# Patient Record
Sex: FEMALE | Race: WHITE | NOT HISPANIC OR LATINO | Employment: OTHER | URBAN - METROPOLITAN AREA
[De-identification: names, ages, dates, MRNs, and addresses within clinical notes are randomized per-mention and may not be internally consistent; named-entity substitution may affect disease eponyms.]

---

## 2017-01-05 ENCOUNTER — TELEPHONE (OUTPATIENT)
Dept: INTERNAL MEDICINE | Facility: CLINIC | Age: 67
End: 2017-01-05

## 2017-01-05 NOTE — TELEPHONE ENCOUNTER
----- Message from Safia Sanches sent at 1/5/2017 10:29 AM CST -----  Contact: self  Pt returning phone call. Please contact the pt at [phone]. Thanks!

## 2017-01-17 ENCOUNTER — OFFICE VISIT (OUTPATIENT)
Dept: INTERNAL MEDICINE | Facility: CLINIC | Age: 67
End: 2017-01-17
Payer: MEDICARE

## 2017-01-17 VITALS
OXYGEN SATURATION: 96 % | TEMPERATURE: 98 F | HEIGHT: 59 IN | HEART RATE: 83 BPM | RESPIRATION RATE: 17 BRPM | BODY MASS INDEX: 37.36 KG/M2 | DIASTOLIC BLOOD PRESSURE: 82 MMHG | WEIGHT: 185.31 LBS | SYSTOLIC BLOOD PRESSURE: 120 MMHG

## 2017-01-17 DIAGNOSIS — J44.9 CHRONIC OBSTRUCTIVE PULMONARY DISEASE, UNSPECIFIED COPD TYPE: ICD-10-CM

## 2017-01-17 DIAGNOSIS — I10 HYPERTENSION, ESSENTIAL: ICD-10-CM

## 2017-01-17 DIAGNOSIS — N18.30 CKD (CHRONIC KIDNEY DISEASE), STAGE III: ICD-10-CM

## 2017-01-17 DIAGNOSIS — H25.9 SENILE CATARACT OF LEFT EYE, UNSPECIFIED AGE-RELATED CATARACT TYPE: Primary | ICD-10-CM

## 2017-01-17 PROCEDURE — 99999 PR PBB SHADOW E&M-EST. PATIENT-LVL III: CPT | Mod: PBBFAC,,, | Performed by: INTERNAL MEDICINE

## 2017-01-17 PROCEDURE — 99213 OFFICE O/P EST LOW 20 MIN: CPT | Mod: PBBFAC | Performed by: INTERNAL MEDICINE

## 2017-01-17 PROCEDURE — 99214 OFFICE O/P EST MOD 30 MIN: CPT | Mod: S$PBB,,, | Performed by: INTERNAL MEDICINE

## 2017-01-17 NOTE — PROGRESS NOTES
"Subjective:       Patient ID: Dayami Mina is a 66 y.o. female.    Chief Complaint: Bronchitis and Follow-up    HPI Comments: Medical clearance for cataract surgery 1/30/17    HPI: 65 y/o w/ HTN CKD III chronic lower back pain presents for clearance for elective left cataract surgery scheduled 1/30/17. Seen one month ago with persistant cough and wheezing has resolved now back to baseline no exertional dyspnea not using albuterol regularlly. No LE edema or CP    Review of Systems   Constitutional: Negative for activity change, appetite change, fatigue, fever and unexpected weight change.   HENT: Negative for ear pain, rhinorrhea and sore throat.    Eyes: Negative for discharge and visual disturbance.   Respiratory: Negative for chest tightness, shortness of breath and wheezing.    Cardiovascular: Negative for chest pain, palpitations and leg swelling.   Gastrointestinal: Negative for abdominal pain, constipation and diarrhea.   Endocrine: Negative for cold intolerance and heat intolerance.   Genitourinary: Negative for dysuria and hematuria.   Musculoskeletal: Negative for joint swelling and neck stiffness.   Skin: Negative for rash.   Neurological: Negative for dizziness, syncope, weakness and headaches.   Psychiatric/Behavioral: Negative for suicidal ideas.       Objective:     Vitals:    01/17/17 1119   BP: 120/82   BP Location: Left arm   Patient Position: Sitting   BP Method: Manual   Pulse: 83   Resp: 17   Temp: 98 °F (36.7 °C)   TempSrc: Oral   SpO2: 96%   Weight: 84.1 kg (185 lb 4.8 oz)   Height: 4' 11" (1.499 m)          Physical Exam   Constitutional: She is oriented to person, place, and time. She appears well-developed and well-nourished.   HENT:   Head: Normocephalic and atraumatic.   Eyes: Conjunctivae are normal. Pupils are equal, round, and reactive to light.   Neck: Normal range of motion.   Cardiovascular: Normal rate and regular rhythm.  Exam reveals no gallop and no friction rub.    No " murmur heard.  Pulmonary/Chest: Effort normal and breath sounds normal. She has no wheezes. She has no rales.   Abdominal: Soft. Bowel sounds are normal. There is no tenderness. There is no rebound and no guarding.   Musculoskeletal: Normal range of motion. She exhibits no edema or tenderness.   Neurological: She is alert and oriented to person, place, and time. No cranial nerve deficit.   Skin: Skin is warm and dry.   Psychiatric: She has a normal mood and affect.       Assessment:       1. Senile cataract of left eye, unspecified age-related cataract type    2. Hypertension, essential    3. CKD (chronic kidney disease), stage III    4. Chronic obstructive pulmonary disease, unspecified COPD type        Plan:    1. Medically optimized intermediate risk for low risk procedure no further CV testing indicated    2/3. BP at goal continue avoidance of NSAID's repeat labs in three months    4. Stable no wheezing today

## 2017-01-30 ENCOUNTER — OFFICE VISIT (OUTPATIENT)
Dept: FAMILY MEDICINE | Facility: CLINIC | Age: 67
End: 2017-01-30
Payer: MEDICARE

## 2017-01-30 VITALS
WEIGHT: 182.44 LBS | DIASTOLIC BLOOD PRESSURE: 78 MMHG | SYSTOLIC BLOOD PRESSURE: 159 MMHG | TEMPERATURE: 98 F | HEART RATE: 79 BPM | OXYGEN SATURATION: 96 % | HEIGHT: 59 IN | BODY MASS INDEX: 36.78 KG/M2

## 2017-01-30 DIAGNOSIS — J40 BRONCHITIS: ICD-10-CM

## 2017-01-30 DIAGNOSIS — J44.9 CHRONIC OBSTRUCTIVE PULMONARY DISEASE, UNSPECIFIED COPD TYPE: Primary | ICD-10-CM

## 2017-01-30 PROCEDURE — 99214 OFFICE O/P EST MOD 30 MIN: CPT | Mod: PBBFAC,PO | Performed by: NURSE PRACTITIONER

## 2017-01-30 PROCEDURE — 99999 PR PBB SHADOW E&M-EST. PATIENT-LVL IV: CPT | Mod: PBBFAC,,, | Performed by: NURSE PRACTITIONER

## 2017-01-30 PROCEDURE — 99214 OFFICE O/P EST MOD 30 MIN: CPT | Mod: S$PBB,,, | Performed by: NURSE PRACTITIONER

## 2017-01-30 RX ORDER — METHYLPREDNISOLONE 4 MG/1
TABLET ORAL
Qty: 1 PACKAGE | Refills: 0 | Status: SHIPPED | OUTPATIENT
Start: 2017-01-30 | End: 2017-02-20

## 2017-01-30 RX ORDER — BENZONATATE 100 MG/1
100 CAPSULE ORAL 3 TIMES DAILY PRN
Qty: 30 CAPSULE | Refills: 0 | Status: SHIPPED | OUTPATIENT
Start: 2017-01-30 | End: 2017-02-09

## 2017-01-30 RX ORDER — CODEINE PHOSPHATE AND GUAIFENESIN 10; 100 MG/5ML; MG/5ML
5 SOLUTION ORAL 3 TIMES DAILY PRN
Qty: 180 ML | Refills: 0 | Status: SHIPPED | OUTPATIENT
Start: 2017-01-30 | End: 2017-02-09

## 2017-01-30 RX ORDER — TRIAMCINOLONE ACETONIDE 40 MG/ML
40 INJECTION, SUSPENSION INTRA-ARTICULAR; INTRAMUSCULAR
Status: COMPLETED | OUTPATIENT
Start: 2017-01-30 | End: 2017-01-30

## 2017-01-30 RX ADMIN — TRIAMCINOLONE ACETONIDE 40 MG: 40 INJECTION, SUSPENSION INTRA-ARTICULAR; INTRAMUSCULAR at 02:01

## 2017-01-30 NOTE — MR AVS SNAPSHOT
[facility]  [address]  [place] [address]  Phone: [phone]  Fax: [phone]                  Dayami Mina   2017 2:00 PM   Office Visit    Description:  Female : 1950   Provider:  ROSMERY GARCIA IN   Department:  [facility]           Reason for Visit     COUGH & CONGESTION           Diagnoses this Visit        Comments    Chronic obstructive pulmonary disease, unspecified COPD type    -  Primary     Bronchitis                To Do List           Goals (5 Years of Data)     None      Follow-Up and Disposition     Return if symptoms worsen or fail to improve.       These Medications        Disp Refills Start End    guaifenesin-codeine 100-10 mg/5 ml (TUSSI-ORGANIDIN NR)  mg/5 mL syrup 180 mL 0 2017    Take 5 mLs by mouth 3 (three) times daily as needed for Cough. - Oral    Pharmacy: [address] Ph #: [phone]       methylPREDNISolone (MEDROL DOSEPACK) 4 mg tablet 1 Package 0 2017    use as directed    Pharmacy: [address] Ph #: [phone]       benzonatate (TESSALON) 100 MG capsule 30 capsule 0 2017    Take 1 capsule (100 mg total) by mouth 3 (three) times daily as needed. - Oral    Pharmacy: [address] Ph #: [phone]         [facility]     [facility] Care Line -  Assistance  Registered nurses in the [facility] provide clinical advisement, health education, appointment booking, and other advisory services.  Call for this free service at [phone].             Medications           Message regarding Medications     Verify the changes and/or additions to your medication regime listed below are the same as discussed with your clinician today.  If any of these changes or additions are  incorrect, please notify your healthcare provider.        START taking these NEW medications        Refills    guaifenesin-codeine 100-10 mg/5 ml (TUSSI-ORGANIDIN NR)  mg/5 mL syrup 0    Sig: Take 5 mLs by mouth 3 (three) times daily as needed for Cough.    Class: Normal    Route: Oral    methylPREDNISolone (MEDROL DOSEPACK) 4 mg tablet 0    Sig: use as directed    Class: Normal    benzonatate (TESSALON) 100 MG capsule 0    Sig: Take 1 capsule (100 mg total) by mouth 3 (three) times daily as needed.    Class: Normal    Route: Oral      These medications were administered today        Dose Freq    triamcinolone acetonide injection 40 mg 40 mg Clinic/HOD 1 time    Sig: Inject 1 mL (40 mg total) into the muscle one time.    Class: Normal    Route: Intramuscular      STOP taking these medications     ferrous sulfate 325 mg (65 mg iron) Tab tablet Take 1 tablet (325 mg total) by mouth 2 (two) times daily.    omega-3 acid ethyl esters (LOVAZA) 1 gram capsule Take 2 g by mouth 2 (two) times daily.           Verify that the below list of medications is an accurate representation of the medications you are currently taking.  If none reported, the list may be blank. If incorrect, please contact your healthcare provider. Carry this list with you in case of emergency.           Current Medications     albuterol 90 mcg/actuation inhaler Inhale 2 puffs into the lungs every 6 (six) hours as needed for Wheezing.    allopurinol (ZYLOPRIM) 100 MG tablet Take 100 mg by mouth once daily.     aspirin (ECOTRIN) 81 MG EC tablet Take 81 mg by mouth once daily.    escitalopram oxalate (LEXAPRO) 20 MG tablet Take 1 tablet (20 mg total) by mouth once daily.    esomeprazole (NEXIUM) 20 MG capsule Take 20 mg by mouth before breakfast.    estradiol (ESTRACE) 0.5 MG tablet Take 0.5 mg by mouth once daily.    gabapentin (NEURONTIN) 100 MG capsule     lancets Misc 1 lancet by Misc.(Non-Drug; Combo Route) route once daily.    LIVALO 2 mg Tab  "tablet TAKE 1 TABLET THREE TIMES WEEKLY    loratadine (CLARITIN) 10 mg tablet Take 10 mg by mouth once daily.    losartan (COZAAR) 50 MG tablet Take 50 mg by mouth 2 (two) times daily.    tramadol (ULTRAM) 50 mg tablet Take 50 mg by mouth every 6 (six) hours as needed for Pain.    benzonatate (TESSALON) 100 MG capsule Take 1 capsule (100 mg total) by mouth 3 (three) times daily as needed.    blood sugar diagnostic Strp Uses Freestyle meter to monitor readings once daily    epinastine 0.05 % ophthalmic solution Place 2 drops into both eyes once daily.     guaifenesin-codeine 100-10 mg/5 ml (TUSSI-ORGANIDIN NR)  mg/5 mL syrup Take 5 mLs by mouth 3 (three) times daily as needed for Cough.    methylPREDNISolone (MEDROL DOSEPACK) 4 mg tablet use as directed           Clinical Reference Information           Vital Signs - Last Recorded  Most recent update: 1/30/2017  2:13 PM by Oksana Troncoso    BP Pulse Temp Ht Wt SpO2    (!) 159/78 (BP Location: Left arm, Patient Position: Sitting, BP Method: Manual) 79 97.9 °F (36.6 °C) (Oral) 4' 11" (1.499 m) 82.7 kg (182 lb 6.9 oz) 96%    BMI                36.85 kg/m2          Blood Pressure          Most Recent Value    BP  (!)  159/78      Allergies as of 1/30/2017     Sulfa (Sulfonamide Antibiotics)      Immunizations Administered on Date of Encounter - 1/30/2017     None      Instructions      Bronchitis, Antibiotic Treatment (Adult)    Bronchitis is an infection of the air passages (bronchial tubes) in your lungs. It often occurs when you have a cold. This illness is contagious during the first few days and is spread through the air by coughing and sneezing, or by direct contact (touching the sick person and then touching your own eyes, nose, or mouth).  Symptoms of bronchitis include cough with mucus (phlegm) and low-grade fever. Bronchitis usually lasts 7 to 14 days. Mild cases can be treated with simple home remedies. More severe infection is treated with an " antibiotic.  Home care  Follow these guidelines when caring for yourself at home:  · If your symptoms are severe, rest at home for the first 2 to 3 days. When you go back to your usual activities, don't let yourself get too tired.  · Do not smoke. Also avoid being exposed to secondhand smoke.  · You may use over-the-counter medicines to control fever or pain, unless another medicine was prescribed. (Note: If you have chronic liver or kidney disease or have ever had a stomach ulcer or gastrointestinal bleeding, talk with your healthcare provider before using these medicines. Also talk to your provider if you are taking medicine to prevent blood clots.) Aspirin should never be given to anyone younger than 18 years of age who is ill with a viral infection or fever. It may cause severe liver or brain damage.  · Your appetite may be poor, so a light diet is fine. Avoid dehydration by drinking 6 to 8 glasses of fluids per day (such as water, soft drinks, sports drinks, juices, tea, or soup). Extra fluids will help loosen secretions in the nose and lungs.  · Over-the-counter cough, cold, and sore-throat medicines will not shorten the length of the illness, but they may be helpful to reduce symptoms. (Note: Do not use decongestants if you have high blood pressure.)  · Finish all antibiotic medicine. Do this even if you are feeling better after only a few days.  Follow-up care  Follow up with your healthcare provider, or as advised. If you had an X-ray or ECG (electrocardiogram), a specialist will review it. You will be notified of any new findings that may affect your care.  Note: If you are age 65 or older, or if you have a chronic lung disease or condition that affects your immune system, or you smoke, talk to your healthcare provider about having pneumococcal vaccinations and a yearly influenza vaccination (flu shot).  When to seek medical advice  Call your healthcare provider right away if any of these occur:  · Fever  of 100.4°F (38°C) or higher  · Coughing up increased amounts of colored sputum  · Weakness, drowsiness, headache, facial pain, ear pain, or a stiff neck  Call 911, or get immediate medical care  Contact emergency services right away if any of these occur.  · Coughing up blood  · Worsening weakness, drowsiness, headache, or stiff neck  · Trouble breathing, wheezing, or pain with breathing  © 5832-9282 [employer]. [address] [place] [address], [place] [address]. All rights reserved. This information is not intended as a substitute for professional medical care. Always follow your healthcare professional's instructions.

## 2017-01-30 NOTE — PATIENT INSTRUCTIONS
Bronchitis, Antibiotic Treatment (Adult)    Bronchitis is an infection of the air passages (bronchial tubes) in your lungs. It often occurs when you have a cold. This illness is contagious during the first few days and is spread through the air by coughing and sneezing, or by direct contact (touching the sick person and then touching your own eyes, nose, or mouth).  Symptoms of bronchitis include cough with mucus (phlegm) and low-grade fever. Bronchitis usually lasts 7 to 14 days. Mild cases can be treated with simple home remedies. More severe infection is treated with an antibiotic.  Home care  Follow these guidelines when caring for yourself at home:  · If your symptoms are severe, rest at home for the first 2 to 3 days. When you go back to your usual activities, don't let yourself get too tired.  · Do not smoke. Also avoid being exposed to secondhand smoke.  · You may use over-the-counter medicines to control fever or pain, unless another medicine was prescribed. (Note: If you have chronic liver or kidney disease or have ever had a stomach ulcer or gastrointestinal bleeding, talk with your healthcare provider before using these medicines. Also talk to your provider if you are taking medicine to prevent blood clots.) Aspirin should never be given to anyone younger than 18 years of age who is ill with a viral infection or fever. It may cause severe liver or brain damage.  · Your appetite may be poor, so a light diet is fine. Avoid dehydration by drinking 6 to 8 glasses of fluids per day (such as water, soft drinks, sports drinks, juices, tea, or soup). Extra fluids will help loosen secretions in the nose and lungs.  · Over-the-counter cough, cold, and sore-throat medicines will not shorten the length of the illness, but they may be helpful to reduce symptoms. (Note: Do not use decongestants if you have high blood pressure.)  · Finish all antibiotic medicine. Do this even if you are feeling better after only a  few days.  Follow-up care  Follow up with your healthcare provider, or as advised. If you had an X-ray or ECG (electrocardiogram), a specialist will review it. You will be notified of any new findings that may affect your care.  Note: If you are age 65 or older, or if you have a chronic lung disease or condition that affects your immune system, or you smoke, talk to your healthcare provider about having pneumococcal vaccinations and a yearly influenza vaccination (flu shot).  When to seek medical advice  Call your healthcare provider right away if any of these occur:  · Fever of 100.4°F (38°C) or higher  · Coughing up increased amounts of colored sputum  · Weakness, drowsiness, headache, facial pain, ear pain, or a stiff neck  Call 911, or get immediate medical care  Contact emergency services right away if any of these occur.  · Coughing up blood  · Worsening weakness, drowsiness, headache, or stiff neck  · Trouble breathing, wheezing, or pain with breathing  © 2050-8077 [employer]. [address] [place] [address], [place] [address]. All rights reserved. This information is not intended as a substitute for professional medical care. Always follow your healthcare professional's instructions.

## 2017-01-30 NOTE — PROGRESS NOTES
Subjective:       Patient ID: Dayami Mina is a 66 y.o. female.    Chief Complaint: COUGH & CONGESTION (X1D)    Cough   This is a new problem. The current episode started yesterday. The problem has been gradually worsening. The problem occurs every few minutes. The cough is productive of sputum. Associated symptoms include ear congestion, headaches, nasal congestion, postnasal drip, rhinorrhea and a sore throat. Pertinent negatives include no chest pain, chills, ear pain, fever, hemoptysis, myalgias, shortness of breath, sweats, weight loss or wheezing. Nothing aggravates the symptoms. She has tried nothing for the symptoms. The treatment provided no relief.     Review of Systems   Constitutional: Negative for chills, fever and weight loss.   HENT: Positive for postnasal drip, rhinorrhea and sore throat. Negative for ear pain.    Respiratory: Positive for cough. Negative for hemoptysis, shortness of breath and wheezing.    Cardiovascular: Negative for chest pain.   Musculoskeletal: Negative for myalgias.   Neurological: Positive for headaches.       Objective:      Physical Exam   Constitutional: She is oriented to person, place, and time. She appears well-developed and well-nourished. No distress.   HENT:   Head: Normocephalic and atraumatic.   Right Ear: Tympanic membrane, external ear and ear canal normal.   Left Ear: Tympanic membrane, external ear and ear canal normal.   Nose: Mucosal edema present.   Mouth/Throat: Oropharynx is clear and moist. Mucous membranes are not dry. No oropharyngeal exudate, posterior oropharyngeal edema or posterior oropharyngeal erythema.   Cardiovascular: Normal rate and regular rhythm.    No murmur heard.  Pulmonary/Chest: Effort normal. She has no wheezes. She has rhonchi. She has no rales.   Lymphadenopathy:     She has no cervical adenopathy.   Neurological: She is alert and oriented to person, place, and time.   Skin: Skin is warm and dry.   Psychiatric: She has a  normal mood and affect.   Nursing note and vitals reviewed.      Assessment:       1. Chronic obstructive pulmonary disease, unspecified COPD type    2. Bronchitis        Plan:       Dayami was seen today for cough & congestion.    Diagnoses and all orders for this visit:    Chronic obstructive pulmonary disease, unspecified COPD type  -     guaifenesin-codeine 100-10 mg/5 ml (TUSSI-ORGANIDIN NR)  mg/5 mL syrup; Take 5 mLs by mouth 3 (three) times daily as needed for Cough.  -     methylPREDNISolone (MEDROL DOSEPACK) 4 mg tablet; use as directed  -     triamcinolone acetonide injection 40 mg; Inject 1 mL (40 mg total) into the muscle one time.  -     benzonatate (TESSALON) 100 MG capsule; Take 1 capsule (100 mg total) by mouth 3 (three) times daily as needed.    Bronchitis  -     guaifenesin-codeine 100-10 mg/5 ml (TUSSI-ORGANIDIN NR)  mg/5 mL syrup; Take 5 mLs by mouth 3 (three) times daily as needed for Cough.  -     methylPREDNISolone (MEDROL DOSEPACK) 4 mg tablet; use as directed  -     triamcinolone acetonide injection 40 mg; Inject 1 mL (40 mg total) into the muscle one time.  -     benzonatate (TESSALON) 100 MG capsule; Take 1 capsule (100 mg total) by mouth 3 (three) times daily as needed.    Pt has been given instructions populated from [employer] database and has verbalized understanding of the after visit summary and information contained wherein.     Follow up with a primary care provider. May go to ER for acute shortness of breath, lightheadedness, fever, or any other emergent complaints or changes in condition.

## 2017-02-27 ENCOUNTER — TELEPHONE (OUTPATIENT)
Dept: FAMILY MEDICINE | Facility: CLINIC | Age: 67
End: 2017-02-27

## 2017-02-27 NOTE — TELEPHONE ENCOUNTER
----- Message from Ti Howell sent at 2/27/2017  9:37 AM CST -----  Contact: Self/[phone]  Refill:  escitalopram oxalate (LEXAPRO) 20 MG tablet    Thank you.

## 2017-03-02 ENCOUNTER — PATIENT MESSAGE (OUTPATIENT)
Dept: WOUND CARE | Facility: HOSPITAL | Age: 67
End: 2017-03-02

## 2017-03-02 DIAGNOSIS — F33.42 RECURRENT MAJOR DEPRESSIVE DISORDER, IN FULL REMISSION: Primary | ICD-10-CM

## 2017-03-03 RX ORDER — ESCITALOPRAM OXALATE 20 MG/1
20 TABLET ORAL DAILY
Qty: 90 TABLET | Refills: 3 | Status: SHIPPED | OUTPATIENT
Start: 2017-03-03 | End: 2018-03-07 | Stop reason: SDUPTHER

## 2017-03-06 ENCOUNTER — TELEPHONE (OUTPATIENT)
Dept: FAMILY MEDICINE | Facility: CLINIC | Age: 67
End: 2017-03-06

## 2017-03-06 NOTE — TELEPHONE ENCOUNTER
----- Message from Berenice Ling sent at 3/6/2017  2:44 PM CST -----  Contact: self  Pt called to schedule a pre op clearance. Please advise. [phone]

## 2017-03-07 ENCOUNTER — OFFICE VISIT (OUTPATIENT)
Dept: FAMILY MEDICINE | Facility: CLINIC | Age: 67
End: 2017-03-07
Payer: MEDICARE

## 2017-03-07 VITALS
WEIGHT: 181 LBS | HEIGHT: 59 IN | SYSTOLIC BLOOD PRESSURE: 132 MMHG | RESPIRATION RATE: 17 BRPM | DIASTOLIC BLOOD PRESSURE: 84 MMHG | OXYGEN SATURATION: 97 % | BODY MASS INDEX: 36.49 KG/M2 | TEMPERATURE: 97 F | HEART RATE: 76 BPM

## 2017-03-07 DIAGNOSIS — Z01.818 PREOP EXAMINATION: ICD-10-CM

## 2017-03-07 PROCEDURE — 99213 OFFICE O/P EST LOW 20 MIN: CPT | Mod: PBBFAC,PN | Performed by: NURSE PRACTITIONER

## 2017-03-07 PROCEDURE — 99214 OFFICE O/P EST MOD 30 MIN: CPT | Mod: S$PBB,,, | Performed by: NURSE PRACTITIONER

## 2017-03-07 PROCEDURE — 99999 PR PBB SHADOW E&M-EST. PATIENT-LVL III: CPT | Mod: PBBFAC,,, | Performed by: NURSE PRACTITIONER

## 2017-03-07 RX ORDER — PREDNISOLONE ACETATE 10 MG/ML
SUSPENSION/ DROPS OPHTHALMIC
COMMUNITY
Start: 2017-02-09 | End: 2017-04-21 | Stop reason: ALTCHOICE

## 2017-03-07 RX ORDER — MOXIFLOXACIN HCL 0.5 %
DROPS OPHTHALMIC (EYE)
COMMUNITY
Start: 2017-02-09 | End: 2017-04-21 | Stop reason: ALTCHOICE

## 2017-03-07 RX ORDER — BUTALBITAL, ACETAMINOPHEN AND CAFFEINE 50; 325; 40 MG/1; MG/1; MG/1
TABLET ORAL DAILY PRN
COMMUNITY
Start: 2017-01-18 | End: 2021-03-10 | Stop reason: SDUPTHER

## 2017-03-07 NOTE — PROGRESS NOTES
Subjective:       Patient ID: Dayami Mina is a 66 y.o. female.    Chief Complaint: Pre-op Exam    HPI Ms Mina is here for a preop exam for prolapsed rectum.  She has undergone surgery with general anesthesia without any untoward effects.  She denies any SOB or CP when walking.  She states her sugars run in the 80's.    Review of Systems   Constitutional: Negative for fever.   Respiratory: Negative.    Cardiovascular: Negative.        Objective:      Physical Exam   Constitutional: She is oriented to person, place, and time. She appears well-nourished. She does not appear ill. No distress.   Cardiovascular: Normal rate, regular rhythm and normal heart sounds.  Exam reveals no friction rub.    No murmur heard.  Pulmonary/Chest: Effort normal and breath sounds normal. No respiratory distress. She has no decreased breath sounds. She has no wheezes. She has no rhonchi. She has no rales.   Neurological: She is alert and oriented to person, place, and time.   Skin: Skin is warm and dry. No erythema.   Psychiatric: She has a normal mood and affect. Her behavior is normal.   Vitals reviewed.      Assessment:       1. Preop examination        Plan:       Preop examination    Ms Mina has minor clinical indicators, she is cleared to proceed at minimal risk.    F/u post up

## 2017-03-07 NOTE — PATIENT INSTRUCTIONS
Follow up with primary care provider after surgery  Go to ER for new worse or concerning symptoms  Using a Blood Sugar Log    You have diabetes. This means your body has trouble regulating a sugar called glucose. To help manage your diabetes, youll need to check your blood sugar level as directed by your healthcare provider. Keeping a log of your blood sugar levels will help you track your blood sugar readings. Its a simple and easy way to see how well you are controlling your diabetes.  Checking your blood sugar level  You can check your blood sugar level with a blood glucose meter. Youll first prick the side of your finger with a tiny lancet to draw a tiny drop of blood onto the test strip. Some glucose meters let you use another place on your body to test. But these other places should not be used in some cases as they may be inaccurate. Follow the instructions for your glucose meter. And talk with your healthcare provider before doing the test on other places.  The strip goes into the meter first, then a drop of blood is placed on the tip of the strip. The meter then shows a reading that tells you the level of your blood sugar. Your readings should be in your target range as often as possible. This means not too high or too low. Staying in this range helps lower your risk for complications. Your healthcare provider will help you figure out the target range that is best for you.  Tracking your readings  Every time you check your blood sugar, use your log to keep track of your readings. Your meter will also probably have a memory feature that your healthcare provider can check at your next visit. You may be advised by your healthcare provider to check your blood sugar in the morning, at bedtime, and before and after meals. Be sure to write down all of your numbers. Also use your log to record things that might have affected your blood sugar. Some examples include being sick, certain medicines, being physically  active, feeling stressed, or skipping meals.   Lessons learned from your readings  Tracking your blood sugar readings helps you see patterns. These patterns tell you how your actions affect your blood sugar. For instance, you may have higher numbers after eating certain foods or lower numbers after exercise. They just help you understand how to stay in your target range more often, so that your diabetes remains in good control.  Sharing your log with your healthcare team  Bring your blood sugar log and glucose meter with you to all of your healthcare appointments. This can help your healthcare team make changes to your treatment plan, if needed. This may involve making changes in what you eat, what medicines you take, or how much you exercise.  To learn more  The resources below can help you learn more:  · American Diabetes Association 830-352-2411 www.diabetes.org  · [employer] 171-129-0851 www.lighthouse.org  · [employer] [place] 205-404-8805 www.nei.nih.gov  · [employer] 842-365-7583 www.hormone.org  Date Last Reviewed: 5/1/2016  © 3212-8130 [employer]. [address] [place] [address], [place] [address]. All rights reserved. This information is not intended as a substitute for professional medical care. Always follow your healthcare professional's instructions.

## 2017-04-21 ENCOUNTER — OFFICE VISIT (OUTPATIENT)
Dept: FAMILY MEDICINE | Facility: CLINIC | Age: 67
End: 2017-04-21
Payer: MEDICARE

## 2017-04-21 VITALS
OXYGEN SATURATION: 97 % | RESPIRATION RATE: 16 BRPM | WEIGHT: 183.44 LBS | TEMPERATURE: 98 F | HEIGHT: 59 IN | SYSTOLIC BLOOD PRESSURE: 126 MMHG | BODY MASS INDEX: 36.98 KG/M2 | HEART RATE: 88 BPM | DIASTOLIC BLOOD PRESSURE: 78 MMHG

## 2017-04-21 DIAGNOSIS — L03.032 CELLULITIS OF TOE OF LEFT FOOT: Primary | ICD-10-CM

## 2017-04-21 PROCEDURE — 99213 OFFICE O/P EST LOW 20 MIN: CPT | Mod: PBBFAC,PN | Performed by: INTERNAL MEDICINE

## 2017-04-21 PROCEDURE — 99999 PR PBB SHADOW E&M-EST. PATIENT-LVL III: CPT | Mod: PBBFAC,,, | Performed by: INTERNAL MEDICINE

## 2017-04-21 PROCEDURE — 99214 OFFICE O/P EST MOD 30 MIN: CPT | Mod: S$PBB,,, | Performed by: INTERNAL MEDICINE

## 2017-04-21 RX ORDER — DOCUSATE SODIUM 100 MG/1
CAPSULE, LIQUID FILLED ORAL
Refills: 0 | COMMUNITY
Start: 2017-04-05 | End: 2017-04-21

## 2017-04-21 RX ORDER — IBUPROFEN 600 MG/1
TABLET ORAL
Refills: 0 | Status: ON HOLD | COMMUNITY
Start: 2017-04-05 | End: 2017-12-03 | Stop reason: HOSPADM

## 2017-04-21 RX ORDER — OXYCODONE AND ACETAMINOPHEN 5; 325 MG/1; MG/1
TABLET ORAL
Refills: 0 | COMMUNITY
Start: 2017-04-05 | End: 2017-06-27 | Stop reason: ALTCHOICE

## 2017-04-21 RX ORDER — CLINDAMYCIN HYDROCHLORIDE 300 MG/1
300 CAPSULE ORAL 3 TIMES DAILY
Qty: 21 CAPSULE | Refills: 0 | Status: SHIPPED | OUTPATIENT
Start: 2017-04-21 | End: 2017-06-27 | Stop reason: ALTCHOICE

## 2017-04-21 NOTE — PROGRESS NOTES
"Subjective:       Patient ID: Dayami Mina is a 66 y.o. female.    Chief Complaint: Toe Pain (left 5th digit with redness and drainage )    HPI Comments: Painful toe ulcer    HPI: 67 y/o w/ one week historyof a "corn" to medial fifth left toe. Used a Dr. Scholls bandage yesterday when removed today had drainage and pain. No erythema no fevers chills. She does have distant history of left foot fracture with internal hardware. No motor weakness.     Review of Systems   Constitutional: Negative for activity change, appetite change, fatigue, fever and unexpected weight change.   HENT: Negative for ear pain, rhinorrhea and sore throat.    Eyes: Negative for discharge and visual disturbance.   Respiratory: Negative for chest tightness, shortness of breath and wheezing.    Cardiovascular: Negative for chest pain, palpitations and leg swelling.   Gastrointestinal: Negative for abdominal pain, constipation and diarrhea.   Endocrine: Negative for cold intolerance and heat intolerance.   Genitourinary: Negative for dysuria and hematuria.   Musculoskeletal: Negative for joint swelling and neck stiffness.   Skin: Positive for wound. Negative for rash.   Neurological: Negative for dizziness, syncope, weakness and headaches.   Psychiatric/Behavioral: Negative for suicidal ideas.       Objective:     Vitals:    04/21/17 1450   BP: 126/78   BP Location: Left arm   Patient Position: Sitting   BP Method: Manual   Pulse: 88   Resp: 16   Temp: 97.7 °F (36.5 °C)   TempSrc: Oral   SpO2: 97%   Weight: 83.2 kg (183 lb 6.8 oz)   Height: 4' 11" (1.499 m)          Physical Exam   Constitutional: She is oriented to person, place, and time. She appears well-developed and well-nourished.   HENT:   Head: Normocephalic and atraumatic.   Eyes: Conjunctivae are normal. Pupils are equal, round, and reactive to light.   Neck: Normal range of motion.   Cardiovascular: Normal rate and regular rhythm.  Exam reveals no gallop and no friction rub. "    No murmur heard.  Pulmonary/Chest: Effort normal and breath sounds normal. She has no wheezes. She has no rales.   Abdominal: Soft. Bowel sounds are normal. There is no tenderness. There is no rebound and no guarding.   Musculoskeletal: Normal range of motion. She exhibits no edema or tenderness.   Neurological: She is alert and oriented to person, place, and time. No cranial nerve deficit.   Skin: Skin is warm and dry. There is erythema.   Medial fifth toe on left shows 1cm are of erythema with central opening to dermal layer no exposed structures no surrounding induration or expressible discharge   Psychiatric: She has a normal mood and affect.       Assessment:       1. Cellulitis of toe of left foot        Plan:        with underlying hardware would like coverage for staph, clindamycin tid x seven days warm water soaks nightly keep skin dry avoid cutting or puncturing skin.

## 2017-06-27 ENCOUNTER — OFFICE VISIT (OUTPATIENT)
Dept: FAMILY MEDICINE | Facility: CLINIC | Age: 67
End: 2017-06-27
Payer: MEDICARE

## 2017-06-27 VITALS
BODY MASS INDEX: 36.01 KG/M2 | WEIGHT: 178.63 LBS | RESPIRATION RATE: 17 BRPM | OXYGEN SATURATION: 97 % | HEIGHT: 59 IN | HEART RATE: 84 BPM | TEMPERATURE: 98 F | DIASTOLIC BLOOD PRESSURE: 84 MMHG | SYSTOLIC BLOOD PRESSURE: 142 MMHG

## 2017-06-27 DIAGNOSIS — N18.30 CKD (CHRONIC KIDNEY DISEASE), STAGE III: Primary | ICD-10-CM

## 2017-06-27 DIAGNOSIS — R68.89 OTHER GENERAL SYMPTOMS AND SIGNS: ICD-10-CM

## 2017-06-27 DIAGNOSIS — R73.02 GLUCOSE INTOLERANCE (IMPAIRED GLUCOSE TOLERANCE): ICD-10-CM

## 2017-06-27 PROCEDURE — 1126F AMNT PAIN NOTED NONE PRSNT: CPT | Mod: ,,, | Performed by: INTERNAL MEDICINE

## 2017-06-27 PROCEDURE — 99213 OFFICE O/P EST LOW 20 MIN: CPT | Mod: PBBFAC,PN | Performed by: INTERNAL MEDICINE

## 2017-06-27 PROCEDURE — 99999 PR PBB SHADOW E&M-EST. PATIENT-LVL III: CPT | Mod: PBBFAC,,, | Performed by: INTERNAL MEDICINE

## 2017-06-27 PROCEDURE — 99214 OFFICE O/P EST MOD 30 MIN: CPT | Mod: S$PBB,,, | Performed by: INTERNAL MEDICINE

## 2017-06-27 PROCEDURE — 1159F MED LIST DOCD IN RCRD: CPT | Mod: ,,, | Performed by: INTERNAL MEDICINE

## 2017-06-27 RX ORDER — OXYBUTYNIN CHLORIDE 5 MG/1
TABLET ORAL
COMMUNITY
Start: 2017-06-08 | End: 2018-01-22

## 2017-06-27 NOTE — PROGRESS NOTES
"Subjective:       Patient ID: aDyami Mina is a 67 y.o. female.    Chief Complaint: Headache; Dizziness; and Fatigue    Sweating x 2 weeks    HPI: 66 y/o reports sensation of feeling flush and sweating x two weeks symptoms can occur at different times of day no associate palpitations or light headed. No exacerbation when going from sitting to standing. No increase urinary frequency denies swelling of lower extremities. Did have NGUYEN to lumbar spine two months ago and since that time has been able to taper down on tramadol from 300mg daily to 150mg daily. No diarrhea or loose stool no syncope no dyspnea or CP    PMHx: bilateral mastectomy for fibrocystic breast with recurrent abscess      Review of Systems   Constitutional: Negative for activity change, appetite change, fatigue, fever and unexpected weight change.   HENT: Negative for ear pain, rhinorrhea and sore throat.    Eyes: Negative for discharge and visual disturbance.   Respiratory: Negative for chest tightness, shortness of breath and wheezing.    Cardiovascular: Negative for chest pain, palpitations and leg swelling.   Gastrointestinal: Negative for abdominal pain, constipation and diarrhea.   Endocrine: Negative for cold intolerance and heat intolerance.   Genitourinary: Negative for dysuria and hematuria.   Musculoskeletal: Negative for joint swelling and neck stiffness.   Skin: Negative for rash.   Neurological: Negative for dizziness, syncope, weakness and headaches.   Psychiatric/Behavioral: Negative for suicidal ideas.       Objective:     Vitals:    06/27/17 1148   BP: (!) 142/84   BP Location: Left arm   Patient Position: Sitting   BP Method: Manual   Pulse: 84   Resp: 17   Temp: 98.1 °F (36.7 °C)   TempSrc: Oral   SpO2: 97%   Weight: 81 kg (178 lb 9.6 oz)   Height: 4' 11" (1.499 m)          Physical Exam   Constitutional: She is oriented to person, place, and time. She appears well-developed and well-nourished.   HENT:   Head: " Normocephalic and atraumatic.   Eyes: Conjunctivae are normal. Pupils are equal, round, and reactive to light.   Neck: Normal range of motion. Neck supple. No JVD present. No thyromegaly present.   Cardiovascular: Normal rate and regular rhythm.  Exam reveals no gallop and no friction rub.    No murmur heard.  No LE swelling   Pulmonary/Chest: Effort normal and breath sounds normal. She has no wheezes. She has no rales.   Abdominal: Soft. Bowel sounds are normal. There is no tenderness. There is no rebound and no guarding.   Musculoskeletal: Normal range of motion. She exhibits no edema or tenderness.   Lymphadenopathy:     She has no cervical adenopathy.   Neurological: She is alert and oriented to person, place, and time. No cranial nerve deficit.   atremulous normal gait negative pronator drift   Skin: Skin is warm and dry.   Psychiatric: She has a normal mood and affect.       Assessment:       1. CKD (chronic kidney disease), stage III    2. Glucose intolerance (impaired glucose tolerance)    3. Other general symptoms and signs         Plan:    1/2/3. With history of pre diabetes POCT glucose within normal range cehck a1c thyroid for other endorcine causes. Check renal functiona dn electrolytes in light of CKD, BP at upper limit of normal but no evidence of volume overload. Short follow up in one week to monitor symtpoms

## 2017-07-07 ENCOUNTER — OFFICE VISIT (OUTPATIENT)
Dept: FAMILY MEDICINE | Facility: CLINIC | Age: 67
End: 2017-07-07
Payer: MEDICARE

## 2017-07-07 ENCOUNTER — PATIENT MESSAGE (OUTPATIENT)
Dept: FAMILY MEDICINE | Facility: CLINIC | Age: 67
End: 2017-07-07

## 2017-07-07 VITALS
BODY MASS INDEX: 36.53 KG/M2 | HEART RATE: 93 BPM | OXYGEN SATURATION: 96 % | SYSTOLIC BLOOD PRESSURE: 142 MMHG | WEIGHT: 181.19 LBS | HEIGHT: 59 IN | RESPIRATION RATE: 18 BRPM | TEMPERATURE: 98 F | DIASTOLIC BLOOD PRESSURE: 84 MMHG

## 2017-07-07 DIAGNOSIS — R00.2 INTERMITTENT PALPITATIONS: ICD-10-CM

## 2017-07-07 DIAGNOSIS — I10 HYPERTENSION, ESSENTIAL: Primary | ICD-10-CM

## 2017-07-07 DIAGNOSIS — R00.2 HEART PALPITATIONS: Primary | ICD-10-CM

## 2017-07-07 PROCEDURE — 1159F MED LIST DOCD IN RCRD: CPT | Mod: ,,, | Performed by: INTERNAL MEDICINE

## 2017-07-07 PROCEDURE — 99214 OFFICE O/P EST MOD 30 MIN: CPT | Mod: S$PBB,,, | Performed by: INTERNAL MEDICINE

## 2017-07-07 PROCEDURE — 1125F AMNT PAIN NOTED PAIN PRSNT: CPT | Mod: ,,, | Performed by: INTERNAL MEDICINE

## 2017-07-07 PROCEDURE — 99213 OFFICE O/P EST LOW 20 MIN: CPT | Mod: PBBFAC,PN | Performed by: INTERNAL MEDICINE

## 2017-07-07 PROCEDURE — 99999 PR PBB SHADOW E&M-EST. PATIENT-LVL III: CPT | Mod: PBBFAC,,, | Performed by: INTERNAL MEDICINE

## 2017-07-07 RX ORDER — METOPROLOL SUCCINATE 50 MG/1
50 TABLET, EXTENDED RELEASE ORAL DAILY
Qty: 30 TABLET | Refills: 2 | Status: SHIPPED | OUTPATIENT
Start: 2017-07-07 | End: 2017-09-21 | Stop reason: SDUPTHER

## 2017-07-10 ENCOUNTER — TELEPHONE (OUTPATIENT)
Dept: FAMILY MEDICINE | Facility: CLINIC | Age: 67
End: 2017-07-10

## 2017-07-10 NOTE — TELEPHONE ENCOUNTER
----- Message from Berenice Ling sent at 7/7/2017 12:35 PM CDT -----  Contact: self  Pt called concerning her cardiology appt. Please advise. [phone] or [phone]

## 2017-07-21 ENCOUNTER — OFFICE VISIT (OUTPATIENT)
Dept: CARDIOLOGY | Facility: CLINIC | Age: 67
End: 2017-07-21
Payer: MEDICARE

## 2017-07-21 VITALS
SYSTOLIC BLOOD PRESSURE: 143 MMHG | BODY MASS INDEX: 36.17 KG/M2 | HEART RATE: 66 BPM | HEIGHT: 59 IN | WEIGHT: 179.44 LBS | DIASTOLIC BLOOD PRESSURE: 73 MMHG | OXYGEN SATURATION: 97 %

## 2017-07-21 DIAGNOSIS — R06.09 DOE (DYSPNEA ON EXERTION): ICD-10-CM

## 2017-07-21 DIAGNOSIS — R07.89 CHEST PAIN, ATYPICAL: ICD-10-CM

## 2017-07-21 DIAGNOSIS — J44.9 CHRONIC OBSTRUCTIVE PULMONARY DISEASE, UNSPECIFIED COPD TYPE: Primary | ICD-10-CM

## 2017-07-21 DIAGNOSIS — R00.2 PALPITATIONS: ICD-10-CM

## 2017-07-21 DIAGNOSIS — I10 HYPERTENSION, ESSENTIAL: ICD-10-CM

## 2017-07-21 DIAGNOSIS — I10 HYPERTENSION: ICD-10-CM

## 2017-07-21 PROBLEM — Z01.818 PREOP EXAMINATION: Status: RESOLVED | Noted: 2017-03-07 | Resolved: 2017-07-21

## 2017-07-21 PROCEDURE — 93005 ELECTROCARDIOGRAM TRACING: CPT | Mod: PBBFAC | Performed by: INTERNAL MEDICINE

## 2017-07-21 PROCEDURE — 99999 PR PBB SHADOW E&M-EST. PATIENT-LVL IV: CPT | Mod: PBBFAC,,, | Performed by: INTERNAL MEDICINE

## 2017-07-21 PROCEDURE — 1126F AMNT PAIN NOTED NONE PRSNT: CPT | Mod: ,,, | Performed by: INTERNAL MEDICINE

## 2017-07-21 PROCEDURE — 93010 ELECTROCARDIOGRAM REPORT: CPT | Mod: S$PBB,,, | Performed by: INTERNAL MEDICINE

## 2017-07-21 PROCEDURE — 99214 OFFICE O/P EST MOD 30 MIN: CPT | Mod: PBBFAC | Performed by: INTERNAL MEDICINE

## 2017-07-21 PROCEDURE — 1159F MED LIST DOCD IN RCRD: CPT | Mod: ,,, | Performed by: INTERNAL MEDICINE

## 2017-07-21 PROCEDURE — 99204 OFFICE O/P NEW MOD 45 MIN: CPT | Mod: S$PBB,,, | Performed by: INTERNAL MEDICINE

## 2017-07-21 NOTE — LETTER
July 21, 2017      Ramana French MD  [address]  [place] [address]           [facility]  [address] [facility] [address]  Phone: [phone]          Patient: Dayami Mina   MR Number: [id number]   YOB: 1950   Date of Visit: 7/21/2017       Dear Dr. Ramana French:    Thank you for referring Dayami Mina to me for evaluation. Attached you will find relevant portions of my assessment and plan of care.    If you have questions, please do not hesitate to call me. I look forward to following Dayami Mina along with you.    Sincerely,    Todd Chapa MD    Enclosure  CC:  No Recipients    If you would like to receive this communication electronically, please contact [email or web address] or [phone] to request more information on [employer] access.    For providers and/or their staff who would like to refer a patient to [facility], please contact us through our one-stop-shop provider referral line, [facility], at [phone].    If you feel you have received this communication in error or would no longer like to receive these types of communications, please e-mail [email or web address]

## 2017-07-21 NOTE — PROGRESS NOTES
Subjective:    Patient ID:  Dayami Mina is a 67 y.o. female who presents for evaluation of Hypertension      HPI     Referred by Dr French  HPI: 68 y/o w/ HTN CKD III presents to follow up sweating and palpitations. Episodes daily lasting few seconds no syncope or dizziness. Labs from [facility] show improved renal function normal blood count LFT's and blood glucose.     [facility] - has not had stress test or echo in years  TSH 3.2 (10/2016)    Palpitations occur daily - he gets a flushed sensation as well  Worsening ARANGO for several months - vague chest discomfort  EKG NSR - ok  Cannot walk on the treadmill due to chronic back issues         Review of Systems   Constitution: Negative for decreased appetite.   HENT: Negative for ear discharge.    Eyes: Negative for blurred vision.   Respiratory: Negative for hemoptysis.    Endocrine: Negative for polyphagia.   Hematologic/Lymphatic: Negative for adenopathy.   Skin: Negative for color change.   Musculoskeletal: Negative for joint swelling.   Neurological: Negative for brief paralysis.   Psychiatric/Behavioral: Negative for hallucinations.        Objective:    Physical Exam   Constitutional: She is oriented to person, place, and time. She appears well-developed and well-nourished.   HENT:   Head: Normocephalic and atraumatic.   Eyes: Conjunctivae are normal. Pupils are equal, round, and reactive to light.   Neck: Normal range of motion. Neck supple.   Cardiovascular: Normal rate, normal heart sounds and intact distal pulses.    Pulmonary/Chest: Effort normal and breath sounds normal.   Abdominal: Soft. Bowel sounds are normal.   Musculoskeletal: Normal range of motion.   Neurological: She is alert and oriented to person, place, and time.   Skin: Skin is warm and dry.         Assessment:       1. Chronic obstructive pulmonary disease, unspecified COPD type    2. Hypertension, essential    3. Palpitations    4. ARANGO (dyspnea on exertion)     5. Chest pain, atypical         Plan:       Echo, holter and lexiscan myoview for worsening palpitations and ARANGO  Obtain records from [facility]

## 2017-07-26 ENCOUNTER — OFFICE VISIT (OUTPATIENT)
Dept: FAMILY MEDICINE | Facility: CLINIC | Age: 67
End: 2017-07-26
Payer: MEDICARE

## 2017-07-26 VITALS
BODY MASS INDEX: 31.36 KG/M2 | HEART RATE: 68 BPM | OXYGEN SATURATION: 96 % | RESPIRATION RATE: 17 BRPM | SYSTOLIC BLOOD PRESSURE: 142 MMHG | TEMPERATURE: 98 F | DIASTOLIC BLOOD PRESSURE: 86 MMHG | WEIGHT: 177 LBS | HEIGHT: 63 IN

## 2017-07-26 DIAGNOSIS — R00.2 PALPITATIONS: Primary | ICD-10-CM

## 2017-07-26 DIAGNOSIS — H25.10 NUCLEAR SCLEROSIS, UNSPECIFIED LATERALITY: ICD-10-CM

## 2017-07-26 DIAGNOSIS — I10 HYPERTENSION, ESSENTIAL: ICD-10-CM

## 2017-07-26 PROCEDURE — 99999 PR PBB SHADOW E&M-EST. PATIENT-LVL III: CPT | Mod: PBBFAC,,, | Performed by: INTERNAL MEDICINE

## 2017-07-26 PROCEDURE — 99213 OFFICE O/P EST LOW 20 MIN: CPT | Mod: PBBFAC,PN | Performed by: INTERNAL MEDICINE

## 2017-07-26 PROCEDURE — 99214 OFFICE O/P EST MOD 30 MIN: CPT | Mod: S$PBB,,, | Performed by: INTERNAL MEDICINE

## 2017-07-26 PROCEDURE — 1159F MED LIST DOCD IN RCRD: CPT | Mod: ,,, | Performed by: INTERNAL MEDICINE

## 2017-07-26 PROCEDURE — 1125F AMNT PAIN NOTED PAIN PRSNT: CPT | Mod: ,,, | Performed by: INTERNAL MEDICINE

## 2017-07-26 RX ORDER — ALLOPURINOL 100 MG/1
100 TABLET ORAL DAILY
Qty: 90 TABLET | Refills: 2 | Status: SHIPPED | OUTPATIENT
Start: 2017-07-26 | End: 2018-04-03 | Stop reason: SDUPTHER

## 2017-07-26 NOTE — PROGRESS NOTES
"Subjective:       Patient ID: Dayami Mina is a 67 y.o. female.    Chief Complaint: Follow-up (medication )    F/u palpitations    HPI: 66 y/o w/ COPD HTN presents for follow up. Recently seen by cards due to intermittent palpitations scheduled for holter and NST next week. No changes in symptoms with increase metoprolol. No chest pain currently. She is to have cataract surgery in mid august      Review of Systems   Constitutional: Negative for activity change, appetite change, fatigue, fever and unexpected weight change.   HENT: Negative for ear pain, rhinorrhea and sore throat.    Eyes: Negative for discharge and visual disturbance.   Respiratory: Negative for chest tightness, shortness of breath and wheezing.    Cardiovascular: Negative for chest pain, palpitations and leg swelling.   Gastrointestinal: Negative for abdominal pain, constipation and diarrhea.   Endocrine: Negative for cold intolerance and heat intolerance.   Genitourinary: Negative for dysuria and hematuria.   Musculoskeletal: Negative for joint swelling and neck stiffness.   Skin: Negative for rash.   Neurological: Negative for dizziness, syncope, weakness and headaches.   Psychiatric/Behavioral: Negative for suicidal ideas.       Objective:     Vitals:    07/26/17 1147   BP: (!) 142/86   BP Location: Right arm   Patient Position: Sitting   BP Method: Manual   Pulse: 68   Resp: 17   Temp: 97.8 °F (36.6 °C)   TempSrc: Oral   SpO2: 96%   Weight: 80.3 kg (177 lb 0.5 oz)   Height: 5' 3" (1.6 m)          Physical Exam   Constitutional: She is oriented to person, place, and time. She appears well-developed and well-nourished.   HENT:   Head: Normocephalic and atraumatic.   Eyes: Conjunctivae are normal. Pupils are equal, round, and reactive to light.   Neck: Normal range of motion.   Cardiovascular: Normal rate and regular rhythm.  Exam reveals no gallop and no friction rub.    No murmur heard.  Pulmonary/Chest: Effort normal and breath sounds " normal. She has no wheezes. She has no rales.   Abdominal: Soft. Bowel sounds are normal. There is no tenderness. There is no rebound and no guarding.   Musculoskeletal: Normal range of motion. She exhibits no edema or tenderness.   Neurological: She is alert and oriented to person, place, and time. No cranial nerve deficit.   Skin: Skin is warm and dry.   Psychiatric: She has a normal mood and affect.       Assessment:       1. Palpitations    2. Hypertension, essential    3. Nuclear sclerosis, unspecified laterality        Plan:    1. Cardiac testing and cards follow up as scheduled    2. Continue current medicaitons    3. Medical clearnace pending above cardiac testing.

## 2017-08-01 ENCOUNTER — HOSPITAL ENCOUNTER (OUTPATIENT)
Dept: CARDIOLOGY | Facility: HOSPITAL | Age: 67
Discharge: HOME | End: 2017-08-01
Attending: INTERNAL MEDICINE
Payer: MEDICARE

## 2017-08-01 ENCOUNTER — HOSPITAL ENCOUNTER (OUTPATIENT)
Dept: RADIOLOGY | Facility: HOSPITAL | Age: 67
Discharge: HOME | End: 2017-08-01
Attending: INTERNAL MEDICINE
Payer: MEDICARE

## 2017-08-01 DIAGNOSIS — R06.09 DOE (DYSPNEA ON EXERTION): ICD-10-CM

## 2017-08-01 DIAGNOSIS — R07.89 CHEST PAIN, ATYPICAL: ICD-10-CM

## 2017-08-01 DIAGNOSIS — J44.9 CHRONIC OBSTRUCTIVE PULMONARY DISEASE, UNSPECIFIED COPD TYPE: ICD-10-CM

## 2017-08-01 DIAGNOSIS — I10 HYPERTENSION, ESSENTIAL: ICD-10-CM

## 2017-08-01 DIAGNOSIS — R00.2 PALPITATIONS: ICD-10-CM

## 2017-08-01 LAB
DIASTOLIC DYSFUNCTION: NO
DIASTOLIC DYSFUNCTION: NO
ESTIMATED PA SYSTOLIC PRESSURE: 17.59
GLOBAL PERICARDIAL EFFUSION: NORMAL
MITRAL VALVE REGURGITATION: NORMAL
RETIRED EF AND QEF - SEE NOTES: 50 (ref 55–65)
TRICUSPID VALVE REGURGITATION: NORMAL

## 2017-08-01 PROCEDURE — 93016 CV STRESS TEST SUPVJ ONLY: CPT | Mod: ,,, | Performed by: INTERNAL MEDICINE

## 2017-08-01 PROCEDURE — 93306 TTE W/DOPPLER COMPLETE: CPT

## 2017-08-01 PROCEDURE — 93226 XTRNL ECG REC<48 HR SCAN A/R: CPT

## 2017-08-01 PROCEDURE — 93227 XTRNL ECG REC<48 HR R&I: CPT | Mod: ,,, | Performed by: INTERNAL MEDICINE

## 2017-08-01 PROCEDURE — 93306 TTE W/DOPPLER COMPLETE: CPT | Mod: 26,,, | Performed by: INTERNAL MEDICINE

## 2017-08-01 PROCEDURE — 78452 HT MUSCLE IMAGE SPECT MULT: CPT | Mod: 26,,, | Performed by: INTERNAL MEDICINE

## 2017-08-01 PROCEDURE — 78452 HT MUSCLE IMAGE SPECT MULT: CPT | Mod: TC

## 2017-08-01 PROCEDURE — 93017 CV STRESS TEST TRACING ONLY: CPT

## 2017-08-01 PROCEDURE — 93018 CV STRESS TEST I&R ONLY: CPT | Mod: ,,, | Performed by: INTERNAL MEDICINE

## 2017-08-01 PROCEDURE — 63600175 PHARM REV CODE 636 W HCPCS

## 2017-08-01 RX ORDER — REGADENOSON 0.08 MG/ML
INJECTION, SOLUTION INTRAVENOUS
Status: DISPENSED
Start: 2017-08-01 | End: 2017-08-01

## 2017-08-03 ENCOUNTER — OFFICE VISIT (OUTPATIENT)
Dept: CARDIOLOGY | Facility: CLINIC | Age: 67
End: 2017-08-03
Payer: MEDICARE

## 2017-08-03 VITALS
BODY MASS INDEX: 36.63 KG/M2 | SYSTOLIC BLOOD PRESSURE: 114 MMHG | HEIGHT: 59 IN | OXYGEN SATURATION: 95 % | WEIGHT: 181.69 LBS | HEART RATE: 72 BPM | DIASTOLIC BLOOD PRESSURE: 58 MMHG

## 2017-08-03 DIAGNOSIS — R00.2 PALPITATIONS: Primary | ICD-10-CM

## 2017-08-03 DIAGNOSIS — J44.9 CHRONIC OBSTRUCTIVE PULMONARY DISEASE, UNSPECIFIED COPD TYPE: ICD-10-CM

## 2017-08-03 DIAGNOSIS — R07.89 CHEST PAIN, ATYPICAL: ICD-10-CM

## 2017-08-03 DIAGNOSIS — I10 HYPERTENSION, ESSENTIAL: ICD-10-CM

## 2017-08-03 PROCEDURE — 99213 OFFICE O/P EST LOW 20 MIN: CPT | Mod: S$PBB,,, | Performed by: INTERNAL MEDICINE

## 2017-08-03 PROCEDURE — 99999 PR PBB SHADOW E&M-EST. PATIENT-LVL IV: CPT | Mod: PBBFAC,,, | Performed by: INTERNAL MEDICINE

## 2017-08-03 PROCEDURE — 1159F MED LIST DOCD IN RCRD: CPT | Mod: ,,, | Performed by: INTERNAL MEDICINE

## 2017-08-03 PROCEDURE — 99214 OFFICE O/P EST MOD 30 MIN: CPT | Mod: PBBFAC | Performed by: INTERNAL MEDICINE

## 2017-08-03 PROCEDURE — 3008F BODY MASS INDEX DOCD: CPT | Mod: ,,, | Performed by: INTERNAL MEDICINE

## 2017-08-03 PROCEDURE — 1126F AMNT PAIN NOTED NONE PRSNT: CPT | Mod: ,,, | Performed by: INTERNAL MEDICINE

## 2017-08-03 NOTE — PROGRESS NOTES
Subjective:    Patient ID:  Dayami Mina is a 67 y.o. female who presents for follow-up of Results      HPI     Referred by Dr French  HPI: 68 y/o w/ HTN CKD III presents to follow up sweating and palpitations. Episodes daily lasting few seconds no syncope or dizziness. Labs from [facility] show improved renal function normal blood count LFT's and blood glucose.      [facility] - has not had stress test or echo in years  TSH 3.2 (10/2016)     Palpitations occur daily - he gets a flushed sensation as well  Worsening ARANGO for several months - vague chest discomfort  EKG NSR - ok  Cannot walk on the treadmill due to chronic back issues    Stress test 8/1/17  LVEF: 66 %  Impression: NORMAL MYOCARDIAL PERFUSION  1. The perfusion scan is free of evidence for myocardial ischemia or injury.   2. There is a mild intensity fixed defect in the anterior wall of the left ventricle, secondary to breast attenuation.   3. Resting wall motion is physiologic.   4. Resting LV function is normal.     Echo 8/1/17    1 - Low normal to mildly depressed left ventricular systolic function (EF 50-55%).     Holter 8/1/17  1. Sinus rhythm with heart rates varying between 56 and 105 bpm with an average of 72 bpm.     VENTRICULAR ARRHYTHMIAS  1. There were very frequent PVCs totalling 4114 and averaging 171 per hour.     2. There were no episodes of ventricular tachycardia.    SUPRA VENTRICULAR ARRHYTHMIAS  1. There were very rare PACs recorded totalling 1 and averaging less than 1 per hour.     2. There were no episodes of sustained supraventricular tachycardia.    SINUS NODE FUNCTION  1. There was no evidence of high grade SA manuel block.     AV CONDUCTION  1. There was no evidence of high grade AV block.     Was placed on metoprolol last month and palpitations have improved      Review of Systems   Constitution: Negative for decreased appetite.   HENT: Negative for ear discharge.    Eyes: Negative for blurred  vision.   Respiratory: Negative for hemoptysis.    Endocrine: Negative for polyphagia.   Hematologic/Lymphatic: Negative for adenopathy.   Skin: Negative for color change.   Musculoskeletal: Negative for joint swelling.   Neurological: Negative for brief paralysis.   Psychiatric/Behavioral: Negative for hallucinations.        Objective:    Physical Exam   Constitutional: She is oriented to person, place, and time. She appears well-developed and well-nourished.   HENT:   Head: Normocephalic and atraumatic.   Eyes: Conjunctivae are normal. Pupils are equal, round, and reactive to light.   Neck: Normal range of motion. Neck supple.   Cardiovascular: Normal rate, normal heart sounds and intact distal pulses.    Pulmonary/Chest: Effort normal and breath sounds normal.   Abdominal: Soft. Bowel sounds are normal.   Musculoskeletal: Normal range of motion.   Neurological: She is alert and oriented to person, place, and time.   Skin: Skin is warm and dry.         Assessment:       1. Palpitations    2. Chest pain, atypical    3. Chronic obstructive pulmonary disease, unspecified COPD type    4. Hypertension, essential         Plan:       Cardiac stable  OV 6 months

## 2017-08-22 ENCOUNTER — OFFICE VISIT (OUTPATIENT)
Dept: FAMILY MEDICINE | Facility: CLINIC | Age: 67
End: 2017-08-22
Payer: MEDICARE

## 2017-08-22 VITALS
BODY MASS INDEX: 36.44 KG/M2 | RESPIRATION RATE: 20 BRPM | DIASTOLIC BLOOD PRESSURE: 66 MMHG | HEIGHT: 59 IN | OXYGEN SATURATION: 97 % | HEART RATE: 69 BPM | WEIGHT: 180.75 LBS | SYSTOLIC BLOOD PRESSURE: 128 MMHG | TEMPERATURE: 98 F

## 2017-08-22 DIAGNOSIS — Z23 NEED FOR PNEUMOCOCCAL VACCINE: ICD-10-CM

## 2017-08-22 DIAGNOSIS — H25.11 NUCLEAR SCLEROSIS, RIGHT: Primary | ICD-10-CM

## 2017-08-22 DIAGNOSIS — Z23 NEED FOR PNEUMOCOCCAL VACCINATION: ICD-10-CM

## 2017-08-22 DIAGNOSIS — J44.9 CHRONIC OBSTRUCTIVE PULMONARY DISEASE, UNSPECIFIED COPD TYPE: ICD-10-CM

## 2017-08-22 DIAGNOSIS — I10 HYPERTENSION, ESSENTIAL: ICD-10-CM

## 2017-08-22 PROCEDURE — 3074F SYST BP LT 130 MM HG: CPT | Mod: ,,, | Performed by: INTERNAL MEDICINE

## 2017-08-22 PROCEDURE — 1125F AMNT PAIN NOTED PAIN PRSNT: CPT | Mod: ,,, | Performed by: INTERNAL MEDICINE

## 2017-08-22 PROCEDURE — G0009 ADMIN PNEUMOCOCCAL VACCINE: HCPCS | Mod: PBBFAC,PN

## 2017-08-22 PROCEDURE — 1159F MED LIST DOCD IN RCRD: CPT | Mod: ,,, | Performed by: INTERNAL MEDICINE

## 2017-08-22 PROCEDURE — 99213 OFFICE O/P EST LOW 20 MIN: CPT | Mod: PBBFAC,PN | Performed by: INTERNAL MEDICINE

## 2017-08-22 PROCEDURE — 99999 PR PBB SHADOW E&M-EST. PATIENT-LVL III: CPT | Mod: PBBFAC,,, | Performed by: INTERNAL MEDICINE

## 2017-08-22 PROCEDURE — 90670 PCV13 VACCINE IM: CPT | Mod: PBBFAC,PN

## 2017-08-22 PROCEDURE — 3078F DIAST BP <80 MM HG: CPT | Mod: ,,, | Performed by: INTERNAL MEDICINE

## 2017-08-22 PROCEDURE — 99214 OFFICE O/P EST MOD 30 MIN: CPT | Mod: S$PBB,,, | Performed by: INTERNAL MEDICINE

## 2017-08-22 NOTE — LETTER
2017    Dayami Mina  [address]  [facility] [address]             [facility]  [address] [facility] [address]  Phone: [phone] To Whom It May Concern:     Ms. Dayami Mina (: 1950) is medically optimized for cataract surgery without further cardiovascular testing. She under went pharmacological nuclear stress testing 2017 without evidence of reversible ischemia. Her blood pressure and other cardiovascular risk are well controlled.     Please do not hesitate to contact me with any questions or concerns.      Very Respectfully        Bryce French M.D.

## 2017-08-22 NOTE — PROGRESS NOTES
Prevnar 13  Given to the pt as order by the MD. The patient tolerated well, I advised the patient to wait 15 minuets to observe for any vaccine reactions. The pt. Expressed an understanding.

## 2017-08-22 NOTE — PROGRESS NOTES
"Subjective:       Patient ID: Dayami Mina is a 67 y.o. female.    Chief Complaint: Pre-op Exam    F/u recent cardiac testing    HPI: 66 y/o w/ HTN CKD presents for scheduled follow up. Due to symptomatic palpitations and flushing she underwent ischemic cardiac testing and arrythmia work up with negative SPECT and holter monitor (did have frequent PVC's but no tachyarrhytmias). She feels palpitations have improved with metoprolol. No pain overall feels well.s cheduled for cataract surgery OD next week      Review of Systems   Constitutional: Negative for activity change, appetite change, fatigue, fever and unexpected weight change.   HENT: Negative for ear pain, rhinorrhea and sore throat.    Eyes: Negative for discharge and visual disturbance.   Respiratory: Negative for chest tightness, shortness of breath and wheezing.    Cardiovascular: Negative for chest pain, palpitations and leg swelling.   Gastrointestinal: Negative for abdominal pain, constipation and diarrhea.   Endocrine: Negative for cold intolerance and heat intolerance.   Genitourinary: Negative for dysuria and hematuria.   Musculoskeletal: Negative for joint swelling and neck stiffness.   Skin: Negative for rash.   Neurological: Negative for dizziness, syncope, weakness and headaches.   Psychiatric/Behavioral: Negative for suicidal ideas.       Objective:     Vitals:    08/22/17 1344   BP: 128/66   BP Location: Right arm   Patient Position: Sitting   BP Method: Large (Manual)   Pulse: 69   Resp: 20   Temp: 97.8 °F (36.6 °C)   TempSrc: Oral   SpO2: 97%   Weight: 82 kg (180 lb 12.4 oz)   Height: 4' 11" (1.499 m)          Physical Exam   Constitutional: She is oriented to person, place, and time. She appears well-developed and well-nourished.   HENT:   Head: Normocephalic and atraumatic.   Eyes: Conjunctivae are normal. Pupils are equal, round, and reactive to light.   Neck: Normal range of motion.   Cardiovascular: Normal rate and regular " rhythm.  Exam reveals no gallop and no friction rub.    No murmur heard.  Pulmonary/Chest: Effort normal and breath sounds normal. She has no wheezes. She has no rales.   Abdominal: Soft. Bowel sounds are normal. There is no tenderness. There is no rebound and no guarding.   Musculoskeletal: Normal range of motion. She exhibits no edema or tenderness.   Neurological: She is alert and oriented to person, place, and time. No cranial nerve deficit.   Skin: Skin is warm and dry.   Psychiatric: She has a normal mood and affect.       Assessment:       1. Nuclear sclerosis, right    2. Chronic obstructive pulmonary disease, unspecified COPD type    3. Hypertension, essential    4. Need for pneumococcal vaccine    5. Need for pneumococcal vaccination        Plan:    1. Medically optimized for cataract surgery note stating same given to patient    2. Stable continue prn albuterol    3. At goal continue current meidcatiosn    4/5. pcv13 today    F/u three months sooner PRN

## 2017-09-07 ENCOUNTER — PATIENT MESSAGE (OUTPATIENT)
Dept: FAMILY MEDICINE | Facility: CLINIC | Age: 67
End: 2017-09-07

## 2017-09-07 RX ORDER — PITAVASTATIN CALCIUM 2.09 MG/1
2 TABLET, FILM COATED ORAL DAILY
Qty: 45 TABLET | Refills: 1 | Status: SHIPPED | OUTPATIENT
Start: 2017-09-07 | End: 2017-09-18 | Stop reason: SDUPTHER

## 2017-09-18 RX ORDER — PITAVASTATIN CALCIUM 2.09 MG/1
2 TABLET, FILM COATED ORAL DAILY
Qty: 90 TABLET | Refills: 1 | Status: SHIPPED | OUTPATIENT
Start: 2017-09-18 | End: 2018-04-03 | Stop reason: SDUPTHER

## 2017-09-18 NOTE — TELEPHONE ENCOUNTER
----- Message from Ti Howell sent at 9/18/2017 11:37 AM CDT -----  Contact: [facility]/[phone]  [facility] is requesting clarification on the directions regarding patient's prescription:  pitavastatin (LIVALO) 2 mg Tab tablet. Thank you.

## 2017-09-18 NOTE — TELEPHONE ENCOUNTER
[facility] requesting clarification on medication directions.  Please advise.    pitavastatin (LIVALO) 2 mg Tab tablet     Sig - Route: Take 1 tablet (2 mg total) by mouth once daily. One tab three times per week.

## 2017-09-21 DIAGNOSIS — R00.2 INTERMITTENT PALPITATIONS: ICD-10-CM

## 2017-09-21 RX ORDER — METOPROLOL SUCCINATE 50 MG/1
TABLET, EXTENDED RELEASE ORAL
Qty: 30 TABLET | Refills: 5 | Status: SHIPPED | OUTPATIENT
Start: 2017-09-21 | End: 2018-03-02 | Stop reason: SDUPTHER

## 2017-09-22 DIAGNOSIS — Z12.11 COLON CANCER SCREENING: ICD-10-CM

## 2017-10-11 ENCOUNTER — OFFICE VISIT (OUTPATIENT)
Dept: FAMILY MEDICINE | Facility: CLINIC | Age: 67
End: 2017-10-11
Payer: MEDICARE

## 2017-10-11 VITALS
WEIGHT: 181.69 LBS | RESPIRATION RATE: 17 BRPM | SYSTOLIC BLOOD PRESSURE: 128 MMHG | OXYGEN SATURATION: 96 % | BODY MASS INDEX: 36.63 KG/M2 | HEIGHT: 59 IN | TEMPERATURE: 98 F | HEART RATE: 66 BPM | DIASTOLIC BLOOD PRESSURE: 78 MMHG

## 2017-10-11 DIAGNOSIS — N18.30 CKD (CHRONIC KIDNEY DISEASE), STAGE III: ICD-10-CM

## 2017-10-11 DIAGNOSIS — I10 HYPERTENSION, ESSENTIAL: ICD-10-CM

## 2017-10-11 DIAGNOSIS — S83.242D TEAR OF MEDIAL MENISCUS OF LEFT KNEE, CURRENT, UNSPECIFIED TEAR TYPE, SUBSEQUENT ENCOUNTER: Primary | ICD-10-CM

## 2017-10-11 PROCEDURE — 99999 PR PBB SHADOW E&M-EST. PATIENT-LVL III: CPT | Mod: PBBFAC,,, | Performed by: INTERNAL MEDICINE

## 2017-10-11 PROCEDURE — 99214 OFFICE O/P EST MOD 30 MIN: CPT | Mod: S$PBB,,, | Performed by: INTERNAL MEDICINE

## 2017-10-11 PROCEDURE — 99213 OFFICE O/P EST LOW 20 MIN: CPT | Mod: PBBFAC,PN | Performed by: INTERNAL MEDICINE

## 2017-10-11 RX ORDER — PREDNISOLONE ACETATE 10 MG/ML
SUSPENSION/ DROPS OPHTHALMIC
COMMUNITY
Start: 2017-09-21 | End: 2018-01-22

## 2017-10-11 RX ORDER — MOXIFLOXACIN 5 MG/ML
SOLUTION/ DROPS OPHTHALMIC
COMMUNITY
Start: 2017-08-25 | End: 2018-01-22

## 2017-10-11 RX ORDER — METRONIDAZOLE 7.5 MG/G
CREAM TOPICAL
COMMUNITY
Start: 2017-09-06 | End: 2018-01-22

## 2017-10-11 NOTE — Clinical Note
Please fax copy of my note from today (10/11), cardiac tests from 8/1/17 to Dr. Hernández at [facility] fax number [phone]

## 2017-10-11 NOTE — PROGRESS NOTES
"Subjective:       Patient ID: Dayami Mina is a 67 y.o. female.    Chief Complaint: Pre-op Exam (knee surgery)    Surgical clearance    HPI: 66 y/o presents requesting medical clearance for left knee arthroscopy for meniscal repair. She denies any current chest pain orthopnea PND or LE swelling. She underwent non invasive cardiac testing in Aug 2017 with nuclear SPECT without evidence of reversible ischemia.       Review of Systems   Constitutional: Negative for activity change, appetite change, fatigue, fever and unexpected weight change.   HENT: Negative for ear pain, rhinorrhea and sore throat.    Eyes: Negative for discharge and visual disturbance.   Respiratory: Negative for chest tightness, shortness of breath and wheezing.    Cardiovascular: Negative for chest pain, palpitations and leg swelling.   Gastrointestinal: Negative for abdominal pain, constipation and diarrhea.   Endocrine: Negative for cold intolerance and heat intolerance.   Genitourinary: Negative for dysuria and hematuria.   Musculoskeletal: Negative for joint swelling and neck stiffness.   Skin: Negative for rash.   Neurological: Negative for dizziness, syncope, weakness and headaches.   Psychiatric/Behavioral: Negative for suicidal ideas.       Objective:     Vitals:    10/11/17 1353   BP: 128/78   BP Location: Left arm   Patient Position: Sitting   BP Method: Medium (Manual)   Pulse: 66   Resp: 17   Temp: 98 °F (36.7 °C)   TempSrc: Oral   SpO2: 96%   Weight: 82.4 kg (181 lb 10.5 oz)   Height: 4' 11" (1.499 m)          Physical Exam   Constitutional: She is oriented to person, place, and time. She appears well-developed and well-nourished.   HENT:   Head: Normocephalic and atraumatic.   Eyes: Conjunctivae are normal. Pupils are equal, round, and reactive to light.   Neck: Normal range of motion.   Cardiovascular: Normal rate and regular rhythm.  Exam reveals no gallop and no friction rub.    No murmur heard.  Pulmonary/Chest: Effort " normal and breath sounds normal. She has no wheezes. She has no rales.   Abdominal: Soft. Bowel sounds are normal. There is no tenderness. There is no rebound and no guarding.   Musculoskeletal: Normal range of motion. She exhibits no edema or tenderness.   Neurological: She is alert and oriented to person, place, and time. No cranial nerve deficit.   Skin: Skin is warm and dry.   Psychiatric: She has a normal mood and affect.       Assessment:       1. Tear of medial meniscus of left knee, current, unspecified tear type, subsequent encounter    2. Hypertension, essential    3. CKD (chronic kidney disease), stage III        Plan:    1. Intermediate risk procedure she is intermediate risk but has had recent CV testing without evidence of ischemia and is currently without symptoms       2/3. At goal continue current medications

## 2017-10-13 ENCOUNTER — TELEPHONE (OUTPATIENT)
Dept: FAMILY MEDICINE | Facility: CLINIC | Age: 67
End: 2017-10-13

## 2017-10-13 NOTE — TELEPHONE ENCOUNTER
----- Message from Ramana French MD sent at 10/11/2017  5:40 PM CDT -----  Please fax copy of my note from today (10/11), cardiac tests from 8/1/17 to Dr. Hernández at [facility] fax number [phone]

## 2017-10-16 ENCOUNTER — PATIENT MESSAGE (OUTPATIENT)
Dept: FAMILY MEDICINE | Facility: CLINIC | Age: 67
End: 2017-10-16

## 2017-10-16 RX ORDER — LOSARTAN POTASSIUM 50 MG/1
50 TABLET ORAL 2 TIMES DAILY
Qty: 180 TABLET | Refills: 2 | Status: SHIPPED | OUTPATIENT
Start: 2017-10-16 | End: 2018-04-03 | Stop reason: SDUPTHER

## 2017-11-15 ENCOUNTER — OFFICE VISIT (OUTPATIENT)
Dept: FAMILY MEDICINE | Facility: CLINIC | Age: 67
End: 2017-11-15
Payer: MEDICARE

## 2017-11-15 VITALS
WEIGHT: 183.63 LBS | OXYGEN SATURATION: 94 % | DIASTOLIC BLOOD PRESSURE: 72 MMHG | RESPIRATION RATE: 16 BRPM | TEMPERATURE: 98 F | SYSTOLIC BLOOD PRESSURE: 122 MMHG | HEART RATE: 69 BPM | BODY MASS INDEX: 37.02 KG/M2 | HEIGHT: 59 IN

## 2017-11-15 DIAGNOSIS — N18.30 CKD (CHRONIC KIDNEY DISEASE), STAGE III: Primary | ICD-10-CM

## 2017-11-15 DIAGNOSIS — I10 HYPERTENSION, ESSENTIAL: ICD-10-CM

## 2017-11-15 PROCEDURE — 99213 OFFICE O/P EST LOW 20 MIN: CPT | Mod: PBBFAC,PN | Performed by: INTERNAL MEDICINE

## 2017-11-15 PROCEDURE — 99214 OFFICE O/P EST MOD 30 MIN: CPT | Mod: S$PBB,,, | Performed by: INTERNAL MEDICINE

## 2017-11-15 PROCEDURE — 99999 PR PBB SHADOW E&M-EST. PATIENT-LVL III: CPT | Mod: PBBFAC,,, | Performed by: INTERNAL MEDICINE

## 2017-11-15 RX ORDER — HYDROCODONE BITARTRATE AND ACETAMINOPHEN 7.5; 325 MG/1; MG/1
TABLET ORAL
COMMUNITY
Start: 2017-10-17 | End: 2018-01-22

## 2017-11-15 NOTE — PROGRESS NOTES
"Subjective:       Patient ID: Dayami Mina is a 67 y.o. female.    Chief Complaint: Follow-up    F/u for chronic conditions    HPI: 68 y/o w/ HTN had recent knee arthoscopy for tendon cyst. This was excised and ROM and pain significantly improved. No LE swelling she did have borderline CKD III, has improved with better BP control overall she feels well denies LE swelling ARANGO or orthopnea      Review of Systems   Constitutional: Negative for activity change, appetite change, fatigue, fever and unexpected weight change.   HENT: Negative for ear pain, rhinorrhea and sore throat.    Eyes: Negative for discharge and visual disturbance.   Respiratory: Negative for chest tightness, shortness of breath and wheezing.    Cardiovascular: Negative for chest pain, palpitations and leg swelling.   Gastrointestinal: Negative for abdominal pain, constipation and diarrhea.   Endocrine: Negative for cold intolerance and heat intolerance.   Genitourinary: Negative for dysuria and hematuria.   Musculoskeletal: Negative for joint swelling and neck stiffness.   Skin: Negative for rash.   Neurological: Negative for dizziness, syncope, weakness and headaches.   Psychiatric/Behavioral: Negative for suicidal ideas.       Objective:     Vitals:    11/15/17 1355   BP: 122/72   Pulse: 69   Resp: 16   Temp: 97.5 °F (36.4 °C)   TempSrc: Oral   SpO2: (!) 94%   Weight: 83.3 kg (183 lb 10.3 oz)   Height: 4' 11" (1.499 m)          Physical Exam   Constitutional: She is oriented to person, place, and time. She appears well-developed and well-nourished.   HENT:   Head: Normocephalic and atraumatic.   Eyes: Conjunctivae are normal. Pupils are equal, round, and reactive to light.   Neck: Normal range of motion.   Cardiovascular: Normal rate and regular rhythm.  Exam reveals no gallop and no friction rub.    No murmur heard.  No LE edema   Pulmonary/Chest: Effort normal and breath sounds normal. She has no wheezes. She has no rales.   Abdominal: " Soft. Bowel sounds are normal. There is no tenderness. There is no rebound and no guarding.   Musculoskeletal: Normal range of motion. She exhibits no edema or tenderness.   Neurological: She is alert and oriented to person, place, and time. No cranial nerve deficit.   Skin: Skin is warm and dry.   Psychiatric: She has a normal mood and affect.       Assessment:       1. CKD (chronic kidney disease), stage III    2. Hypertension, essential        Plan:    1/2. BP at goal creatinine improved will plan to repeat again in three months. With follow up there after

## 2017-12-02 ENCOUNTER — HOSPITAL ENCOUNTER (INPATIENT)
Facility: HOSPITAL | Age: 67
LOS: 1 days | Discharge: HOME | DRG: 206 | End: 2017-12-03
Attending: EMERGENCY MEDICINE | Admitting: EMERGENCY MEDICINE
Payer: MEDICARE

## 2017-12-02 DIAGNOSIS — R06.02 SOB (SHORTNESS OF BREATH): ICD-10-CM

## 2017-12-02 DIAGNOSIS — R09.02 HYPOXIA: Primary | ICD-10-CM

## 2017-12-02 DIAGNOSIS — J96.91 RESPIRATORY FAILURE WITH HYPOXIA, UNSPECIFIED CHRONICITY: ICD-10-CM

## 2017-12-02 LAB
ALBUMIN SERPL BCP-MCNC: 3.6 G/DL
ALP SERPL-CCNC: 54 U/L
ALT SERPL W/O P-5'-P-CCNC: 25 U/L
ANION GAP SERPL CALC-SCNC: 10 MMOL/L
AST SERPL-CCNC: 18 U/L
BASOPHILS # BLD AUTO: 0.01 K/UL
BASOPHILS NFR BLD: 0.1 %
BILIRUB SERPL-MCNC: 0.3 MG/DL
BNP SERPL-MCNC: 58 PG/ML
BUN SERPL-MCNC: 19 MG/DL
CALCIUM SERPL-MCNC: 9.4 MG/DL
CHLORIDE SERPL-SCNC: 101 MMOL/L
CO2 SERPL-SCNC: 26 MMOL/L
CREAT SERPL-MCNC: 0.9 MG/DL
DIFFERENTIAL METHOD: ABNORMAL
EOSINOPHIL # BLD AUTO: 0.1 K/UL
EOSINOPHIL NFR BLD: 0.8 %
ERYTHROCYTE [DISTWIDTH] IN BLOOD BY AUTOMATED COUNT: 13.9 %
EST. GFR  (AFRICAN AMERICAN): >60 ML/MIN/1.73 M^2
EST. GFR  (NON AFRICAN AMERICAN): >60 ML/MIN/1.73 M^2
FLUAV AG SPEC QL IA: NEGATIVE
FLUBV AG SPEC QL IA: NEGATIVE
GLUCOSE SERPL-MCNC: 112 MG/DL
HCT VFR BLD AUTO: 39.6 %
HGB BLD-MCNC: 12.6 G/DL
LYMPHOCYTES # BLD AUTO: 1.4 K/UL
LYMPHOCYTES NFR BLD: 13.1 %
MAGNESIUM SERPL-MCNC: 1.5 MG/DL
MCH RBC QN AUTO: 27 PG
MCHC RBC AUTO-ENTMCNC: 31.8 G/DL
MCV RBC AUTO: 85 FL
MONOCYTES # BLD AUTO: 0.4 K/UL
MONOCYTES NFR BLD: 3.3 %
NEUTROPHILS # BLD AUTO: 8.7 K/UL
NEUTROPHILS NFR BLD: 82.7 %
PLATELET # BLD AUTO: 221 K/UL
PMV BLD AUTO: 9.8 FL
POTASSIUM SERPL-SCNC: 4.1 MMOL/L
PROT SERPL-MCNC: 7 G/DL
RBC # BLD AUTO: 4.67 M/UL
SODIUM SERPL-SCNC: 137 MMOL/L
SPECIMEN SOURCE: NORMAL
TROPONIN I SERPL DL<=0.01 NG/ML-MCNC: <0.006 NG/ML
WBC # BLD AUTO: 10.56 K/UL

## 2017-12-02 PROCEDURE — 96372 THER/PROPH/DIAG INJ SC/IM: CPT | Mod: 59

## 2017-12-02 PROCEDURE — 80053 COMPREHEN METABOLIC PANEL: CPT

## 2017-12-02 PROCEDURE — 25000242 PHARM REV CODE 250 ALT 637 W/ HCPCS: Performed by: EMERGENCY MEDICINE

## 2017-12-02 PROCEDURE — 94761 N-INVAS EAR/PLS OXIMETRY MLT: CPT

## 2017-12-02 PROCEDURE — 12000002 HC ACUTE/MED SURGE SEMI-PRIVATE ROOM

## 2017-12-02 PROCEDURE — 93010 ELECTROCARDIOGRAM REPORT: CPT | Mod: ,,, | Performed by: INTERNAL MEDICINE

## 2017-12-02 PROCEDURE — 85025 COMPLETE CBC W/AUTO DIFF WBC: CPT

## 2017-12-02 PROCEDURE — 83735 ASSAY OF MAGNESIUM: CPT

## 2017-12-02 PROCEDURE — 27000221 HC OXYGEN, UP TO 24 HOURS

## 2017-12-02 PROCEDURE — 84484 ASSAY OF TROPONIN QUANT: CPT

## 2017-12-02 PROCEDURE — 93005 ELECTROCARDIOGRAM TRACING: CPT

## 2017-12-02 PROCEDURE — 63600175 PHARM REV CODE 636 W HCPCS: Performed by: EMERGENCY MEDICINE

## 2017-12-02 PROCEDURE — 83880 ASSAY OF NATRIURETIC PEPTIDE: CPT

## 2017-12-02 PROCEDURE — 96375 TX/PRO/DX INJ NEW DRUG ADDON: CPT

## 2017-12-02 PROCEDURE — 96365 THER/PROPH/DIAG IV INF INIT: CPT

## 2017-12-02 PROCEDURE — 99285 EMERGENCY DEPT VISIT HI MDM: CPT | Mod: 25

## 2017-12-02 PROCEDURE — 94640 AIRWAY INHALATION TREATMENT: CPT

## 2017-12-02 PROCEDURE — 87400 INFLUENZA A/B EACH AG IA: CPT | Mod: 59

## 2017-12-02 RX ORDER — MAGNESIUM SULFATE 1 G/100ML
1 INJECTION INTRAVENOUS
Status: COMPLETED | OUTPATIENT
Start: 2017-12-02 | End: 2017-12-02

## 2017-12-02 RX ORDER — IPRATROPIUM BROMIDE AND ALBUTEROL SULFATE 2.5; .5 MG/3ML; MG/3ML
3 SOLUTION RESPIRATORY (INHALATION)
Status: COMPLETED | OUTPATIENT
Start: 2017-12-02 | End: 2017-12-02

## 2017-12-02 RX ORDER — PREDNISONE 20 MG/1
60 TABLET ORAL
Status: COMPLETED | OUTPATIENT
Start: 2017-12-02 | End: 2017-12-02

## 2017-12-02 RX ORDER — PROCHLORPERAZINE EDISYLATE 5 MG/ML
10 INJECTION INTRAMUSCULAR; INTRAVENOUS ONCE
Status: COMPLETED | OUTPATIENT
Start: 2017-12-02 | End: 2017-12-02

## 2017-12-02 RX ADMIN — MAGNESIUM SULFATE 1 G: 1 INJECTION INTRAVENOUS at 09:12

## 2017-12-02 RX ADMIN — PROCHLORPERAZINE EDISYLATE 10 MG: 5 INJECTION INTRAMUSCULAR; INTRAVENOUS at 07:12

## 2017-12-02 RX ADMIN — PREDNISONE 60 MG: 20 TABLET ORAL at 06:12

## 2017-12-02 RX ADMIN — IOHEXOL 70 ML: 350 INJECTION, SOLUTION INTRAVENOUS at 11:12

## 2017-12-02 RX ADMIN — IPRATROPIUM BROMIDE AND ALBUTEROL SULFATE 3 ML: .5; 3 SOLUTION RESPIRATORY (INHALATION) at 06:12

## 2017-12-02 NOTE — ED PROVIDER NOTES
Encounter Date: 12/2/2017    SCRIBE #1 NOTE: I, Dennys Avis, am scribing for, and in the presence of, Chuy De Jesus MD. Other sections scribed: HPI, ROS.       History     Chief Complaint   Patient presents with    Shortness of Breath     with cough that started suddenly a few hours ago. tried inhalers without relief     CC: Shortness of Breath  HPI: This 67 y.o. female with Hx of HTN, HLD, reactive airway disease, tobacco use, sinus surgery presents to the ED c/o shortness of breath a chest tightness that began at 1400 this afternoon after smelling treated lumber. She gave herself a Proair treatment with little relief, and then laid for a nap after taking 2 Ibuprofen for chills, severe HA, and body aches that developed earlier today; pt states that she woke a few hours later still feeling SOB. Pt states that she had rhinorrhea, cough sore throat last week that have improved a few days ago; she states that she had rhinorrhea again today that improved after she took Claritin. Pt is not on home O2. Daughter states pt is chronically dyspneic on exertion. Pt also c/o that her belly feels bloated today. Pt denies any recent oral steroid use for he respiratory problems. Pt denies chest pain, abdominal pain, N/V. She denies any recent sick contacts.        The history is provided by the patient and a relative.     Review of patient's allergies indicates:   Allergen Reactions    Refresh redness relief [phenylephrin-polyvinyl alcohol] Blisters    Sulfa (sulfonamide antibiotics)      Past Medical History:   Diagnosis Date    Depression     Hyperlipidemia     Hypertension      Past Surgical History:   Procedure Laterality Date    APPENDECTOMY      BACK SURGERY      breast implants      CLOSED REDUCTION FINGER DISLOCATION      FOOT SURGERY      HYSTERECTOMY      MASTECTOMY      SHOULDER ARTHROSCOPY      SINUS SURGERY       Family History   Problem Relation Age of Onset    Diabetes Mother     Diabetes  Father     Stroke Paternal Grandfather     Diabetes Sister      Social History   Substance Use Topics    Smoking status: Former Smoker     Quit date: 1/1/1988    Smokeless tobacco: Never Used    Alcohol use No     Review of Systems   Constitutional: Positive for chills and fever (subjective).   HENT: Positive for rhinorrhea. Negative for ear pain and sore throat.    Eyes: Negative for pain and visual disturbance.   Respiratory: Positive for cough, chest tightness and shortness of breath.    Cardiovascular: Negative for chest pain.   Gastrointestinal: Negative for abdominal pain, diarrhea, nausea and vomiting.   Genitourinary: Negative for difficulty urinating, dysuria and flank pain.   Musculoskeletal: Positive for myalgias (generalized).   Skin: Negative for rash and wound.   Neurological: Positive for headaches. Negative for syncope.       Physical Exam     Initial Vitals [12/02/17 1714]   BP Pulse Resp Temp SpO2   (!) 159/72 103 (!) 22 98.2 °F (36.8 °C) (!) 87 %      MAP       101         Physical Exam    Constitutional: She appears well-developed and well-nourished.   HENT:   Head: Normocephalic and atraumatic.   Eyes: EOM are normal. Pupils are equal, round, and reactive to light.   Neck: Normal range of motion.   Cardiovascular: Normal rate, regular rhythm, normal heart sounds and intact distal pulses. Exam reveals no gallop and no friction rub.    No murmur heard.  Pulmonary/Chest: No respiratory distress. She has wheezes (diffuse).   Mild tachypnea   Abdominal: Soft. Bowel sounds are normal. She exhibits no distension. There is no tenderness. There is no rebound and no guarding.   Musculoskeletal: Normal range of motion. She exhibits no edema.   Neurological: She is alert and oriented to person, place, and time.   Skin: Skin is warm and dry.   Psychiatric: Thought content normal.         ED Course   Procedures  Labs Reviewed   MAGNESIUM - Abnormal; Notable for the following:        Result Value     Magnesium 1.5 (*)     All other components within normal limits   COMPREHENSIVE METABOLIC PANEL - Abnormal; Notable for the following:     Glucose 112 (*)     Alkaline Phosphatase 54 (*)     All other components within normal limits   CBC W/ AUTO DIFFERENTIAL - Abnormal; Notable for the following:     MCHC 31.8 (*)     Gran # 8.7 (*)     Gran% 82.7 (*)     Lymph% 13.1 (*)     Mono% 3.3 (*)     All other components within normal limits   TROPONIN I   B-TYPE NATRIURETIC PEPTIDE   INFLUENZA A AND B ANTIGEN     EKG Readings: (Independently Interpreted)   Initial Reading: No STEMI. Rhythm: Sinus Tachycardia. Heart Rate: 109.          Medical Decision Making:   Initial Assessment:   66 yo female w/ Hx of reactive airway disease p/w SOB, dyspnea, and wheezing since earlier today c/w prior exacerbation. Denies CP other than  Tightness similar to previous. Also notes mild Ha, not as severe as prior migraines. Tried albuterol inhaler w/o relief.   Differential Diagnosis:   COPD exacerbation, Pe, ACS, PNA  ED Management:  Patient given round 1 of nebs. EKG/trop negative. CXR . Relatively low concern for PE given improvement and no other symptoms aside from dyspnea. Low Wells score. CTA negative for PE/PNA. Sx atypical for ACS, negative stress in August, and trop/EKG reassuring. Patient continued to wheeze after initial round of nebs. Will admit for further treatment and workup of COPD exacerbation as well as hypoxia, 87% on RA on presentation.             Scribe Attestation:   Scribe #1: I performed the above scribed service and the documentation accurately describes the services I performed. I attest to the accuracy of the note.    Attending Attestation:           Physician Attestation for Scribe:  Physician Attestation Statement for Scribe #1: I, Chuy De Jesus MD, reviewed documentation, as scribed by Dennys Albarran in my presence, and it is both accurate and complete.                 ED Course      Clinical Impression:    The primary encounter diagnosis was Hypoxia. Diagnoses of Respiratory failure with hypoxia, unspecified chronicity and SOB (shortness of breath) were also pertinent to this visit.    Disposition:   Disposition: Discharged  Condition: Stable                        Chuy De Jesus MD  12/07/17 9556

## 2017-12-03 VITALS
WEIGHT: 176.56 LBS | OXYGEN SATURATION: 98 % | BODY MASS INDEX: 35.6 KG/M2 | DIASTOLIC BLOOD PRESSURE: 68 MMHG | RESPIRATION RATE: 18 BRPM | HEIGHT: 59 IN | TEMPERATURE: 99 F | SYSTOLIC BLOOD PRESSURE: 151 MMHG | HEART RATE: 76 BPM

## 2017-12-03 PROBLEM — R09.02 HYPOXIA: Status: ACTIVE | Noted: 2017-12-02

## 2017-12-03 PROBLEM — K21.9 GERD (GASTROESOPHAGEAL REFLUX DISEASE): Chronic | Status: ACTIVE | Noted: 2017-12-03

## 2017-12-03 PROBLEM — F32.A DEPRESSION: Chronic | Status: ACTIVE | Noted: 2017-12-03

## 2017-12-03 LAB
ALBUMIN SERPL BCP-MCNC: 3.4 G/DL
ALP SERPL-CCNC: 52 U/L
ALT SERPL W/O P-5'-P-CCNC: 23 U/L
ANION GAP SERPL CALC-SCNC: 9 MMOL/L
AST SERPL-CCNC: 18 U/L
BASOPHILS # BLD AUTO: 0 K/UL
BASOPHILS NFR BLD: 0 %
BILIRUB SERPL-MCNC: 0.3 MG/DL
BUN SERPL-MCNC: 15 MG/DL
CALCIUM SERPL-MCNC: 9.5 MG/DL
CHLORIDE SERPL-SCNC: 99 MMOL/L
CO2 SERPL-SCNC: 28 MMOL/L
CREAT SERPL-MCNC: 0.9 MG/DL
DIFFERENTIAL METHOD: ABNORMAL
EOSINOPHIL # BLD AUTO: 0 K/UL
EOSINOPHIL NFR BLD: 0 %
ERYTHROCYTE [DISTWIDTH] IN BLOOD BY AUTOMATED COUNT: 14.2 %
EST. GFR  (AFRICAN AMERICAN): >60 ML/MIN/1.73 M^2
EST. GFR  (NON AFRICAN AMERICAN): >60 ML/MIN/1.73 M^2
GLUCOSE SERPL-MCNC: 181 MG/DL
HCT VFR BLD AUTO: 38.6 %
HGB BLD-MCNC: 12.4 G/DL
LYMPHOCYTES # BLD AUTO: 0.4 K/UL
LYMPHOCYTES NFR BLD: 3.2 %
MAGNESIUM SERPL-MCNC: 2 MG/DL
MCH RBC QN AUTO: 26.8 PG
MCHC RBC AUTO-ENTMCNC: 32.1 G/DL
MCV RBC AUTO: 83 FL
MONOCYTES # BLD AUTO: 0.2 K/UL
MONOCYTES NFR BLD: 1.4 %
NEUTROPHILS # BLD AUTO: 11.9 K/UL
NEUTROPHILS NFR BLD: 95.4 %
PHOSPHATE SERPL-MCNC: 3.1 MG/DL
PLATELET # BLD AUTO: 298 K/UL
PMV BLD AUTO: 10.3 FL
POTASSIUM SERPL-SCNC: 4.2 MMOL/L
PROT SERPL-MCNC: 7.1 G/DL
RBC # BLD AUTO: 4.63 M/UL
SODIUM SERPL-SCNC: 136 MMOL/L
WBC # BLD AUTO: 12.45 K/UL

## 2017-12-03 PROCEDURE — 94640 AIRWAY INHALATION TREATMENT: CPT

## 2017-12-03 PROCEDURE — 25000242 PHARM REV CODE 250 ALT 637 W/ HCPCS: Performed by: EMERGENCY MEDICINE

## 2017-12-03 PROCEDURE — 85025 COMPLETE CBC W/AUTO DIFF WBC: CPT

## 2017-12-03 PROCEDURE — 27000221 HC OXYGEN, UP TO 24 HOURS

## 2017-12-03 PROCEDURE — 83735 ASSAY OF MAGNESIUM: CPT

## 2017-12-03 PROCEDURE — 84100 ASSAY OF PHOSPHORUS: CPT

## 2017-12-03 PROCEDURE — 94761 N-INVAS EAR/PLS OXIMETRY MLT: CPT

## 2017-12-03 PROCEDURE — 25500020 PHARM REV CODE 255: Performed by: EMERGENCY MEDICINE

## 2017-12-03 PROCEDURE — 80053 COMPREHEN METABOLIC PANEL: CPT

## 2017-12-03 PROCEDURE — 36415 COLL VENOUS BLD VENIPUNCTURE: CPT

## 2017-12-03 PROCEDURE — 25000003 PHARM REV CODE 250: Performed by: INTERNAL MEDICINE

## 2017-12-03 RX ORDER — POTASSIUM CHLORIDE 20 MEQ/15ML
60 SOLUTION ORAL
Status: DISCONTINUED | OUTPATIENT
Start: 2017-12-03 | End: 2017-12-03 | Stop reason: HOSPADM

## 2017-12-03 RX ORDER — CLONIDINE HYDROCHLORIDE 0.1 MG/1
0.1 TABLET ORAL 3 TIMES DAILY PRN
Status: DISCONTINUED | OUTPATIENT
Start: 2017-12-03 | End: 2017-12-03 | Stop reason: HOSPADM

## 2017-12-03 RX ORDER — ENOXAPARIN SODIUM 100 MG/ML
40 INJECTION SUBCUTANEOUS EVERY 24 HOURS
Status: DISCONTINUED | OUTPATIENT
Start: 2017-12-03 | End: 2017-12-03 | Stop reason: HOSPADM

## 2017-12-03 RX ORDER — IPRATROPIUM BROMIDE AND ALBUTEROL SULFATE 2.5; .5 MG/3ML; MG/3ML
3 SOLUTION RESPIRATORY (INHALATION) EVERY 4 HOURS
Status: DISCONTINUED | OUTPATIENT
Start: 2017-12-03 | End: 2017-12-03 | Stop reason: HOSPADM

## 2017-12-03 RX ORDER — PITAVASTATIN CALCIUM 2.09 MG/1
2 TABLET, FILM COATED ORAL DAILY
Status: DISCONTINUED | OUTPATIENT
Start: 2017-12-03 | End: 2017-12-03 | Stop reason: HOSPADM

## 2017-12-03 RX ORDER — METOPROLOL SUCCINATE 50 MG/1
50 TABLET, EXTENDED RELEASE ORAL DAILY
Status: DISCONTINUED | OUTPATIENT
Start: 2017-12-03 | End: 2017-12-03 | Stop reason: HOSPADM

## 2017-12-03 RX ORDER — ESCITALOPRAM OXALATE 10 MG/1
20 TABLET ORAL DAILY
Status: DISCONTINUED | OUTPATIENT
Start: 2017-12-03 | End: 2017-12-03 | Stop reason: HOSPADM

## 2017-12-03 RX ORDER — ENOXAPARIN SODIUM 100 MG/ML
30 INJECTION SUBCUTANEOUS EVERY 24 HOURS
Status: DISCONTINUED | OUTPATIENT
Start: 2017-12-03 | End: 2017-12-03

## 2017-12-03 RX ORDER — POTASSIUM CHLORIDE 20 MEQ/15ML
40 SOLUTION ORAL
Status: DISCONTINUED | OUTPATIENT
Start: 2017-12-03 | End: 2017-12-03 | Stop reason: HOSPADM

## 2017-12-03 RX ORDER — ASPIRIN 81 MG/1
81 TABLET ORAL DAILY
Status: DISCONTINUED | OUTPATIENT
Start: 2017-12-03 | End: 2017-12-03 | Stop reason: HOSPADM

## 2017-12-03 RX ORDER — RAMELTEON 8 MG/1
8 TABLET ORAL NIGHTLY PRN
Status: DISCONTINUED | OUTPATIENT
Start: 2017-12-03 | End: 2017-12-03 | Stop reason: HOSPADM

## 2017-12-03 RX ORDER — ACETAMINOPHEN 325 MG/1
650 TABLET ORAL EVERY 8 HOURS PRN
Status: DISCONTINUED | OUTPATIENT
Start: 2017-12-03 | End: 2017-12-03

## 2017-12-03 RX ORDER — ONDANSETRON 2 MG/ML
4 INJECTION INTRAMUSCULAR; INTRAVENOUS EVERY 8 HOURS PRN
Status: DISCONTINUED | OUTPATIENT
Start: 2017-12-03 | End: 2017-12-03

## 2017-12-03 RX ORDER — PROCHLORPERAZINE EDISYLATE 5 MG/ML
5 INJECTION INTRAMUSCULAR; INTRAVENOUS EVERY 6 HOURS PRN
Status: DISCONTINUED | OUTPATIENT
Start: 2017-12-03 | End: 2017-12-03 | Stop reason: HOSPADM

## 2017-12-03 RX ORDER — ACETAMINOPHEN 500 MG
500 TABLET ORAL EVERY 6 HOURS PRN
Status: DISCONTINUED | OUTPATIENT
Start: 2017-12-03 | End: 2017-12-03 | Stop reason: HOSPADM

## 2017-12-03 RX ORDER — LOSARTAN POTASSIUM 25 MG/1
50 TABLET ORAL 2 TIMES DAILY
Status: DISCONTINUED | OUTPATIENT
Start: 2017-12-03 | End: 2017-12-03 | Stop reason: HOSPADM

## 2017-12-03 RX ORDER — AMOXICILLIN 250 MG
1 CAPSULE ORAL 2 TIMES DAILY
Status: DISCONTINUED | OUTPATIENT
Start: 2017-12-03 | End: 2017-12-03

## 2017-12-03 RX ORDER — PANTOPRAZOLE SODIUM 40 MG/1
40 TABLET, DELAYED RELEASE ORAL DAILY
Status: DISCONTINUED | OUTPATIENT
Start: 2017-12-03 | End: 2017-12-03 | Stop reason: HOSPADM

## 2017-12-03 RX ORDER — FAMOTIDINE 20 MG/1
20 TABLET, FILM COATED ORAL 2 TIMES DAILY
Status: DISCONTINUED | OUTPATIENT
Start: 2017-12-03 | End: 2017-12-03

## 2017-12-03 RX ORDER — SODIUM CHLORIDE 0.9 % (FLUSH) 0.9 %
3 SYRINGE (ML) INJECTION EVERY 8 HOURS
Status: DISCONTINUED | OUTPATIENT
Start: 2017-12-03 | End: 2017-12-03

## 2017-12-03 RX ADMIN — ESCITALOPRAM OXALATE 20 MG: 10 TABLET ORAL at 09:12

## 2017-12-03 RX ADMIN — METOPROLOL SUCCINATE 50 MG: 50 TABLET, EXTENDED RELEASE ORAL at 09:12

## 2017-12-03 RX ADMIN — ASPIRIN 81 MG: 81 TABLET, COATED ORAL at 09:12

## 2017-12-03 RX ADMIN — IPRATROPIUM BROMIDE AND ALBUTEROL SULFATE 3 ML: .5; 3 SOLUTION RESPIRATORY (INHALATION) at 03:12

## 2017-12-03 RX ADMIN — IPRATROPIUM BROMIDE AND ALBUTEROL SULFATE 3 ML: .5; 3 SOLUTION RESPIRATORY (INHALATION) at 08:12

## 2017-12-03 RX ADMIN — LOSARTAN POTASSIUM 50 MG: 25 TABLET, FILM COATED ORAL at 09:12

## 2017-12-03 RX ADMIN — PANTOPRAZOLE SODIUM 40 MG: 40 TABLET, DELAYED RELEASE ORAL at 09:12

## 2017-12-03 NOTE — SUBJECTIVE & OBJECTIVE
Past Medical History:   Diagnosis Date    Depression     Hyperlipidemia     Hypertension        Past Surgical History:   Procedure Laterality Date    APPENDECTOMY      BACK SURGERY      breast implants      CLOSED REDUCTION FINGER DISLOCATION      FOOT SURGERY      HYSTERECTOMY      MASTECTOMY      SHOULDER ARTHROSCOPY      SINUS SURGERY         Review of patient's allergies indicates:   Allergen Reactions    Refresh redness relief [phenylephrin-polyvinyl alcohol] Blisters    Sulfa (sulfonamide antibiotics)        No current facility-administered medications on file prior to encounter.      Current Outpatient Prescriptions on File Prior to Encounter   Medication Sig    albuterol 90 mcg/actuation inhaler Inhale 2 puffs into the lungs every 6 (six) hours as needed for Wheezing.    aspirin (ECOTRIN) 81 MG EC tablet Take 81 mg by mouth once daily.    blood sugar diagnostic Strp Uses Freestyle meter to monitor readings once daily    butalbital-acetaminophen-caffeine -40 mg (FIORICET, ESGIC) -40 mg per tablet     escitalopram oxalate (LEXAPRO) 20 MG tablet Take 1 tablet (20 mg total) by mouth once daily.    esomeprazole (NEXIUM) 20 MG capsule Take 20 mg by mouth before breakfast.    FOLIC ACID/MULTIVIT-MIN/LUTEIN (CENTRUM SILVER ORAL) Take by mouth.    gabapentin (NEURONTIN) 100 MG capsule     ibuprofen (ADVIL,MOTRIN) 600 MG tablet TK 1 T PO  Q 6 H    lancets Misc 1 lancet by Misc.(Non-Drug; Combo Route) route once daily.    loratadine (CLARITIN) 10 mg tablet Take 10 mg by mouth once daily.    losartan (COZAAR) 50 MG tablet Take 1 tablet (50 mg total) by mouth 2 (two) times daily.    metoprolol succinate (TOPROL-XL) 50 MG 24 hr tablet TAKE ONE TABLET BY MOUTH once DAILY    metronidazole 0.75% (METROCREAM) 0.75 % Crea     pitavastatin (LIVALO) 2 mg Tab tablet Take 1 tablet (2 mg total) by mouth once daily.    tramadol (ULTRAM) 50 mg tablet Take 50 mg by mouth every 6 (six) hours as  needed for Pain.    allopurinol (ZYLOPRIM) 100 MG tablet Take 1 tablet (100 mg total) by mouth once daily.    estradiol (ESTRACE) 0.5 MG tablet Take 0.5 mg by mouth once daily.    hydrocodone-acetaminophen 7.5-325mg (NORCO) 7.5-325 mg per tablet     moxifloxacin (VIGAMOX) 0.5 % ophthalmic solution     oxybutynin (DITROPAN) 5 MG Tab     prednisoLONE acetate (PRED FORTE) 1 % DrpS     UNABLE TO FIND Take by mouth. Herbal Laxative Tabs     Family History     Problem Relation (Age of Onset)    Diabetes Mother, Father, Sister    Stroke Paternal Grandfather        Social History Main Topics    Smoking status: Former Smoker     Quit date: 1/1/1988    Smokeless tobacco: Never Used    Alcohol use No    Drug use: No    Sexual activity: No     Review of Systems   Constitutional: Negative for activity change, appetite change, chills, diaphoresis, fatigue, fever and unexpected weight change.   HENT: Negative.    Eyes: Negative.    Respiratory: Positive for cough, chest tightness, shortness of breath and wheezing.    Cardiovascular: Negative for chest pain, palpitations and leg swelling.   Gastrointestinal: Negative for abdominal distention, abdominal pain, blood in stool, constipation, diarrhea, nausea and vomiting.   Genitourinary: Negative for dyspareunia and hematuria.   Musculoskeletal: Negative.    Neurological: Negative for dizziness, seizures, syncope, weakness and light-headedness.   Psychiatric/Behavioral: Negative.      Objective:     Vital Signs (Most Recent):  Temp: 100 °F (37.8 °C) (12/03/17 0027)  Pulse: 83 (12/03/17 0348)  Resp: 18 (12/03/17 0348)  BP: (!) 167/73 (12/03/17 0027)  SpO2: 96 % (12/03/17 0348) Vital Signs (24h Range):  Temp:  [98.2 °F (36.8 °C)-100 °F (37.8 °C)] 100 °F (37.8 °C)  Pulse:  [] 83  Resp:  [14-22] 18  SpO2:  [78 %-96 %] 96 %  BP: (137-174)/(62-77) 167/73     Weight: 80.1 kg (176 lb 9.4 oz)  Body mass index is 35.67 kg/m².    Physical Exam   Constitutional: She is oriented  to person, place, and time. She appears well-developed and well-nourished. No distress.   HENT:   Head: Normocephalic and atraumatic.   Right Ear: External ear normal.   Left Ear: External ear normal.   Nose: Nose normal.   Eyes: Right eye exhibits no discharge. Left eye exhibits no discharge.   Neck: Normal range of motion.   Cardiovascular:   Regular rate & rhythm with a Grade II/VI holosystolic murmur, no gallops   Pulmonary/Chest:   Normal effort with no wheezing but with mild bibasilar crackles   Abdominal: Soft. Bowel sounds are normal. She exhibits no distension. There is no tenderness. There is no rebound and no guarding.   Musculoskeletal: Normal range of motion. She exhibits no edema.   Neurological: She is alert and oriented to person, place, and time.   Skin: Skin is warm and dry. She is not diaphoretic. No erythema.   Psychiatric: She has a normal mood and affect. Her behavior is normal. Judgment and thought content normal.   Nursing note and vitals reviewed.          Significant Labs: All pertinent labs within the past 24 hours have been reviewed.    Significant Imaging: I have reviewed and interpreted all pertinent imaging results/findings within the past 24 hours.

## 2017-12-03 NOTE — ASSESSMENT & PLAN NOTE
Patient's blood pressure is poorly-controlled; will continue home regimen of losartan and metoprolol, and provide as-needed clonidine.

## 2017-12-03 NOTE — H&P
[facility] Medicine  History & Physical    Patient Name: Dayami Mina  MRN: [id number]  Admission Date: 12/2/2017  Attending Physician: Facundo Scales MD   Primary Care Provider: Ramana French MD         Patient information was obtained from patient.     Subjective:     Principal Problem:Hypoxia    Chief Complaint: Shortness of breath today.    HPI: Mrs. Dayami Mina is a 67 y.o. female with essential hypertension, hyperlipidemia (.2 Apr 2015), GERD, and depression who presents to [facility] ED with complaints of dyspnea today.  She shortness of breath started gradually throughout the day and she thinks it was triggered by some renovations bring done in her home.  Some plywood was being placed in her home and the installers says it was treated plywood.  She started to have some wheezing and coughing that was productive of greenish sputum.  She went to bed to take a nap but woke up still having these symptoms.  She tried her albuterol inhalers without relief and decided to seek ED evaluation.  She denies any fevers, chills, chest pain, pleurisy, palpitations, diaphoresis, nausea, vomiting, hemoptysis, nor any lower extremity pain or swelling.  She did not have any sick contacts or recent travel, and denies any similar episodes in the past.  Of note, she has been following with Dr. Vishal Mcmahan who initially felt that she had COPD and treated her for that for three years before changing his opinion.  She did smoke but quit in 1988.      Chart Review:  Patient has not had any recent hospitalizations within the system.    Outpatient Follow-Up  Date of Visit Physician Service   Nov 2017 Ramana French MD Primary Care   Aug 2017 Todd Chapa MD Cardiology    Jul 2015 Ricky Robins NP Endocrinology      Past Medical History:   Diagnosis Date    Depression     Hyperlipidemia     Hypertension        Past Surgical History:   Procedure Laterality Date     APPENDECTOMY      BACK SURGERY      breast implants      CLOSED REDUCTION FINGER DISLOCATION      FOOT SURGERY      HYSTERECTOMY      MASTECTOMY      SHOULDER ARTHROSCOPY      SINUS SURGERY         Review of patient's allergies indicates:   Allergen Reactions    Refresh redness relief [phenylephrin-polyvinyl alcohol] Blisters    Sulfa (sulfonamide antibiotics)        No current facility-administered medications on file prior to encounter.      Current Outpatient Prescriptions on File Prior to Encounter   Medication Sig    albuterol 90 mcg/actuation inhaler Inhale 2 puffs into the lungs every 6 (six) hours as needed for Wheezing.    aspirin (ECOTRIN) 81 MG EC tablet Take 81 mg by mouth once daily.    blood sugar diagnostic Strp Uses Freestyle meter to monitor readings once daily    butalbital-acetaminophen-caffeine -40 mg (FIORICET, ESGIC) -40 mg per tablet     escitalopram oxalate (LEXAPRO) 20 MG tablet Take 1 tablet (20 mg total) by mouth once daily.    esomeprazole (NEXIUM) 20 MG capsule Take 20 mg by mouth before breakfast.    FOLIC ACID/MULTIVIT-MIN/LUTEIN (CENTRUM SILVER ORAL) Take by mouth.    gabapentin (NEURONTIN) 100 MG capsule     ibuprofen (ADVIL,MOTRIN) 600 MG tablet TK 1 T PO  Q 6 H    lancets Misc 1 lancet by Misc.(Non-Drug; Combo Route) route once daily.    loratadine (CLARITIN) 10 mg tablet Take 10 mg by mouth once daily.    losartan (COZAAR) 50 MG tablet Take 1 tablet (50 mg total) by mouth 2 (two) times daily.    metoprolol succinate (TOPROL-XL) 50 MG 24 hr tablet TAKE ONE TABLET BY MOUTH once DAILY    metronidazole 0.75% (METROCREAM) 0.75 % Crea     pitavastatin (LIVALO) 2 mg Tab tablet Take 1 tablet (2 mg total) by mouth once daily.    tramadol (ULTRAM) 50 mg tablet Take 50 mg by mouth every 6 (six) hours as needed for Pain.    allopurinol (ZYLOPRIM) 100 MG tablet Take 1 tablet (100 mg total) by mouth once daily.    estradiol (ESTRACE) 0.5 MG tablet Take  0.5 mg by mouth once daily.    hydrocodone-acetaminophen 7.5-325mg (NORCO) 7.5-325 mg per tablet     moxifloxacin (VIGAMOX) 0.5 % ophthalmic solution     oxybutynin (DITROPAN) 5 MG Tab     prednisoLONE acetate (PRED FORTE) 1 % DrpS     UNABLE TO FIND Take by mouth. Herbal Laxative Tabs     Family History     Problem Relation (Age of Onset)    Diabetes Mother, Father, Sister    Stroke Paternal Grandfather        Social History Main Topics    Smoking status: Former Smoker     Quit date: 1/1/1988    Smokeless tobacco: Never Used    Alcohol use No    Drug use: No    Sexual activity: No     Review of Systems   Constitutional: Negative for activity change, appetite change, chills, diaphoresis, fatigue, fever and unexpected weight change.   HENT: Negative.    Eyes: Negative.    Respiratory: Positive for cough, chest tightness, shortness of breath and wheezing.    Cardiovascular: Negative for chest pain, palpitations and leg swelling.   Gastrointestinal: Negative for abdominal distention, abdominal pain, blood in stool, constipation, diarrhea, nausea and vomiting.   Genitourinary: Negative for dyspareunia and hematuria.   Musculoskeletal: Negative.    Neurological: Negative for dizziness, seizures, syncope, weakness and light-headedness.   Psychiatric/Behavioral: Negative.      Objective:     Vital Signs (Most Recent):  Temp: 100 °F (37.8 °C) (12/03/17 0027)  Pulse: 83 (12/03/17 0348)  Resp: 18 (12/03/17 0348)  BP: (!) 167/73 (12/03/17 0027)  SpO2: 96 % (12/03/17 0348) Vital Signs (24h Range):  Temp:  [98.2 °F (36.8 °C)-100 °F (37.8 °C)] 100 °F (37.8 °C)  Pulse:  [] 83  Resp:  [14-22] 18  SpO2:  [78 %-96 %] 96 %  BP: (137-174)/(62-77) 167/73     Weight: 80.1 kg (176 lb 9.4 oz)  Body mass index is 35.67 kg/m².    Physical Exam   Constitutional: She is oriented to person, place, and time. She appears well-developed and well-nourished. No distress.   HENT:   Head: Normocephalic and atraumatic.   Right Ear:  External ear normal.   Left Ear: External ear normal.   Nose: Nose normal.   Eyes: Right eye exhibits no discharge. Left eye exhibits no discharge.   Neck: Normal range of motion.   Cardiovascular:   Regular rate & rhythm with a Grade II/VI holosystolic murmur, no gallops   Pulmonary/Chest:   Normal effort with no wheezing but with mild bibasilar crackles   Abdominal: Soft. Bowel sounds are normal. She exhibits no distension. There is no tenderness. There is no rebound and no guarding.   Musculoskeletal: Normal range of motion. She exhibits no edema.   Neurological: She is alert and oriented to person, place, and time.   Skin: Skin is warm and dry. She is not diaphoretic. No erythema.   Psychiatric: She has a normal mood and affect. Her behavior is normal. Judgment and thought content normal.   Nursing note and vitals reviewed.          Significant Labs: All pertinent labs within the past 24 hours have been reviewed.    Significant Imaging: I have reviewed and interpreted all pertinent imaging results/findings within the past 24 hours.    Assessment/Plan:     * Hypoxia    She has some vague history of a pulmonary disorder and does have a history of tobacco abuse for which she has long stopped smoking.  I have reviewed the chest X-ray and it reveals no focal infiltrates or pleural effusions.  Her presenting oxygen saturation on ambient air was 87% but dropped as low as 78% on ambulation.  CT-A chest is negative for pulmonary embolism.  Will provide supplemental oxygen therapy; she may need home oxygen therapy.        Essential hypertension    Patient's blood pressure is poorly-controlled; will continue home regimen of losartan and metoprolol, and provide as-needed clonidine.        Hyperlipidemia    Well-controlled; will continue patient's home regimen of pitavastatin.        GERD (gastroesophageal reflux disease)    Will substitute her home regimen of esomeprazole for pantoprazole while she is inpatient.         Depression    Stable; will continue her home regimen of citalopram.          VTE Risk Mitigation         Ordered     enoxaparin injection 30 mg  Daily     Route:  Subcutaneous        12/03/17 0244     Medium Risk of VTE  Once      12/03/17 0244             Total time spent on case: 45 minutes.        Eddie Clark M.D.  Staff Nocturnist  Department of [facility] Medicine  [facility]  Pager: [phone]

## 2017-12-03 NOTE — PLAN OF CARE
Problem: Fall Risk (Adult)  Intervention: Reduce Risk/Promote Restraint Free Environment   17   Prevent [place] Drop/Fall   Safety/Security Measures family to remain at bedside;bed alarm set     Intervention: Review Medications/Identify Contributors to Fall Risk   17   Safety Interventions   Medication Review/Management medications reviewed     Intervention: Patient Rounds   17 0500   Safety Interventions   Patient Rounds bed wheels locked;bed in low position;call light in reach;clutter free environment maintained;ID band on;placement of personal items at bedside;toileting offered;visualized patient       Goal: Identify Related Risk Factors and Signs and Symptoms  Related risk factors and signs and symptoms are identified upon initiation of Human Response Clinical Practice Guideline (CPG)   Outcome: Ongoing (interventions implemented as appropriate)   17   Fall Risk   Related Risk Factors (Fall Risk) environment unfamiliar   Signs and Symptoms (Fall Risk) presence of risk factors     Goal: Absence of Falls  Patient will demonstrate the desired outcomes by discharge/transition of care.   Outcome: Ongoing (interventions implemented as appropriate)   17   Fall Risk (Adult)   Absence of Falls making progress toward outcome       Problem: Patient Care Overview  Goal: Plan of Care Review  Outcome: Ongoing (interventions implemented as appropriate)   17   Coping/Psychosocial   Plan Of Care Reviewed With patient;daughter     Rested quietly in bed.  No complaints of pain or shortness of breath noted this shift.  Remained free from falls and trauma this shift.  Call bell within reach.  Will continue to monitor.

## 2017-12-03 NOTE — HPI
Mrs. Dayami Mina is a 67 y.o. female with essential hypertension, hyperlipidemia (.2 Apr 2015), GERD, and depression who presents to [facility] ED with complaints of dyspnea today.  She shortness of breath started gradually throughout the day and she thinks it was triggered by some renovations bring done in her home.  Some plywood was being placed in her home and the installers says it was treated plywood.  She started to have some wheezing and coughing that was productive of greenish sputum.  She went to bed to take a nap but woke up still having these symptoms.  She tried her albuterol inhalers without relief and decided to seek ED evaluation.  She denies any fevers, chills, chest pain, pleurisy, palpitations, diaphoresis, nausea, vomiting, hemoptysis, nor any lower extremity pain or swelling.  She did not have any sick contacts or recent travel, and denies any similar episodes in the past.  Of note, she has been following with Dr. Vishal Mcmahan who initially felt that she had COPD and treated her for that for three years before changing his opinion.  She did smoke but quit in 1988.

## 2017-12-03 NOTE — DISCHARGE SUMMARY
[facility] Hospital Medicine  Discharge Summary      Patient Name: Dayami Mina  MRN: [id number]  Admission Date: 12/2/2017  Hospital Length of Stay: 1 days  Discharge Date and Time:  12/03/2017 9:52 AM  Attending Physician: Lila Askew MD   Discharging Provider: Lila Askew MD  Primary Care Provider: Ramana French MD      HPI:   Mrs. Dayami Mina is a 67 y.o. female with essential hypertension, hyperlipidemia (.2 Apr 2015), GERD, and depression who presents to [facility] ED with complaints of dyspnea today.  She shortness of breath started gradually throughout the day and she thinks it was triggered by some renovations bring done in her home.  Some plywood was being placed in her home and the installers says it was treated plywood.  She started to have some wheezing and coughing that was productive of greenish sputum.  She went to bed to take a nap but woke up still having these symptoms.  She tried her albuterol inhalers without relief and decided to seek ED evaluation.  She denies any fevers, chills, chest pain, pleurisy, palpitations, diaphoresis, nausea, vomiting, hemoptysis, nor any lower extremity pain or swelling.  She did not have any sick contacts or recent travel, and denies any similar episodes in the past.  Of note, she has been following with Dr. Vishal Mcmahan who initially felt that she had COPD and treated her for that for three years before changing his opinion.  She did smoke but quit in 1988.      * No surgery found *      Hospital Course:   Mrs. Dayami Mina is a 67 y.o. female with essential hypertension, hyperlipidemia (.2 Apr 2015), GERD, and depression who presents to [facility] ED with complaints of dyspnea .She shortness of breath started gradually throughout the day and she thinks it was triggered by some renovations bring done in her home.  Some plywood was being placed in her home and the installers says it was  treated kim.  She started to have some wheezing and coughing that was productive of greenish sputum.  She went to bed to take a nap but woke up still having these symptoms.  She tried her albuterol inhalers without relief and decided to seek ED evaluation.  She denies any fevers, chills, chest pain, pleurisy, palpitations, diaphoresis, nausea, vomiting, hemoptysis, nor any lower extremity pain or swelling.  She did not have any sick contacts or recent travel, and denies any similar episodes in the past.  Of note, she has been following with Dr. Vishal Mcmahan who initially felt that she had COPD and treated her for that for three years before changing his opinion,after couple PFT study show no sign of COPD,  She did smoke but quit in 1988. She was started on supplemental oxygen for hypoxia with nebulizer treatment,CTA chest show no PE,no consolidation,or fluid overload,SOB and hypoxic episode may is duo to expose to chemical agent.her SOB,and  Hypoxia resolved,she was stable on RA,she improved better than expected,she has been discharged home with follow up with her pulmonologist ,she has already appointment.     Consults:   Consults         Status Ordering Provider     Inpatient consult to Pulmonology  Once     Provider:  (Not yet assigned)    Acknowledged CORNELIO LION          No new Assessment & Plan notes have been filed under this hospital service since the last note was generated.  Service: Hospital Medicine    Final Active Diagnoses:    Diagnosis Date Noted POA    PRINCIPAL PROBLEM:  Hypoxia [R09.02] 12/02/2017 Yes    GERD (gastroesophageal reflux disease) [K21.9] 12/03/2017 Yes     Chronic    Depression [F32.9] 12/03/2017 Yes     Chronic    Hyperlipidemia [E78.5] 07/07/2015 Yes     Chronic    Essential hypertension [I10] 04/20/2015 Yes     Chronic      Problems Resolved During this Admission:    Diagnosis Date Noted Date Resolved POA       Discharged Condition: stable    Disposition:  Home or Self Care    Follow Up:  Follow-up Information     Ramana French MD In 1 week.    Specialty:  Internal Medicine  Contact information:  [place] [id number] [phone]                 Patient Instructions:     Diet general     Activity as tolerated         Significant Diagnostic Studies: Labs:   BMP:   Recent Labs  Lab 12/02/17  1825 12/03/17  0533   * 181*    136   K 4.1 4.2    99   CO2 26 28   BUN 19 15   CREATININE 0.9 0.9   CALCIUM 9.4 9.5   MG 1.5* 2.0    and CBC   Recent Labs  Lab 12/02/17  1825 12/03/17  0533   WBC 10.56 12.45   HGB 12.6 12.4   HCT 39.6 38.6    298       CT scan: CTA chest     Pending Diagnostic Studies:     None         Medications:  Reconciled Home Medications:   Current Discharge Medication List      CONTINUE these medications which have NOT CHANGED    Details   albuterol 90 mcg/actuation inhaler Inhale 2 puffs into the lungs every 6 (six) hours as needed for Wheezing.  Qty: 8 g, Refills: 3    Associated Diagnoses: COPD exacerbation      aspirin (ECOTRIN) 81 MG EC tablet Take 81 mg by mouth once daily.      blood sugar diagnostic Strp Uses Freestyle meter to monitor readings once daily  Qty: 50 strip, Refills: 6    Associated Diagnoses: Glucose intolerance (impaired glucose tolerance)      butalbital-acetaminophen-caffeine -40 mg (FIORICET, ESGIC) -40 mg per tablet       escitalopram oxalate (LEXAPRO) 20 MG tablet Take 1 tablet (20 mg total) by mouth once daily.  Qty: 90 tablet, Refills: 3    Associated Diagnoses: Recurrent major depressive disorder, in full remission      esomeprazole (NEXIUM) 20 MG capsule Take 20 mg by mouth before breakfast.      FOLIC ACID/MULTIVIT-MIN/LUTEIN (CENTRUM SILVER ORAL) Take by mouth.      gabapentin (NEURONTIN) 100 MG capsule       lancets Misc 1 lancet by Misc.(Non-Drug; Combo Route) route once daily.  Qty: 50 each, Refills: 6    Associated Diagnoses: Glucose intolerance (impaired glucose  tolerance)      loratadine (CLARITIN) 10 mg tablet Take 10 mg by mouth once daily.      losartan (COZAAR) 50 MG tablet Take 1 tablet (50 mg total) by mouth 2 (two) times daily.  Qty: 180 tablet, Refills: 2      metoprolol succinate (TOPROL-XL) 50 MG 24 hr tablet TAKE ONE TABLET BY MOUTH once DAILY  Qty: 30 tablet, Refills: 5    Associated Diagnoses: Intermittent palpitations      metronidazole 0.75% (METROCREAM) 0.75 % Crea       pitavastatin (LIVALO) 2 mg Tab tablet Take 1 tablet (2 mg total) by mouth once daily.  Qty: 90 tablet, Refills: 1      tramadol (ULTRAM) 50 mg tablet Take 50 mg by mouth every 6 (six) hours as needed for Pain.      allopurinol (ZYLOPRIM) 100 MG tablet Take 1 tablet (100 mg total) by mouth once daily.  Qty: 90 tablet, Refills: 2      estradiol (ESTRACE) 0.5 MG tablet Take 0.5 mg by mouth once daily.      hydrocodone-acetaminophen 7.5-325mg (NORCO) 7.5-325 mg per tablet       moxifloxacin (VIGAMOX) 0.5 % ophthalmic solution       oxybutynin (DITROPAN) 5 MG Tab       prednisoLONE acetate (PRED FORTE) 1 % DrpS       UNABLE TO FIND Take by mouth. Herbal Laxative Tabs         STOP taking these medications       ibuprofen (ADVIL,MOTRIN) 600 MG tablet Comments:   Reason for Stopping:               Indwelling Lines/Drains at time of discharge:   Lines/Drains/Airways          No matching active lines, drains, or airways          Time spent on the discharge of patient: 30  minutes  Patient was seen and examined on the date of discharge and determined to be suitable for discharge.         Lila Askew MD  Department of Hospital Medicine  [facility]

## 2017-12-03 NOTE — NURSING
Saline locks removed cath tips intact no redness or swelling to sites X2. Verbalized good understanding of discharge instructions. Left unit ambulatory as requested. Family at side.

## 2017-12-03 NOTE — NURSING
Arrived to the floor via wheelchair per transport. Oriented to room.  Introductions made.  Able to make needs known. Denies pain at this time.  Encouraged to call with PRN assist.  Daughter noted to be at bedside.  Call bell within reach.  Will continue to monitor.

## 2017-12-03 NOTE — ASSESSMENT & PLAN NOTE
She has some vague history of a pulmonary disorder and does have a history of tobacco abuse for which she has long stopped smoking.  I have reviewed the chest X-ray and it reveals no focal infiltrates or pleural effusions.  Her presenting oxygen saturation on ambient air was 87% but dropped as low as 78% on ambulation.  CT-A chest is negative for pulmonary embolism.  Will provide supplemental oxygen therapy; she may need home oxygen therapy.

## 2017-12-03 NOTE — HOSPITAL COURSE
Mrs. Dayami Mina is a 67 y.o. female with essential hypertension, hyperlipidemia (.2 Apr 2015), GERD, and depression who presents to [facility] ED with complaints of dyspnea .She shortness of breath started gradually throughout the day and she thinks it was triggered by some renovations bring done in her home.  Some plywood was being placed in her home and the installers says it was treated plywood.  She started to have some wheezing and coughing that was productive of greenish sputum.  She went to bed to take a nap but woke up still having these symptoms.  She tried her albuterol inhalers without relief and decided to seek ED evaluation.  She denies any fevers, chills, chest pain, pleurisy, palpitations, diaphoresis, nausea, vomiting, hemoptysis, nor any lower extremity pain or swelling.  She did not have any sick contacts or recent travel, and denies any similar episodes in the past.  Of note, she has been following with Dr. Vishal Mcmahan who initially felt that she had COPD and treated her for that for three years before changing his opinion,after couple PFT study show no sign of COPD,  She did smoke but quit in 1988. She was started on supplemental oxygen for hypoxia with nebulizer treatment,CTA chest show no PE,no consolidation,or fluid overload,SOB and hypoxic episode may is duo to expose to chemical agent.her SOB,and  Hypoxia resolved,she was stable on RA,she improved better than expected,she has been discharged home with follow up with her pulmonologist ,she has already appointment.

## 2017-12-03 NOTE — PLAN OF CARE
12/03/17 1005   Discharge Assessment   Assessment Type Discharge Planning Assessment   Confirmed/corrected address and phone number on facesheet? Yes   Assessment information obtained from? Medical Record   Prior to hospitilization cognitive status: Alert/Oriented   Prior to hospitalization functional status: Independent   Current cognitive status: Alert/Oriented   Current Functional Status: Independent   Facility Arrived From: Home   Able to Return to Prior Arrangements yes   Is patient able to care for self after discharge? Yes   Who are your caregiver(s) and their phone number(s)? Frieda-daughter: [phone]   Patient's perception of discharge disposition home or selfcare   Patient currently being followed by outpatient case management? No   Patient currently receives any other outside agency services? No   Do you have any problems affording any of your prescribed medications? No   Is the patient taking medications as prescribed? yes   Does the patient have transportation home? Yes   Transportation Available family or friend will provide   Does the patient receive services at the [facility]? No   Discharge Plan A Home with family   Discharge Plan B Other  (TBD)   Patient/Family In Agreement With Plan yes

## 2017-12-04 ENCOUNTER — OFFICE VISIT (OUTPATIENT)
Dept: FAMILY MEDICINE | Facility: CLINIC | Age: 67
End: 2017-12-04
Payer: MEDICARE

## 2017-12-04 ENCOUNTER — TELEPHONE (OUTPATIENT)
Dept: FAMILY MEDICINE | Facility: CLINIC | Age: 67
End: 2017-12-04

## 2017-12-04 VITALS
TEMPERATURE: 97 F | HEIGHT: 59 IN | OXYGEN SATURATION: 91 % | RESPIRATION RATE: 14 BRPM | BODY MASS INDEX: 36.08 KG/M2 | HEART RATE: 74 BPM | WEIGHT: 179 LBS | SYSTOLIC BLOOD PRESSURE: 132 MMHG | DIASTOLIC BLOOD PRESSURE: 80 MMHG

## 2017-12-04 DIAGNOSIS — R05.9 COUGH: ICD-10-CM

## 2017-12-04 DIAGNOSIS — J44.1 COPD WITH ACUTE EXACERBATION: Primary | ICD-10-CM

## 2017-12-04 PROCEDURE — 99214 OFFICE O/P EST MOD 30 MIN: CPT | Mod: S$PBB,,, | Performed by: INTERNAL MEDICINE

## 2017-12-04 PROCEDURE — 99213 OFFICE O/P EST LOW 20 MIN: CPT | Mod: PBBFAC,PN | Performed by: INTERNAL MEDICINE

## 2017-12-04 PROCEDURE — 99999 PR PBB SHADOW E&M-EST. PATIENT-LVL III: CPT | Mod: PBBFAC,,, | Performed by: INTERNAL MEDICINE

## 2017-12-04 RX ORDER — PREDNISONE 20 MG/1
40 TABLET ORAL DAILY
Qty: 14 TABLET | Refills: 0 | Status: SHIPPED | OUTPATIENT
Start: 2017-12-04 | End: 2017-12-11

## 2017-12-04 RX ORDER — PROMETHAZINE HYDROCHLORIDE AND CODEINE PHOSPHATE 6.25; 1 MG/5ML; MG/5ML
5 SOLUTION ORAL EVERY 12 HOURS PRN
Qty: 120 ML | Refills: 0 | Status: SHIPPED | OUTPATIENT
Start: 2017-12-04 | End: 2017-12-14

## 2017-12-04 NOTE — TELEPHONE ENCOUNTER
"Incoming call from the Phone Room from patient.  Patient with complaint of continued shortness of breath.  Reports productive cough of green sputum and bilateral rib pain "from coughing too much."  Denies fever.  Reports chills.  Denies chest pain.  Discharged from hospital yesterday after one day admit for respiratory failure with hypoxia.  Instructed patient to go to the nearest ED for emergent evaluation.  Patient unable to get a ride to the ED.  Did not want to travel by EMS.  Suggested to the patient that she uses her rescue inhaler.  Patient took two puffs of her ProAir and had symptom relief after several minutes.  Speaking in clear sentences with normal respiratory effort.  Did not seem to be in respiratory distress.  Scheduled Urgent appointment today with PCP.  Former smoker.  Not on home oxygen.  Residence undergoing construction with dust and debris.  Suggested to patient to live elsewhere during construction.  Requesting home oxygen and prescription refill request for inhaler and new prescription for home nebulizer.    "

## 2017-12-04 NOTE — TELEPHONE ENCOUNTER
Please advise. This pt. Is requesting to be seen today. Would you like her added on or placed on the schedule for another day?

## 2017-12-04 NOTE — TELEPHONE ENCOUNTER
Spoke with this pt. And advised her that Dr. French could see her later this week or we could schedule her earlier with another provider.       The patient states that she spoke with someone earlier  who scheduled her in the 4:20 slot. I advised the pt. That we should move her later in the week per the doctors request. The pt. Became angry and states that she will be attending the 4:20 appointment that was given to her.

## 2017-12-04 NOTE — TELEPHONE ENCOUNTER
----- Message from Monica Emerson sent at 12/4/2017 10:41 AM CST -----  Contact: Self  Pt calling regarding getting a follow up with  today Doctor is booked. Pt went to ER 12/2 for shortness of breath. Please call [phone] or [phone].

## 2017-12-04 NOTE — PROGRESS NOTES
"Subjective:       Patient ID: Dayami Mina is a 67 y.o. female.    Chief Complaint: Shortness of Breath and Lymphadenopathy    Cough    HPI: 66 y/o w/ HTN CKD III COPD presents after hospital admission for acute onset of shortness of breath three days ago after treated wood was stored in her home. Developed non productive cough wheezing and ithcing skin. Treated with nebulizer and one time dose of predinsone. When she returned home yesterday symptoms recurred. Wood is being moved out of her home today still with "burnign" skin. And sensation of feeling short of breath. No change with position or movement no LE swelling albuteral MDI helps temporarily but symptosm return after one hour      Review of Systems   Constitutional: Negative for activity change, appetite change, fatigue, fever and unexpected weight change.   HENT: Negative for ear pain, rhinorrhea and sore throat.    Eyes: Negative for discharge and visual disturbance.   Respiratory: Positive for shortness of breath. Negative for chest tightness and wheezing.    Cardiovascular: Negative for chest pain, palpitations and leg swelling.   Gastrointestinal: Negative for abdominal pain, constipation and diarrhea.   Endocrine: Negative for cold intolerance and heat intolerance.   Genitourinary: Negative for dysuria and hematuria.   Musculoskeletal: Negative for joint swelling and neck stiffness.   Skin: Negative for rash.   Neurological: Negative for dizziness, syncope, weakness and headaches.   Psychiatric/Behavioral: Negative for suicidal ideas.       Objective:     Vitals:    12/04/17 1615   BP: 132/80   Pulse: 74   Resp: 14   Temp: 97.4 °F (36.3 °C)   TempSrc: Oral   SpO2: (!) 91%   Weight: 81.2 kg (179 lb 0.2 oz)   Height: 4' 11" (1.499 m)          Physical Exam   Constitutional: She is oriented to person, place, and time. She appears well-developed and well-nourished.   Speaking full sentences no acessory muscle use   HENT:   Head: Normocephalic " and atraumatic.   Eyes: Conjunctivae are normal. Pupils are equal, round, and reactive to light.   Neck: Normal range of motion.   Cardiovascular: Normal rate and regular rhythm.  Exam reveals no gallop and no friction rub.    No murmur heard.  Pulmonary/Chest: Effort normal and breath sounds normal. She has no wheezes. She has no rales.   Good air movement to bases   Abdominal: Soft. Bowel sounds are normal. There is no tenderness. There is no rebound and no guarding.   Musculoskeletal: Normal range of motion. She exhibits no edema or tenderness.   Neurological: She is alert and oriented to person, place, and time. No cranial nerve deficit.   Skin: Skin is warm and dry.   Psychiatric: She has a normal mood and affect.       Assessment:       1. COPD with acute exacerbation    2. Cough        Plan:    1/2. Suspect airborne irritant, stressed importance of removing treated wood from inside home. Short steroid course prn cough suppressant.

## 2018-01-22 ENCOUNTER — OFFICE VISIT (OUTPATIENT)
Dept: CARDIOLOGY | Facility: CLINIC | Age: 68
End: 2018-01-22
Payer: MEDICARE

## 2018-01-22 VITALS
OXYGEN SATURATION: 96 % | WEIGHT: 175.25 LBS | HEIGHT: 59 IN | HEART RATE: 72 BPM | DIASTOLIC BLOOD PRESSURE: 68 MMHG | SYSTOLIC BLOOD PRESSURE: 154 MMHG | BODY MASS INDEX: 35.33 KG/M2

## 2018-01-22 DIAGNOSIS — R00.2 PALPITATIONS: ICD-10-CM

## 2018-01-22 DIAGNOSIS — I10 HTN (HYPERTENSION): ICD-10-CM

## 2018-01-22 DIAGNOSIS — R07.89 CHEST PAIN, ATYPICAL: ICD-10-CM

## 2018-01-22 DIAGNOSIS — J44.9 CHRONIC OBSTRUCTIVE PULMONARY DISEASE, UNSPECIFIED COPD TYPE: Primary | ICD-10-CM

## 2018-01-22 DIAGNOSIS — E78.5 HYPERLIPIDEMIA, UNSPECIFIED HYPERLIPIDEMIA TYPE: Chronic | ICD-10-CM

## 2018-01-22 DIAGNOSIS — R06.09 DOE (DYSPNEA ON EXERTION): ICD-10-CM

## 2018-01-22 DIAGNOSIS — I10 ESSENTIAL HYPERTENSION: Chronic | ICD-10-CM

## 2018-01-22 PROCEDURE — 93010 ELECTROCARDIOGRAM REPORT: CPT | Mod: ,,, | Performed by: INTERNAL MEDICINE

## 2018-01-22 PROCEDURE — 99213 OFFICE O/P EST LOW 20 MIN: CPT | Mod: S$PBB,,, | Performed by: INTERNAL MEDICINE

## 2018-01-22 PROCEDURE — 99999 PR PBB SHADOW E&M-EST. PATIENT-LVL III: CPT | Mod: PBBFAC,,, | Performed by: INTERNAL MEDICINE

## 2018-01-22 PROCEDURE — 99213 OFFICE O/P EST LOW 20 MIN: CPT | Mod: PBBFAC | Performed by: INTERNAL MEDICINE

## 2018-01-22 NOTE — PROGRESS NOTES
Patient, Dayami Mina (MRN #[id number]), presented with a recorded BMI of 35.4 kg/m^2 and a documented comorbidity(s):  - Hypertension  - Hyperlipidemia  to which the severe obesity is a contributing factor. This is consistent with the definition of severe obesity (BMI 35.0-35.9) with comorbidity (ICD-10 E66.01, Z68.35). The patient's severe obesity was monitored, evaluated, addressed and/or treated. This addendum to the medical record is made on 01/22/2018.

## 2018-01-22 NOTE — PROGRESS NOTES
Subjective:    Patient ID:  Dayami Mina is a 67 y.o. female who presents for follow-up of Palpitations      HPI     HTN, COPD, CRI, palpitations    Stress test 8/1/17  LVEF: 66 %  Impression: NORMAL MYOCARDIAL PERFUSION  1. The perfusion scan is free of evidence for myocardial ischemia or injury.   2. There is a mild intensity fixed defect in the anterior wall of the left ventricle, secondary to breast attenuation.   3. Resting wall motion is physiologic.   4. Resting LV function is normal.      Echo 8/1/17    1 - Low normal to mildly depressed left ventricular systolic function (EF 50-55%).      Holter 8/1/17  1. Sinus rhythm with heart rates varying between 56 and 105 bpm with an average of 72 bpm.     VENTRICULAR ARRHYTHMIAS  1. There were very frequent PVCs totalling 4114 and averaging 171 per hour.     2. There were no episodes of ventricular tachycardia.    SUPRA VENTRICULAR ARRHYTHMIAS  1. There were very rare PACs recorded totalling 1 and averaging less than 1 per hour.     2. There were no episodes of sustained supraventricular tachycardia.    SINUS NODE FUNCTION  1. There was no evidence of high grade SA manuel block.     AV CONDUCTION  1. There was no evidence of high grade AV block.     Admitted 12/2/17  Mrs. Dayami Mina is a 67 y.o. female with essential hypertension, hyperlipidemia (.2 Apr 2015), GERD, and depression who presents to [facility] ED with complaints of dyspnea today.  She shortness of breath started gradually throughout the day and she thinks it was triggered by some renovations bring done in her home.  Some plywood was being placed in her home and the installers says it was treated plywood.  She started to have some wheezing and coughing that was productive of greenish sputum.  She went to bed to take a nap but woke up still having these symptoms.  She tried her albuterol inhalers without relief and decided to seek ED evaluation.  She denies any fevers, chills,  chest pain, pleurisy, palpitations, diaphoresis, nausea, vomiting, hemoptysis, nor any lower extremity pain or swelling.  She did not have any sick contacts or recent travel, and denies any similar episodes in the past.  Of note, she has been following with Dr. Vishal Mcmahan who initially felt that she had COPD and treated her for that for three years before changing his opinion.  She did smoke but quit in 1988.      Mrs. Dayami Mina is a 67 y.o. female with essential hypertension, hyperlipidemia (.2 Apr 2015), GERD, and depression who presents to [facility] ED with complaints of dyspnea .She shortness of breath started gradually throughout the day and she thinks it was triggered by some renovations bring done in her home.  Some plywood was being placed in her home and the installers says it was treated plywood.  She started to have some wheezing and coughing that was productive of greenish sputum.  She went to bed to take a nap but woke up still having these symptoms.  She tried her albuterol inhalers without relief and decided to seek ED evaluation.  She denies any fevers, chills, chest pain, pleurisy, palpitations, diaphoresis, nausea, vomiting, hemoptysis, nor any lower extremity pain or swelling.  She did not have any sick contacts or recent travel, and denies any similar episodes in the past.  Of note, she has been following with Dr. Vishal Mcmahan who initially felt that she had COPD and treated her for that for three years before changing his opinion,after couple PFT study show no sign of COPD,  She did smoke but quit in 1988. She was started on supplemental oxygen for hypoxia with nebulizer treatment,CTA chest show no PE,no consolidation,or fluid overload,SOB and hypoxic episode may is duo to expose to chemical agent.her SOB,and  Hypoxia resolved,she was stable on RA,she improved better than expected,she has been discharged home with follow up with her pulmonologist ,she has already  appointment.     Denies CP  SOB stable  EKG NSR - ok  Needs clearance for toe surgery       Review of Systems   Constitution: Negative for decreased appetite.   HENT: Negative for ear discharge.    Eyes: Negative for blurred vision.   Respiratory: Negative for hemoptysis.    Endocrine: Negative for polyphagia.   Hematologic/Lymphatic: Negative for adenopathy.   Skin: Negative for color change.   Musculoskeletal: Negative for joint swelling.   Neurological: Negative for brief paralysis.   Psychiatric/Behavioral: Negative for hallucinations.        Objective:    Physical Exam   Constitutional: She is oriented to person, place, and time. She appears well-developed and well-nourished.   HENT:   Head: Normocephalic and atraumatic.   Eyes: Conjunctivae are normal. Pupils are equal, round, and reactive to light.   Neck: Normal range of motion. Neck supple.   Cardiovascular: Normal rate, normal heart sounds and intact distal pulses.    Pulmonary/Chest: Effort normal and breath sounds normal.   Abdominal: Soft. Bowel sounds are normal.   Musculoskeletal: Normal range of motion.   Neurological: She is alert and oriented to person, place, and time.   Skin: Skin is warm and dry.         Assessment:       1. Chronic obstructive pulmonary disease, unspecified COPD type    2. Essential hypertension    3. Hyperlipidemia, unspecified hyperlipidemia type    4. Palpitations    5. Chest pain, atypical    6. ARANGO (dyspnea on exertion)         Plan:       Cleared for toe surgery at low cardiac risk  OV 6 months

## 2018-03-02 DIAGNOSIS — R00.2 INTERMITTENT PALPITATIONS: ICD-10-CM

## 2018-03-02 RX ORDER — METOPROLOL SUCCINATE 50 MG/1
TABLET, EXTENDED RELEASE ORAL
Qty: 30 TABLET | Refills: 5 | Status: SHIPPED | OUTPATIENT
Start: 2018-03-02 | End: 2018-04-03 | Stop reason: SDUPTHER

## 2018-03-07 ENCOUNTER — PATIENT MESSAGE (OUTPATIENT)
Dept: FAMILY MEDICINE | Facility: CLINIC | Age: 68
End: 2018-03-07

## 2018-03-07 DIAGNOSIS — F33.42 RECURRENT MAJOR DEPRESSIVE DISORDER, IN FULL REMISSION: ICD-10-CM

## 2018-03-07 RX ORDER — DOXYCYCLINE 100 MG/1
CAPSULE ORAL
COMMUNITY
Start: 2018-02-04 | End: 2018-04-03 | Stop reason: ALTCHOICE

## 2018-03-07 RX ORDER — MUPIROCIN 20 MG/G
OINTMENT TOPICAL
COMMUNITY
Start: 2018-02-20 | End: 2019-04-15 | Stop reason: ALTCHOICE

## 2018-03-07 RX ORDER — CEPHALEXIN 500 MG/1
CAPSULE ORAL
COMMUNITY
Start: 2018-01-03 | End: 2018-04-03 | Stop reason: ALTCHOICE

## 2018-03-07 RX ORDER — HYDROCODONE BITARTRATE AND ACETAMINOPHEN 5; 325 MG/1; MG/1
TABLET ORAL
COMMUNITY
Start: 2018-02-16 | End: 2018-07-27 | Stop reason: SDUPTHER

## 2018-03-07 RX ORDER — ESCITALOPRAM OXALATE 20 MG/1
20 TABLET ORAL DAILY
Qty: 90 TABLET | Refills: 3 | Status: SHIPPED | OUTPATIENT
Start: 2018-03-07 | End: 2019-04-15 | Stop reason: SDUPTHER

## 2018-03-07 RX ORDER — BENZONATATE 100 MG/1
CAPSULE ORAL
COMMUNITY
Start: 2018-02-04 | End: 2018-03-19

## 2018-03-07 RX ORDER — OSELTAMIVIR PHOSPHATE 75 MG/1
CAPSULE ORAL
COMMUNITY
Start: 2018-02-04 | End: 2018-04-03

## 2018-03-07 RX ORDER — MONTELUKAST SODIUM 10 MG/1
TABLET ORAL
COMMUNITY
Start: 2018-02-04 | End: 2018-04-03

## 2018-03-07 RX ORDER — CETIRIZINE HYDROCHLORIDE 10 MG/1
TABLET ORAL
COMMUNITY
Start: 2018-02-04 | End: 2019-08-14

## 2018-03-07 RX ORDER — FLUTICASONE PROPIONATE 50 MCG
SPRAY, SUSPENSION (ML) NASAL
COMMUNITY
Start: 2018-02-04 | End: 2019-04-15 | Stop reason: SDUPTHER

## 2018-03-07 RX ORDER — AMOXICILLIN AND CLAVULANATE POTASSIUM 875; 125 MG/1; MG/1
TABLET, FILM COATED ORAL
COMMUNITY
Start: 2018-02-20 | End: 2018-04-03

## 2018-03-19 ENCOUNTER — TELEPHONE (OUTPATIENT)
Dept: FAMILY MEDICINE | Facility: CLINIC | Age: 68
End: 2018-03-19

## 2018-03-19 ENCOUNTER — OFFICE VISIT (OUTPATIENT)
Dept: FAMILY MEDICINE | Facility: CLINIC | Age: 68
End: 2018-03-19
Payer: MEDICARE

## 2018-03-19 VITALS
HEART RATE: 71 BPM | OXYGEN SATURATION: 94 % | DIASTOLIC BLOOD PRESSURE: 70 MMHG | TEMPERATURE: 98 F | SYSTOLIC BLOOD PRESSURE: 110 MMHG | BODY MASS INDEX: 33.96 KG/M2 | WEIGHT: 168.44 LBS | HEIGHT: 59 IN

## 2018-03-19 DIAGNOSIS — J44.89 COPD WITH ASTHMA: ICD-10-CM

## 2018-03-19 DIAGNOSIS — R05.9 COUGH: Primary | ICD-10-CM

## 2018-03-19 DIAGNOSIS — J44.89 COPD WITH ASTHMA: Primary | ICD-10-CM

## 2018-03-19 PROCEDURE — 99999 PR PBB SHADOW E&M-EST. PATIENT-LVL III: CPT | Mod: PBBFAC,,, | Performed by: FAMILY MEDICINE

## 2018-03-19 PROCEDURE — 99214 OFFICE O/P EST MOD 30 MIN: CPT | Mod: S$PBB,,, | Performed by: FAMILY MEDICINE

## 2018-03-19 PROCEDURE — 99213 OFFICE O/P EST LOW 20 MIN: CPT | Mod: PBBFAC,PN | Performed by: FAMILY MEDICINE

## 2018-03-19 RX ORDER — ALBUTEROL SULFATE 90 UG/1
2 AEROSOL, METERED RESPIRATORY (INHALATION) EVERY 4 HOURS PRN
Qty: 1 INHALER | Refills: 2 | Status: SHIPPED | OUTPATIENT
Start: 2018-03-19 | End: 2018-07-27 | Stop reason: SDUPTHER

## 2018-03-19 RX ORDER — BENZONATATE 200 MG/1
200 CAPSULE ORAL 3 TIMES DAILY PRN
Qty: 30 CAPSULE | Refills: 0 | Status: SHIPPED | OUTPATIENT
Start: 2018-03-19 | End: 2018-03-29

## 2018-03-19 RX ORDER — FLUTICASONE PROPIONATE AND SALMETEROL 113; 14 UG/1; UG/1
1 POWDER, METERED RESPIRATORY (INHALATION) 2 TIMES DAILY
Qty: 1 EACH | Refills: 0 | Status: SHIPPED | OUTPATIENT
Start: 2018-03-19 | End: 2018-05-29 | Stop reason: DRUGHIGH

## 2018-03-19 NOTE — TELEPHONE ENCOUNTER
I did not send an antibiotic to the pharmacy. I told her I was sending a new inhaler. Ask pharmacy if they received it and if covered. If so left patient know. Antibiotic not needed.

## 2018-03-19 NOTE — TELEPHONE ENCOUNTER
Spoke with pharmacist- states that AirDuo wasn't covered- states that preferred is not listed.  I sent in Anoro Ellipta. Call pharmacy and ask if that was covered

## 2018-03-19 NOTE — PROGRESS NOTES
Spoke with patient on phone. Requesting antibiotic. Advised her that I do not recommend antibiotic right now. Needs to start new inhaler- Anoro, which I confirmed with pharmacy is covered.  In the meantime, will start Tessalon as well

## 2018-03-19 NOTE — PROGRESS NOTES
"Subjective:       Patient ID: Dayami Mina is a 67 y.o. female.    Chief Complaint: Sore Throat and Cough    HPI   67 year old female comes in for 3-4 days of worsening cough and shortness of breath. She reports that it is worse when she lays down for bed. No Chest pain, fever, chills, or fluid in feet. Has been using her Ventolin Inhaler but states that it is not providing adequate relief. Daughter who is present reports having hear wheezing. Patient's chart shows that she was previously told she had COPD, with an exacerbation in February. She was a smoker, a quit smoking in 1988.    Review of Systems   Constitutional: Positive for fatigue. Negative for chills, diaphoresis and fever.   HENT: Positive for sore throat. Negative for congestion and sneezing.    Eyes: Negative for visual disturbance.   Respiratory: Positive for cough, shortness of breath and wheezing.    Cardiovascular: Negative for chest pain, palpitations and leg swelling.   Gastrointestinal: Negative for abdominal pain.   Genitourinary: Negative for dysuria.   Hematological: Negative for adenopathy.       Objective:     /70   Pulse 71   Temp 97.7 °F (36.5 °C) (Oral)   Ht 4' 11" (1.499 m)   Wt 76.4 kg (168 lb 6.9 oz)   SpO2 (!) 94%   BMI 34.02 kg/m²     Physical Exam   Constitutional: She appears well-developed and well-nourished. No distress.   HENT:   Head: Normocephalic and atraumatic.   Right Ear: External ear normal.   Left Ear: External ear normal.   Nose: Nose normal.   Mouth/Throat: Posterior oropharyngeal erythema present. No posterior oropharyngeal edema.   Tonsils absent   Eyes: Conjunctivae and EOM are normal. Pupils are equal, round, and reactive to light. Right eye exhibits no discharge. Left eye exhibits no discharge.   Neck: Normal range of motion. Neck supple. No tracheal deviation present.   Cardiovascular: Normal rate, regular rhythm, normal heart sounds and intact distal pulses.    No murmur " heard.  Pulmonary/Chest: Effort normal. No tachypnea and no bradypnea. No respiratory distress. She has no decreased breath sounds. She has wheezes. She has no rhonchi. She has no rales.   Abdominal: Soft. Bowel sounds are normal. She exhibits no mass. There is no tenderness.   Lymphadenopathy:     She has no cervical adenopathy.   Skin: She is not diaphoretic.   Vitals reviewed.      Assessment:       1. COPD with asthma        Plan:     Dayami was seen today for sore throat and cough.    Diagnoses and all orders for this visit:    COPD with asthma  -     fluticasone-salmeterol (AIRDUO RESPICLICK) 113-14 mcg/actuation AePB; Inhale 1 puff into the lungs 2 (two) times daily.  -     albuterol 90 mcg/actuation inhaler; Inhale 2 puffs into the lungs every 4 (four) hours as needed for Wheezing or Shortness of Breath. Rescue  Patient shown how to use rescue inhaler correctly.  Patient educated on correct use of inhaler via video: https://www.Comeet.com/watch?v=Sie1k3VUrK0.  Patient was able to explain back how to use inhaler.  Also given the symptoms are increasing will add a controller, and reevaluate in 2 weeks.   I did not feel it was necessary to add an antibiotic with this exacerbation as symptoms seemed mild. However if patient doesn't improve in next few days, she is to call back and will need to be reevaluated.

## 2018-03-19 NOTE — TELEPHONE ENCOUNTER
----- Message from Ruth Ann erlindamonet sent at 3/19/2018 12:56 PM CDT -----  Contact: [facility]  Pt is at the pharmacy now and state she was supposed to have an antibiotic sent over. Please call back at [phone].

## 2018-03-19 NOTE — TELEPHONE ENCOUNTER
----- Message from Mayra Ruiz sent at 3/19/2018  3:35 PM CDT -----  Contact: self  Patient is waiting on the antibiotic to be sent into the pharmacy. Patient can be reached at [phone].      Thanks,

## 2018-03-20 NOTE — TELEPHONE ENCOUNTER
Dr. Wilcox did you order an antibiotic for this pt. Yesterday? If so rachel has not received the rx as of yet.

## 2018-03-20 NOTE — TELEPHONE ENCOUNTER
No. I spoke with patient about this yesterday. I sent in the inhaler and benzonatate, which should help with the coughing

## 2018-04-03 ENCOUNTER — OFFICE VISIT (OUTPATIENT)
Dept: FAMILY MEDICINE | Facility: CLINIC | Age: 68
End: 2018-04-03
Payer: MEDICARE

## 2018-04-03 ENCOUNTER — LAB VISIT (OUTPATIENT)
Dept: LAB | Facility: HOSPITAL | Age: 68
End: 2018-04-03
Attending: INTERNAL MEDICINE
Payer: MEDICARE

## 2018-04-03 VITALS
BODY MASS INDEX: 34.57 KG/M2 | OXYGEN SATURATION: 94 % | SYSTOLIC BLOOD PRESSURE: 126 MMHG | HEART RATE: 70 BPM | TEMPERATURE: 98 F | DIASTOLIC BLOOD PRESSURE: 80 MMHG | WEIGHT: 171.5 LBS | HEIGHT: 59 IN

## 2018-04-03 DIAGNOSIS — E78.5 HYPERLIPIDEMIA, UNSPECIFIED HYPERLIPIDEMIA TYPE: Chronic | ICD-10-CM

## 2018-04-03 DIAGNOSIS — M1A.9XX0 CHRONIC GOUT INVOLVING TOE WITHOUT TOPHUS, UNSPECIFIED CAUSE, UNSPECIFIED LATERALITY: ICD-10-CM

## 2018-04-03 DIAGNOSIS — R00.2 INTERMITTENT PALPITATIONS: ICD-10-CM

## 2018-04-03 DIAGNOSIS — I10 ESSENTIAL HYPERTENSION: Chronic | ICD-10-CM

## 2018-04-03 DIAGNOSIS — N18.30 CKD (CHRONIC KIDNEY DISEASE), STAGE III: ICD-10-CM

## 2018-04-03 DIAGNOSIS — J44.9 CHRONIC OBSTRUCTIVE PULMONARY DISEASE, UNSPECIFIED COPD TYPE: Primary | ICD-10-CM

## 2018-04-03 LAB
ALBUMIN SERPL BCP-MCNC: 3.8 G/DL
ALBUMIN SERPL BCP-MCNC: 3.8 G/DL
ALP SERPL-CCNC: 49 U/L
ALT SERPL W/O P-5'-P-CCNC: 26 U/L
ANION GAP SERPL CALC-SCNC: 8 MMOL/L
ANION GAP SERPL CALC-SCNC: 8 MMOL/L
AST SERPL-CCNC: 20 U/L
BASOPHILS # BLD AUTO: 0.03 K/UL
BASOPHILS NFR BLD: 0.5 %
BILIRUB SERPL-MCNC: 0.3 MG/DL
BUN SERPL-MCNC: 21 MG/DL
BUN SERPL-MCNC: 21 MG/DL
CALCIUM SERPL-MCNC: 9.5 MG/DL
CALCIUM SERPL-MCNC: 9.5 MG/DL
CHLORIDE SERPL-SCNC: 101 MMOL/L
CHLORIDE SERPL-SCNC: 101 MMOL/L
CO2 SERPL-SCNC: 28 MMOL/L
CO2 SERPL-SCNC: 28 MMOL/L
CREAT SERPL-MCNC: 1 MG/DL
CREAT SERPL-MCNC: 1 MG/DL
DIFFERENTIAL METHOD: ABNORMAL
EOSINOPHIL # BLD AUTO: 0.2 K/UL
EOSINOPHIL NFR BLD: 2.9 %
ERYTHROCYTE [DISTWIDTH] IN BLOOD BY AUTOMATED COUNT: 14 %
EST. GFR  (AFRICAN AMERICAN): >60 ML/MIN/1.73 M^2
EST. GFR  (AFRICAN AMERICAN): >60 ML/MIN/1.73 M^2
EST. GFR  (NON AFRICAN AMERICAN): 58 ML/MIN/1.73 M^2
EST. GFR  (NON AFRICAN AMERICAN): 58 ML/MIN/1.73 M^2
GLUCOSE SERPL-MCNC: 90 MG/DL
GLUCOSE SERPL-MCNC: 90 MG/DL
HCT VFR BLD AUTO: 40.3 %
HGB BLD-MCNC: 12.9 G/DL
LYMPHOCYTES # BLD AUTO: 2.2 K/UL
LYMPHOCYTES NFR BLD: 34.5 %
MCH RBC QN AUTO: 26.7 PG
MCHC RBC AUTO-ENTMCNC: 32 G/DL
MCV RBC AUTO: 83 FL
MONOCYTES # BLD AUTO: 0.6 K/UL
MONOCYTES NFR BLD: 9.4 %
NEUTROPHILS # BLD AUTO: 3.4 K/UL
NEUTROPHILS NFR BLD: 52.7 %
PHOSPHATE SERPL-MCNC: 3.8 MG/DL
PLATELET # BLD AUTO: 297 K/UL
PMV BLD AUTO: 10.3 FL
POTASSIUM SERPL-SCNC: 4.3 MMOL/L
POTASSIUM SERPL-SCNC: 4.3 MMOL/L
PROT SERPL-MCNC: 7.1 G/DL
RBC # BLD AUTO: 4.84 M/UL
SODIUM SERPL-SCNC: 137 MMOL/L
SODIUM SERPL-SCNC: 137 MMOL/L
URATE SERPL-MCNC: 6.1 MG/DL
WBC # BLD AUTO: 6.47 K/UL

## 2018-04-03 PROCEDURE — 80053 COMPREHEN METABOLIC PANEL: CPT

## 2018-04-03 PROCEDURE — 36415 COLL VENOUS BLD VENIPUNCTURE: CPT | Mod: PN

## 2018-04-03 PROCEDURE — 99214 OFFICE O/P EST MOD 30 MIN: CPT | Mod: S$PBB,,, | Performed by: INTERNAL MEDICINE

## 2018-04-03 PROCEDURE — 99213 OFFICE O/P EST LOW 20 MIN: CPT | Mod: PBBFAC,PN | Performed by: INTERNAL MEDICINE

## 2018-04-03 PROCEDURE — 84550 ASSAY OF BLOOD/URIC ACID: CPT

## 2018-04-03 PROCEDURE — 99999 PR PBB SHADOW E&M-EST. PATIENT-LVL III: CPT | Mod: PBBFAC,,, | Performed by: INTERNAL MEDICINE

## 2018-04-03 PROCEDURE — 85025 COMPLETE CBC W/AUTO DIFF WBC: CPT

## 2018-04-03 PROCEDURE — 84100 ASSAY OF PHOSPHORUS: CPT

## 2018-04-03 RX ORDER — PITAVASTATIN CALCIUM 2.09 MG/1
2 TABLET, FILM COATED ORAL DAILY
Qty: 90 TABLET | Refills: 1 | Status: SHIPPED | OUTPATIENT
Start: 2018-04-03 | End: 2018-07-27

## 2018-04-03 RX ORDER — ALLOPURINOL 100 MG/1
100 TABLET ORAL DAILY
Qty: 90 TABLET | Refills: 2 | Status: SHIPPED | OUTPATIENT
Start: 2018-04-03 | End: 2019-04-15 | Stop reason: SDUPTHER

## 2018-04-03 RX ORDER — LOSARTAN POTASSIUM 50 MG/1
50 TABLET ORAL 2 TIMES DAILY
Qty: 180 TABLET | Refills: 2 | Status: SHIPPED | OUTPATIENT
Start: 2018-04-03 | End: 2019-03-18 | Stop reason: SDUPTHER

## 2018-04-03 RX ORDER — METOPROLOL SUCCINATE 50 MG/1
50 TABLET, EXTENDED RELEASE ORAL DAILY
Qty: 90 TABLET | Refills: 3 | Status: SHIPPED | OUTPATIENT
Start: 2018-04-03 | End: 2018-07-27 | Stop reason: SDUPTHER

## 2018-04-03 NOTE — PROGRESS NOTES
"Subjective:       Patient ID: Dayami Mina is a 67 y.o. female.    Chief Complaint: Follow-up (2 week f/u)    F/u cough    HPI: 66 y/o w/ COPD seen by my colleague two weeks ago for chronic recurrent cough on albuterol mdi switched to laba/ics with complete resolution rinsing after use no fevers/chills no LE swelling. Compliant with BP medications no orhtopnea or PND       Review of Systems   Constitutional: Negative for activity change, appetite change, fatigue, fever and unexpected weight change.   HENT: Negative for ear pain, rhinorrhea and sore throat.    Eyes: Negative for discharge and visual disturbance.   Respiratory: Negative for chest tightness, shortness of breath and wheezing.    Cardiovascular: Negative for chest pain, palpitations and leg swelling.   Gastrointestinal: Negative for abdominal pain, constipation and diarrhea.   Endocrine: Negative for cold intolerance and heat intolerance.   Genitourinary: Negative for dysuria and hematuria.   Musculoskeletal: Negative for joint swelling and neck stiffness.   Skin: Negative for rash.   Neurological: Negative for dizziness, syncope, weakness and headaches.   Psychiatric/Behavioral: Negative for suicidal ideas.       Objective:     Vitals:    04/03/18 1312   BP: 126/80   Pulse: 70   Temp: 97.6 °F (36.4 °C)   TempSrc: Oral   SpO2: (!) 94%   Weight: 77.8 kg (171 lb 8.3 oz)   Height: 4' 11" (1.499 m)          Physical Exam   Constitutional: She is oriented to person, place, and time. She appears well-developed and well-nourished.   HENT:   Head: Normocephalic and atraumatic.   Eyes: Conjunctivae are normal. No scleral icterus.   Neck: Normal range of motion.   Cardiovascular: Normal rate and regular rhythm.  Exam reveals no gallop and no friction rub.    No murmur heard.  Pulmonary/Chest: Effort normal and breath sounds normal. She has no wheezes. She has no rales.   Abdominal: Soft. Bowel sounds are normal. There is no tenderness. There is no " rebound and no guarding.   Musculoskeletal: Normal range of motion. She exhibits no edema or tenderness.   Neurological: She is alert and oriented to person, place, and time. No cranial nerve deficit.   Skin: Skin is warm and dry.   Psychiatric: She has a normal mood and affect.       Assessment:       1. Chronic obstructive pulmonary disease, unspecified COPD type    2. Intermittent palpitations    3. Essential hypertension    4. Hyperlipidemia, unspecified hyperlipidemia type    5. Chronic gout involving toe without tophus, unspecified cause, unspecified laterality        Plan:    1. Improved with laba/ics continue    2. Metoprolol daily     3. At goal repeat renal function and eelctrolytes    4. On statin continue    5. On allopurinol no recent flares continue same

## 2018-05-25 ENCOUNTER — TELEPHONE (OUTPATIENT)
Dept: FAMILY MEDICINE | Facility: CLINIC | Age: 68
End: 2018-05-25

## 2018-05-25 DIAGNOSIS — J44.89 COPD WITH ASTHMA: ICD-10-CM

## 2018-05-25 NOTE — TELEPHONE ENCOUNTER
----- Message from Nehemiah Mathews sent at 5/25/2018  2:26 PM CDT -----  Contact: [phone]/pt  Calling TO speak with nurse regarding Anoro script ,and health concerns.

## 2018-07-10 DIAGNOSIS — Z12.31 ENCOUNTER FOR SCREENING MAMMOGRAM FOR MALIGNANT NEOPLASM OF BREAST: Primary | ICD-10-CM

## 2018-07-23 ENCOUNTER — TELEPHONE (OUTPATIENT)
Dept: FAMILY MEDICINE | Facility: CLINIC | Age: 68
End: 2018-07-23

## 2018-07-23 DIAGNOSIS — I10 ESSENTIAL HYPERTENSION: Primary | ICD-10-CM

## 2018-07-23 DIAGNOSIS — E78.5 HYPERLIPIDEMIA, UNSPECIFIED HYPERLIPIDEMIA TYPE: ICD-10-CM

## 2018-07-23 DIAGNOSIS — R73.03 PREDIABETES: ICD-10-CM

## 2018-07-24 ENCOUNTER — LAB VISIT (OUTPATIENT)
Dept: LAB | Facility: HOSPITAL | Age: 68
End: 2018-07-24
Attending: FAMILY MEDICINE
Payer: MEDICARE

## 2018-07-24 DIAGNOSIS — E78.5 HYPERLIPIDEMIA, UNSPECIFIED HYPERLIPIDEMIA TYPE: ICD-10-CM

## 2018-07-24 DIAGNOSIS — R73.03 PREDIABETES: ICD-10-CM

## 2018-07-24 DIAGNOSIS — I10 ESSENTIAL HYPERTENSION: ICD-10-CM

## 2018-07-24 LAB
ALBUMIN SERPL BCP-MCNC: 3.9 G/DL
ALP SERPL-CCNC: 47 U/L
ALT SERPL W/O P-5'-P-CCNC: 25 U/L
ANION GAP SERPL CALC-SCNC: 9 MMOL/L
AST SERPL-CCNC: 18 U/L
BILIRUB SERPL-MCNC: 0.4 MG/DL
BUN SERPL-MCNC: 23 MG/DL
CALCIUM SERPL-MCNC: 10.2 MG/DL
CHLORIDE SERPL-SCNC: 102 MMOL/L
CHOLEST SERPL-MCNC: 256 MG/DL
CHOLEST/HDLC SERPL: 5.6 {RATIO}
CO2 SERPL-SCNC: 27 MMOL/L
CREAT SERPL-MCNC: 1 MG/DL
EST. GFR  (AFRICAN AMERICAN): >60 ML/MIN/1.73 M^2
EST. GFR  (NON AFRICAN AMERICAN): 57 ML/MIN/1.73 M^2
GLUCOSE SERPL-MCNC: 98 MG/DL
HDLC SERPL-MCNC: 46 MG/DL
HDLC SERPL: 18 %
LDLC SERPL CALC-MCNC: 156.6 MG/DL
NONHDLC SERPL-MCNC: 210 MG/DL
POTASSIUM SERPL-SCNC: 4.2 MMOL/L
PROT SERPL-MCNC: 7.1 G/DL
SODIUM SERPL-SCNC: 138 MMOL/L
TRIGL SERPL-MCNC: 267 MG/DL

## 2018-07-24 PROCEDURE — 36415 COLL VENOUS BLD VENIPUNCTURE: CPT | Mod: PN

## 2018-07-24 PROCEDURE — 80053 COMPREHEN METABOLIC PANEL: CPT

## 2018-07-24 PROCEDURE — 80061 LIPID PANEL: CPT

## 2018-07-24 PROCEDURE — 83036 HEMOGLOBIN GLYCOSYLATED A1C: CPT

## 2018-07-24 NOTE — TELEPHONE ENCOUNTER
----- Message from Trisha Foy MA sent at 7/23/2018  1:38 PM CDT -----  Contact: self    ----- Message -----  From: Meena Aguilar  Sent: 7/23/2018  10:39 AM  To: [facility] Staff    Please add pt's lab orders to chart. Schedule annual appointment 7.26.18. Once orders are placed contact pt at [phone].    Thanks-

## 2018-07-25 LAB
ESTIMATED AVG GLUCOSE: 123 MG/DL
HBA1C MFR BLD HPLC: 5.9 %

## 2018-07-27 ENCOUNTER — OFFICE VISIT (OUTPATIENT)
Dept: FAMILY MEDICINE | Facility: CLINIC | Age: 68
End: 2018-07-27
Payer: MEDICARE

## 2018-07-27 ENCOUNTER — TELEPHONE (OUTPATIENT)
Dept: FAMILY MEDICINE | Facility: CLINIC | Age: 68
End: 2018-07-27

## 2018-07-27 VITALS
DIASTOLIC BLOOD PRESSURE: 90 MMHG | BODY MASS INDEX: 35.68 KG/M2 | TEMPERATURE: 99 F | HEIGHT: 59 IN | OXYGEN SATURATION: 95 % | HEART RATE: 75 BPM | RESPIRATION RATE: 17 BRPM | WEIGHT: 177 LBS | SYSTOLIC BLOOD PRESSURE: 142 MMHG

## 2018-07-27 DIAGNOSIS — L85.3 XEROSIS CUTIS: ICD-10-CM

## 2018-07-27 DIAGNOSIS — I10 ESSENTIAL HYPERTENSION: ICD-10-CM

## 2018-07-27 DIAGNOSIS — J44.89 COPD WITH ASTHMA: Primary | ICD-10-CM

## 2018-07-27 DIAGNOSIS — R73.03 PREDIABETES: ICD-10-CM

## 2018-07-27 DIAGNOSIS — E78.5 HYPERLIPIDEMIA, UNSPECIFIED HYPERLIPIDEMIA TYPE: ICD-10-CM

## 2018-07-27 DIAGNOSIS — B37.2 CANDIDAL INTERTRIGO: ICD-10-CM

## 2018-07-27 DIAGNOSIS — R60.0 LOWER EXTREMITY EDEMA: ICD-10-CM

## 2018-07-27 PROCEDURE — 99214 OFFICE O/P EST MOD 30 MIN: CPT | Mod: S$PBB,,, | Performed by: FAMILY MEDICINE

## 2018-07-27 PROCEDURE — 99999 PR PBB SHADOW E&M-EST. PATIENT-LVL III: CPT | Mod: PBBFAC,,, | Performed by: FAMILY MEDICINE

## 2018-07-27 PROCEDURE — 99213 OFFICE O/P EST LOW 20 MIN: CPT | Mod: PBBFAC,PN | Performed by: FAMILY MEDICINE

## 2018-07-27 RX ORDER — METOPROLOL SUCCINATE 50 MG/1
50 TABLET, EXTENDED RELEASE ORAL
COMMUNITY
End: 2019-03-18 | Stop reason: SDUPTHER

## 2018-07-27 RX ORDER — LOSARTAN POTASSIUM 50 MG/1
50 TABLET ORAL
COMMUNITY
End: 2018-07-27 | Stop reason: SDUPTHER

## 2018-07-27 RX ORDER — POTASSIUM CHLORIDE 20 MEQ/1
20 TABLET, EXTENDED RELEASE ORAL DAILY
Qty: 10 TABLET | Refills: 2 | Status: SHIPPED | OUTPATIENT
Start: 2018-07-27 | End: 2019-12-09 | Stop reason: SDUPTHER

## 2018-07-27 RX ORDER — ESCITALOPRAM OXALATE 20 MG/1
20 TABLET ORAL
COMMUNITY
End: 2019-03-18 | Stop reason: SDUPTHER

## 2018-07-27 RX ORDER — PRAVASTATIN SODIUM 40 MG/1
40 TABLET ORAL DAILY
Qty: 90 TABLET | Refills: 0 | Status: SHIPPED | OUTPATIENT
Start: 2018-07-27 | End: 2018-10-16 | Stop reason: SDUPTHER

## 2018-07-27 RX ORDER — ASPIRIN 81 MG/1
81 TABLET ORAL
COMMUNITY
End: 2018-07-27 | Stop reason: SDUPTHER

## 2018-07-27 RX ORDER — ALLOPURINOL 100 MG/1
100 TABLET ORAL
COMMUNITY
End: 2018-07-27 | Stop reason: SDUPTHER

## 2018-07-27 RX ORDER — GABAPENTIN 100 MG/1
100 CAPSULE ORAL
COMMUNITY
End: 2018-07-27 | Stop reason: SDUPTHER

## 2018-07-27 RX ORDER — HYDROCODONE BITARTRATE AND ACETAMINOPHEN 7.5; 325 MG/1; MG/1
TABLET ORAL
COMMUNITY
Start: 2018-05-14 | End: 2019-04-15 | Stop reason: SDUPTHER

## 2018-07-27 RX ORDER — MELOXICAM 15 MG/1
TABLET ORAL
COMMUNITY
Start: 2018-07-26 | End: 2019-04-15

## 2018-07-27 RX ORDER — HYDROGEN PEROXIDE 3 %
20 SOLUTION, NON-ORAL MISCELLANEOUS
COMMUNITY
End: 2018-07-27 | Stop reason: SDUPTHER

## 2018-07-27 NOTE — TELEPHONE ENCOUNTER
----- Message from Lionel Melara sent at 7/27/2018  3:43 PM CDT -----  Contact: Self/[phone]  Patient would like to speak with staff in regards to cream the provider was suppose to prescribe.     Thank you

## 2018-07-27 NOTE — TELEPHONE ENCOUNTER
Patient states she was suppose to have a cream sent to her pharmary for her to apply to her legs and under her stomach.

## 2018-07-30 RX ORDER — AMMONIUM LACTATE 12 G/100G
LOTION TOPICAL 2 TIMES DAILY PRN
Qty: 500 G | Refills: 5 | Status: SHIPPED | OUTPATIENT
Start: 2018-07-30 | End: 2023-05-08 | Stop reason: CLARIF

## 2018-07-30 RX ORDER — NYSTATIN 100000 [USP'U]/G
POWDER TOPICAL 3 TIMES DAILY
Qty: 60 G | Refills: 2 | Status: SHIPPED | OUTPATIENT
Start: 2018-07-30 | End: 2020-11-03 | Stop reason: ALTCHOICE

## 2018-07-31 NOTE — PROGRESS NOTES
Routine Office Visit    Patient Name: Dayami Mina    : 1950  MRN: [id number]    Subjective:  Dayami is a 68 y.o. female who presents today for:   Chief Complaint   Patient presents with    Chronic Problems       68 year old female with COPD, HTN, dyslipidemia, GERD, and CKD, comes in for routine care. She reports needing a refill on her Anoro. She also reports the she occasionally gets lower extremity edema and was given Lasix for this before but gets cramps when she takes it. She states she was never given potassium to take with the Lasix. She only takes the Lasix for a day or 2.   The patient also reports an itchy, moist, yeasty rash under her abdomen. Has been there for a few weeks.Worsened when she sweats.  Lastly she states she has been off her Livalo because she cannot afford it.    Past Medical History  Past Medical History:   Diagnosis Date    Depression     Hyperlipidemia     Hypertension        Past Surgical History  Past Surgical History:   Procedure Laterality Date    APPENDECTOMY      BACK SURGERY      breast implants      CLOSED REDUCTION FINGER DISLOCATION      FOOT SURGERY      HYSTERECTOMY      MASTECTOMY      SHOULDER ARTHROSCOPY      SINUS SURGERY          Family History  Family History   Problem Relation Age of Onset    Diabetes Mother     Diabetes Father     Stroke Paternal Grandfather     Diabetes Sister        Social History  Social History     Social History    Marital status:      Spouse name: N/A    Number of children: N/A    Years of education: N/A     Occupational History    Not on file.     Social History Main Topics    Smoking status: Former Smoker     Quit date: 1988    Smokeless tobacco: Never Used    Alcohol use No    Drug use: No    Sexual activity: No     Other Topics Concern    Not on file     Social History Narrative    No narrative on file       Current Medications  Current Outpatient Prescriptions on File Prior to Visit    Medication Sig Dispense Refill    albuterol 90 mcg/actuation inhaler Inhale 2 puffs into the lungs every 6 (six) hours as needed for Wheezing. 8 g 3    allopurinol (ZYLOPRIM) 100 MG tablet Take 1 tablet (100 mg total) by mouth once daily. 90 tablet 2    aspirin (ECOTRIN) 81 MG EC tablet Take 81 mg by mouth once daily.      blood sugar diagnostic Strp Uses Freestyle meter to monitor readings once daily 50 strip 6    butalbital-acetaminophen-caffeine -40 mg (FIORICET, ESGIC) -40 mg per tablet       cetirizine (ZYRTEC) 10 MG tablet       escitalopram oxalate (LEXAPRO) 20 MG tablet Take 1 tablet (20 mg total) by mouth once daily. 90 tablet 3    esomeprazole (NEXIUM) 20 MG capsule Take 20 mg by mouth before breakfast.      fluticasone (FLONASE) 50 mcg/actuation nasal spray       FOLIC ACID/MULTIVIT-MIN/LUTEIN (CENTRUM SILVER ORAL) Take by mouth.      gabapentin (NEURONTIN) 100 MG capsule       lancets Misc 1 lancet by Misc.(Non-Drug; Combo Route) route once daily. 50 each 6    loratadine (CLARITIN) 10 mg tablet Take 10 mg by mouth once daily.      losartan (COZAAR) 50 MG tablet Take 1 tablet (50 mg total) by mouth 2 (two) times daily. 180 tablet 2    multivitamin capsule Take 1 capsule by mouth once daily.      mupirocin (BACTROBAN) 2 % ointment       tramadol (ULTRAM) 50 mg tablet Take 50 mg by mouth every 6 (six) hours as needed for Pain.       No current facility-administered medications on file prior to visit.        Allergies   Review of patient's allergies indicates:   Allergen Reactions    Sulfa (sulfonamide antibiotics) Anaphylaxis    Refresh redness relief [phenylephrin-polyvinyl alcohol] Blisters       Review of Systems   Constitutional: Negative for unexpected weight change.   HENT: Negative for ear pain and sore throat.    Eyes: Negative for visual disturbance.   Respiratory: Negative for shortness of breath.    Cardiovascular: Positive for leg swelling (recurrent). Negative  "for chest pain.   Gastrointestinal: Negative for abdominal pain and blood in stool.   Endocrine: Negative for cold intolerance and heat intolerance.   Genitourinary: Negative for dysuria and frequency.   Skin: Negative for rash.   Neurological: Negative for weakness, numbness and headaches.   Hematological: Negative for adenopathy.   Psychiatric/Behavioral: Negative for suicidal ideas.       BP (!) 142/90 (BP Location: Right arm, Patient Position: Sitting, BP Method: Medium (Manual))   Pulse 75   Temp 98.6 °F (37 °C) (Oral)   Resp 17   Ht 4' 11" (1.499 m)   Wt 80.3 kg (177 lb 0.5 oz)   SpO2 95%   BMI 35.76 kg/m²     Physical Exam   Constitutional: She appears well-developed and well-nourished. No distress.   HENT:   Head: Normocephalic and atraumatic.   Right Ear: External ear normal.   Left Ear: External ear normal.   Nose: Nose normal.   Mouth/Throat: No posterior oropharyngeal edema or posterior oropharyngeal erythema.   Tonsils absent   Eyes: Conjunctivae and EOM are normal. Pupils are equal, round, and reactive to light. Right eye exhibits no discharge. Left eye exhibits no discharge.   Neck: Normal range of motion. Neck supple. No tracheal deviation present.   Cardiovascular: Normal rate, regular rhythm, normal heart sounds and intact distal pulses.    No murmur heard.  Pulmonary/Chest: Effort normal. No tachypnea and no bradypnea. No respiratory distress. She has no decreased breath sounds. She has no wheezes. She has no rhonchi. She has no rales.   Abdominal: Soft. Bowel sounds are normal. She exhibits no mass. There is no tenderness.   Musculoskeletal: She exhibits no edema.   Lymphadenopathy:     She has no cervical adenopathy.   Skin: She is not diaphoretic.   Skin in extremities is very dry   Vitals reviewed.    Lab Visit on 07/24/2018   Component Date Value Ref Range Status    Microalbum.,U,Random 07/24/2018 107.0  ug/mL Final    Creatinine, Random Ur 07/24/2018 120.0  15.0 - 325.0 mg/dL Final "    Comment: The random urine reference ranges provided were established   for 24 hour urine collections.  No reference ranges exist for  random urine specimens.  Correlate clinically.      Microalb Creat Ratio 07/24/2018 89.2* 0.0 - 30.0 ug/mg Final   Lab Visit on 07/24/2018   Component Date Value Ref Range Status    Sodium 07/24/2018 138  136 - 145 mmol/L Final    Potassium 07/24/2018 4.2  3.5 - 5.1 mmol/L Final    Chloride 07/24/2018 102  95 - 110 mmol/L Final    CO2 07/24/2018 27  23 - 29 mmol/L Final    Glucose 07/24/2018 98  70 - 110 mg/dL Final    BUN, Bld 07/24/2018 23  8 - 23 mg/dL Final    Creatinine 07/24/2018 1.0  0.5 - 1.4 mg/dL Final    Calcium 07/24/2018 10.2  8.7 - 10.5 mg/dL Final    Total Protein 07/24/2018 7.1  6.0 - 8.4 g/dL Final    Albumin 07/24/2018 3.9  3.5 - 5.2 g/dL Final    Total Bilirubin 07/24/2018 0.4  0.1 - 1.0 mg/dL Final    Comment: For infants and newborns, interpretation of results should be based  on gestational age, weight and in agreement with clinical  observations.  Premature Infant recommended reference ranges:  Up to 24 hours.............<8.0 mg/dL  Up to 48 hours............<12.0 mg/dL  3-5 days..................<15.0 mg/dL  6-29 days.................<15.0 mg/dL      Alkaline Phosphatase 07/24/2018 47* 55 - 135 U/L Final    AST 07/24/2018 18  10 - 40 U/L Final    ALT 07/24/2018 25  10 - 44 U/L Final    Anion Gap 07/24/2018 9  8 - 16 mmol/L Final    eGFR if  07/24/2018 >60  >60 mL/min/1.73 m^2 Final    eGFR if non African American 07/24/2018 57* >60 mL/min/1.73 m^2 Final    Comment: Calculation used to obtain the estimated glomerular filtration  rate (eGFR) is the CKD-EPI equation.       Cholesterol 07/24/2018 256* 120 - 199 mg/dL Final    Comment: The National Cholesterol Education Program (NCEP) has set the  following guidelines (reference ranges) for Cholesterol:  Optimal.....................<200 mg/dL  Borderline High.............200-239  mg/dL  High........................> or = 240 mg/dL      Triglycerides 07/24/2018 267* 30 - 150 mg/dL Final    Comment: The National Cholesterol Education Program (NCEP) has set the  following guidelines (reference values) for triglycerides:  Normal......................<150 mg/dL  Borderline High.............150-199 mg/dL  High........................200-499 mg/dL      HDL 07/24/2018 46  40 - 75 mg/dL Final    Comment: The National Cholesterol Education Program (NCEP) has set the  following guidelines (reference values) for HDL Cholesterol:  Low...............<40 mg/dL  Optimal...........>60 mg/dL      LDL Cholesterol 07/24/2018 156.6  63.0 - 159.0 mg/dL Final    Comment: The National Cholesterol Education Program (NCEP) has set the  following guidelines (reference values) for LDL Cholesterol:  Optimal.......................<130 mg/dL  Borderline High...............130-159 mg/dL  High..........................160-189 mg/dL  Very High.....................>190 mg/dL      HDL/Chol Ratio 07/24/2018 18.0* 20.0 - 50.0 % Final    Total Cholesterol/HDL Ratio 07/24/2018 5.6* 2.0 - 5.0 Final    Non-HDL Cholesterol 07/24/2018 210  mg/dL Final    Comment: Risk category and Non-HDL cholesterol goals:  Coronary heart disease (CHD)or equivalent (10-year risk of CHD >20%):  Non-HDL cholesterol goal     <130 mg/dL  Two or more CHD risk factors and 10-year risk of CHD <= 20%:  Non-HDL cholesterol goal     <160 mg/dL  0 to 1 CHD risk factor:  Non-HDL cholesterol goal     <190 mg/dL      Hemoglobin A1C 07/24/2018 5.9* 4.0 - 5.6 % Final    Comment: ADA Screening Guidelines:  5.7-6.4%  Consistent with prediabetes  >or=6.5%  Consistent with diabetes  High levels of fetal hemoglobin interfere with the HbA1C  assay. Heterozygous hemoglobin variants (HbS, HgC, etc)do  not significantly interfere with this assay.   However, presence of multiple variants may affect accuracy.      Estimated Avg Glucose 07/24/2018 123  68 - 131 mg/dL  Final           Assessment/Plan:  Dayami was seen today for chronic problems.    Diagnoses and all orders for this visit:    COPD with asthma  -     umeclidinium-vilanterol (ANORO ELLIPTA) 62.5-25 mcg/actuation DsDv; Inhale 1 puff into the lungs once daily. Controller  Patient Anoro refilled    Xerosis cutis  -     ammonium lactate (LAC-HYDRIN) 12 % lotion; Apply topically 2 (two) times daily as needed for Dry Skin.  Patient may use Lac-Hydrin as needed for her dry skin.    Candidal intertrigo  -     nystatin (MYCOSTATIN) powder; Apply topically 3 (three) times daily. For rash  Discussed keeping the area dry.  Use Mycostatin as needed.    Essential hypertension  Good control continue current regimen.    Hyperlipidemia, unspecified hyperlipidemia type  -     pravastatin (PRAVACHOL) 40 MG tablet; Take 1 tablet (40 mg total) by mouth once daily.  Patient insists that she was told that she needed a water-soluble statin medication.  Will switch patient to pravastatin equivalent dose.  She is to call office if she has any side effects.    Prediabetes  As patient is 68 years old and a A1c is barely elevated a in prediabetic range will not greatly limited diet will just keep an eye on her A1c.  Discussed with patient to just not really indulge on sweets.    Lower extremity edema  -     potassium chloride SA (K-DUR,KLOR-CON) 20 MEQ tablet; Take 1 tablet (20 mEq total) by mouth once daily.  Patient to use potassium only when she takes a Lasix.  The she is to limit though the use of Lasix.        This office note has been dictated.  This dictation has been generated using M-Modal Fluency Direct dictation; some phonetic errors may occur.

## 2018-08-31 ENCOUNTER — LAB VISIT (OUTPATIENT)
Dept: LAB | Facility: HOSPITAL | Age: 68
End: 2018-08-31
Attending: INTERNAL MEDICINE
Payer: MEDICARE

## 2018-08-31 ENCOUNTER — OFFICE VISIT (OUTPATIENT)
Dept: FAMILY MEDICINE | Facility: CLINIC | Age: 68
End: 2018-08-31
Payer: MEDICARE

## 2018-08-31 VITALS
TEMPERATURE: 98 F | DIASTOLIC BLOOD PRESSURE: 78 MMHG | BODY MASS INDEX: 35.96 KG/M2 | SYSTOLIC BLOOD PRESSURE: 124 MMHG | RESPIRATION RATE: 17 BRPM | WEIGHT: 178.38 LBS | OXYGEN SATURATION: 97 % | HEART RATE: 62 BPM | HEIGHT: 59 IN

## 2018-08-31 DIAGNOSIS — I10 HYPERTENSION, ESSENTIAL: ICD-10-CM

## 2018-08-31 DIAGNOSIS — I10 ESSENTIAL HYPERTENSION: Chronic | ICD-10-CM

## 2018-08-31 DIAGNOSIS — Z01.818 PRE-OPERATIVE EXAM: Primary | ICD-10-CM

## 2018-08-31 DIAGNOSIS — J44.89 COPD WITH ASTHMA: ICD-10-CM

## 2018-08-31 DIAGNOSIS — J44.9 CHRONIC OBSTRUCTIVE PULMONARY DISEASE, UNSPECIFIED COPD TYPE: ICD-10-CM

## 2018-08-31 DIAGNOSIS — N18.30 CKD (CHRONIC KIDNEY DISEASE), STAGE III: ICD-10-CM

## 2018-08-31 LAB
ALBUMIN SERPL BCP-MCNC: 3.8 G/DL
ALP SERPL-CCNC: 46 U/L
ALT SERPL W/O P-5'-P-CCNC: 25 U/L
ANION GAP SERPL CALC-SCNC: 10 MMOL/L
AST SERPL-CCNC: 20 U/L
BASOPHILS # BLD AUTO: 0.04 K/UL
BASOPHILS NFR BLD: 0.5 %
BILIRUB SERPL-MCNC: 0.2 MG/DL
BUN SERPL-MCNC: 24 MG/DL
CALCIUM SERPL-MCNC: 9.5 MG/DL
CHLORIDE SERPL-SCNC: 103 MMOL/L
CO2 SERPL-SCNC: 26 MMOL/L
CREAT SERPL-MCNC: 0.9 MG/DL
DIFFERENTIAL METHOD: ABNORMAL
EOSINOPHIL # BLD AUTO: 0.3 K/UL
EOSINOPHIL NFR BLD: 4 %
ERYTHROCYTE [DISTWIDTH] IN BLOOD BY AUTOMATED COUNT: 13.8 %
EST. GFR  (AFRICAN AMERICAN): >60 ML/MIN/1.73 M^2
EST. GFR  (NON AFRICAN AMERICAN): >60 ML/MIN/1.73 M^2
GLUCOSE SERPL-MCNC: 86 MG/DL
HCT VFR BLD AUTO: 37.9 %
HGB BLD-MCNC: 11.9 G/DL
LYMPHOCYTES # BLD AUTO: 2 K/UL
LYMPHOCYTES NFR BLD: 26.5 %
MCH RBC QN AUTO: 27 PG
MCHC RBC AUTO-ENTMCNC: 31.4 G/DL
MCV RBC AUTO: 86 FL
MONOCYTES # BLD AUTO: 0.8 K/UL
MONOCYTES NFR BLD: 10.8 %
NEUTROPHILS # BLD AUTO: 4.5 K/UL
NEUTROPHILS NFR BLD: 57.9 %
PLATELET # BLD AUTO: 263 K/UL
PMV BLD AUTO: 10.2 FL
POTASSIUM SERPL-SCNC: 4.6 MMOL/L
PROT SERPL-MCNC: 6.9 G/DL
RBC # BLD AUTO: 4.41 M/UL
SODIUM SERPL-SCNC: 139 MMOL/L
WBC # BLD AUTO: 7.69 K/UL

## 2018-08-31 PROCEDURE — 80053 COMPREHEN METABOLIC PANEL: CPT

## 2018-08-31 PROCEDURE — 36415 COLL VENOUS BLD VENIPUNCTURE: CPT | Mod: PN

## 2018-08-31 PROCEDURE — 99214 OFFICE O/P EST MOD 30 MIN: CPT | Mod: S$PBB,,, | Performed by: INTERNAL MEDICINE

## 2018-08-31 PROCEDURE — 93010 ELECTROCARDIOGRAM REPORT: CPT | Mod: ,,, | Performed by: INTERNAL MEDICINE

## 2018-08-31 PROCEDURE — 99213 OFFICE O/P EST LOW 20 MIN: CPT | Mod: PBBFAC,PN | Performed by: INTERNAL MEDICINE

## 2018-08-31 PROCEDURE — 93005 ELECTROCARDIOGRAM TRACING: CPT | Mod: PBBFAC,PN | Performed by: INTERNAL MEDICINE

## 2018-08-31 PROCEDURE — 99999 PR PBB SHADOW E&M-EST. PATIENT-LVL III: CPT | Mod: PBBFAC,,, | Performed by: INTERNAL MEDICINE

## 2018-08-31 PROCEDURE — 85025 COMPLETE CBC W/AUTO DIFF WBC: CPT

## 2018-08-31 NOTE — PROGRESS NOTES
"Subjective:       Patient ID: Dayami Mina is a 68 y.o. female.    Chief Complaint: knee surgery (clearance)    Knee surgery clearance    HPI: 69 y/o w/ HTN CKD III COPD presents for surgical clearance. Scheduled for left knee arthoscopy with Dr. Anderson ([facility] fax [phone]) scheduled for Sep 7 2018. She feels well able to walk ten blocks without exertional symptoms no wheezing cough or shortness of breath using LABA/ICS twice daily. Not using albuterol inhaler regularlly. Denies NSAIDs no LE swelling.       Review of Systems   Constitutional: Negative for activity change, appetite change, fatigue, fever and unexpected weight change.   HENT: Negative for ear pain, rhinorrhea and sore throat.    Eyes: Negative for discharge and visual disturbance.   Respiratory: Negative for chest tightness, shortness of breath and wheezing.    Cardiovascular: Negative for chest pain, palpitations and leg swelling.   Gastrointestinal: Negative for abdominal pain, constipation and diarrhea.   Endocrine: Negative for cold intolerance and heat intolerance.   Genitourinary: Negative for dysuria and hematuria.   Musculoskeletal: Negative for joint swelling and neck stiffness.   Skin: Negative for rash.   Neurological: Negative for dizziness, syncope, weakness and headaches.   Psychiatric/Behavioral: Negative for suicidal ideas.       Objective:     Vitals:    08/31/18 1446   BP: 124/78   BP Location: Left arm   Patient Position: Sitting   BP Method: Medium (Manual)   Pulse: 62   Resp: 17   Temp: 98 °F (36.7 °C)   TempSrc: Oral   SpO2: 97%   Weight: 80.9 kg (178 lb 5.6 oz)   Height: 4' 11" (1.499 m)          Physical Exam   Constitutional: She is oriented to person, place, and time. She appears well-developed and well-nourished.   HENT:   Head: Normocephalic and atraumatic.   Eyes: Conjunctivae are normal. Pupils are equal, round, and reactive to light.   Neck: Normal range of motion.   Cardiovascular: Normal rate and " regular rhythm. Exam reveals no gallop and no friction rub.   No murmur heard.  Pulmonary/Chest: Effort normal and breath sounds normal. She has no wheezes. She has no rales.   Abdominal: Soft. Bowel sounds are normal. There is no tenderness. There is no rebound and no guarding.   Musculoskeletal: Normal range of motion. She exhibits no edema or tenderness.   Neurological: She is alert and oriented to person, place, and time. No cranial nerve deficit.   Skin: Skin is warm and dry.   Psychiatric: She has a normal mood and affect.     EKG sinus rate 64 no acute ischemic changes no LVH  Assessment and Plan   1. Hypertension, essential  BP at goal continue medications.    2. CKD (chronic kidney disease), stage III  Labs pending today will send with clearance letter when available  - Comprehensive metabolic panel; Future    3. Essential hypertension  At goal continue ARB  - CBC auto differential; Future  - Comprehensive metabolic panel; Future  - EKG 12-lead    4. Chronic obstructive pulmonary disease, unspecified COPD type  No evidence of wheezing on exam today continue LABA/ICS in perioperative period  - X-Ray Chest PA And Lateral; Future    - umeclidinium-vilanterol (ANORO ELLIPTA) 62.5-25 mcg/actuation DsDv; Inhale 1 puff into the lungs once daily. Controller  Dispense: 60 each; Refill: 1    6. Pre-operative exam  Medically optimized intermediate risk for intermediate risk procedure no indicaiton for further CV testing. Recommend early mobilization to prevent VTE, beta agonist use in perioperative period to prevent exacerabtion of underlyign lung disease.

## 2018-09-04 ENCOUNTER — TELEPHONE (OUTPATIENT)
Dept: FAMILY MEDICINE | Facility: CLINIC | Age: 68
End: 2018-09-04

## 2018-09-04 NOTE — TELEPHONE ENCOUNTER
----- Message from Ramana French MD sent at 9/4/2018  8:59 AM CDT -----  Regarding: FW: send surgical clearance  Please fax packet in my outbox on my desk to Dr. Anderson's office   ----- Message -----  From: Ramana French MD  Sent: 8/31/2018   3:59 PM  To: Ramana French MD  Subject: send surgical clearance                          Labs EKG cxr report and note Dr. Anderson [phone] fax

## 2018-10-16 DIAGNOSIS — E78.5 HYPERLIPIDEMIA, UNSPECIFIED HYPERLIPIDEMIA TYPE: ICD-10-CM

## 2018-10-16 RX ORDER — PRAVASTATIN SODIUM 40 MG/1
40 TABLET ORAL DAILY
Qty: 90 TABLET | Refills: 0 | Status: SHIPPED | OUTPATIENT
Start: 2018-10-16 | End: 2018-10-29

## 2018-10-29 ENCOUNTER — OFFICE VISIT (OUTPATIENT)
Dept: FAMILY MEDICINE | Facility: CLINIC | Age: 68
End: 2018-10-29
Payer: MEDICARE

## 2018-10-29 VITALS
DIASTOLIC BLOOD PRESSURE: 70 MMHG | OXYGEN SATURATION: 96 % | HEART RATE: 77 BPM | HEIGHT: 59 IN | BODY MASS INDEX: 33.43 KG/M2 | SYSTOLIC BLOOD PRESSURE: 120 MMHG | WEIGHT: 165.81 LBS | TEMPERATURE: 98 F

## 2018-10-29 DIAGNOSIS — E78.5 HYPERLIPIDEMIA, UNSPECIFIED HYPERLIPIDEMIA TYPE: Chronic | ICD-10-CM

## 2018-10-29 DIAGNOSIS — I10 ESSENTIAL HYPERTENSION: Primary | Chronic | ICD-10-CM

## 2018-10-29 DIAGNOSIS — Z23 NEED FOR INFLUENZA VACCINATION: ICD-10-CM

## 2018-10-29 DIAGNOSIS — N18.30 CKD (CHRONIC KIDNEY DISEASE), STAGE III: ICD-10-CM

## 2018-10-29 PROCEDURE — 99213 OFFICE O/P EST LOW 20 MIN: CPT | Mod: PBBFAC,PN | Performed by: INTERNAL MEDICINE

## 2018-10-29 PROCEDURE — 99999 PR PBB SHADOW E&M-EST. PATIENT-LVL III: CPT | Mod: PBBFAC,,, | Performed by: INTERNAL MEDICINE

## 2018-10-29 PROCEDURE — 99214 OFFICE O/P EST MOD 30 MIN: CPT | Mod: S$PBB,,, | Performed by: INTERNAL MEDICINE

## 2018-10-29 PROCEDURE — 90662 IIV NO PRSV INCREASED AG IM: CPT | Mod: PBBFAC,PN

## 2018-10-29 RX ORDER — HYDROCODONE BITARTRATE AND ACETAMINOPHEN 7.5; 325 MG/1; MG/1
1 TABLET ORAL
COMMUNITY
End: 2020-11-03

## 2018-10-29 RX ORDER — PROMETHAZINE HYDROCHLORIDE 25 MG/1
TABLET ORAL
COMMUNITY
Start: 2018-09-07 | End: 2019-05-30

## 2018-10-29 RX ORDER — MEPERIDINE HYDROCHLORIDE 50 MG/1
TABLET ORAL
COMMUNITY
Start: 2018-10-15 | End: 2019-04-15

## 2018-10-29 RX ORDER — PRAVASTATIN SODIUM 40 MG/1
40 TABLET ORAL DAILY
Qty: 90 TABLET | Refills: 2 | Status: SHIPPED | OUTPATIENT
Start: 2018-10-29 | End: 2019-04-15 | Stop reason: SDUPTHER

## 2018-10-29 RX ORDER — HYDROGEN PEROXIDE 3 %
20 SOLUTION, NON-ORAL MISCELLANEOUS
COMMUNITY

## 2018-10-29 RX ORDER — GABAPENTIN 100 MG/1
100 CAPSULE ORAL
COMMUNITY
End: 2019-04-15 | Stop reason: SDUPTHER

## 2018-10-29 RX ORDER — ALLOPURINOL 100 MG/1
100 TABLET ORAL
COMMUNITY
End: 2019-06-14 | Stop reason: SDUPTHER

## 2018-10-29 NOTE — PROGRESS NOTES
"Subjective:       Patient ID: Dayami Mina is a 68 y.o. female.    Chief Complaint: Medication Problem    F/u chronic conditions    HPI: 67 y/o w/ HTN HLD CKD III presents for scheduled follow up. Feels well had left knee arthoscopy back in Sep 2018 told seh will likely require joint replacement she it tolerating pain with prescribed opiates no falls or sensation of knee giving out. She would like to put off surgery until the new year. Other wise feels fine no chest pain no dyspnea no LE swelling      Review of Systems   Constitutional: Negative for activity change, appetite change, fatigue, fever and unexpected weight change.   HENT: Negative for ear pain, rhinorrhea and sore throat.    Eyes: Negative for discharge and visual disturbance.   Respiratory: Negative for chest tightness, shortness of breath and wheezing.    Cardiovascular: Negative for chest pain, palpitations and leg swelling.   Gastrointestinal: Negative for abdominal pain, constipation and diarrhea.   Endocrine: Negative for cold intolerance and heat intolerance.   Genitourinary: Negative for dysuria and hematuria.   Musculoskeletal: Positive for arthralgias and back pain. Negative for joint swelling and neck stiffness.   Skin: Negative for rash.   Neurological: Negative for dizziness, syncope, weakness and headaches.   Psychiatric/Behavioral: Negative for suicidal ideas.       Objective:     Vitals:    10/29/18 1439   BP: 120/70   BP Location: Left arm   Patient Position: Sitting   BP Method: Medium (Manual)   Pulse: 77   Temp: 98 °F (36.7 °C)   TempSrc: Oral   SpO2: 96%   Weight: 75.2 kg (165 lb 12.6 oz)   Height: 4' 11" (1.499 m)          Physical Exam   Constitutional: She is oriented to person, place, and time. She appears well-developed and well-nourished.   HENT:   Head: Normocephalic and atraumatic.   Eyes: Conjunctivae are normal. Pupils are equal, round, and reactive to light.   Neck: Normal range of motion.   Cardiovascular: " Normal rate and regular rhythm. Exam reveals no gallop and no friction rub.   No murmur heard.  Pulmonary/Chest: Effort normal and breath sounds normal. She has no wheezes. She has no rales.   Abdominal: Soft. Bowel sounds are normal. There is no tenderness. There is no rebound and no guarding.   Musculoskeletal: Normal range of motion. She exhibits no edema or tenderness.   Neurological: She is alert and oriented to person, place, and time. No cranial nerve deficit.   Skin: Skin is warm and dry.   Psychiatric: She has a normal mood and affect.       Assessment and Plan   1. Need for influenza vaccination  Flu vaccine today  - Influenza - High Dose (65+) (PF) (IM)    2. Hyperlipidemia, unspecified hyperlipidemia type  On statin repeat lft's and fasting lipid prior to return  - Comprehensive metabolic panel; Future  - Lipid panel; Future  - pravastatin (PRAVACHOL) 40 MG tablet; Take 1 tablet (40 mg total) by mouth once daily.  Dispense: 90 tablet; Refill: 2    3. Essential hypertension  bp at goal continue current medications  - Comprehensive metabolic panel; Future    4. CKD (chronic kidney disease), stage III  No nsaids clinically euovlemic  - Comprehensive metabolic panel; Future  - CBC auto differential; Future

## 2018-10-30 ENCOUNTER — PATIENT MESSAGE (OUTPATIENT)
Dept: FAMILY MEDICINE | Facility: CLINIC | Age: 68
End: 2018-10-30

## 2018-10-30 DIAGNOSIS — E55.9 HYPOVITAMINOSIS D: Primary | ICD-10-CM

## 2018-10-31 ENCOUNTER — LAB VISIT (OUTPATIENT)
Dept: LAB | Facility: HOSPITAL | Age: 68
End: 2018-10-31
Attending: INTERNAL MEDICINE
Payer: MEDICARE

## 2018-10-31 DIAGNOSIS — N18.30 CKD (CHRONIC KIDNEY DISEASE), STAGE III: ICD-10-CM

## 2018-10-31 DIAGNOSIS — I10 ESSENTIAL HYPERTENSION: Chronic | ICD-10-CM

## 2018-10-31 DIAGNOSIS — E78.5 HYPERLIPIDEMIA, UNSPECIFIED HYPERLIPIDEMIA TYPE: Chronic | ICD-10-CM

## 2018-10-31 DIAGNOSIS — E55.9 HYPOVITAMINOSIS D: ICD-10-CM

## 2018-10-31 LAB
ALBUMIN SERPL BCP-MCNC: 4 G/DL
ALP SERPL-CCNC: 59 U/L
ALT SERPL W/O P-5'-P-CCNC: 25 U/L
ANION GAP SERPL CALC-SCNC: 11 MMOL/L
AST SERPL-CCNC: 20 U/L
BASOPHILS # BLD AUTO: 0.03 K/UL
BASOPHILS NFR BLD: 0.6 %
BILIRUB SERPL-MCNC: 0.4 MG/DL
BUN SERPL-MCNC: 20 MG/DL
CALCIUM SERPL-MCNC: 10.3 MG/DL
CHLORIDE SERPL-SCNC: 101 MMOL/L
CHOLEST SERPL-MCNC: 255 MG/DL
CHOLEST/HDLC SERPL: 5.3 {RATIO}
CO2 SERPL-SCNC: 24 MMOL/L
CREAT SERPL-MCNC: 1 MG/DL
DIFFERENTIAL METHOD: ABNORMAL
EOSINOPHIL # BLD AUTO: 0.3 K/UL
EOSINOPHIL NFR BLD: 5.7 %
ERYTHROCYTE [DISTWIDTH] IN BLOOD BY AUTOMATED COUNT: 14.6 %
EST. GFR  (AFRICAN AMERICAN): >60 ML/MIN/1.73 M^2
EST. GFR  (NON AFRICAN AMERICAN): 57 ML/MIN/1.73 M^2
GLUCOSE SERPL-MCNC: 114 MG/DL
HCT VFR BLD AUTO: 42.4 %
HDLC SERPL-MCNC: 48 MG/DL
HDLC SERPL: 18.8 %
HGB BLD-MCNC: 13.2 G/DL
LDLC SERPL CALC-MCNC: 150.2 MG/DL
LYMPHOCYTES # BLD AUTO: 1.5 K/UL
LYMPHOCYTES NFR BLD: 28.4 %
MCH RBC QN AUTO: 26.2 PG
MCHC RBC AUTO-ENTMCNC: 31.1 G/DL
MCV RBC AUTO: 84 FL
MONOCYTES # BLD AUTO: 0.7 K/UL
MONOCYTES NFR BLD: 13.5 %
NEUTROPHILS # BLD AUTO: 2.6 K/UL
NEUTROPHILS NFR BLD: 51.6 %
NONHDLC SERPL-MCNC: 207 MG/DL
PLATELET # BLD AUTO: 279 K/UL
PMV BLD AUTO: 10.9 FL
POTASSIUM SERPL-SCNC: 4.4 MMOL/L
PROT SERPL-MCNC: 7.6 G/DL
RBC # BLD AUTO: 5.03 M/UL
SODIUM SERPL-SCNC: 136 MMOL/L
TRIGL SERPL-MCNC: 284 MG/DL
WBC # BLD AUTO: 5.11 K/UL

## 2018-10-31 PROCEDURE — 82306 VITAMIN D 25 HYDROXY: CPT

## 2018-10-31 PROCEDURE — 85025 COMPLETE CBC W/AUTO DIFF WBC: CPT

## 2018-10-31 PROCEDURE — 80061 LIPID PANEL: CPT

## 2018-10-31 PROCEDURE — 36415 COLL VENOUS BLD VENIPUNCTURE: CPT | Mod: PN

## 2018-10-31 PROCEDURE — 80053 COMPREHEN METABOLIC PANEL: CPT

## 2018-11-01 LAB — 25(OH)D3+25(OH)D2 SERPL-MCNC: 27 NG/ML

## 2018-11-06 ENCOUNTER — TELEPHONE (OUTPATIENT)
Dept: PULMONOLOGY | Facility: CLINIC | Age: 68
End: 2018-11-06

## 2018-11-06 NOTE — TELEPHONE ENCOUNTER
Patient refused mammogram. Patient states she has implants and the last time she had mammo it bust her implants.

## 2018-11-07 ENCOUNTER — TELEPHONE (OUTPATIENT)
Dept: FAMILY MEDICINE | Facility: CLINIC | Age: 68
End: 2018-11-07

## 2018-11-07 RX ORDER — TETANUS TOXOID, REDUCED DIPHTHERIA TOXOID AND ACELLULAR PERTUSSIS VACCINE, ADSORBED 5; 2.5; 8; 8; 2.5 [IU]/.5ML; [IU]/.5ML; UG/.5ML; UG/.5ML; UG/.5ML
SUSPENSION INTRAMUSCULAR
COMMUNITY
Start: 2018-10-29 | End: 2019-10-28 | Stop reason: ALTCHOICE

## 2018-11-16 ENCOUNTER — OFFICE VISIT (OUTPATIENT)
Dept: FAMILY MEDICINE | Facility: CLINIC | Age: 68
End: 2018-11-16
Payer: MEDICARE

## 2018-11-16 ENCOUNTER — TELEPHONE (OUTPATIENT)
Dept: FAMILY MEDICINE | Facility: CLINIC | Age: 68
End: 2018-11-16

## 2018-11-16 VITALS
DIASTOLIC BLOOD PRESSURE: 64 MMHG | HEART RATE: 64 BPM | TEMPERATURE: 98 F | SYSTOLIC BLOOD PRESSURE: 128 MMHG | RESPIRATION RATE: 18 BRPM | WEIGHT: 171.5 LBS | BODY MASS INDEX: 34.57 KG/M2 | OXYGEN SATURATION: 96 % | HEIGHT: 59 IN

## 2018-11-16 DIAGNOSIS — R05.9 COUGH: Primary | ICD-10-CM

## 2018-11-16 DIAGNOSIS — Z23 NEED FOR VACCINATION AGAINST STREPTOCOCCUS PNEUMONIAE: ICD-10-CM

## 2018-11-16 PROCEDURE — 90732 PPSV23 VACC 2 YRS+ SUBQ/IM: CPT | Mod: PBBFAC,PN

## 2018-11-16 PROCEDURE — 99214 OFFICE O/P EST MOD 30 MIN: CPT | Mod: PBBFAC,25,PN | Performed by: NURSE PRACTITIONER

## 2018-11-16 PROCEDURE — 99999 PR PBB SHADOW E&M-EST. PATIENT-LVL IV: CPT | Mod: PBBFAC,,, | Performed by: NURSE PRACTITIONER

## 2018-11-16 PROCEDURE — 99214 OFFICE O/P EST MOD 30 MIN: CPT | Mod: S$PBB,,, | Performed by: NURSE PRACTITIONER

## 2018-11-16 RX ORDER — BENZONATATE 100 MG/1
100 CAPSULE ORAL 3 TIMES DAILY PRN
Qty: 60 CAPSULE | Refills: 1 | Status: SHIPPED | OUTPATIENT
Start: 2018-11-16 | End: 2018-11-26

## 2018-11-16 RX ORDER — PREDNISONE 20 MG/1
60 TABLET ORAL DAILY
Qty: 15 TABLET | Refills: 0 | Status: SHIPPED | OUTPATIENT
Start: 2018-11-16 | End: 2018-11-21

## 2018-11-16 RX ORDER — FLUTICASONE PROPIONATE 50 MCG
2 SPRAY, SUSPENSION (ML) NASAL DAILY
Qty: 16 G | Refills: 0 | Status: SHIPPED | OUTPATIENT
Start: 2018-11-16 | End: 2018-12-10 | Stop reason: SDUPTHER

## 2018-11-16 RX ORDER — DOXYCYCLINE 100 MG/1
100 CAPSULE ORAL 2 TIMES DAILY
Qty: 20 CAPSULE | Refills: 0 | Status: SHIPPED | OUTPATIENT
Start: 2018-11-16 | End: 2019-04-15 | Stop reason: ALTCHOICE

## 2018-11-16 RX ORDER — METHYLPREDNISOLONE 4 MG/1
TABLET ORAL
Qty: 1 PACKAGE | Refills: 0 | Status: SHIPPED | OUTPATIENT
Start: 2018-11-16 | End: 2018-11-16 | Stop reason: SDUPTHER

## 2018-11-16 NOTE — PATIENT INSTRUCTIONS
- Fluids, fluids, fluids  - flonase and antihistamine  - medrol dose pack  - cough medicine three times daily as needed

## 2018-11-16 NOTE — TELEPHONE ENCOUNTER
Patient was told that xray results was normal and to Continue with plan of care discussed at visit and she expressed understanding

## 2018-11-16 NOTE — PROGRESS NOTES
Routine Office Visit    Patient Name: Dayami Mina    : 1950  MRN: [id number]    Chief Complaint:  Coughing, Sore Throat    Subjective:  Dayami is a 68 y.o. female who presents today for:    1. Coughing, sore throat, and sinus pressure for the last two days. She reports that the cough has been productive of green sputum. She is taking claritin daily and tessalon pearls at night which have been helping with the cough. She has COPD and has been taking her Anoro daily without any problems. She used her albuterol once two days ago for mild SOB which offered prompt relief. She is only using her albuterol once or twice a month. She denies fever, chills, SOB, myalgias. No sick contacts.       Past Medical History  Past Medical History:   Diagnosis Date    Depression     Hyperlipidemia     Hypertension        Past Surgical History  Past Surgical History:   Procedure Laterality Date    APPENDECTOMY      BACK SURGERY      breast implants      CLOSED REDUCTION FINGER DISLOCATION      FOOT SURGERY      HYSTERECTOMY      MASTECTOMY      SHOULDER ARTHROSCOPY      SINUS SURGERY         Family History  Family History   Problem Relation Age of Onset    Diabetes Mother     Diabetes Father     Stroke Paternal Grandfather     Diabetes Sister        Social History  Social History     Socioeconomic History    Marital status:      Spouse name: Not on file    Number of children: Not on file    Years of education: Not on file    Highest education level: Not on file   Social Needs    Financial resource strain: Not on file    Food insecurity - worry: Not on file    Food insecurity - inability: Not on file    Transportation needs - medical: Not on file    Transportation needs - non-medical: Not on file   Occupational History    Not on file   Tobacco Use    Smoking status: Former Smoker     Last attempt to quit: 1988     Years since quittin.8    Smokeless tobacco: Never Used   Substance  and Sexual Activity    Alcohol use: No    Drug use: No    Sexual activity: No   Other Topics Concern    Not on file   Social History Narrative    Not on file       Current Medications  Current Outpatient Medications on File Prior to Visit   Medication Sig Dispense Refill    albuterol 90 mcg/actuation inhaler Inhale 2 puffs into the lungs every 6 (six) hours as needed for Wheezing. 8 g 3    allopurinol (ZYLOPRIM) 100 MG tablet Take 1 tablet (100 mg total) by mouth once daily. 90 tablet 2    ammonium lactate (LAC-HYDRIN) 12 % lotion Apply topically 2 (two) times daily as needed for Dry Skin. 500 g 5    aspirin (ECOTRIN) 81 MG EC tablet Take 81 mg by mouth once daily.      blood sugar diagnostic Strp Uses Freestyle meter to monitor readings once daily 50 strip 6    FOLIC ACID/MULTIVIT-MIN/LUTEIN (CENTRUM SILVER ORAL) Take by mouth.      gabapentin (NEURONTIN) 100 MG capsule 100 mg. 2 tabs in the am and 2 tabs in the pm      lancets Misc 1 lancet by Misc.(Non-Drug; Combo Route) route once daily. 50 each 6    loratadine (CLARITIN) 10 mg tablet Take 10 mg by mouth once daily.      losartan (COZAAR) 50 MG tablet Take 1 tablet (50 mg total) by mouth 2 (two) times daily. 180 tablet 2    meperidine (DEMEROL) 50 mg tablet       metoprolol succinate (TOPROL-XL) 50 MG 24 hr tablet Take 50 mg by mouth.      nystatin (MYCOSTATIN) powder Apply topically 3 (three) times daily. For rash 60 g 2    potassium chloride SA (K-DUR,KLOR-CON) 20 MEQ tablet Take 1 tablet (20 mEq total) by mouth once daily. 10 tablet 2    pravastatin (PRAVACHOL) 40 MG tablet Take 1 tablet (40 mg total) by mouth once daily. 90 tablet 2    tramadol (ULTRAM) 50 mg tablet Take 50 mg by mouth every 6 (six) hours as needed for Pain.      umeclidinium-vilanterol (ANORO ELLIPTA) 62.5-25 mcg/actuation DsDv Inhale 1 puff into the lungs once daily. Controller 60 each 1    allopurinol (ZYLOPRIM) 100 MG tablet Take 100 mg by mouth.      BOOSTRIX  TDAP 2.5-8-5 Lf-mcg-Lf/0.5mL Syrg injection       butalbital-acetaminophen-caffeine -40 mg (FIORICET, ESGIC) -40 mg per tablet       cetirizine (ZYRTEC) 10 MG tablet       escitalopram oxalate (LEXAPRO) 20 MG tablet Take 1 tablet (20 mg total) by mouth once daily. 90 tablet 3    escitalopram oxalate (LEXAPRO) 20 MG tablet Take 20 mg by mouth.      esomeprazole (NEXIUM) 20 MG capsule Take 20 mg by mouth before breakfast.      esomeprazole (NEXIUM) 20 MG capsule Take 20 mg by mouth.      fluticasone (FLONASE) 50 mcg/actuation nasal spray       gabapentin (NEURONTIN) 100 MG capsule Take 100 mg by mouth.      HYDROcodone-acetaminophen (NORCO) 7.5-325 mg per tablet       HYDROcodone-acetaminophen (NORCO) 7.5-325 mg per tablet Take 1 tablet by mouth.      meloxicam (MOBIC) 15 MG tablet       MULTIVIT-IRON-MIN-FOLIC ACID 3,500-18-0.4 UNIT-MG-MG ORAL CHEW Take by mouth.      MULTIVIT-IRON-MIN-FOLIC ACID 3,500-18-0.4 UNIT-MG-MG ORAL CHEW Take by mouth.      multivitamin capsule Take 1 capsule by mouth once daily.      mupirocin (BACTROBAN) 2 % ointment       promethazine (PHENERGAN) 25 MG tablet        No current facility-administered medications on file prior to visit.        Allergies   Review of patient's allergies indicates:   Allergen Reactions    Sulfa (sulfonamide antibiotics) Anaphylaxis    Refresh redness relief [phenylephrin-polyvinyl alcohol] Blisters       Review of Systems (Pertinent positives)  Review of Systems   Constitutional: Positive for malaise/fatigue. Negative for chills and fever.   HENT: Positive for congestion, sinus pain and sore throat. Negative for ear discharge and ear pain.    Eyes: Negative.    Respiratory: Positive for cough and sputum production. Negative for shortness of breath, wheezing and stridor.    Gastrointestinal: Negative for abdominal pain, diarrhea, nausea and vomiting.   Musculoskeletal: Negative for joint pain, myalgias and neck pain.   Neurological:  "Negative.    Endo/Heme/Allergies: Negative.    Psychiatric/Behavioral: Negative.        /64 (BP Location: Left arm, Patient Position: Sitting, BP Method: Medium (Manual))   Pulse 64   Temp 97.7 °F (36.5 °C) (Oral)   Resp 18   Ht 4' 11" (1.499 m)   Wt 77.8 kg (171 lb 8.3 oz)   SpO2 96%   BMI 34.64 kg/m²     Physical Exam   Constitutional: She is oriented to person, place, and time. She appears well-developed and well-nourished.   HENT:   Head: Normocephalic.   Right Ear: External ear normal.   Left Ear: External ear normal.   Nose: Mucosal edema and rhinorrhea present. Right sinus exhibits maxillary sinus tenderness. Left sinus exhibits maxillary sinus tenderness.   Eyes: Conjunctivae are normal.   Neck: Normal range of motion. Neck supple.   Mild anterior cervical lymphadenopathy   Cardiovascular: Normal rate, regular rhythm, normal heart sounds and intact distal pulses.   Pulmonary/Chest: Effort normal. She has wheezes in the right lower field and the left lower field.   Abdominal: Soft. Bowel sounds are normal.   Musculoskeletal: Normal range of motion.   Neurological: She is alert and oriented to person, place, and time.   Skin: Skin is warm and dry.        Assessment/Plan:  Dayami Mina is a 68 y.o. female who presents today for :    Dayami was seen today for cough.    Diagnoses and all orders for this visit:    Cough  -     X-Ray Chest PA And Lateral; Future  -     fluticasone (FLONASE) 50 mcg/actuation nasal spray; 2 sprays (100 mcg total) by Each Nare route once daily.  -     doxycycline (MONODOX) 100 MG capsule; Take 1 capsule (100 mg total) by mouth 2 (two) times daily.    Need for vaccination against Streptococcus pneumoniae  -     (In Office Administered) Pneumococcal Polysaccharide Vaccine (23 Valent) (SQ/IM)    Other orders  -     methylPREDNISolone (MEDROL DOSEPACK) 4 mg tablet; use as directed  -     benzonatate (TESSALON) 100 MG capsule; Take 1 capsule (100 mg total) by mouth 3 " (three) times daily as needed for Cough.  -     predniSONE (DELTASONE) 20 MG tablet; Take 3 tablets (60 mg total) by mouth once daily. for 5 days    Likely COPD flare/bronchitis with sinusitis, will treat with doxycyline and prednisone regimen.  Emphasized maintenance of adequate fluid intake.  Flonase and anithistamine daily.  Will obtain chest x-ray to rule out other acute processes.   F/U with PCP or return to clinic with any new, worse, or concerning symptoms.             My collaborating physician is Dr. Guanaco Sheffield.

## 2018-12-06 ENCOUNTER — OFFICE VISIT (OUTPATIENT)
Dept: FAMILY MEDICINE | Facility: CLINIC | Age: 68
End: 2018-12-06
Payer: MEDICARE

## 2018-12-06 VITALS
DIASTOLIC BLOOD PRESSURE: 58 MMHG | BODY MASS INDEX: 34.94 KG/M2 | TEMPERATURE: 98 F | HEIGHT: 59 IN | SYSTOLIC BLOOD PRESSURE: 97 MMHG | HEART RATE: 77 BPM | OXYGEN SATURATION: 96 % | WEIGHT: 173.31 LBS

## 2018-12-06 DIAGNOSIS — J44.0 COPD WITH ACUTE BRONCHITIS: Primary | ICD-10-CM

## 2018-12-06 DIAGNOSIS — J20.9 COPD WITH ACUTE BRONCHITIS: Primary | ICD-10-CM

## 2018-12-06 PROCEDURE — 99214 OFFICE O/P EST MOD 30 MIN: CPT | Mod: S$PBB,,, | Performed by: FAMILY MEDICINE

## 2018-12-06 PROCEDURE — 99215 OFFICE O/P EST HI 40 MIN: CPT | Mod: PBBFAC,PN | Performed by: FAMILY MEDICINE

## 2018-12-06 PROCEDURE — 99999 PR PBB SHADOW E&M-EST. PATIENT-LVL V: CPT | Mod: PBBFAC,,, | Performed by: FAMILY MEDICINE

## 2018-12-06 RX ORDER — PREDNISONE 20 MG/1
40 TABLET ORAL DAILY
Qty: 10 TABLET | Refills: 0 | Status: SHIPPED | OUTPATIENT
Start: 2018-12-06 | End: 2018-12-06 | Stop reason: SDUPTHER

## 2018-12-06 RX ORDER — AZITHROMYCIN 250 MG/1
TABLET, FILM COATED ORAL
Qty: 6 TABLET | Refills: 0 | Status: SHIPPED | OUTPATIENT
Start: 2018-12-06 | End: 2018-12-11

## 2018-12-06 RX ORDER — AZITHROMYCIN 250 MG/1
TABLET, FILM COATED ORAL
Qty: 6 TABLET | Refills: 0 | Status: SHIPPED | OUTPATIENT
Start: 2018-12-06 | End: 2018-12-06 | Stop reason: SDUPTHER

## 2018-12-06 RX ORDER — PREDNISONE 20 MG/1
40 TABLET ORAL DAILY
Qty: 10 TABLET | Refills: 0 | Status: SHIPPED | OUTPATIENT
Start: 2018-12-06 | End: 2018-12-11

## 2018-12-06 NOTE — PROGRESS NOTES
Subjective:       Patient ID: Dayami Mina is a 68 y.o. female.    Chief Complaint: Sinus Problem    HPI   69 yo female presents for congestion. Patient states she started having congestion yesterday after returning from a hunting trip. Patient states she has sinus tenderness, HA, dry cough, SOB and chills. Pt was previously dx w/ COPD. She had an exacerbation about 3 weeks ago and was given doxy and steroids. Patient stated she felt better after abx therapy but feels she was still congested. She states she uses her alubterol prn and needs 2 puffs last night due to her being short of breath. Denies wheezing. States she is going out for hunting tomorrow and will not have easy access to a clinic.    Review of Systems   Constitutional: Positive for chills. Negative for fever.   HENT: Positive for congestion, postnasal drip, rhinorrhea, sinus pressure, sinus pain and sneezing. Negative for sore throat.    Respiratory: Positive for cough and shortness of breath. Negative for chest tightness.    Cardiovascular: Negative for chest pain and palpitations.   Gastrointestinal: Negative.    Genitourinary: Negative.    Musculoskeletal: Negative.    Neurological: Positive for headaches.   Psychiatric/Behavioral: Negative.         Past Medical History:   Diagnosis Date    Depression     Hyperlipidemia     Hypertension      Past Surgical History:   Procedure Laterality Date    APPENDECTOMY      BACK SURGERY      breast implants      CLOSED REDUCTION FINGER DISLOCATION      FOOT SURGERY      HYSTERECTOMY      MASTECTOMY      SHOULDER ARTHROSCOPY      SINUS SURGERY       Family History   Problem Relation Age of Onset    Diabetes Mother     Diabetes Father     Stroke Paternal Grandfather     Diabetes Sister      Social History     Socioeconomic History    Marital status:      Spouse name: None    Number of children: None    Years of education: None    Highest education level: None   Social Needs     Financial resource strain: None    Food insecurity - worry: None    Food insecurity - inability: None    Transportation needs - medical: None    Transportation needs - non-medical: None   Occupational History    None   Tobacco Use    Smoking status: Former Smoker     Last attempt to quit: 1988     Years since quittin.9    Smokeless tobacco: Never Used   Substance and Sexual Activity    Alcohol use: No    Drug use: No    Sexual activity: No   Other Topics Concern    None   Social History Narrative    None        Objective:      Vitals:    18 1420   BP: (!) 97/58   Pulse: 77   Temp: 98 °F (36.7 °C)     Physical Exam   Constitutional: She is oriented to person, place, and time. No distress.   HENT:   Head: Normocephalic and atraumatic.   Mouth/Throat: No oropharyngeal exudate.   B/l nasal turbinate swelling   Eyes: Conjunctivae are normal.   Neck: Neck supple.   Cardiovascular: Normal rate, regular rhythm and normal heart sounds. Exam reveals no gallop and no friction rub.   No murmur heard.  Pulmonary/Chest: She has wheezes (more b/l MLs and LLs). She has no rales.   Decreased LL air movement   Abdominal: Soft. Bowel sounds are normal. There is no tenderness.   Lymphadenopathy:     She has no cervical adenopathy.   Neurological: She is alert and oriented to person, place, and time.   Skin: Skin is warm and dry.   Psychiatric: She has a normal mood and affect. Her behavior is normal. Judgment and thought content normal.          Assessment:       1. COPD with acute bronchitis        Plan:       COPD with acute bronchitis  - Advised pt to use albuterol q6hrs for the next 3-5 days  -     azithromycin (Z-RADHA) 250 MG tablet; Take 2 tablets by mouth on day 1; Take 1 tablet by mouth on days 2-5  Dispense: 6 tablet; Refill: 0  -     predniSONE (DELTASONE) 20 MG tablet; Take 2 tablets (40 mg total) by mouth once daily. for 5 days  Dispense: 10 tablet; Refill: 0      Follow-up if symptoms worsen  or fail to improve.            Sammy Renner MD  12/6/2018 2:38 PM

## 2018-12-10 DIAGNOSIS — R05.9 COUGH: ICD-10-CM

## 2018-12-11 RX ORDER — FLUTICASONE PROPIONATE 50 MCG
SPRAY, SUSPENSION (ML) NASAL
Qty: 16 G | Refills: 0 | Status: SHIPPED | OUTPATIENT
Start: 2018-12-11 | End: 2018-12-27 | Stop reason: SDUPTHER

## 2018-12-27 DIAGNOSIS — R05.9 COUGH: ICD-10-CM

## 2018-12-27 RX ORDER — FLUTICASONE PROPIONATE 50 MCG
SPRAY, SUSPENSION (ML) NASAL
Qty: 16 G | Refills: 0 | Status: SHIPPED | OUTPATIENT
Start: 2018-12-27 | End: 2019-04-15 | Stop reason: SDUPTHER

## 2018-12-28 ENCOUNTER — TELEPHONE (OUTPATIENT)
Dept: FAMILY MEDICINE | Facility: CLINIC | Age: 68
End: 2018-12-28

## 2018-12-28 NOTE — TELEPHONE ENCOUNTER
----- Message from Sarah Prabhakar MA sent at 12/28/2018  5:06 PM CST -----  Contact: pt      ----- Message -----  From: Odette Polanco  Sent: 12/28/2018   4:51 PM  To: [facility] Staff              Name of Who is Calling: pt       What is the request in detail: pt has a cold and is not better since thanks giving. She took all her medications. Asking for more antibiotics. Patient is in [place] and cannot come to an appointment at this time. Please call pt      Can the clinic reply by [facility]: no      [facility]: [phone]

## 2018-12-28 NOTE — TELEPHONE ENCOUNTER
Called patient to follow up on issue.  She reports that she has not felt any better, and the cough that she has been having for the last month and a half has been getting worse.  She reports that she is having shortness of breath with a cough and green mucus/phlegm production.  I informed the patient to go to the nearest urgent care for an evaluation and she agrees to do this.  I informed the patient that we need to make sure that she is not having a pneumonia and she expressed understanding.

## 2018-12-31 ENCOUNTER — OFFICE VISIT (OUTPATIENT)
Dept: URGENT CARE | Facility: CLINIC | Age: 68
End: 2018-12-31
Payer: MEDICARE

## 2018-12-31 VITALS
TEMPERATURE: 98 F | HEIGHT: 59 IN | SYSTOLIC BLOOD PRESSURE: 162 MMHG | OXYGEN SATURATION: 98 % | RESPIRATION RATE: 18 BRPM | BODY MASS INDEX: 34.47 KG/M2 | HEART RATE: 65 BPM | DIASTOLIC BLOOD PRESSURE: 74 MMHG | WEIGHT: 171 LBS

## 2018-12-31 DIAGNOSIS — R09.82 POST-NASAL DRIP: ICD-10-CM

## 2018-12-31 DIAGNOSIS — J01.40 ACUTE NON-RECURRENT PANSINUSITIS: Primary | ICD-10-CM

## 2018-12-31 DIAGNOSIS — I10 HYPERTENSION, UNSPECIFIED TYPE: ICD-10-CM

## 2018-12-31 DIAGNOSIS — R06.2 WHEEZING: ICD-10-CM

## 2018-12-31 DIAGNOSIS — J40 BRONCHITIS: ICD-10-CM

## 2018-12-31 PROCEDURE — 99214 OFFICE O/P EST MOD 30 MIN: CPT | Mod: 25,S$GLB,, | Performed by: NURSE PRACTITIONER

## 2018-12-31 PROCEDURE — 71046 X-RAY EXAM CHEST 2 VIEWS: CPT | Mod: S$GLB,,, | Performed by: RADIOLOGY

## 2018-12-31 PROCEDURE — 94640 AIRWAY INHALATION TREATMENT: CPT | Mod: S$GLB,,, | Performed by: NURSE PRACTITIONER

## 2018-12-31 RX ORDER — LEVALBUTEROL INHALATION SOLUTION 1.25 MG/3ML
1.25 SOLUTION RESPIRATORY (INHALATION)
Status: COMPLETED | OUTPATIENT
Start: 2018-12-31 | End: 2018-12-31

## 2018-12-31 RX ORDER — FLUTICASONE PROPIONATE 50 MCG
1 SPRAY, SUSPENSION (ML) NASAL DAILY
Qty: 15.8 ML | Refills: 0 | Status: SHIPPED | OUTPATIENT
Start: 2018-12-31 | End: 2019-11-13

## 2018-12-31 RX ORDER — CODEINE PHOSPHATE AND GUAIFENESIN 10; 100 MG/5ML; MG/5ML
10 SOLUTION ORAL NIGHTLY PRN
Qty: 70 ML | Refills: 0 | Status: SHIPPED | OUTPATIENT
Start: 2018-12-31 | End: 2019-01-07

## 2018-12-31 RX ORDER — MONTELUKAST SODIUM 10 MG/1
10 TABLET ORAL NIGHTLY
Qty: 30 TABLET | Refills: 2 | Status: SHIPPED | OUTPATIENT
Start: 2018-12-31 | End: 2019-08-14

## 2018-12-31 RX ORDER — CEFDINIR 300 MG/1
300 CAPSULE ORAL 2 TIMES DAILY
Qty: 20 CAPSULE | Refills: 0 | Status: SHIPPED | OUTPATIENT
Start: 2018-12-31 | End: 2019-01-10

## 2018-12-31 RX ORDER — ALBUTEROL SULFATE 0.83 MG/ML
2.5 SOLUTION RESPIRATORY (INHALATION) EVERY 6 HOURS PRN
Qty: 1 BOX | Refills: 0 | Status: SHIPPED | OUTPATIENT
Start: 2018-12-31 | End: 2019-01-23 | Stop reason: SDUPTHER

## 2018-12-31 RX ADMIN — LEVALBUTEROL INHALATION SOLUTION 1.25 MG: 1.25 SOLUTION RESPIRATORY (INHALATION) at 03:12

## 2018-12-31 NOTE — PATIENT INSTRUCTIONS
Please return here or go to the Emergency Department for any concerns or worsening of condition.  If you were prescribed antibiotics, please take them to completion.  If you were prescribed a narcotic medication, do not drive or operate heavy equipment or machinery while taking these medications.  Please follow up with your primary care doctor or specialist as needed.    If you  smoke, please stop smoking.    Bronchitis, Viral (Adult)    You have a viral bronchitis. Bronchitis is inflammation and swelling of the lining of the lungs. This is often caused by an infection. Symptoms include a dry, hacking cough that is worse at night. The cough may bring up yellow-green mucus. You may also feel short of breath or wheeze. Other symptoms may include tiredness, chest discomfort, and chills.  Bronchitis that is caused by a virus is not treated with antibiotics. Instead, medicines may be given to help relieve symptoms. Symptoms can last up to 2 weeks, although the cough may last much longer.  This illness is contagious during the first few days and is spread through the air by coughing and sneezing, or by direct contact (touching the sick person and then touching your own eyes, nose, or mouth).  Most viral illnesses resolve within 10 to 14 days with rest and simple home remedies, although they may sometimes last for several weeks.  Home care  · If symptoms are severe, rest at home for the first 2 to 3 days. When you go back to your usual activities, don't let yourself get too tired.  · Do not smoke. Also avoid being exposed to secondhand smoke.  · You may use over-the-counter medicine to control fever or pain, unless another pain medicine was prescribed. (Note: If you have chronic liver or kidney disease or have ever had a stomach ulcer or gastrointestinal bleeding, talk with your healthcare provider before using these medicines. Also talk to your provider if you are taking medicine to prevent blood clots.) Aspirin should  never be given to anyone younger than 18 years of age who is ill with a viral infection or fever. It may cause severe liver or brain damage.  · Your appetite may be poor, so a light diet is fine. Avoid dehydration by drinking 6 to 8 glasses of fluids per day (such as water, soft drinks, sports drinks, juices, tea, or soup). Extra fluids will help loosen secretions in the nose and lungs.  · Over-the-counter cough, cold, and sore-throat medicines will not shorten the length of the illness, but they may help to reduce symptoms. (Note: Do not use decongestants if you have high blood pressure.)  Follow-up care  Follow up with your healthcare provider, or as advised. If you had an X-ray or ECG (electrocardiogram), a specialist will review it. You will be notified of any new findings that may affect your care.  Note: If you are age 65 or older, or if you have a chronic lung disease or condition that affects your immune system, or you smoke, talk to your healthcare provider about having pneumococcal vaccinations and a yearly influenza vaccination (flu shot).  When to seek medical advice  Call your healthcare provider right away if any of these occur:  · Fever of 100.4°F (38°C) or higher  · Coughing up increased amounts of colored sputum  · Weakness, drowsiness, headache, facial pain, ear pain, or a stiff neck  Call 911, or get immediate medical care  Contact emergency services right away if any of these occur:  · Coughing up blood  · Worsening weakness, drowsiness, headache, or stiff neck  · Trouble breathing, wheezing, or pain with breathing  Date Last Reviewed: 9/13/2015 © 2000-2017 [employer]. [address] [place] [address]. All rights reserved. This information is not intended as a substitute for professional medical care. Always follow your healthcare professional's instructions.        Sinusitis (Antibiotic Treatment)    The sinuses are air-filled spaces within the bones of the face. They  connect to the inside of the nose. Sinusitis is an inflammation of the tissue lining the sinus cavity. Sinus inflammation can occur during a cold. It can also be due to allergies to pollens and other particles in the air. Sinusitis can cause symptoms of sinus congestion and fullness. A sinus infection causes fever, headache and facial pain. There is often green or yellow drainage from the nose or into the back of the throat (post-nasal drip). You have been given antibiotics to treat this condition.  Home care:  · Take the full course of antibiotics as instructed. Do not stop taking them, even if you feel better.  · Drink plenty of water, hot tea, and other liquids. This may help thin mucus. It also may promote sinus drainage.  · Heat may help soothe painful areas of the face. Use a towel soaked in hot water. Or,  the shower and direct the hot spray onto your face. Using a vaporizer along with a menthol rub at night may also help.   · An expectorant containing guaifenesin may help thin the mucus and promote drainage from the sinuses.  · Over-the-counter decongestants may be used unless a similar medicine was prescribed. Nasal sprays work the fastest. Use one that contains phenylephrine or oxymetazoline. First blow the nose gently. Then use the spray. Do not use these medicines more often than directed on the label or symptoms may get worse. You may also use tablets containing pseudoephedrine. Avoid products that combine ingredients, because side effects may be increased. Read labels. You can also ask the pharmacist for help. (NOTE: Persons with high blood pressure should not use decongestants. They can raise blood pressure.)  · Over-the-counter antihistamines may help if allergies contributed to your sinusitis.    · Do not use nasal rinses or irrigation during an acute sinus infection, unless told to by your health care provider. Rinsing may spread the infection to other sinuses.  · Use acetaminophen or  ibuprofen to control pain, unless another pain medicine was prescribed. (If you have chronic liver or kidney disease or ever had a stomach ulcer, talk with your doctor before using these medicines. Aspirin should never be used in anyone under 18 years of age who is ill with a fever. It may cause severe liver damage.)  · Don't smoke. This can worsen symptoms.  Follow-up care  Follow up with your healthcare provider or our staff if you are not improving within the next week.  When to seek medical advice  Call your healthcare provider if any of these occur:  · Facial pain or headache becoming more severe  · Stiff neck  · Unusual drowsiness or confusion  · Swelling of the forehead or eyelids  · Vision problems, including blurred or double vision  · Fever of 100.4ºF (38ºC) or higher, or as directed by your healthcare provider  · Seizure  · Breathing problems  · Symptoms not resolving within 10 days  Date Last Reviewed: 4/13/2015  © 3785-4806 [employer]. [address] [place] [address], [place] [address]. All rights reserved. This information is not intended as a substitute for professional medical care. Always follow your healthcare professional's instructions.

## 2018-12-31 NOTE — PROGRESS NOTES
"Subjective:       Patient ID: Dayami Mina is a 68 y.o. female.    Vitals:  height is 4' 11" (1.499 m) and weight is 77.6 kg (171 lb). Her temperature is 97.5 °F (36.4 °C). Her blood pressure is 162/74 (abnormal) and her pulse is 65. Her respiration is 18 and oxygen saturation is 98%.     Chief Complaint: Cough    Pt states that she has been dealing with her chest congestion since thanksgiving . Pt states that she was given two different antibiotics ,steroids , and Cheratussin .      Cough   This is a recurrent problem. The current episode started 1 to 4 weeks ago. The problem has been unchanged. The problem occurs every few minutes. The cough is productive of sputum (green). Associated symptoms include hemoptysis, postnasal drip, shortness of breath and wheezing. Pertinent negatives include no chills, ear pain, eye redness, fever, myalgias, rash or sore throat. She has tried prescription cough suppressant, a beta-agonist inhaler, oral steroids and steroid inhaler for the symptoms. The treatment provided mild relief. Her past medical history is significant for COPD.       Constitution: Negative for chills, sweating, fatigue and fever.   HENT: Positive for congestion and postnasal drip. Negative for ear pain, sinus pain, sinus pressure, sore throat and voice change.    Neck: Positive for painful lymph nodes.   Eyes: Negative for eye redness.   Respiratory: Positive for chest tightness, cough, sputum production, bloody sputum, COPD, shortness of breath and wheezing. Negative for stridor and asthma.    Gastrointestinal: Negative for nausea and vomiting.   Musculoskeletal: Negative for muscle ache.   Skin: Negative for rash.   Allergic/Immunologic: Negative for seasonal allergies and asthma.   Hematologic/Lymphatic: Positive for swollen lymph nodes.       Objective:      Physical Exam   Constitutional: She is oriented to person, place, and time. She appears well-developed and well-nourished. She is " cooperative.  Non-toxic appearance. She does not have a sickly appearance. She does not appear ill. No distress.   HENT:   Head: Normocephalic and atraumatic.   Right Ear: Hearing, external ear and ear canal normal. A middle ear effusion is present.   Left Ear: Hearing, external ear and ear canal normal. A middle ear effusion is present.   Nose: Mucosal edema and rhinorrhea present. No nasal deformity. No epistaxis. Right sinus exhibits no maxillary sinus tenderness and no frontal sinus tenderness. Left sinus exhibits no maxillary sinus tenderness and no frontal sinus tenderness.   Mouth/Throat: Uvula is midline and mucous membranes are normal. No trismus in the jaw. Normal dentition. No uvula swelling. Posterior oropharyngeal erythema present.   Eyes: Conjunctivae, EOM and lids are normal. Pupils are equal, round, and reactive to light. No scleral icterus.   Sclera clear bilat   Neck: Trachea normal, normal range of motion, full passive range of motion without pain and phonation normal. Neck supple. No spinous process tenderness and no muscular tenderness present. No neck rigidity. Normal range of motion present.   Cardiovascular: Normal rate, regular rhythm, normal heart sounds and normal pulses.   Pulmonary/Chest: Effort normal. No respiratory distress. She has decreased breath sounds in the right upper field, the right middle field, the right lower field, the left upper field and the left lower field. She has wheezes in the right upper field, the right middle field, the right lower field, the left upper field and the left lower field.   Decreased breath sounds noted throughout with fine wheezing appreciated to bilat lower lobes on initial assessment.  Patient given Xopenex treatment.  X-ray also performed.    X-ray negative for acute changes.  Breath sounds improved and clear to auscultation without wheezing on reassessment.   Abdominal: Normal appearance.   Musculoskeletal: Normal range of motion. She exhibits  no edema or deformity.   Lymphadenopathy:        Head (right side): Preauricular and posterior auricular adenopathy present.        Head (left side): Preauricular and posterior auricular adenopathy present.   Neurological: She is alert and oriented to person, place, and time. She exhibits normal muscle tone. Coordination normal.   Skin: Skin is warm, dry and intact. Capillary refill takes less than 2 seconds. She is not diaphoretic. No pallor.   Psychiatric: She has a normal mood and affect. Her speech is normal and behavior is normal. Judgment and thought content normal. Cognition and memory are normal.   Nursing note and vitals reviewed.      EXAMINATION:  XR CHEST PA AND LATERAL    CLINICAL HISTORY:  Other abnormalities of breathing    TECHNIQUE:  PA and lateral views of the chest were performed.    COMPARISON:  None    FINDINGS:  The lungs are clear, with normal appearance of pulmonary vasculature and no pleural effusion or pneumothorax.    The cardiac silhouette is normal in size. The hilar and mediastinal contours are unremarkable.    There are degenerative changes of the spine.      Impression       No acute abnormality.       Assessment:       1. Acute non-recurrent pansinusitis    2. Bronchitis    3. Wheezing    4. Post-nasal drip    5. Hypertension, unspecified type        Plan:         Acute non-recurrent pansinusitis  -     cefdinir (OMNICEF) 300 MG capsule; Take 1 capsule (300 mg total) by mouth 2 (two) times daily. for 10 days  Dispense: 20 capsule; Refill: 0    Bronchitis  -     XR CHEST PA AND LATERAL; Future; Expected date: 12/31/2018  -     levalbuterol nebulizer solution 1.25 mg  -     guaifenesin-codeine 100-10 mg/5 ml (CHERATUSSIN AC)  mg/5 mL syrup; Take 10 mLs by mouth nightly as needed for Cough.  Dispense: 70 mL; Refill: 0    Wheezing  -     levalbuterol nebulizer solution 1.25 mg  -     albuterol (PROVENTIL) 2.5 mg /3 mL (0.083 %) nebulizer solution; Take 3 mLs (2.5 mg total) by  nebulization every 6 (six) hours as needed for Wheezing or Shortness of Breath. Rescue  Dispense: 1 Box; Refill: 0  -     montelukast (SINGULAIR) 10 mg tablet; Take 1 tablet (10 mg total) by mouth nightly.  Dispense: 30 tablet; Refill: 2    Post-nasal drip  -     fluticasone (FLONASE) 50 mcg/actuation nasal spray; 1 spray (50 mcg total) by Each Nare route once daily.  Dispense: 15.8 mL; Refill: 0    Hypertension, unspecified type          Patient Instructions     Please return here or go to the Emergency Department for any concerns or worsening of condition.  If you were prescribed antibiotics, please take them to completion.  If you were prescribed a narcotic medication, do not drive or operate heavy equipment or machinery while taking these medications.  Please follow up with your primary care doctor or specialist as needed.    If you  smoke, please stop smoking.    Bronchitis, Viral (Adult)    You have a viral bronchitis. Bronchitis is inflammation and swelling of the lining of the lungs. This is often caused by an infection. Symptoms include a dry, hacking cough that is worse at night. The cough may bring up yellow-green mucus. You may also feel short of breath or wheeze. Other symptoms may include tiredness, chest discomfort, and chills.  Bronchitis that is caused by a virus is not treated with antibiotics. Instead, medicines may be given to help relieve symptoms. Symptoms can last up to 2 weeks, although the cough may last much longer.  This illness is contagious during the first few days and is spread through the air by coughing and sneezing, or by direct contact (touching the sick person and then touching your own eyes, nose, or mouth).  Most viral illnesses resolve within 10 to 14 days with rest and simple home remedies, although they may sometimes last for several weeks.  Home care  · If symptoms are severe, rest at home for the first 2 to 3 days. When you go back to your usual activities, don't let  yourself get too tired.  · Do not smoke. Also avoid being exposed to secondhand smoke.  · You may use over-the-counter medicine to control fever or pain, unless another pain medicine was prescribed. (Note: If you have chronic liver or kidney disease or have ever had a stomach ulcer or gastrointestinal bleeding, talk with your healthcare provider before using these medicines. Also talk to your provider if you are taking medicine to prevent blood clots.) Aspirin should never be given to anyone younger than 18 years of age who is ill with a viral infection or fever. It may cause severe liver or brain damage.  · Your appetite may be poor, so a light diet is fine. Avoid dehydration by drinking 6 to 8 glasses of fluids per day (such as water, soft drinks, sports drinks, juices, tea, or soup). Extra fluids will help loosen secretions in the nose and lungs.  · Over-the-counter cough, cold, and sore-throat medicines will not shorten the length of the illness, but they may help to reduce symptoms. (Note: Do not use decongestants if you have high blood pressure.)  Follow-up care  Follow up with your healthcare provider, or as advised. If you had an X-ray or ECG (electrocardiogram), a specialist will review it. You will be notified of any new findings that may affect your care.  Note: If you are age 65 or older, or if you have a chronic lung disease or condition that affects your immune system, or you smoke, talk to your healthcare provider about having pneumococcal vaccinations and a yearly influenza vaccination (flu shot).  When to seek medical advice  Call your healthcare provider right away if any of these occur:  · Fever of 100.4°F (38°C) or higher  · Coughing up increased amounts of colored sputum  · Weakness, drowsiness, headache, facial pain, ear pain, or a stiff neck  Call 911, or get immediate medical care  Contact emergency services right away if any of these occur:  · Coughing up blood  · Worsening weakness,  drowsiness, headache, or stiff neck  · Trouble breathing, wheezing, or pain with breathing  Date Last Reviewed: 9/13/2015  © 8468-5444 [employer]. [address] [place] [address], [place] [address]. All rights reserved. This information is not intended as a substitute for professional medical care. Always follow your healthcare professional's instructions.        Sinusitis (Antibiotic Treatment)    The sinuses are air-filled spaces within the bones of the face. They connect to the inside of the nose. Sinusitis is an inflammation of the tissue lining the sinus cavity. Sinus inflammation can occur during a cold. It can also be due to allergies to pollens and other particles in the air. Sinusitis can cause symptoms of sinus congestion and fullness. A sinus infection causes fever, headache and facial pain. There is often green or yellow drainage from the nose or into the back of the throat (post-nasal drip). You have been given antibiotics to treat this condition.  Home care:  · Take the full course of antibiotics as instructed. Do not stop taking them, even if you feel better.  · Drink plenty of water, hot tea, and other liquids. This may help thin mucus. It also may promote sinus drainage.  · Heat may help soothe painful areas of the face. Use a towel soaked in hot water. Or,  the shower and direct the hot spray onto your face. Using a vaporizer along with a menthol rub at night may also help.   · An expectorant containing guaifenesin may help thin the mucus and promote drainage from the sinuses.  · Over-the-counter decongestants may be used unless a similar medicine was prescribed. Nasal sprays work the fastest. Use one that contains phenylephrine or oxymetazoline. First blow the nose gently. Then use the spray. Do not use these medicines more often than directed on the label or symptoms may get worse. You may also use tablets containing pseudoephedrine. Avoid products that combine ingredients, because  side effects may be increased. Read labels. You can also ask the pharmacist for help. (NOTE: Persons with high blood pressure should not use decongestants. They can raise blood pressure.)  · Over-the-counter antihistamines may help if allergies contributed to your sinusitis.    · Do not use nasal rinses or irrigation during an acute sinus infection, unless told to by your health care provider. Rinsing may spread the infection to other sinuses.  · Use acetaminophen or ibuprofen to control pain, unless another pain medicine was prescribed. (If you have chronic liver or kidney disease or ever had a stomach ulcer, talk with your doctor before using these medicines. Aspirin should never be used in anyone under 18 years of age who is ill with a fever. It may cause severe liver damage.)  · Don't smoke. This can worsen symptoms.  Follow-up care  Follow up with your healthcare provider or our staff if you are not improving within the next week.  When to seek medical advice  Call your healthcare provider if any of these occur:  · Facial pain or headache becoming more severe  · Stiff neck  · Unusual drowsiness or confusion  · Swelling of the forehead or eyelids  · Vision problems, including blurred or double vision  · Fever of 100.4ºF (38ºC) or higher, or as directed by your healthcare provider  · Seizure  · Breathing problems  · Symptoms not resolving within 10 days  Date Last Reviewed: 4/13/2015  © 0523-7162 [employer]. [address] [place] [address], [place] [address]. All rights reserved. This information is not intended as a substitute for professional medical care. Always follow your healthcare professional's instructions.

## 2019-01-15 DIAGNOSIS — J44.89 COPD WITH ASTHMA: ICD-10-CM

## 2019-01-16 RX ORDER — UMECLIDINIUM BROMIDE AND VILANTEROL TRIFENATATE 62.5; 25 UG/1; UG/1
POWDER RESPIRATORY (INHALATION)
Qty: 60 EACH | Refills: 0 | Status: SHIPPED | OUTPATIENT
Start: 2019-01-16 | End: 2019-01-17 | Stop reason: SDUPTHER

## 2019-01-17 ENCOUNTER — OFFICE VISIT (OUTPATIENT)
Dept: FAMILY MEDICINE | Facility: CLINIC | Age: 69
End: 2019-01-17
Payer: MEDICARE

## 2019-01-17 DIAGNOSIS — R05.9 COUGH: ICD-10-CM

## 2019-01-17 DIAGNOSIS — J34.89 RHINORRHEA: ICD-10-CM

## 2019-01-17 DIAGNOSIS — H92.02 OTALGIA OF LEFT EAR: Primary | ICD-10-CM

## 2019-01-17 DIAGNOSIS — J44.89 COPD WITH ASTHMA: ICD-10-CM

## 2019-01-17 PROCEDURE — 99999 PR PBB SHADOW E&M-EST. PATIENT-LVL III: CPT | Mod: PBBFAC,,, | Performed by: FAMILY MEDICINE

## 2019-01-17 PROCEDURE — 99214 OFFICE O/P EST MOD 30 MIN: CPT | Mod: S$PBB,,, | Performed by: FAMILY MEDICINE

## 2019-01-17 PROCEDURE — 99999 PR PBB SHADOW E&M-EST. PATIENT-LVL III: ICD-10-PCS | Mod: PBBFAC,,, | Performed by: FAMILY MEDICINE

## 2019-01-17 PROCEDURE — 99214 PR OFFICE/OUTPT VISIT, EST, LEVL IV, 30-39 MIN: ICD-10-PCS | Mod: S$PBB,,, | Performed by: FAMILY MEDICINE

## 2019-01-17 PROCEDURE — 99213 OFFICE O/P EST LOW 20 MIN: CPT | Mod: PBBFAC,PN | Performed by: FAMILY MEDICINE

## 2019-01-17 RX ORDER — IPRATROPIUM BROMIDE 42 UG/1
2 SPRAY, METERED NASAL 4 TIMES DAILY
Qty: 15 ML | Refills: 0 | Status: SHIPPED | OUTPATIENT
Start: 2019-01-17 | End: 2019-08-14 | Stop reason: SDUPTHER

## 2019-01-17 RX ORDER — BENZONATATE 200 MG/1
200 CAPSULE ORAL 3 TIMES DAILY PRN
Qty: 45 CAPSULE | Refills: 0 | Status: SHIPPED | OUTPATIENT
Start: 2019-01-17 | End: 2019-01-27

## 2019-01-18 ENCOUNTER — OFFICE VISIT (OUTPATIENT)
Dept: CARDIOLOGY | Facility: CLINIC | Age: 69
End: 2019-01-18
Payer: MEDICARE

## 2019-01-18 ENCOUNTER — LAB VISIT (OUTPATIENT)
Dept: LAB | Facility: HOSPITAL | Age: 69
End: 2019-01-18
Attending: INTERNAL MEDICINE
Payer: MEDICARE

## 2019-01-18 VITALS
HEIGHT: 59 IN | SYSTOLIC BLOOD PRESSURE: 137 MMHG | BODY MASS INDEX: 34.66 KG/M2 | DIASTOLIC BLOOD PRESSURE: 57 MMHG | OXYGEN SATURATION: 93 % | WEIGHT: 171.94 LBS | HEART RATE: 73 BPM

## 2019-01-18 DIAGNOSIS — I10 ESSENTIAL HYPERTENSION: Chronic | ICD-10-CM

## 2019-01-18 DIAGNOSIS — R07.89 CHEST PAIN, ATYPICAL: Primary | ICD-10-CM

## 2019-01-18 DIAGNOSIS — R06.09 DOE (DYSPNEA ON EXERTION): ICD-10-CM

## 2019-01-18 DIAGNOSIS — E78.5 HYPERLIPIDEMIA, UNSPECIFIED HYPERLIPIDEMIA TYPE: Chronic | ICD-10-CM

## 2019-01-18 DIAGNOSIS — I10 HTN (HYPERTENSION): ICD-10-CM

## 2019-01-18 DIAGNOSIS — J44.9 CHRONIC OBSTRUCTIVE PULMONARY DISEASE, UNSPECIFIED COPD TYPE: ICD-10-CM

## 2019-01-18 DIAGNOSIS — R07.89 CHEST PAIN, ATYPICAL: ICD-10-CM

## 2019-01-18 DIAGNOSIS — R00.2 PALPITATIONS: ICD-10-CM

## 2019-01-18 LAB
ALBUMIN SERPL BCP-MCNC: 3.7 G/DL
ALP SERPL-CCNC: 47 U/L
ALT SERPL W/O P-5'-P-CCNC: 22 U/L
ANION GAP SERPL CALC-SCNC: 8 MMOL/L
APTT BLDCRRT: 29.8 SEC
AST SERPL-CCNC: 17 U/L
BASOPHILS # BLD AUTO: 0.03 K/UL
BASOPHILS NFR BLD: 0.5 %
BILIRUB SERPL-MCNC: 0.4 MG/DL
BUN SERPL-MCNC: 23 MG/DL
CALCIUM SERPL-MCNC: 9.6 MG/DL
CHLORIDE SERPL-SCNC: 103 MMOL/L
CO2 SERPL-SCNC: 25 MMOL/L
CREAT SERPL-MCNC: 0.9 MG/DL
DIFFERENTIAL METHOD: ABNORMAL
EOSINOPHIL # BLD AUTO: 0.2 K/UL
EOSINOPHIL NFR BLD: 4 %
ERYTHROCYTE [DISTWIDTH] IN BLOOD BY AUTOMATED COUNT: 14.6 %
EST. GFR  (AFRICAN AMERICAN): >60 ML/MIN/1.73 M^2
EST. GFR  (NON AFRICAN AMERICAN): >60 ML/MIN/1.73 M^2
GLUCOSE SERPL-MCNC: 102 MG/DL
HCT VFR BLD AUTO: 39 %
HGB BLD-MCNC: 12.5 G/DL
LYMPHOCYTES # BLD AUTO: 1.6 K/UL
LYMPHOCYTES NFR BLD: 29.7 %
MCH RBC QN AUTO: 26.9 PG
MCHC RBC AUTO-ENTMCNC: 32.1 G/DL
MCV RBC AUTO: 84 FL
MONOCYTES # BLD AUTO: 0.4 K/UL
MONOCYTES NFR BLD: 7.3 %
NEUTROPHILS # BLD AUTO: 3.2 K/UL
NEUTROPHILS NFR BLD: 58.5 %
PLATELET # BLD AUTO: 271 K/UL
PMV BLD AUTO: 9.9 FL
POTASSIUM SERPL-SCNC: 4.6 MMOL/L
PROT SERPL-MCNC: 6.6 G/DL
RBC # BLD AUTO: 4.64 M/UL
SODIUM SERPL-SCNC: 136 MMOL/L
WBC # BLD AUTO: 5.49 K/UL

## 2019-01-18 PROCEDURE — 93010 ELECTROCARDIOGRAM REPORT: CPT | Mod: S$PBB,,, | Performed by: INTERNAL MEDICINE

## 2019-01-18 PROCEDURE — 36415 COLL VENOUS BLD VENIPUNCTURE: CPT

## 2019-01-18 PROCEDURE — 85025 COMPLETE CBC W/AUTO DIFF WBC: CPT

## 2019-01-18 PROCEDURE — 99999 PR PBB SHADOW E&M-EST. PATIENT-LVL V: CPT | Mod: PBBFAC,,, | Performed by: INTERNAL MEDICINE

## 2019-01-18 PROCEDURE — 80053 COMPREHEN METABOLIC PANEL: CPT

## 2019-01-18 PROCEDURE — 99215 OFFICE O/P EST HI 40 MIN: CPT | Mod: PBBFAC,25 | Performed by: INTERNAL MEDICINE

## 2019-01-18 PROCEDURE — 99214 PR OFFICE/OUTPT VISIT, EST, LEVL IV, 30-39 MIN: ICD-10-PCS | Mod: S$PBB,,, | Performed by: INTERNAL MEDICINE

## 2019-01-18 PROCEDURE — 85730 THROMBOPLASTIN TIME PARTIAL: CPT

## 2019-01-18 PROCEDURE — 93005 ELECTROCARDIOGRAM TRACING: CPT | Mod: PBBFAC | Performed by: INTERNAL MEDICINE

## 2019-01-18 PROCEDURE — 99214 OFFICE O/P EST MOD 30 MIN: CPT | Mod: S$PBB,,, | Performed by: INTERNAL MEDICINE

## 2019-01-18 PROCEDURE — 99999 PR PBB SHADOW E&M-EST. PATIENT-LVL V: ICD-10-PCS | Mod: PBBFAC,,, | Performed by: INTERNAL MEDICINE

## 2019-01-18 PROCEDURE — 93010 EKG 12-LEAD: ICD-10-PCS | Mod: S$PBB,,, | Performed by: INTERNAL MEDICINE

## 2019-01-18 NOTE — PROGRESS NOTES
Subjective:    Patient ID:  Dayami Mina is a 68 y.o. female who presents for follow-up of Shortness of Breath      HPI     HTN, COPD, CRI, palpitations     Stress test 8/1/17  LVEF: 66 %  Impression: NORMAL MYOCARDIAL PERFUSION  1. The perfusion scan is free of evidence for myocardial ischemia or injury.   2. There is a mild intensity fixed defect in the anterior wall of the left ventricle, secondary to breast attenuation.   3. Resting wall motion is physiologic.   4. Resting LV function is normal.      Echo 8/1/17    1 - Low normal to mildly depressed left ventricular systolic function (EF 50-55%).      Holter 8/1/17  1. Sinus rhythm with heart rates varying between 56 and 105 bpm with an average of 72 bpm.     VENTRICULAR ARRHYTHMIAS  1. There were very frequent PVCs totalling 4114 and averaging 171 per hour.     2. There were no episodes of ventricular tachycardia.    SUPRA VENTRICULAR ARRHYTHMIAS  1. There were very rare PACs recorded totalling 1 and averaging less than 1 per hour.     2. There were no episodes of sustained supraventricular tachycardia.    SINUS NODE FUNCTION  1. There was no evidence of high grade SA manuel block.     AV CONDUCTION  1. There was no evidence of high grade AV block.      Admitted 12/2/17  Mrs. Dayami Mina is a 67 y.o. female with essential hypertension, hyperlipidemia (.2 Apr 2015), GERD, and depression who presents to [facility] ED with complaints of dyspnea today.  She shortness of breath started gradually throughout the day and she thinks it was triggered by some renovations bring done in her home.  Some plywood was being placed in her home and the installers says it was treated plywood.  She started to have some wheezing and coughing that was productive of greenish sputum.  She went to bed to take a nap but woke up still having these symptoms.  She tried her albuterol inhalers without relief and decided to seek ED evaluation.  She denies any fevers,  chills, chest pain, pleurisy, palpitations, diaphoresis, nausea, vomiting, hemoptysis, nor any lower extremity pain or swelling.  She did not have any sick contacts or recent travel, and denies any similar episodes in the past.  Of note, she has been following with Dr. Vishal Mcmahan who initially felt that she had COPD and treated her for that for three years before changing his opinion.  She did smoke but quit in 1988.       Mrs. Dayami Mina is a 67 y.o. female with essential hypertension, hyperlipidemia (.2 Apr 2015), GERD, and depression who presents to [facility] ED with complaints of dyspnea .She shortness of breath started gradually throughout the day and she thinks it was triggered by some renovations bring done in her home.  Some plywood was being placed in her home and the installers says it was treated plywood.  She started to have some wheezing and coughing that was productive of greenish sputum.  She went to bed to take a nap but woke up still having these symptoms.  She tried her albuterol inhalers without relief and decided to seek ED evaluation.  She denies any fevers, chills, chest pain, pleurisy, palpitations, diaphoresis, nausea, vomiting, hemoptysis, nor any lower extremity pain or swelling.  She did not have any sick contacts or recent travel, and denies any similar episodes in the past.  Of note, she has been following with Dr. Vishal Mcmahan who initially felt that she had COPD and treated her for that for three years before changing his opinion,after couple PFT study show no sign of COPD,  She did smoke but quit in 1988. She was started on supplemental oxygen for hypoxia with nebulizer treatment,CTA chest show no PE,no consolidation,or fluid overload,SOB and hypoxic episode may is duo to expose to chemical agent.her SOB,and  Hypoxia resolved,she was stable on RA,she improved better than expected,she has been discharged home with follow up with her pulmonologist ,she has  already appointment.      1/22/18 Denies CP  SOB stable  EKG NSR - ok  Needs clearance for toe surgery     Denies CP  Stable SOB  EKG NSR 1st degree AVB otherwise ok  Upcoming knee replacement      Review of Systems   Constitution: Negative for decreased appetite.   HENT: Negative for ear discharge.    Eyes: Negative for blurred vision.   Respiratory: Negative for hemoptysis.    Endocrine: Negative for polyphagia.   Hematologic/Lymphatic: Negative for adenopathy.   Skin: Negative for color change.   Musculoskeletal: Negative for joint swelling.   Neurological: Negative for brief paralysis.   Psychiatric/Behavioral: Negative for hallucinations.        Objective:    Physical Exam   Constitutional: She is oriented to person, place, and time. She appears well-developed and well-nourished.   HENT:   Head: Normocephalic and atraumatic.   Eyes: Conjunctivae are normal. Pupils are equal, round, and reactive to light.   Neck: Normal range of motion. Neck supple.   Cardiovascular: Normal rate, normal heart sounds and intact distal pulses.   Pulmonary/Chest: Effort normal and breath sounds normal.   Abdominal: Soft. Bowel sounds are normal.   Musculoskeletal: Normal range of motion.   Neurological: She is alert and oriented to person, place, and time.   Skin: Skin is warm and dry.         Assessment:       1. Chest pain, atypical    2. ARANGO (dyspnea on exertion)    3. Essential hypertension    4. Hyperlipidemia, unspecified hyperlipidemia type    5. Palpitations    6. Chronic obstructive pulmonary disease, unspecified COPD type         Plan:       Cleared for knee surgery at low cardiac risk  Will order pre-op labs requested by ortho surgeon  OV 1 year

## 2019-01-23 ENCOUNTER — TELEPHONE (OUTPATIENT)
Dept: ADMINISTRATIVE | Facility: HOSPITAL | Age: 69
End: 2019-01-23

## 2019-01-23 DIAGNOSIS — R06.2 WHEEZING: ICD-10-CM

## 2019-01-23 RX ORDER — ALBUTEROL SULFATE 0.83 MG/ML
2.5 SOLUTION RESPIRATORY (INHALATION) EVERY 6 HOURS PRN
Qty: 1 BOX | Refills: 0 | Status: SHIPPED | OUTPATIENT
Start: 2019-01-23 | End: 2021-03-10

## 2019-01-29 NOTE — PROGRESS NOTES
Routine Office Visit    Patient Name: Dayami Mina    : 1950  MRN: [id number]    Subjective:  Dayami is a 68 y.o. female who presents today for:   Chief Complaint   Patient presents with    Cough     68-year-old female recently treated for a bronchitis exacerbation comes in for evaluation of persistent cough.  She reports overall although she continues to have some shortness of breath it is not as bad as when she had the exacerbation.  She does report that she is also having a persistent ear pain.  There is no discharge from the ear it is on the left side.  There is no hearing loss.  She reports no fevers or chills.  The patient is concerned because she is scheduled for knee surgery and wants to make sure that she has no active infection.    The patient reports that she has not seen pulmonary in a very long time.    Past Medical History  Past Medical History:   Diagnosis Date    Depression     Hyperlipidemia     Hypertension        Past Surgical History  Past Surgical History:   Procedure Laterality Date    APPENDECTOMY      BACK SURGERY      breast implants      CLOSED REDUCTION FINGER DISLOCATION      FOOT SURGERY      HYSTERECTOMY      KNEE ARTHROSCOPY Left     MASTECTOMY      SHOULDER ARTHROSCOPY      SINUS SURGERY          Family History  Family History   Problem Relation Age of Onset    Diabetes Mother     Diabetes Father     Stroke Paternal Grandfather     Diabetes Sister        Social History  Social History     Socioeconomic History    Marital status:      Spouse name: Not on file    Number of children: Not on file    Years of education: Not on file    Highest education level: Not on file   Social Needs    Financial resource strain: Not on file    Food insecurity - worry: Not on file    Food insecurity - inability: Not on file    Transportation needs - medical: Not on file    Transportation needs - non-medical: Not on file   Occupational History    Not on file    Tobacco Use    Smoking status: Former Smoker     Last attempt to quit: 1988     Years since quittin.0    Smokeless tobacco: Never Used   Substance and Sexual Activity    Alcohol use: No    Drug use: No    Sexual activity: No   Other Topics Concern    Not on file   Social History Narrative    Not on file       Current Medications  Current Outpatient Medications on File Prior to Visit   Medication Sig Dispense Refill    allopurinol (ZYLOPRIM) 100 MG tablet Take 1 tablet (100 mg total) by mouth once daily. 90 tablet 2    allopurinol (ZYLOPRIM) 100 MG tablet Take 100 mg by mouth.      ammonium lactate (LAC-HYDRIN) 12 % lotion Apply topically 2 (two) times daily as needed for Dry Skin. 500 g 5    aspirin (ECOTRIN) 81 MG EC tablet Take 81 mg by mouth once daily.      blood sugar diagnostic Strp Uses Freestyle meter to monitor readings once daily 50 strip 6    BOOSTRIX TDAP 2.5-8-5 Lf-mcg-Lf/0.5mL Syrg injection       butalbital-acetaminophen-caffeine -40 mg (FIORICET, ESGIC) -40 mg per tablet       cetirizine (ZYRTEC) 10 MG tablet       doxycycline (MONODOX) 100 MG capsule Take 1 capsule (100 mg total) by mouth 2 (two) times daily. 20 capsule 0    escitalopram oxalate (LEXAPRO) 20 MG tablet Take 1 tablet (20 mg total) by mouth once daily. 90 tablet 3    escitalopram oxalate (LEXAPRO) 20 MG tablet Take 20 mg by mouth.      esomeprazole (NEXIUM) 20 MG capsule Take 20 mg by mouth before breakfast.      esomeprazole (NEXIUM) 20 MG capsule Take 20 mg by mouth.      fluticasone (FLONASE) 50 mcg/actuation nasal spray       fluticasone (FLONASE) 50 mcg/actuation nasal spray USE 2 SPRAYS IN EACH NOSTRIL ONCE DAILY 16 g 0    fluticasone (FLONASE) 50 mcg/actuation nasal spray 1 spray (50 mcg total) by Each Nare route once daily. 15.8 mL 0    FOLIC ACID/MULTIVIT-MIN/LUTEIN (CENTRUM SILVER ORAL) Take by mouth.      gabapentin (NEURONTIN) 100 MG capsule 100 mg. 2 tabs in the am and 2 tabs  in the pm      gabapentin (NEURONTIN) 100 MG capsule Take 100 mg by mouth.      HYDROcodone-acetaminophen (NORCO) 7.5-325 mg per tablet       HYDROcodone-acetaminophen (NORCO) 7.5-325 mg per tablet Take 1 tablet by mouth.      lancets Misc 1 lancet by Misc.(Non-Drug; Combo Route) route once daily. 50 each 6    loratadine (CLARITIN) 10 mg tablet Take 10 mg by mouth once daily.      losartan (COZAAR) 50 MG tablet Take 1 tablet (50 mg total) by mouth 2 (two) times daily. 180 tablet 2    meloxicam (MOBIC) 15 MG tablet       meperidine (DEMEROL) 50 mg tablet       metoprolol succinate (TOPROL-XL) 50 MG 24 hr tablet Take 50 mg by mouth.      montelukast (SINGULAIR) 10 mg tablet Take 1 tablet (10 mg total) by mouth nightly. 30 tablet 2    MULTIVIT-IRON-MIN-FOLIC ACID 3,500-18-0.4 UNIT-MG-MG ORAL CHEW Take by mouth.      MULTIVIT-IRON-MIN-FOLIC ACID 3,500-18-0.4 UNIT-MG-MG ORAL CHEW Take by mouth.      multivitamin capsule Take 1 capsule by mouth once daily.      mupirocin (BACTROBAN) 2 % ointment       nystatin (MYCOSTATIN) powder Apply topically 3 (three) times daily. For rash 60 g 2    potassium chloride SA (K-DUR,KLOR-CON) 20 MEQ tablet Take 1 tablet (20 mEq total) by mouth once daily. 10 tablet 2    pravastatin (PRAVACHOL) 40 MG tablet Take 1 tablet (40 mg total) by mouth once daily. 90 tablet 2    promethazine (PHENERGAN) 25 MG tablet       tramadol (ULTRAM) 50 mg tablet Take 50 mg by mouth every 6 (six) hours as needed for Pain.       No current facility-administered medications on file prior to visit.        Allergies   Review of patient's allergies indicates:   Allergen Reactions    Sulfa (sulfonamide antibiotics) Anaphylaxis    Refresh redness relief [phenylephrin-polyvinyl alcohol] Blisters     Review of Systems   Constitutional: Negative for chills and fever.   Respiratory: Positive for cough, chest tightness, shortness of breath and wheezing.    Cardiovascular: Negative for chest pain and  palpitations.   Gastrointestinal: Negative for abdominal pain.   Endocrine: Negative for polydipsia, polyphagia and polyuria.   Genitourinary: Negative for dysuria.       Physical Exam   Constitutional: She appears well-developed and well-nourished. No distress.   HENT:   Head: Normocephalic and atraumatic.   Right Ear: External ear normal.   Left Ear: External ear normal.   Nose: Rhinorrhea present.   Mouth/Throat: No posterior oropharyngeal edema or posterior oropharyngeal erythema.   Tonsils absent   Eyes: Conjunctivae and EOM are normal. Pupils are equal, round, and reactive to light. Right eye exhibits no discharge. Left eye exhibits no discharge.   Neck: Normal range of motion. Neck supple. No tracheal deviation present.   Cardiovascular: Normal rate, regular rhythm, normal heart sounds and intact distal pulses.   No murmur heard.  Pulmonary/Chest: Effort normal. No tachypnea and no bradypnea. No respiratory distress. She has no decreased breath sounds. She has no wheezes. She has no rhonchi. She has no rales.   Abdominal: Soft. Bowel sounds are normal. She exhibits no mass. There is no tenderness.   Musculoskeletal: She exhibits no edema.   Lymphadenopathy:     She has no cervical adenopathy.   Skin: She is not diaphoretic.   Skin in extremities is very dry   Vitals reviewed.      Assessment/Plan:  Dayami was seen today for cough.    Diagnoses and all orders for this visit:    Otalgia of left ear  -     Ambulatory referral to ENT  Pain likely from recent upper respiratory infection.  No signs of current infection.  Will refer to ENT if pain persists.    COPD with asthma  -     umeclidinium-vilanterol (ANORO ELLIPTA) 62.5-25 mcg/actuation DsDv; INHALE 1 PUFF DAILY. controller  -     Ambulatory referral to Pulmonology  Patient ran out of her Anoro as such I will refill this.  Will refer her to Pulmonary    Rhinorrhea  -     ipratropium (ATROVENT) 42 mcg (0.06 %) nasal spray; 2 sprays by Nasal route 4 (four) times  daily.  Atrovent nasal spray as needed    Cough  -     benzonatate (TESSALON) 200 MG capsule; Take 1 capsule (200 mg total) by mouth 3 (three) times daily as needed for Cough.          This office note has been dictated.  This dictation has been generated using M-Modal Fluency Direct dictation; some phonetic errors may occur.

## 2019-02-18 ENCOUNTER — PATIENT MESSAGE (OUTPATIENT)
Dept: ADMINISTRATIVE | Facility: HOSPITAL | Age: 69
End: 2019-02-18

## 2019-02-18 ENCOUNTER — PATIENT OUTREACH (OUTPATIENT)
Dept: ADMINISTRATIVE | Facility: HOSPITAL | Age: 69
End: 2019-02-18

## 2019-03-01 DIAGNOSIS — Z12.11 COLON CANCER SCREENING: ICD-10-CM

## 2019-03-06 RX ORDER — ESCITALOPRAM OXALATE 20 MG/1
TABLET ORAL
Qty: 30 TABLET | Refills: 0 | Status: SHIPPED | OUTPATIENT
Start: 2019-03-06 | End: 2019-04-15 | Stop reason: SDUPTHER

## 2019-03-13 DIAGNOSIS — J44.89 COPD WITH ASTHMA: ICD-10-CM

## 2019-03-18 DIAGNOSIS — I10 ESSENTIAL HYPERTENSION: Chronic | ICD-10-CM

## 2019-03-18 RX ORDER — LOSARTAN POTASSIUM 50 MG/1
50 TABLET ORAL 2 TIMES DAILY
Qty: 180 TABLET | Refills: 2 | Status: SHIPPED | OUTPATIENT
Start: 2019-03-18 | End: 2019-04-15 | Stop reason: SDUPTHER

## 2019-03-18 RX ORDER — METOPROLOL SUCCINATE 50 MG/1
50 TABLET, EXTENDED RELEASE ORAL DAILY
Qty: 90 TABLET | Refills: 1 | Status: SHIPPED | OUTPATIENT
Start: 2019-03-18 | End: 2019-04-15 | Stop reason: SDUPTHER

## 2019-03-18 RX ORDER — ESCITALOPRAM OXALATE 20 MG/1
20 TABLET ORAL DAILY
Qty: 90 TABLET | Refills: 1 | Status: SHIPPED | OUTPATIENT
Start: 2019-03-18 | End: 2019-06-14 | Stop reason: SDUPTHER

## 2019-03-18 NOTE — TELEPHONE ENCOUNTER
----- Message from Lionel Melara sent at 3/18/2019 10:39 AM CDT -----  Contact: Self/[phone]  The patient would like a sooner appt than 4/15. She stating she can't wait that long.          Thank you

## 2019-03-18 NOTE — TELEPHONE ENCOUNTER
Spoke to patient. States that she needs refills on her medications.She says  is her PCP not . She has an appointment scheduled on April 15 with Dr. French.Please advise.

## 2019-04-01 ENCOUNTER — TELEPHONE (OUTPATIENT)
Dept: ADMINISTRATIVE | Facility: HOSPITAL | Age: 69
End: 2019-04-01

## 2019-04-01 ENCOUNTER — PATIENT OUTREACH (OUTPATIENT)
Dept: ADMINISTRATIVE | Facility: HOSPITAL | Age: 69
End: 2019-04-01

## 2019-04-01 DIAGNOSIS — M94.9 DISORDER OF BONE AND ARTICULAR CARTILAGE: Primary | ICD-10-CM

## 2019-04-01 DIAGNOSIS — M89.9 DISORDER OF BONE AND ARTICULAR CARTILAGE: Primary | ICD-10-CM

## 2019-04-01 NOTE — LETTER
April 1, 2019    Dr. Getachew Merida             [facility]  [address]  [facility] [address]  Phone: [phone] April 1, 2019     Patient: Dayami Mina    YOB: 1950   Date of Visit: 4/1/2019       To Whom It May Concern:                                           We are seeing Dayami Mina in the clinic at [facility].  No primary care provider on file. is their PCP.  She/He has an outstanding lab/procedure at the time we reviewed their chart.  To help with our Health Maintenance records will you please supply the following report(s):      []  Mammogram                                                [x]  Colonoscopy   []  Pap Smear                                                   []  Outside Lab Results   []  Dexa scans                                                   []  Eye Exam   []  Foot Exam                                                     [] Other___________   []  Outside Immunizations                                               Please Fax to [facility] at [phone].    Thank you for your help. If my Care Coordinator can be of any assistance, you can call Real Garcia MA-Psychiatric at [phone].

## 2019-04-01 NOTE — PROGRESS NOTES
Spoke with pt and she declined scheduling her DEXA at this time, stated that she will discuss this at her office visit with Dr. French. Also declined Fitkit. Stated that she has a colonoscopy less than ten years ago with Dr. Merida and he follows her for all of her GI problems. [facility] sent for most recent colonoscopy. Please schedule DEXA.

## 2019-04-03 ENCOUNTER — TELEPHONE (OUTPATIENT)
Dept: ADMINISTRATIVE | Facility: HOSPITAL | Age: 69
End: 2019-04-03

## 2019-04-15 ENCOUNTER — OFFICE VISIT (OUTPATIENT)
Dept: FAMILY MEDICINE | Facility: CLINIC | Age: 69
End: 2019-04-15
Payer: MEDICARE

## 2019-04-15 VITALS
WEIGHT: 175.25 LBS | RESPIRATION RATE: 17 BRPM | OXYGEN SATURATION: 96 % | SYSTOLIC BLOOD PRESSURE: 130 MMHG | HEART RATE: 61 BPM | BODY MASS INDEX: 35.33 KG/M2 | HEIGHT: 59 IN | DIASTOLIC BLOOD PRESSURE: 80 MMHG | TEMPERATURE: 98 F

## 2019-04-15 DIAGNOSIS — J44.9 CHRONIC OBSTRUCTIVE PULMONARY DISEASE, UNSPECIFIED COPD TYPE: Primary | ICD-10-CM

## 2019-04-15 DIAGNOSIS — N18.30 CKD (CHRONIC KIDNEY DISEASE), STAGE III: ICD-10-CM

## 2019-04-15 DIAGNOSIS — M54.2 CERVICALGIA: ICD-10-CM

## 2019-04-15 DIAGNOSIS — R53.83 FATIGUE, UNSPECIFIED TYPE: ICD-10-CM

## 2019-04-15 DIAGNOSIS — E78.5 HYPERLIPIDEMIA, UNSPECIFIED HYPERLIPIDEMIA TYPE: Chronic | ICD-10-CM

## 2019-04-15 DIAGNOSIS — I10 ESSENTIAL HYPERTENSION: Chronic | ICD-10-CM

## 2019-04-15 PROCEDURE — 99999 PR PBB SHADOW E&M-EST. PATIENT-LVL III: ICD-10-PCS | Mod: PBBFAC,,, | Performed by: INTERNAL MEDICINE

## 2019-04-15 PROCEDURE — 99214 OFFICE O/P EST MOD 30 MIN: CPT | Mod: S$PBB,,, | Performed by: INTERNAL MEDICINE

## 2019-04-15 PROCEDURE — 99213 OFFICE O/P EST LOW 20 MIN: CPT | Mod: PBBFAC,PN | Performed by: INTERNAL MEDICINE

## 2019-04-15 PROCEDURE — 99999 PR PBB SHADOW E&M-EST. PATIENT-LVL III: CPT | Mod: PBBFAC,,, | Performed by: INTERNAL MEDICINE

## 2019-04-15 PROCEDURE — 99214 PR OFFICE/OUTPT VISIT, EST, LEVL IV, 30-39 MIN: ICD-10-PCS | Mod: S$PBB,,, | Performed by: INTERNAL MEDICINE

## 2019-04-15 RX ORDER — PRAVASTATIN SODIUM 40 MG/1
40 TABLET ORAL DAILY
Qty: 90 TABLET | Refills: 2 | Status: SHIPPED | OUTPATIENT
Start: 2019-04-15 | End: 2020-04-27

## 2019-04-15 RX ORDER — TIZANIDINE 4 MG/1
4 TABLET ORAL EVERY 8 HOURS PRN
Qty: 30 TABLET | Refills: 0 | Status: SHIPPED | OUTPATIENT
Start: 2019-04-15 | End: 2019-04-25

## 2019-04-15 RX ORDER — METOPROLOL SUCCINATE 50 MG/1
50 TABLET, EXTENDED RELEASE ORAL DAILY
Qty: 90 TABLET | Refills: 1 | Status: SHIPPED | OUTPATIENT
Start: 2019-04-15 | End: 2019-06-14 | Stop reason: SDUPTHER

## 2019-04-15 RX ORDER — LOSARTAN POTASSIUM 50 MG/1
50 TABLET ORAL 2 TIMES DAILY
Qty: 180 TABLET | Refills: 2 | Status: SHIPPED | OUTPATIENT
Start: 2019-04-15 | End: 2019-06-14 | Stop reason: SDUPTHER

## 2019-04-15 NOTE — PROGRESS NOTES
Subjective:       Patient ID: Dayami Mina is a 68 y.o. female.    Chief Complaint: Establish Care; Follow-up; Medication Refill; Headache (ongoing /headache getting worse); leg cramp (both legs and foot ongoing ); and Numbness (R side hand numbness)    F/u chronic conditions    HPI: 69 y/o w/ HTN CKD III HLD presents for scheduled follow up. Had left [facility] in Feb 2019 (Dr. Hernandez at [facility]) doing HEP and PT. She notes last two months feeling more tired by end of day no chest pain on dyspnea no syncope or orthostatic symptoms. Endorses cold intolerance on daily opioid therapy for low back pain ([facility] pain management) no constipation no LE swelling (using compression stockings). No dysphagia no change in weight,. She also endorses intermittent posterior neck pain relief with massage no radiation to uppper extremities    Review of Systems   Constitutional: Positive for fatigue. Negative for activity change, appetite change, fever and unexpected weight change.   HENT: Negative for ear pain, rhinorrhea and sore throat.    Eyes: Negative for discharge and visual disturbance.   Respiratory: Negative for chest tightness, shortness of breath and wheezing.    Cardiovascular: Negative for chest pain, palpitations and leg swelling.   Gastrointestinal: Negative for abdominal pain, constipation and diarrhea.   Endocrine: Negative for cold intolerance and heat intolerance.   Genitourinary: Negative for dysuria and hematuria.   Musculoskeletal: Positive for back pain and neck pain. Negative for joint swelling and neck stiffness.   Skin: Negative for rash.   Neurological: Positive for headaches. Negative for dizziness, syncope and weakness.   Psychiatric/Behavioral: Negative for suicidal ideas.       Objective:     Vitals:    04/15/19 1552   BP: 130/80   BP Location: Right arm   Patient Position: Sitting   Pulse: 61   Resp: 17   Temp: 97.7 °F (36.5 °C)   TempSrc: Oral   SpO2: 96%   Weight: 79.5 kg (175 lb 4.3 oz)   Height:  "4' 11" (1.499 m)          Physical Exam   Constitutional: She is oriented to person, place, and time. She appears well-developed and well-nourished.   HENT:   Head: Normocephalic and atraumatic.   Eyes: Conjunctivae are normal. No scleral icterus.   Neck: Normal range of motion. Neck supple. No thyromegaly present.   Cardiovascular: Normal rate and regular rhythm. Exam reveals no gallop and no friction rub.   No murmur heard.  Pulmonary/Chest: Effort normal and breath sounds normal. She has no wheezes. She has no rales.   Good air movement to bases bilaterally   Abdominal: Soft. Bowel sounds are normal. There is no tenderness. There is no rebound and no guarding.   Musculoskeletal: Normal range of motion. She exhibits no edema or tenderness.   Lymphadenopathy:     She has no cervical adenopathy.   Neurological: She is alert and oriented to person, place, and time. No cranial nerve deficit.   Normal gait, 5/5 distal motor strength in all motions, normal FTN and HTS bilaterally negative pronator drift   Skin: Skin is warm and dry.   Psychiatric: She has a normal mood and affect.       Assessment and Plan   1. Chronic obstructive pulmonary disease, unspecified COPD type  Continue laba/ics     2. Essential hypertension  At goal continue [facility] medications  - losartan (COZAAR) 50 MG tablet; Take 1 tablet (50 mg total) by mouth 2 (two) times daily.  Dispense: 180 tablet; Refill: 2  - metoprolol succinate (TOPROL-XL) 50 MG 24 hr tablet; Take 1 tablet (50 mg total) by mouth once daily.  Dispense: 90 tablet; Refill: 1    3. Hyperlipidemia, unspecified hyperlipidemia type  On statin repeat fasting lipid  - Lipid panel; Future  - pravastatin (PRAVACHOL) 40 MG tablet; Take 1 tablet (40 mg total) by mouth once daily.  Dispense: 90 tablet; Refill: 2    4. CKD (chronic kidney disease), stage III  Improved avoid nsaids bp control  - CBC auto differential; Future  - Renal function panel; Future    5. Fatigue, unspecified " type  Check tsh if negative consider KELLEY  - TSH; Future    6. Cervicalgia  Trial muscle relaxer prn   - tiZANidine (ZANAFLEX) 4 MG tablet; Take 1 tablet (4 mg total) by mouth every 8 (eight) hours as needed (neck pain).  Dispense: 30 tablet; Refill: 0

## 2019-04-16 ENCOUNTER — LAB VISIT (OUTPATIENT)
Dept: LAB | Facility: HOSPITAL | Age: 69
End: 2019-04-16
Attending: INTERNAL MEDICINE
Payer: MEDICARE

## 2019-04-16 DIAGNOSIS — N18.30 CKD (CHRONIC KIDNEY DISEASE), STAGE III: ICD-10-CM

## 2019-04-16 DIAGNOSIS — E78.5 HYPERLIPIDEMIA, UNSPECIFIED HYPERLIPIDEMIA TYPE: Chronic | ICD-10-CM

## 2019-04-16 DIAGNOSIS — R53.83 FATIGUE, UNSPECIFIED TYPE: ICD-10-CM

## 2019-04-16 LAB
ALBUMIN SERPL BCP-MCNC: 3.8 G/DL (ref 3.5–5.2)
ANION GAP SERPL CALC-SCNC: 15 MMOL/L (ref 8–16)
BASOPHILS # BLD AUTO: 0.03 K/UL (ref 0–0.2)
BASOPHILS NFR BLD: 0.5 % (ref 0–1.9)
BUN SERPL-MCNC: 25 MG/DL (ref 8–23)
CALCIUM SERPL-MCNC: 10.1 MG/DL (ref 8.7–10.5)
CHLORIDE SERPL-SCNC: 100 MMOL/L (ref 95–110)
CHOLEST SERPL-MCNC: 210 MG/DL (ref 120–199)
CHOLEST/HDLC SERPL: 4.4 {RATIO} (ref 2–5)
CO2 SERPL-SCNC: 22 MMOL/L (ref 23–29)
CREAT SERPL-MCNC: 1 MG/DL (ref 0.5–1.4)
DIFFERENTIAL METHOD: ABNORMAL
EOSINOPHIL # BLD AUTO: 0.3 K/UL (ref 0–0.5)
EOSINOPHIL NFR BLD: 5.1 % (ref 0–8)
ERYTHROCYTE [DISTWIDTH] IN BLOOD BY AUTOMATED COUNT: 14.9 % (ref 11.5–14.5)
EST. GFR  (AFRICAN AMERICAN): >60 ML/MIN/1.73 M^2
EST. GFR  (NON AFRICAN AMERICAN): 57 ML/MIN/1.73 M^2
GLUCOSE SERPL-MCNC: 80 MG/DL (ref 70–110)
HCT VFR BLD AUTO: 40.3 % (ref 37–48.5)
HDLC SERPL-MCNC: 48 MG/DL (ref 40–75)
HDLC SERPL: 22.9 % (ref 20–50)
HGB BLD-MCNC: 12.2 G/DL (ref 12–16)
LDLC SERPL CALC-MCNC: 115 MG/DL (ref 63–159)
LYMPHOCYTES # BLD AUTO: 2.1 K/UL (ref 1–4.8)
LYMPHOCYTES NFR BLD: 34.1 % (ref 18–48)
MCH RBC QN AUTO: 25.5 PG (ref 27–31)
MCHC RBC AUTO-ENTMCNC: 30.3 G/DL (ref 32–36)
MCV RBC AUTO: 84 FL (ref 82–98)
MONOCYTES # BLD AUTO: 0.6 K/UL (ref 0.3–1)
MONOCYTES NFR BLD: 9.2 % (ref 4–15)
NEUTROPHILS # BLD AUTO: 3.1 K/UL (ref 1.8–7.7)
NEUTROPHILS NFR BLD: 50.9 % (ref 38–73)
NONHDLC SERPL-MCNC: 162 MG/DL
PHOSPHATE SERPL-MCNC: 3.4 MG/DL (ref 2.7–4.5)
PLATELET # BLD AUTO: 282 K/UL (ref 150–350)
PMV BLD AUTO: 11.1 FL (ref 9.2–12.9)
POTASSIUM SERPL-SCNC: 4.5 MMOL/L (ref 3.5–5.1)
RBC # BLD AUTO: 4.78 M/UL (ref 4–5.4)
SODIUM SERPL-SCNC: 137 MMOL/L (ref 136–145)
TRIGL SERPL-MCNC: 235 MG/DL (ref 30–150)
TSH SERPL DL<=0.005 MIU/L-ACNC: 2.35 UIU/ML (ref 0.4–4)
WBC # BLD AUTO: 6.1 K/UL (ref 3.9–12.7)

## 2019-04-16 PROCEDURE — 85025 COMPLETE CBC W/AUTO DIFF WBC: CPT

## 2019-04-16 PROCEDURE — 84443 ASSAY THYROID STIM HORMONE: CPT

## 2019-04-16 PROCEDURE — 36415 COLL VENOUS BLD VENIPUNCTURE: CPT | Mod: PN

## 2019-04-16 PROCEDURE — 80061 LIPID PANEL: CPT

## 2019-04-16 PROCEDURE — 80069 RENAL FUNCTION PANEL: CPT

## 2019-04-17 ENCOUNTER — OFFICE VISIT (OUTPATIENT)
Dept: FAMILY MEDICINE | Facility: CLINIC | Age: 69
End: 2019-04-17
Payer: MEDICARE

## 2019-04-17 VITALS
OXYGEN SATURATION: 95 % | HEIGHT: 59 IN | RESPIRATION RATE: 17 BRPM | HEART RATE: 87 BPM | BODY MASS INDEX: 35.28 KG/M2 | DIASTOLIC BLOOD PRESSURE: 67 MMHG | TEMPERATURE: 98 F | SYSTOLIC BLOOD PRESSURE: 123 MMHG | WEIGHT: 175 LBS

## 2019-04-17 DIAGNOSIS — N18.30 CKD (CHRONIC KIDNEY DISEASE), STAGE III: ICD-10-CM

## 2019-04-17 DIAGNOSIS — I10 ESSENTIAL HYPERTENSION: Primary | Chronic | ICD-10-CM

## 2019-04-17 DIAGNOSIS — G56.21 ULNAR NERVE COMPRESSION, RIGHT: ICD-10-CM

## 2019-04-17 PROCEDURE — 99213 OFFICE O/P EST LOW 20 MIN: CPT | Mod: PBBFAC,PN | Performed by: INTERNAL MEDICINE

## 2019-04-17 PROCEDURE — 99999 PR PBB SHADOW E&M-EST. PATIENT-LVL III: CPT | Mod: PBBFAC,,, | Performed by: INTERNAL MEDICINE

## 2019-04-17 PROCEDURE — 99999 PR PBB SHADOW E&M-EST. PATIENT-LVL III: ICD-10-PCS | Mod: PBBFAC,,, | Performed by: INTERNAL MEDICINE

## 2019-04-17 PROCEDURE — 99213 OFFICE O/P EST LOW 20 MIN: CPT | Mod: S$PBB,,, | Performed by: INTERNAL MEDICINE

## 2019-04-17 PROCEDURE — 99213 PR OFFICE/OUTPT VISIT, EST, LEVL III, 20-29 MIN: ICD-10-PCS | Mod: S$PBB,,, | Performed by: INTERNAL MEDICINE

## 2019-04-17 NOTE — PROGRESS NOTES
"Subjective:       Patient ID: Dayami Mina is a 68 y.o. female.    Chief Complaint: Numbness; Arm Injury (R side arm numbness); Hand Injury (R side hand numbness); Establish Care; and Follow-up    Hand felt "heavy"    HPI: 69 y/o w/ HTN HLD CKD III presents to discuss episode two days ago of right hand feeling 'heavey" entire hand and distal forearm felt strange with some cramps of hand. Relief with moving hand no involvement of upper right arm no facial tingling or heaviness. No change in in speech. Lasted less than 1 hour and self resolved. Denies any left sided or lower extremity symptoms she has been doing home PT stretches for knee which includes doing stretches on ground. She does use right arm to push herself up off ground    Review of Systems   Constitutional: Negative for activity change, appetite change, fatigue, fever and unexpected weight change.   HENT: Negative for ear pain, rhinorrhea and sore throat.    Eyes: Negative for discharge and visual disturbance.   Respiratory: Negative for chest tightness, shortness of breath and wheezing.    Cardiovascular: Negative for chest pain, palpitations and leg swelling.   Gastrointestinal: Negative for abdominal pain, constipation and diarrhea.   Endocrine: Negative for cold intolerance and heat intolerance.   Genitourinary: Negative for dysuria and hematuria.   Musculoskeletal: Negative for joint swelling and neck stiffness.   Skin: Negative for rash.   Neurological: Negative for dizziness, syncope, weakness and headaches.   Psychiatric/Behavioral: Negative for suicidal ideas.       Objective:     Vitals:    04/17/19 0823   BP: 123/67   BP Location: Right arm   Patient Position: Sitting   Pulse: 87   Resp: 17   Temp: 98 °F (36.7 °C)   TempSrc: Oral   SpO2: 95%   Weight: 79.4 kg (175 lb)   Height: 4' 11" (1.499 m)          Physical Exam   Constitutional: She is oriented to person, place, and time. She appears well-developed and well-nourished.   HENT: "   Head: Normocephalic and atraumatic.   Eyes: Pupils are equal, round, and reactive to light. Conjunctivae are normal.   Neck: Normal range of motion.   Cardiovascular: Normal rate and regular rhythm. Exam reveals no gallop and no friction rub.   No murmur heard.  Pulmonary/Chest: Effort normal and breath sounds normal. She has no wheezes. She has no rales.   Abdominal: Soft. Bowel sounds are normal. There is no tenderness. There is no rebound and no guarding.   Musculoskeletal: Normal range of motion. She exhibits no edema or tenderness.   Neurological: She is alert and oriented to person, place, and time. No cranial nerve deficit.   Skin: Skin is warm and dry.   Psychiatric: She has a normal mood and affect.       Assessment and Plan   1. Ulnar nerve compression intermittent: by history likely related to rising using right elbow to brace no evidence of neurological deficit recent labs show normal electrolytes and renal function discuss trial of muscle relaxer should symptoms recurr. To ED if develop entire arm parathesia or any facial paralysis change in speech    2. Essential HTN at goal continue current medications on statin    CKD III: at baseline avoid NSAIDs

## 2019-05-17 ENCOUNTER — PATIENT MESSAGE (OUTPATIENT)
Dept: FAMILY MEDICINE | Facility: CLINIC | Age: 69
End: 2019-05-17

## 2019-05-17 DIAGNOSIS — J44.89 COPD WITH ASTHMA: ICD-10-CM

## 2019-05-30 ENCOUNTER — OFFICE VISIT (OUTPATIENT)
Dept: URGENT CARE | Facility: CLINIC | Age: 69
End: 2019-05-30
Payer: MEDICARE

## 2019-05-30 VITALS
OXYGEN SATURATION: 98 % | RESPIRATION RATE: 20 BRPM | DIASTOLIC BLOOD PRESSURE: 66 MMHG | TEMPERATURE: 98 F | WEIGHT: 175 LBS | HEIGHT: 59 IN | SYSTOLIC BLOOD PRESSURE: 114 MMHG | BODY MASS INDEX: 35.28 KG/M2 | HEART RATE: 85 BPM

## 2019-05-30 DIAGNOSIS — B34.9 VIRAL SYNDROME: Primary | ICD-10-CM

## 2019-05-30 DIAGNOSIS — R52 BODY ACHES: ICD-10-CM

## 2019-05-30 DIAGNOSIS — R19.7 DIARRHEA OF PRESUMED INFECTIOUS ORIGIN: ICD-10-CM

## 2019-05-30 LAB
CTP QC/QA: YES
FLUAV AG NPH QL: NEGATIVE
FLUBV AG NPH QL: NEGATIVE

## 2019-05-30 PROCEDURE — 87804 INFLUENZA ASSAY W/OPTIC: CPT | Mod: QW,S$GLB,, | Performed by: PHYSICIAN ASSISTANT

## 2019-05-30 PROCEDURE — 99214 OFFICE O/P EST MOD 30 MIN: CPT | Mod: S$GLB,,, | Performed by: PHYSICIAN ASSISTANT

## 2019-05-30 PROCEDURE — 99214 PR OFFICE/OUTPT VISIT, EST, LEVL IV, 30-39 MIN: ICD-10-PCS | Mod: S$GLB,,, | Performed by: PHYSICIAN ASSISTANT

## 2019-05-30 PROCEDURE — 87804 POCT INFLUENZA A/B: ICD-10-PCS | Mod: QW,S$GLB,, | Performed by: PHYSICIAN ASSISTANT

## 2019-05-30 RX ORDER — DICYCLOMINE HYDROCHLORIDE 20 MG/1
20 TABLET ORAL EVERY 6 HOURS
Qty: 12 TABLET | Refills: 0 | Status: SHIPPED | OUTPATIENT
Start: 2019-05-30 | End: 2019-06-03

## 2019-05-30 RX ORDER — PROMETHAZINE HYDROCHLORIDE 12.5 MG/1
12.5 TABLET ORAL EVERY 6 HOURS PRN
Qty: 10 TABLET | Refills: 0 | Status: SHIPPED | OUTPATIENT
Start: 2019-05-30 | End: 2020-11-03 | Stop reason: ALTCHOICE

## 2019-05-30 NOTE — PROGRESS NOTES
"Subjective:       Patient ID: Dayami Mina is a 68 y.o. female.    Vitals:  height is 4' 11" (1.499 m) and weight is 79.4 kg (175 lb). Her temperature is 97.7 °F (36.5 °C). Her blood pressure is 114/66 and her pulse is 85. Her respiration is 20 and oxygen saturation is 98%.     Chief Complaint: Fever    Patient presents for feeling fatigue, body aches, headaches, diarrhea, nausea that began yesterday.  She also has decreased appetite.  Denies urinary symptoms, URI symptoms, chest pain, shortness of breath, lower extremity edema. Admits to subjective fever associated with the body aches.  Has not had any vomiting, but felt like she was going to vomit a few times.  She states she had about 4 episodes of watery diarrhea today in about 2-3 yesterday. No melena or hematochezia.  Denies localized abdominal pain, but admits to generalized abdominal cramping and soreness.  She also had some dizziness yesterday that she described as a feeling of off balance, which she says occurs frequently for her for several years, and these resolved with meclizine.    Fever    This is a new problem. The current episode started yesterday. The problem occurs constantly. The problem has been gradually worsening. Her temperature was unmeasured prior to arrival. Associated symptoms include diarrhea, headaches, nausea and sleepiness. Pertinent negatives include no abdominal pain, chest pain, congestion, coughing, rash, sore throat, urinary pain or vomiting. She has tried acetaminophen and NSAIDs for the symptoms. The treatment provided significant relief.       Constitution: Positive for appetite change, fatigue and fever (Subjective). Negative for chills, sweating and unexpected weight change.   HENT: Negative for congestion and sore throat.    Neck: Negative for neck pain, neck stiffness and painful lymph nodes.   Cardiovascular: Negative for chest trauma, chest pain, leg swelling, palpitations and sob on exertion.   Eyes: Negative " for eye pain, vision loss, double vision and blurred vision.   Respiratory: Negative for cough and shortness of breath.    Gastrointestinal: Positive for nausea and diarrhea. Negative for abdominal trauma, abdominal pain, abdominal bloating, history of abdominal surgery, vomiting, constipation, bright red blood in stool, dark colored stools, rectal bleeding, rectal pain, hemorrhoids, heartburn and bowel incontinence.   Genitourinary: Negative for dysuria, frequency, urgency, urine decreased, flank pain, hematuria and history of kidney stones.   Musculoskeletal: Positive for muscle ache. Negative for pain, trauma, joint pain, joint swelling, abnormal ROM of joint, arthritis, gout, back pain, muscle cramps and history of spine disorder.   Skin: Negative for color change, pale, rash and bruising.   Allergic/Immunologic: Negative for seasonal allergies.   Neurological: Positive for dizziness (Resolved) and headaches. Negative for history of vertigo, light-headedness, passing out, facial drooping, speech difficulty, coordination disturbances, loss of balance, history of migraines, disorientation, altered mental status, loss of consciousness, numbness, tingling, seizures and tremors.   Hematologic/Lymphatic: Negative for swollen lymph nodes.   Psychiatric/Behavioral: Negative for altered mental status, disorientation, confusion, agitation, nervous/anxious, sleep disturbance and depression. The patient is not nervous/anxious.        Objective:      Physical Exam   Constitutional: She is oriented to person, place, and time. She appears well-developed and well-nourished. She is cooperative.  Non-toxic appearance. She does not appear ill. No distress.   Patient sitting comfortably in no acute distress.  Nontoxic appearing.   HENT:   Head: Normocephalic and atraumatic.   Right Ear: Hearing, tympanic membrane, external ear and ear canal normal.   Left Ear: Hearing, tympanic membrane, external ear and ear canal normal.   Nose:  Nose normal. No mucosal edema, rhinorrhea or nasal deformity. No epistaxis. Right sinus exhibits no maxillary sinus tenderness and no frontal sinus tenderness. Left sinus exhibits no maxillary sinus tenderness and no frontal sinus tenderness.   Mouth/Throat: Uvula is midline, oropharynx is clear and moist and mucous membranes are normal. No trismus in the jaw. Normal dentition. No uvula swelling. No oropharyngeal exudate, posterior oropharyngeal edema, posterior oropharyngeal erythema or tonsillar abscesses. Tonsils are 1+ on the right. Tonsils are 1+ on the left. No tonsillar exudate.   No temporal TTP.  Mucous membranes moist.   Eyes: Pupils are equal, round, and reactive to light. Conjunctivae, EOM and lids are normal. No scleral icterus.   Sclera clear bilat   Neck: Trachea normal, normal range of motion, full passive range of motion without pain and phonation normal. Neck supple. No neck rigidity.   Cardiovascular: Normal rate, regular rhythm, normal heart sounds, intact distal pulses and normal pulses.   Pulmonary/Chest: Effort normal and breath sounds normal. No accessory muscle usage or stridor. No tachypnea and no bradypnea. No respiratory distress. She has no decreased breath sounds. She has no wheezes. She has no rhonchi. She has no rales.   Abdominal: Soft. Normal appearance and bowel sounds are normal. She exhibits no distension, no abdominal bruit, no pulsatile midline mass and no mass. There is no tenderness. There is no rigidity, no rebound, no guarding, no CVA tenderness, no tenderness at McBurney's point and negative Dey's sign.   Abdomen is soft, nontender to palpation without rigidity or guarding.   Musculoskeletal: Normal range of motion. She exhibits no edema or deformity.   Neurological: She is alert and oriented to person, place, and time. She has normal strength. She is not disoriented. She displays no atrophy and no tremor. No cranial nerve deficit or sensory deficit. She exhibits normal  muscle tone. She displays no seizure activity. Coordination and gait normal. GCS eye subscore is 4. GCS verbal subscore is 5. GCS motor subscore is 6.   Alert, oriented x 3. EOMI, PERRLA. Cranial nerves intact: facial expressions (smile, raising eyebrows, shutting eyes, pursed lips) symmetric. Shoulder shrug strength 5/5; sternocleidomastoid muscle strength 5/5 bilaterally. Jaw is midline without deviation. Tongue protrudes at midline without fasciculations. Sensation to face in distribution of CN V1, V2, and V3 intact. Sensation to upper and lower extremities intact. Finger to nose, rapid rhythmic alternating movements, and heel to shin test are intact and smooth bilaterally. Patient ambulates unassisted without rigidity or ataxia. Romberg negative. Voice quality, comprehension, articulation, coherence assessed as appropriate.          Skin: Skin is warm, dry and intact. She is not diaphoretic. No pallor.   Good skin turgor.   Psychiatric: She has a normal mood and affect. Her speech is normal and behavior is normal. Judgment and thought content normal. Cognition and memory are normal.   Nursing note and vitals reviewed.        Results for orders placed or performed in visit on 05/30/19   POCT Influenza A/B   Result Value Ref Range    Rapid Influenza A Ag Negative Negative    Rapid Influenza B Ag Negative Negative     Acceptable Yes          Assessment:       1. Viral syndrome    2. Diarrhea of presumed infectious origin    3. Body aches        Plan:         Viral syndrome  -     promethazine (PHENERGAN) 12.5 MG Tab; Take 1 tablet (12.5 mg total) by mouth every 6 (six) hours as needed.  Dispense: 10 tablet; Refill: 0  -     dicyclomine (BENTYL) 20 mg tablet; Take 1 tablet (20 mg total) by mouth every 6 (six) hours. for 3 days  Dispense: 12 tablet; Refill: 0    Diarrhea of presumed infectious origin  -     POCT Influenza A/B    Body aches      Patient Instructions   - Rest.    - Drink plenty of  fluids.  - Pedialyte, Gatorade/powerade, water     - Tylenol as directed as needed for fever/headache/bodyache.   Avoid NSAIDs such as Aleve or ibuprofen because of her history of kidney disease.     - you can take promethazine 12.5-25 mg as directed, as needed for nausea.     - can take over-the-counter Pepto-Bismol to help with diarrhea.     -Dicyclomine is an anti-spasmodic that helps with stomach pain/cramps associated with diarrhea. It is to be taken only as needed.      - Follow up with your PCP or specialty clinic as directed in the next 2-3 days if not improved or as needed.  You can call [phone] to schedule an appointment with the appropriate provider.    - Go to the ER if you develop any new or worsening symptoms     - You must understand that you have received an Urgent Care treatment only and that you may be released before all of your medical problems are known or treated.   - You, the patient, will arrange for follow up care as instructed.   - If your condition worsens or fails to improve we recommend that you receive another evaluation at the ER immediately or contact your PCP to discuss your concerns or return here.        Food Poisoning or Viral Gastroenteritis (Adult)  You have a stomach illness that is likely either food poisoning or viral gastroenteritis.  Food poisoning is illness that is passed along in food and affects the stomach and intestinal tract. It usually occurs from 1 to 24 hours after eating food that has spoiled. When it happens within a few hours of eating, it is often caused by toxins from bacteria in food that has not been cooked or refrigerated properly.  Viral gastroenteritis is also an illness from a virus that affects the stomach and intestinal tract. Many people call it the stomach flu, but it has nothing to do with influenza. In fact, it can happen from food poisoning, but it can also happen when germs are passed from person-to-person or contaminated surface  (toothbrush, cutting board, toilet) to a person.  Either illness can cause these symptoms:  · Abdominal pain and cramping  · Nausea  · Vomiting  · Diarrhea  · Fever and chills  · Loss of bowel control  The symptoms of food poisoning usually last 1 to 2 days. The symptoms of viral gastroenteritis can sometimes last up to 7 days but usually end sooner.  Antibiotics are not effective for either illness.  Other causes of gastroenteritis include bacteria and parasites which are not discussed here.  Home care  Follow all instructions given by your healthcare provider. Rest at home for the next 24 hours, or until you feel better. Avoid caffeine, tobacco, and alcohol. These can make diarrhea, cramping, and pain worse.  If taking medicines:  · Dont take over-the-counter diarrhea or nausea medicines unless your healthcare provider tells you to.  · You may use acetaminophen or NSAID medicines like ibuprofen or naproxen to reduce pain and fever. Dont use these if you have chronic liver or kidney disease, or ever had a stomach ulcer or GI bleeding. Talk with your healthcare provider first. Don't use NSAID medicines if you are already taking one for another condition (like arthritis) or are on daily aspirin therapy (such as for heart disease or after a stroke).  To prevent the spread of illness:  · Remember that washing with soap and water is the best way to prevent the spread of infection. Wash your hands before and after caring for a sick person.  · Clean the toilet after each use.  · Wash your hands or use alcohol-based hand  before eating.  · Wash your hands or use alcohol-based hand  before and after preparing food. Keep in mind that people with diarrhea or vomiting should not prepare food for others.  · Wash your hands or use alcohol-based hand  after using cutting boards, counter-tops, and knives (and other utensils) that have been in contact with raw foods.  · Wash and then peel fruits and  vegetables.  · Keep uncooked meats away from cooked and ready-to-eat foods.  · Use a food thermometer when cooking. Cook poultry to at least 165°F (74°C). Cook ground meat (beef, veal, pork, lamb) to at least 160°F (71°C). Cook fresh beef, veal, lamb, and pork to at least 145°F (63°C).  · Dont eat raw or undercooked eggs (poached or liza side up), poultry, meat or unpasteurized milk and juices.  Food and drinks  The main goal while treating vomiting or diarrhea is to prevent dehydration. This is done by taking small amounts of liquids often.  · Keep in mind that liquids are more important than food right now.  · Drink only small amounts of liquids at a time.  · Dont force yourself to eat, especially if you are having cramping, vomiting, or diarrhea. Dont eat large amounts at a time, even if you are hungry.  · If you eat, avoid fatty, greasy, spicy, or fried foods.  · Dont eat dairy foods or drink milk if you have diarrhea. These can make diarrhea worse.  The first 24 hours you can try:  · Oral rehydration solutions, available at grocery stores and pharmacies. Sports drinks are not a good choice if you are very dehydrated. They have too much sugar and not enough electrolytes.  · Soft drinks without caffeine  · Ginger ale  · Water (plain or flavored)  · Decaf tea or coffee  · Clear broth, consommé, or bouillon  · Gelatin, popsicles, or frozen fruit juice bars   The second 24 hours, if you are feeling better, you can add:  · Hot cereal, plain toast, bread, rolls, or crackers  · Plain noodles, rice, mashed potatoes, chicken noodle soup, or rice soup  · Unsweetened canned fruit (no pineapple)  · Bananas  As you recover:  · Limit fat intake to less than 15 grams per day. Dont eat margarine, butter, oils, mayonnaise, sauces, gravies, fried foods, peanut butter, meat, poultry, or fish.  · Limit fiber. Dont eat raw or cooked vegetables, fresh fruits except bananas, and bran cereals.  · Limit caffeine and  chocolate.  · Dont use spices or seasonings except salt.  · Resume a normal diet over time, as you feel better and your symptoms improve.  · If the symptoms come back, go back to a simple diet or clear liquids.  Follow-up care  Follow up with your healthcare provider, or as advised. If a stool sample was taken or cultures were done, call the healthcare provider for the results as instructed.  Call 911  Call 911 if you have any of these symptoms:  · Trouble breathing  · Confusion  · Extreme drowsiness or trouble walking  · Loss of consciousness  · Rapid heart rate  · Chest pain  · Stiff neck  · Seizure  When to seek medical advice  Call your healthcare provider right away if any of these occur:  · Abdominal pain that gets worse  · Constant lower right abdominal pain  · Continued vomiting and inability to keep liquids down  · Diarrhea more than 5 times a day  · Blood in vomit or stool  · Dark urine or no urine for 8 hours, dry mouth and tongue, tiredness, weakness, or dizziness  · New rash  · You dont get better in 2 to 3 days  · Fever of 100.4°F (38°C) or higher that doesnt get lower with medicine  · You have new symptoms of arthritis  Date Last Reviewed: 1/3/2016  © 3309-6184 [employer]. [address] [place] [address], [place] [address]. All rights reserved. This information is not intended as a substitute for professional medical care. Always follow your healthcare professional's instructions.         Diet for Vomiting or Diarrhea (Adult)    Your symptoms may return or get worse after eating certain foods listed below. If this happens, stop eating these foods until your symptoms ease and you feel better.  Once the vomiting stops, follow the steps below.   During the first 12 to 24 hours  During the first 12 to 24 hours, follow this diet:  · Drinks. Plain water, sport drinks like electrolyte solutions, soft drinks without caffeine, mineral water (plain or flavored), clear fruit juices, and decaffeinated  tea and coffee.  · Soups. Clear broth.  · Desserts. Plain gelatin, popsicles, and fruit juice bars. As you feel better, you may add 6 to 8 ounces of yogurt per day. If you have diarrhea, don't have foods or drinks that contain sugar, high-fructose corn syrup, or sugar alcohols.  During the next 24 hours  During the next 24 hours you may add the following to the above:  · Hot cereal, plain toast, bread, rolls, and crackers  · Plain noodles, rice, mashed potatoes, and chicken noodle or rice soup  · Unsweetened canned fruit (but not pineapple) and bananas  Don't eat more than 15 grams of fat a day. Do this by staying away from margarine, butter, oils, mayonnaise, sauces, gravies, fried foods, peanut butter, meat, poultry, and fish.  Don't eat much fiber. Stay away from raw or cooked vegetables, fresh fruits (except bananas), and bran cereals.  Limit how much caffeine and chocolate you have. Do not use any spices or seasonings except salt.  During the next 24 hours  Slowly go back to your normal diet, as you feel better and your symptoms ease.  Date Last Reviewed: 8/1/2016  © 0806-7239 [employer]. [address] [place] [address], [place] [address]. All rights reserved. This information is not intended as a substitute for professional medical care. Always follow your healthcare professional's instructions.

## 2019-05-31 ENCOUNTER — PATIENT OUTREACH (OUTPATIENT)
Dept: ADMINISTRATIVE | Facility: HOSPITAL | Age: 69
End: 2019-05-31

## 2019-05-31 NOTE — PATIENT INSTRUCTIONS
- Rest.    - Drink plenty of fluids.  - Pedialyte, Gatorade/powerade, water     - Tylenol as directed as needed for fever/headache/bodyache.   Avoid NSAIDs such as Aleve or ibuprofen because of her history of kidney disease.     - you can take promethazine 12.5-25 mg as directed, as needed for nausea.     - can take over-the-counter Pepto-Bismol to help with diarrhea.     -Dicyclomine is an anti-spasmodic that helps with stomach pain/cramps associated with diarrhea. It is to be taken only as needed.      - Follow up with your PCP or specialty clinic as directed in the next 2-3 days if not improved or as needed.  You can call [phone] to schedule an appointment with the appropriate provider.    - Go to the ER if you develop any new or worsening symptoms     - You must understand that you have received an Urgent Care treatment only and that you may be released before all of your medical problems are known or treated.   - You, the patient, will arrange for follow up care as instructed.   - If your condition worsens or fails to improve we recommend that you receive another evaluation at the ER immediately or contact your PCP to discuss your concerns or return here.        Food Poisoning or Viral Gastroenteritis (Adult)  You have a stomach illness that is likely either food poisoning or viral gastroenteritis.  Food poisoning is illness that is passed along in food and affects the stomach and intestinal tract. It usually occurs from 1 to 24 hours after eating food that has spoiled. When it happens within a few hours of eating, it is often caused by toxins from bacteria in food that has not been cooked or refrigerated properly.  Viral gastroenteritis is also an illness from a virus that affects the stomach and intestinal tract. Many people call it the stomach flu, but it has nothing to do with influenza. In fact, it can happen from food poisoning, but it can also happen when germs are passed from person-to-person  or contaminated surface (toothbrush, cutting board, toilet) to a person.  Either illness can cause these symptoms:  · Abdominal pain and cramping  · Nausea  · Vomiting  · Diarrhea  · Fever and chills  · Loss of bowel control  The symptoms of food poisoning usually last 1 to 2 days. The symptoms of viral gastroenteritis can sometimes last up to 7 days but usually end sooner.  Antibiotics are not effective for either illness.  Other causes of gastroenteritis include bacteria and parasites which are not discussed here.  Home care  Follow all instructions given by your healthcare provider. Rest at home for the next 24 hours, or until you feel better. Avoid caffeine, tobacco, and alcohol. These can make diarrhea, cramping, and pain worse.  If taking medicines:  · Dont take over-the-counter diarrhea or nausea medicines unless your healthcare provider tells you to.  · You may use acetaminophen or NSAID medicines like ibuprofen or naproxen to reduce pain and fever. Dont use these if you have chronic liver or kidney disease, or ever had a stomach ulcer or GI bleeding. Talk with your healthcare provider first. Don't use NSAID medicines if you are already taking one for another condition (like arthritis) or are on daily aspirin therapy (such as for heart disease or after a stroke).  To prevent the spread of illness:  · Remember that washing with soap and water is the best way to prevent the spread of infection. Wash your hands before and after caring for a sick person.  · Clean the toilet after each use.  · Wash your hands or use alcohol-based hand  before eating.  · Wash your hands or use alcohol-based hand  before and after preparing food. Keep in mind that people with diarrhea or vomiting should not prepare food for others.  · Wash your hands or use alcohol-based hand  after using cutting boards, counter-tops, and knives (and other utensils) that have been in contact with raw foods.  · Wash and  then peel fruits and vegetables.  · Keep uncooked meats away from cooked and ready-to-eat foods.  · Use a food thermometer when cooking. Cook poultry to at least 165°F (74°C). Cook ground meat (beef, veal, pork, lamb) to at least 160°F (71°C). Cook fresh beef, veal, lamb, and pork to at least 145°F (63°C).  · Dont eat raw or undercooked eggs (poached or liza side up), poultry, meat or unpasteurized milk and juices.  Food and drinks  The main goal while treating vomiting or diarrhea is to prevent dehydration. This is done by taking small amounts of liquids often.  · Keep in mind that liquids are more important than food right now.  · Drink only small amounts of liquids at a time.  · Dont force yourself to eat, especially if you are having cramping, vomiting, or diarrhea. Dont eat large amounts at a time, even if you are hungry.  · If you eat, avoid fatty, greasy, spicy, or fried foods.  · Dont eat dairy foods or drink milk if you have diarrhea. These can make diarrhea worse.  The first 24 hours you can try:  · Oral rehydration solutions, available at grocery stores and pharmacies. Sports drinks are not a good choice if you are very dehydrated. They have too much sugar and not enough electrolytes.  · Soft drinks without caffeine  · Ginger ale  · Water (plain or flavored)  · Decaf tea or coffee  · Clear broth, consommé, or bouillon  · Gelatin, popsicles, or frozen fruit juice bars   The second 24 hours, if you are feeling better, you can add:  · Hot cereal, plain toast, bread, rolls, or crackers  · Plain noodles, rice, mashed potatoes, chicken noodle soup, or rice soup  · Unsweetened canned fruit (no pineapple)  · Bananas  As you recover:  · Limit fat intake to less than 15 grams per day. Dont eat margarine, butter, oils, mayonnaise, sauces, gravies, fried foods, peanut butter, meat, poultry, or fish.  · Limit fiber. Dont eat raw or cooked vegetables, fresh fruits except bananas, and bran cereals.  · Limit  caffeine and chocolate.  · Dont use spices or seasonings except salt.  · Resume a normal diet over time, as you feel better and your symptoms improve.  · If the symptoms come back, go back to a simple diet or clear liquids.  Follow-up care  Follow up with your healthcare provider, or as advised. If a stool sample was taken or cultures were done, call the healthcare provider for the results as instructed.  Call 911  Call 911 if you have any of these symptoms:  · Trouble breathing  · Confusion  · Extreme drowsiness or trouble walking  · Loss of consciousness  · Rapid heart rate  · Chest pain  · Stiff neck  · Seizure  When to seek medical advice  Call your healthcare provider right away if any of these occur:  · Abdominal pain that gets worse  · Constant lower right abdominal pain  · Continued vomiting and inability to keep liquids down  · Diarrhea more than 5 times a day  · Blood in vomit or stool  · Dark urine or no urine for 8 hours, dry mouth and tongue, tiredness, weakness, or dizziness  · New rash  · You dont get better in 2 to 3 days  · Fever of 100.4°F (38°C) or higher that doesnt get lower with medicine  · You have new symptoms of arthritis  Date Last Reviewed: 1/3/2016  © 7614-9471 [employer]. [address] [place] [address], [place] [address]. All rights reserved. This information is not intended as a substitute for professional medical care. Always follow your healthcare professional's instructions.         Diet for Vomiting or Diarrhea (Adult)    Your symptoms may return or get worse after eating certain foods listed below. If this happens, stop eating these foods until your symptoms ease and you feel better.  Once the vomiting stops, follow the steps below.   During the first 12 to 24 hours  During the first 12 to 24 hours, follow this diet:  · Drinks. Plain water, sport drinks like electrolyte solutions, soft drinks without caffeine, mineral water (plain or flavored), clear fruit juices, and  decaffeinated tea and coffee.  · Soups. Clear broth.  · Desserts. Plain gelatin, popsicles, and fruit juice bars. As you feel better, you may add 6 to 8 ounces of yogurt per day. If you have diarrhea, don't have foods or drinks that contain sugar, high-fructose corn syrup, or sugar alcohols.  During the next 24 hours  During the next 24 hours you may add the following to the above:  · Hot cereal, plain toast, bread, rolls, and crackers  · Plain noodles, rice, mashed potatoes, and chicken noodle or rice soup  · Unsweetened canned fruit (but not pineapple) and bananas  Don't eat more than 15 grams of fat a day. Do this by staying away from margarine, butter, oils, mayonnaise, sauces, gravies, fried foods, peanut butter, meat, poultry, and fish.  Don't eat much fiber. Stay away from raw or cooked vegetables, fresh fruits (except bananas), and bran cereals.  Limit how much caffeine and chocolate you have. Do not use any spices or seasonings except salt.  During the next 24 hours  Slowly go back to your normal diet, as you feel better and your symptoms ease.  Date Last Reviewed: 8/1/2016  © 6409-0608 [employer]. [address] [place] [address], [place] [address]. All rights reserved. This information is not intended as a substitute for professional medical care. Always follow your healthcare professional's instructions.

## 2019-05-31 NOTE — PROGRESS NOTES
Colonoscopy received 04/03/19. Pt still due for colonoscopy. Please discuss with pt. Also schedule overdue DEXA.

## 2019-06-01 ENCOUNTER — TELEPHONE (OUTPATIENT)
Dept: URGENT CARE | Facility: CLINIC | Age: 69
End: 2019-06-01

## 2019-06-03 ENCOUNTER — HOSPITAL ENCOUNTER (EMERGENCY)
Facility: HOSPITAL | Age: 69
Discharge: HOME | End: 2019-06-03
Attending: EMERGENCY MEDICINE
Payer: MEDICARE

## 2019-06-03 ENCOUNTER — PATIENT MESSAGE (OUTPATIENT)
Dept: FAMILY MEDICINE | Facility: CLINIC | Age: 69
End: 2019-06-03

## 2019-06-03 ENCOUNTER — TELEPHONE (OUTPATIENT)
Dept: FAMILY MEDICINE | Facility: CLINIC | Age: 69
End: 2019-06-03

## 2019-06-03 VITALS
SYSTOLIC BLOOD PRESSURE: 121 MMHG | HEIGHT: 59 IN | HEART RATE: 68 BPM | BODY MASS INDEX: 34.57 KG/M2 | RESPIRATION RATE: 20 BRPM | DIASTOLIC BLOOD PRESSURE: 58 MMHG | TEMPERATURE: 98 F | OXYGEN SATURATION: 91 % | WEIGHT: 171.5 LBS

## 2019-06-03 DIAGNOSIS — K57.92 ACUTE DIVERTICULITIS: Primary | ICD-10-CM

## 2019-06-03 LAB
ALBUMIN SERPL BCP-MCNC: 3.1 G/DL (ref 3.5–5.2)
ALP SERPL-CCNC: 58 U/L (ref 55–135)
ALT SERPL W/O P-5'-P-CCNC: 22 U/L (ref 10–44)
ANION GAP SERPL CALC-SCNC: 10 MMOL/L (ref 8–16)
AST SERPL-CCNC: 18 U/L (ref 10–40)
BASOPHILS # BLD AUTO: 0.02 K/UL (ref 0–0.2)
BASOPHILS NFR BLD: 0.4 % (ref 0–1.9)
BILIRUB SERPL-MCNC: 0.2 MG/DL (ref 0.1–1)
BILIRUB UR QL STRIP: NEGATIVE
BUN SERPL-MCNC: 7 MG/DL (ref 6–30)
BUN SERPL-MCNC: 9 MG/DL (ref 8–23)
CALCIUM SERPL-MCNC: 9.4 MG/DL (ref 8.7–10.5)
CHLORIDE SERPL-SCNC: 96 MMOL/L (ref 95–110)
CHLORIDE SERPL-SCNC: 98 MMOL/L (ref 95–110)
CLARITY UR REFRACT.AUTO: CLEAR
CO2 SERPL-SCNC: 23 MMOL/L (ref 23–29)
COLOR UR AUTO: NORMAL
CREAT SERPL-MCNC: 0.9 MG/DL (ref 0.5–1.4)
CREAT SERPL-MCNC: 1 MG/DL (ref 0.5–1.4)
DIFFERENTIAL METHOD: ABNORMAL
EOSINOPHIL # BLD AUTO: 0.1 K/UL (ref 0–0.5)
EOSINOPHIL NFR BLD: 1.2 % (ref 0–8)
ERYTHROCYTE [DISTWIDTH] IN BLOOD BY AUTOMATED COUNT: 14.6 % (ref 11.5–14.5)
EST. GFR  (AFRICAN AMERICAN): >60 ML/MIN/1.73 M^2
EST. GFR  (NON AFRICAN AMERICAN): 57.2 ML/MIN/1.73 M^2
GIANT PLATELETS BLD QL SMEAR: PRESENT
GLUCOSE SERPL-MCNC: 128 MG/DL (ref 70–110)
GLUCOSE SERPL-MCNC: 132 MG/DL (ref 70–110)
GLUCOSE UR QL STRIP: NEGATIVE
HCT VFR BLD AUTO: 34.6 % (ref 37–48.5)
HCT VFR BLD CALC: 34 %PCV (ref 36–54)
HGB BLD-MCNC: 11.3 G/DL (ref 12–16)
HGB UR QL STRIP: NEGATIVE
IMM GRANULOCYTES # BLD AUTO: 0.03 K/UL (ref 0–0.04)
IMM GRANULOCYTES NFR BLD AUTO: 0.5 % (ref 0–0.5)
KETONES UR QL STRIP: NEGATIVE
LACTATE SERPL-SCNC: 1.2 MMOL/L (ref 0.5–2.2)
LEUKOCYTE ESTERASE UR QL STRIP: NEGATIVE
LYMPHOCYTES # BLD AUTO: 1.2 K/UL (ref 1–4.8)
LYMPHOCYTES NFR BLD: 21.7 % (ref 18–48)
MCH RBC QN AUTO: 26.1 PG (ref 27–31)
MCHC RBC AUTO-ENTMCNC: 32.7 G/DL (ref 32–36)
MCV RBC AUTO: 80 FL (ref 82–98)
MONOCYTES # BLD AUTO: 0.9 K/UL (ref 0.3–1)
MONOCYTES NFR BLD: 16 % (ref 4–15)
NEUTROPHILS # BLD AUTO: 3.4 K/UL (ref 1.8–7.7)
NEUTROPHILS NFR BLD: 60.2 % (ref 38–73)
NITRITE UR QL STRIP: NEGATIVE
NRBC BLD-RTO: 0 /100 WBC
PH UR STRIP: 6 [PH] (ref 5–8)
PLATELET # BLD AUTO: 295 K/UL (ref 150–350)
PLATELET BLD QL SMEAR: ABNORMAL
PMV BLD AUTO: 9.9 FL (ref 9.2–12.9)
POC IONIZED CALCIUM: 1.04 MMOL/L (ref 1.06–1.42)
POC TCO2 (MEASURED): 23 MMOL/L (ref 23–29)
POTASSIUM BLD-SCNC: 3.3 MMOL/L (ref 3.5–5.1)
POTASSIUM SERPL-SCNC: 3.6 MMOL/L (ref 3.5–5.1)
PROT SERPL-MCNC: 6.9 G/DL (ref 6–8.4)
PROT UR QL STRIP: NEGATIVE
RBC # BLD AUTO: 4.33 M/UL (ref 4–5.4)
SAMPLE: ABNORMAL
SODIUM BLD-SCNC: 132 MMOL/L (ref 136–145)
SODIUM SERPL-SCNC: 131 MMOL/L (ref 136–145)
SP GR UR STRIP: 1.01 (ref 1–1.03)
URN SPEC COLLECT METH UR: NORMAL
WBC # BLD AUTO: 5.61 K/UL (ref 3.9–12.7)

## 2019-06-03 PROCEDURE — 96361 HYDRATE IV INFUSION ADD-ON: CPT

## 2019-06-03 PROCEDURE — 87045 FECES CULTURE AEROBIC BACT: CPT

## 2019-06-03 PROCEDURE — 25000003 PHARM REV CODE 250: Performed by: EMERGENCY MEDICINE

## 2019-06-03 PROCEDURE — 96360 HYDRATION IV INFUSION INIT: CPT | Mod: 59

## 2019-06-03 PROCEDURE — 99285 PR EMERGENCY DEPT VISIT,LEVEL V: ICD-10-PCS | Mod: ,,, | Performed by: EMERGENCY MEDICINE

## 2019-06-03 PROCEDURE — 85025 COMPLETE CBC W/AUTO DIFF WBC: CPT

## 2019-06-03 PROCEDURE — 81003 URINALYSIS AUTO W/O SCOPE: CPT

## 2019-06-03 PROCEDURE — 80047 BASIC METABLC PNL IONIZED CA: CPT

## 2019-06-03 PROCEDURE — 87046 STOOL CULTR AEROBIC BACT EA: CPT

## 2019-06-03 PROCEDURE — 25500020 PHARM REV CODE 255: Performed by: EMERGENCY MEDICINE

## 2019-06-03 PROCEDURE — 99285 EMERGENCY DEPT VISIT HI MDM: CPT | Mod: ,,, | Performed by: EMERGENCY MEDICINE

## 2019-06-03 PROCEDURE — 99285 EMERGENCY DEPT VISIT HI MDM: CPT | Mod: 25

## 2019-06-03 PROCEDURE — 87427 SHIGA-LIKE TOXIN AG IA: CPT | Mod: 59

## 2019-06-03 PROCEDURE — 80053 COMPREHEN METABOLIC PANEL: CPT

## 2019-06-03 PROCEDURE — 83605 ASSAY OF LACTIC ACID: CPT

## 2019-06-03 RX ORDER — METRONIDAZOLE 500 MG/1
500 TABLET ORAL 3 TIMES DAILY
Qty: 21 TABLET | Refills: 0 | Status: SHIPPED | OUTPATIENT
Start: 2019-06-03 | End: 2019-06-10

## 2019-06-03 RX ORDER — CIPROFLOXACIN 500 MG/1
500 TABLET ORAL
Status: COMPLETED | OUTPATIENT
Start: 2019-06-03 | End: 2019-06-03

## 2019-06-03 RX ORDER — METRONIDAZOLE 500 MG/1
500 TABLET ORAL
Status: COMPLETED | OUTPATIENT
Start: 2019-06-03 | End: 2019-06-03

## 2019-06-03 RX ORDER — CIPROFLOXACIN 500 MG/1
500 TABLET ORAL 2 TIMES DAILY
Qty: 14 TABLET | Refills: 0 | Status: SHIPPED | OUTPATIENT
Start: 2019-06-03 | End: 2019-06-10

## 2019-06-03 RX ADMIN — IOHEXOL 75 ML: 350 INJECTION, SOLUTION INTRAVENOUS at 12:06

## 2019-06-03 RX ADMIN — SODIUM CHLORIDE, SODIUM LACTATE, POTASSIUM CHLORIDE, AND CALCIUM CHLORIDE 1000 ML: .6; .31; .03; .02 INJECTION, SOLUTION INTRAVENOUS at 11:06

## 2019-06-03 RX ADMIN — METRONIDAZOLE 500 MG: 500 TABLET ORAL at 02:06

## 2019-06-03 RX ADMIN — CIPROFLOXACIN HYDROCHLORIDE 500 MG: 500 TABLET, FILM COATED ORAL at 02:06

## 2019-06-03 NOTE — ED PROVIDER NOTES
"Encounter Date: 6/3/2019    SCRIBE #1 NOTE: I, Son Lurdes, am scribing for, and in the presence of,  Dr. Knowles . I have scribed the entire note.       History     Chief Complaint   Patient presents with    Diarrhea     Pt with diarrhea since Wed.  Pt seen in UC Thurs and given med nausea and stomach cramps.  Continuees with diarrhea, body aches and blood in her stool that started this morning.     Ms. Mina is a 68 y.o. female with past medical history of benign colon polyp, HLD, HTN presenting with continued diarrhea with associated blood in stool. Patient was seen at Urgent Care on May 30, 2019 for nausea, diarrhea, and headache. She was given phenergan and bentyl with mild relief. Since then, she reports of multiple episode (>5) non bloody loose watery stools, but she noticed bright red blood after wiping yesterday. She states that she had 4 episodes this morning all with bright red blood. She notes that she has history of hemorrhoids but is unsure and is concerned. She states that her diarrhea has persisted and reports of associated intermittent 8/10 abdominal pain. Her pain is located in her lower abdominal and occurs when she has a BM. She states that during these episodes she would get sweaty and "pale." Currently, she endorses generalized fatigue and weakness for the past few days and notes that the headache she had from the other day had resolved. She had tried imodium for her diarrhea, but it gave her no relief. No recent antibiotics use. No chest pain, shortness of breath, fever, cough, numbness or tingling sensation.    The history is provided by the patient and medical records.     Review of patient's allergies indicates:   Allergen Reactions    Sulfa (sulfonamide antibiotics) Anaphylaxis    Refresh redness relief [phenylephrin-polyvinyl alcohol] Blisters     Past Medical History:   Diagnosis Date    Benign colon polyp 2cm     Depression     Hyperlipidemia     Hypertension      Past " Surgical History:   Procedure Laterality Date    APPENDECTOMY      BACK SURGERY      breast implants      CLOSED REDUCTION FINGER DISLOCATION      FOOT SURGERY      HYSTERECTOMY      KNEE ARTHROSCOPY Left     MASTECTOMY      SHOULDER ARTHROSCOPY      SINUS SURGERY       Family History   Problem Relation Age of Onset    Diabetes Mother     Diabetes Father     Stroke Paternal Grandfather     Diabetes Sister      Social History     Tobacco Use    Smoking status: Former Smoker     Last attempt to quit: 1988     Years since quittin.4    Smokeless tobacco: Never Used   Substance Use Topics    Alcohol use: No    Drug use: No     Review of Systems   Constitutional: Positive for fatigue. Negative for chills and fever.   HENT: Negative for congestion.    Eyes: Negative for visual disturbance.   Respiratory: Negative for shortness of breath.    Cardiovascular: Negative for chest pain.   Gastrointestinal: Positive for abdominal pain, anal bleeding, blood in stool and diarrhea. Negative for nausea and vomiting.   Genitourinary: Negative for dysuria.   Musculoskeletal: Negative for back pain.   Skin: Negative for rash.   Neurological: Negative for numbness and headaches.       Physical Exam     Initial Vitals [19 0937]   BP Pulse Resp Temp SpO2   (!) 117/56 80 20 99.2 °F (37.3 °C) (!) 94 %      MAP       --         Physical Exam    Nursing note and vitals reviewed.  Constitutional: She appears well-developed and well-nourished. She is not diaphoretic. No distress.   HENT:   Head: Normocephalic and atraumatic.   Mouth/Throat: Oropharynx is clear and moist. Mucous membranes are dry.   Neck: Normal range of motion. Neck supple. No JVD present.   Cardiovascular: Normal rate, regular rhythm, normal heart sounds and intact distal pulses.   Pulmonary/Chest: Breath sounds normal. No respiratory distress. She has no wheezes. She has no rhonchi. She has no rales.   Abdominal: Soft. She exhibits no  distension. There is tenderness in the suprapubic area and left lower quadrant. There is no rebound and no guarding.   Hypoactive bowel sounds    Genitourinary: Rectal exam shows guaiac negative stool. Guaiac negative stool.   Genitourinary Comments: non thrombosed hemorrhoid at 6 O'clock, mucus present, no blood.    Musculoskeletal: Normal range of motion. She exhibits no edema.   No peripheral edema   Lymphadenopathy:     She has no cervical adenopathy.   Neurological: She is alert and oriented to person, place, and time. She has normal strength. No cranial nerve deficit or sensory deficit.   Skin: Skin is warm and dry.   Left anterior knee scar          ED Course   Procedures  Labs Reviewed   CBC W/ AUTO DIFFERENTIAL - Abnormal; Notable for the following components:       Result Value    Hemoglobin 11.3 (*)     Hematocrit 34.6 (*)     Mean Corpuscular Volume 80 (*)     Mean Corpuscular Hemoglobin 26.1 (*)     RDW 14.6 (*)     Mono% 16.0 (*)     All other components within normal limits   COMPREHENSIVE METABOLIC PANEL - Abnormal; Notable for the following components:    Sodium 131 (*)     Glucose 128 (*)     Albumin 3.1 (*)     eGFR if non  57.2 (*)     All other components within normal limits   ISTAT PROCEDURE - Abnormal; Notable for the following components:    POC Glucose 132 (*)     POC Sodium 132 (*)     POC Potassium 3.3 (*)     POC Ionized Calcium 1.04 (*)     POC Hematocrit 34 (*)     All other components within normal limits   CULTURE, STOOL   ENTEROHEMORRHAGIC E.COLI   LACTIC ACID, PLASMA   URINALYSIS, REFLEX TO URINE CULTURE   POCT CREATININE     EKG Readings: (Independently Interpreted)   Sinus rhythm at rate of 67 bpm, with 1st degrees AV block, low voltage QRS, no ST elevation or depression.        Imaging Results          CT Abdomen Pelvis With Contrast (Final result)  Result time 06/03/19 13:10:51    Final result by Jacob Stewart MD (06/03/19 13:10:51)                  Impression:      1. There is wall thickening and inflammation involving the rectum and sigmoid colon, with more focal inflammation and wall thickening involving a short segment of the proximal sigmoid colon/distal descending colon.  Several diverticula are in the region.  Differential would include nonspecific infectious or inflammatory colitis versus focal diverticulitis.  Clinical correlation is recommended.  Liquid stool within the right colon could reflect sequela of diarrheal illness.  2. Findings suggesting hepatic steatosis, correlation with LFTs recommended.  3. Surgical change of the spine.  4. Several additional findings above.      Electronically signed by: Jacob Stewart MD  Date:    06/03/2019  Time:    13:10             Narrative:    EXAMINATION:  CT ABDOMEN PELVIS WITH CONTRAST    CLINICAL HISTORY:  LLQ pain, suspect diverticulitis;    TECHNIQUE:  Low dose axial images, sagittal and coronal reformations were obtained from the lung bases to the pubic symphysis following the IV administration of 75 mL of Omnipaque 350 .  Oral contrast was not given.    COMPARISON:  None.    FINDINGS:  Images of the lower thorax are remarkable for minimal dependent atelectasis.  Bilateral breast prostheses noted.    The liver is mildly hypoattenuating, could reflect steatosis, correlation with LFTs recommended.  The spleen, pancreas, gallbladder and adrenal glands are unremarkable.  There is a small hiatal hernia.  There is no biliary dilation or ascites.  The pancreatic duct is not dilated.  There is a calcific focus that appears to lie adjacent to the medial aspect of the common duct.  No significant abdominal lymphadenopathy.  The portal vein, splenic vein, SMV proximally, celiac axis and SMA all are grossly patent.    The kidneys enhance symmetrically and excrete contrast appropriately without hydronephrosis or nephrolithiasis.  The bilateral ureters are unremarkable without calculi seen.  The urinary bladder is  unremarkable.  The uterus is absent the adnexa is grossly unremarkable.    The distal large bowel is decompressed.  There are multiple colonic diverticula, particularly along the sigmoid colon.  There is inflammation about several diverticula involving the proximal sigmoid colon/distal descending colon.  There is mild inflammation about the rectum and sigmoid colon in general.  The remaining aspects of the large bowel are grossly unremarkable noting there is some liquid stool within the right colon.  The terminal ileum is unremarkable.  The appendix is not identified.  The small bowel is grossly unremarkable.  No focal organized pelvic fluid collection.    Postsurgical changes are noted of the spine.  Degenerative changes are noted of the spine.  Spinal fusion hardware spans L4-L5.  There is grade 1 anterolisthesis of L4 on L5.  There is atherosclerotic calcification of the aorta.  No significant inguinal lymphadenopathy.  Gluteal injection granulomas noted peer                                 Medical Decision Making:   History:   Old Medical Records: I decided to obtain old medical records.  Old Records Summarized: records from clinic visits.       <> Summary of Records: On 5/30/2019 she was seen at urgent care for fatigue, body aches, headache, nausea and diarrhea since the day before. Discharged with  phenergan and bentyl.   Initial Assessment:   Emergent evaluation a 68-year-old female persistent abdominal pain and diarrhea for the past 6 days now associated with bright red blood per rectum.  She reports continued fatigue and weakness.  Differential Diagnosis:   Acute diverticulitis, colitis, enteritis, electrolyte derangement, dehydration  Independently Interpreted Test(s):   I have ordered and independently interpreted EKG Reading(s) - see prior notes  Clinical Tests:   Lab Tests: Ordered and Reviewed  Radiological Study: Reviewed and Ordered  Medical Tests: Ordered and Reviewed  ED Management:  - labs  - CT  abdomen pelvis  - IV fluids    Labs reviewed with no leukocytosis.  H&H slightly decreased to 11.3 down from 12.2.  No evidence of active bleeding at this time.  CMP within normal limits. Lactate normal at 1.2.  UA is negative.    CT abdomen pelvis reviewed, concerning for rectal/sigmoid inflammatory changes concerning for acute diverticulitis without complication.  Will treat with Cipro and Flagyl considering patient's symptoms are ongoing.  She is able tolerate p.o., pt feels comfortable with discharge. Patient is return to emergency room if symptoms get worse or change.            Scribe Attestation:   Scribe #1: I performed the above scribed service and the documentation accurately describes the services I performed. I attest to the accuracy of the note.    Attending Attestation:           Physician Attestation for Scribe:      Comments: I, Dr. Bozena Knowles, personally performed the services described in this documentation. All medical record entries made by the scribe were at my direction and in my presence.  I have reviewed the chart and agree that the record reflects my personal performance and is accurate and complete. Bozena Knowles MD.                 Clinical Impression:       ICD-10-CM ICD-9-CM   1. Acute diverticulitis K57.92 562.11         Disposition:   Disposition: Discharged  Condition: Stable                        Bozena Knowles MD  06/03/19 [id number]

## 2019-06-03 NOTE — ED TRIAGE NOTES
Pt complains of diarrhea for past 5 days  , was seen at an [facility] 4 days ago and was given medication for abd cramping  Pt states last night and this morning she noticed blood when she wiped herself

## 2019-06-03 NOTE — DISCHARGE INSTRUCTIONS
- please follow up with PCP for further evaluation  - return to emergency room if fever greater than 101, worsening pain, or vomiting    Our goal in the emergency department is to always give you outstanding care and exceptional service. You may receive a survey by mail or e-mail in the next week regarding your experience in our ED. We would greatly appreciate your completing and returning the survey. Your feedback provides us with a way to recognize our staff who give very good care and it helps us learn how to improve when your experience was below our aspiration of excellence.

## 2019-06-03 NOTE — TELEPHONE ENCOUNTER
----- Message from Elvira Hou sent at 6/3/2019  2:57 PM CDT -----  Contact: pt  Daughter Frieda  .Type:  Sooner Appointment Request    Patient is requesting a sooner appointment.  Patient declined first available appointment listed as well as another facility and provider .  Patient will not accept being placed on the waitlist and is requesting a message be sent to doctor.    Name of Caller: Pt Daughter    When is the first available appointment? 6/24/2019    Symptoms: E/R Follow up     Would the patient rather a call back or a response via [facility]? Call back     Best Call Back Number: [phone]    Additional Information: needs sooner appt

## 2019-06-04 ENCOUNTER — PATIENT MESSAGE (OUTPATIENT)
Dept: FAMILY MEDICINE | Facility: CLINIC | Age: 69
End: 2019-06-04

## 2019-06-04 LAB
E COLI SXT1 STL QL IA: NEGATIVE
E COLI SXT2 STL QL IA: NEGATIVE

## 2019-06-05 ENCOUNTER — PES CALL (OUTPATIENT)
Dept: ADMINISTRATIVE | Facility: CLINIC | Age: 69
End: 2019-06-05

## 2019-06-06 LAB — BACTERIA STL CULT: NORMAL

## 2019-06-07 ENCOUNTER — OFFICE VISIT (OUTPATIENT)
Dept: FAMILY MEDICINE | Facility: CLINIC | Age: 69
End: 2019-06-07
Payer: MEDICARE

## 2019-06-07 VITALS
HEART RATE: 57 BPM | HEIGHT: 59 IN | TEMPERATURE: 98 F | BODY MASS INDEX: 35.6 KG/M2 | WEIGHT: 176.56 LBS | SYSTOLIC BLOOD PRESSURE: 132 MMHG | OXYGEN SATURATION: 96 % | DIASTOLIC BLOOD PRESSURE: 84 MMHG | RESPIRATION RATE: 20 BRPM

## 2019-06-07 DIAGNOSIS — K57.92 ACUTE DIVERTICULITIS: Primary | ICD-10-CM

## 2019-06-07 PROCEDURE — 99214 OFFICE O/P EST MOD 30 MIN: CPT | Mod: S$PBB,,, | Performed by: NURSE PRACTITIONER

## 2019-06-07 PROCEDURE — 99214 PR OFFICE/OUTPT VISIT, EST, LEVL IV, 30-39 MIN: ICD-10-PCS | Mod: S$PBB,,, | Performed by: NURSE PRACTITIONER

## 2019-06-07 PROCEDURE — 99999 PR PBB SHADOW E&M-EST. PATIENT-LVL III: ICD-10-PCS | Mod: PBBFAC,,, | Performed by: NURSE PRACTITIONER

## 2019-06-07 PROCEDURE — 99999 PR PBB SHADOW E&M-EST. PATIENT-LVL III: CPT | Mod: PBBFAC,,, | Performed by: NURSE PRACTITIONER

## 2019-06-07 PROCEDURE — 99213 OFFICE O/P EST LOW 20 MIN: CPT | Mod: PBBFAC,PN | Performed by: NURSE PRACTITIONER

## 2019-06-07 NOTE — PROGRESS NOTES
Routine Office Visit    Patient Name: Dayami Mina    : 1950  MRN: [id number]    Chief Complaint:  ER follow-up    Subjective:  Dayami is a 68 y.o. female who presents today for:    1.  ER follow-up -patient presents today for follow-up from the emergency room.  Was recently seen in the ED for rectal bleeding and abdominal pain was diagnosed with acute diverticulitis.  She states that overall she is feeling better.  She denies any more nausea vomiting or diarrhea.  She states that she is still having good intake by mouth.  She reports no problems with the antibiotics that she was given.  She denies any continued fevers or chills.  Denies any abdominal pain. She states that her diarrhea stopped yesterday and now she is having formed bowel movements.  She does state however that she is still having some rectal bleeding with bowel movements.  She states that the bleeding does not happen with every bowel movement and the bleeding has slowed down significantly since her original bout of diverticulitis.  Denies any black tarry stools however.  She states that she did take the meclizine this morning for dizziness because she says that she has equilibrium problem states that the meclizine did help.  She denies any orthostatic symptoms no dizziness or lightheadedness when going from sitting to standing position.  Denies any chest pain, wheezing, shortness of breath, cough, or palpitations.  Denies any lightheadedness or presyncope.    Past Medical History  Past Medical History:   Diagnosis Date    Benign colon polyp 2cm     Depression     Hyperlipidemia     Hypertension        Past Surgical History  Past Surgical History:   Procedure Laterality Date    APPENDECTOMY      BACK SURGERY      breast implants      CLOSED REDUCTION FINGER DISLOCATION      FOOT SURGERY      HYSTERECTOMY      KNEE ARTHROSCOPY Left     MASTECTOMY      SHOULDER ARTHROSCOPY      SINUS SURGERY         Family History  Family  History   Problem Relation Age of Onset    Diabetes Mother     Diabetes Father     Stroke Paternal Grandfather     Diabetes Sister        Social History  Social History     Socioeconomic History    Marital status:      Spouse name: Not on file    Number of children: Not on file    Years of education: Not on file    Highest education level: Not on file   Occupational History    Not on file   Social Needs    Financial resource strain: Not on file    Food insecurity:     Worry: Not on file     Inability: Not on file    Transportation needs:     Medical: Not on file     Non-medical: Not on file   Tobacco Use    Smoking status: Former Smoker     Last attempt to quit: 1988     Years since quittin.4    Smokeless tobacco: Never Used   Substance and Sexual Activity    Alcohol use: No    Drug use: No    Sexual activity: Never   Lifestyle    Physical activity:     Days per week: Not on file     Minutes per session: Not on file    Stress: Not on file   Relationships    Social connections:     Talks on phone: Not on file     Gets together: Not on file     Attends Orthodox service: Not on file     Active member of club or organization: Not on file     Attends meetings of clubs or organizations: Not on file     Relationship status: Not on file   Other Topics Concern    Not on file   Social History Narrative    Not on file       Current Medications  Current Outpatient Medications on File Prior to Visit   Medication Sig Dispense Refill    albuterol (PROVENTIL) 2.5 mg /3 mL (0.083 %) nebulizer solution Take 3 mLs (2.5 mg total) by nebulization every 6 (six) hours as needed for Wheezing or Shortness of Breath. Rescue 1 Box 0    allopurinol (ZYLOPRIM) 100 MG tablet Take 100 mg by mouth.      ammonium lactate (LAC-HYDRIN) 12 % lotion Apply topically 2 (two) times daily as needed for Dry Skin. 500 g 5    aspirin (ECOTRIN) 81 MG EC tablet Take 81 mg by mouth once daily.       butalbital-acetaminophen-caffeine -40 mg (FIORICET, ESGIC) -40 mg per tablet       ciprofloxacin HCl (CIPRO) 500 MG tablet Take 1 tablet (500 mg total) by mouth 2 (two) times daily. for 7 days 14 tablet 0    escitalopram oxalate (LEXAPRO) 20 MG tablet Take 1 tablet (20 mg total) by mouth once daily. 90 tablet 1    esomeprazole (NEXIUM) 20 MG capsule Take 20 mg by mouth.      fluticasone (FLONASE) 50 mcg/actuation nasal spray 1 spray (50 mcg total) by Each Nare route once daily. 15.8 mL 0    gabapentin (NEURONTIN) 100 MG capsule 100 mg. 2 tabs in the am and 2 tabs in the pm      HYDROcodone-acetaminophen (NORCO) 7.5-325 mg per tablet Take 1 tablet by mouth.      ipratropium (ATROVENT) 42 mcg (0.06 %) nasal spray 2 sprays by Nasal route 4 (four) times daily. 15 mL 0    loratadine (CLARITIN) 10 mg tablet Take 10 mg by mouth once daily.      losartan (COZAAR) 50 MG tablet Take 1 tablet (50 mg total) by mouth 2 (two) times daily. 180 tablet 2    metoprolol succinate (TOPROL-XL) 50 MG 24 hr tablet Take 1 tablet (50 mg total) by mouth once daily. 90 tablet 1    metroNIDAZOLE (FLAGYL) 500 MG tablet Take 1 tablet (500 mg total) by mouth 3 (three) times daily. for 7 days 21 tablet 0    MULTIVIT-IRON-MIN-FOLIC ACID 3,500-18-0.4 UNIT-MG-MG ORAL CHEW Take by mouth.      multivitamin capsule Take 1 capsule by mouth once daily.      nystatin (MYCOSTATIN) powder Apply topically 3 (three) times daily. For rash 60 g 2    potassium chloride SA (K-DUR,KLOR-CON) 20 MEQ tablet Take 1 tablet (20 mEq total) by mouth once daily. 10 tablet 2    pravastatin (PRAVACHOL) 40 MG tablet Take 1 tablet (40 mg total) by mouth once daily. 90 tablet 2    promethazine (PHENERGAN) 12.5 MG Tab Take 1 tablet (12.5 mg total) by mouth every 6 (six) hours as needed. 10 tablet 0    umeclidinium-vilanterol (ANORO ELLIPTA) 62.5-25 mcg/actuation DsDv INHALE 1 PUFF DAILY. controller 60 each 2    blood sugar diagnostic Strp Uses  Freestyle meter to monitor readings once daily 50 strip 6    BOOSTRIX TDAP 2.5-8-5 Lf-mcg-Lf/0.5mL Syrg injection       cetirizine (ZYRTEC) 10 MG tablet       FOLIC ACID/MULTIVIT-MIN/LUTEIN (CENTRUM SILVER ORAL) Take by mouth.      lancets Misc 1 lancet by Misc.(Non-Drug; Combo Route) route once daily. 50 each 6    montelukast (SINGULAIR) 10 mg tablet Take 1 tablet (10 mg total) by mouth nightly. 30 tablet 2     No current facility-administered medications on file prior to visit.        Allergies   Review of patient's allergies indicates:   Allergen Reactions    Sulfa (sulfonamide antibiotics) Anaphylaxis    Refresh redness relief [phenylephrin-polyvinyl alcohol] Blisters       Review of Systems (Pertinent positives)  Review of Systems   Constitutional: Negative for chills, diaphoresis, fever, malaise/fatigue and weight loss.   HENT: Negative.    Eyes: Negative for blurred vision, double vision, photophobia, pain, discharge and redness.   Respiratory: Negative for cough, hemoptysis, sputum production, shortness of breath and wheezing.    Cardiovascular: Negative for chest pain, palpitations, orthopnea, claudication, leg swelling and PND.   Gastrointestinal: Positive for blood in stool. Negative for abdominal pain, constipation, diarrhea, heartburn, melena, nausea and vomiting.        Reports continued rectal bleeding   Genitourinary: Negative for dysuria, flank pain, frequency, hematuria and urgency.   Musculoskeletal: Negative for back pain, myalgias and neck pain.   Skin: Negative for itching and rash.   Neurological: Negative.  Negative for dizziness, tingling, tremors, sensory change, speech change, focal weakness, seizures, loss of consciousness, weakness and headaches.   Endo/Heme/Allergies: Negative.    Psychiatric/Behavioral: Negative.        /84 (BP Location: Right arm, Patient Position: Sitting, BP Method: Medium (Manual))   Pulse (!) 57   Temp 97.9 °F (36.6 °C) (Oral)   Resp 20   Ht 4'  "11" (1.499 m)   Wt 80.1 kg (176 lb 9.4 oz)   SpO2 96%   BMI 35.67 kg/m²     Physical Exam   Constitutional: She is oriented to person, place, and time. She appears well-developed and well-nourished. No distress.   Eyes: Pupils are equal, round, and reactive to light. Conjunctivae and EOM are normal.   Neck: Normal range of motion. Neck supple. No JVD present.   Cardiovascular: Normal rate, regular rhythm, normal heart sounds and intact distal pulses. Exam reveals no gallop and no friction rub.   No murmur heard.  Pulmonary/Chest: Effort normal and breath sounds normal. No stridor. No respiratory distress. She has no wheezes. She has no rales. She exhibits no tenderness.   Abdominal: Soft. Normal appearance and bowel sounds are normal. She exhibits no distension, no pulsatile liver, no ascites and no mass. There is no hepatosplenomegaly. There is no tenderness. There is no rigidity, no rebound, no guarding, no CVA tenderness, no tenderness at McBurney's point and negative Dey's sign. No hernia.   Musculoskeletal: Normal range of motion.   Lymphadenopathy:     She has no cervical adenopathy.   Neurological: She is alert and oriented to person, place, and time. She displays normal reflexes. No cranial nerve deficit or sensory deficit. She exhibits normal muscle tone. Coordination normal.   Skin: Skin is warm and dry. Capillary refill takes less than 2 seconds. She is not diaphoretic.        Assessment/Plan:  Dayami Mina is a 68 y.o. female who presents today for :    Dayami was seen today for follow-up.    Diagnoses and all orders for this visit:    Acute diverticulitis  -     CBC auto differential; Future     Resolving.  She has no tenderness on exam and she reports no abdominal pain, nausea, vomiting, or diarrhea.  Continue antibiotics and take all antibiotics until prescription is out.  Stressed continued hydration.  Will recheck CBC in 1 month given that the patient's H&H was mildly decreased from " baseline at the emergency room visit.  She is still having some bright red blood per rectum with bowel movements, however she states that this symptom is improving.  The patient was encouraged to contact her gastroenterologist at [facility] to make an appointment as soon as possible for a colonoscopy and possible hemorrhoidal banding.  Patient refused a rectal exam today.  The patient was instructed that if the bleeding does not stop in the next week or if it worsens or if she starts to experience any orthostatic symptoms or low blood pressure then she needs to go to the emergency room right away.  Patient verbalized understanding of these instructions.        This office note has been dictated.  This dictation has been generated using M-Modal Fluency Direct dictation; some phonetic errors may occur.   My collaborating physician is Dr. Guanaco Sheffield.

## 2019-06-14 ENCOUNTER — OFFICE VISIT (OUTPATIENT)
Dept: FAMILY MEDICINE | Facility: CLINIC | Age: 69
End: 2019-06-14
Payer: MEDICARE

## 2019-06-14 VITALS
WEIGHT: 174.63 LBS | SYSTOLIC BLOOD PRESSURE: 132 MMHG | DIASTOLIC BLOOD PRESSURE: 70 MMHG | BODY MASS INDEX: 35.2 KG/M2 | HEIGHT: 59 IN | HEART RATE: 72 BPM | RESPIRATION RATE: 17 BRPM | OXYGEN SATURATION: 96 % | TEMPERATURE: 98 F

## 2019-06-14 DIAGNOSIS — F33.42 RECURRENT MAJOR DEPRESSIVE DISORDER, IN FULL REMISSION: ICD-10-CM

## 2019-06-14 DIAGNOSIS — K57.21 DIVERTICULITIS OF LARGE INTESTINE WITH PERFORATION AND ABSCESS WITH BLEEDING: Primary | ICD-10-CM

## 2019-06-14 DIAGNOSIS — I10 ESSENTIAL HYPERTENSION: Chronic | ICD-10-CM

## 2019-06-14 PROCEDURE — 99213 OFFICE O/P EST LOW 20 MIN: CPT | Mod: PBBFAC,PN | Performed by: INTERNAL MEDICINE

## 2019-06-14 PROCEDURE — 99214 OFFICE O/P EST MOD 30 MIN: CPT | Mod: S$PBB,,, | Performed by: INTERNAL MEDICINE

## 2019-06-14 PROCEDURE — 99999 PR PBB SHADOW E&M-EST. PATIENT-LVL III: CPT | Mod: PBBFAC,,, | Performed by: INTERNAL MEDICINE

## 2019-06-14 PROCEDURE — 99214 PR OFFICE/OUTPT VISIT, EST, LEVL IV, 30-39 MIN: ICD-10-PCS | Mod: S$PBB,,, | Performed by: INTERNAL MEDICINE

## 2019-06-14 PROCEDURE — 99999 PR PBB SHADOW E&M-EST. PATIENT-LVL III: ICD-10-PCS | Mod: PBBFAC,,, | Performed by: INTERNAL MEDICINE

## 2019-06-14 RX ORDER — ALLOPURINOL 100 MG/1
100 TABLET ORAL DAILY
Qty: 90 TABLET | Refills: 1 | Status: SHIPPED | OUTPATIENT
Start: 2019-06-14 | End: 2019-10-21 | Stop reason: SDUPTHER

## 2019-06-14 RX ORDER — ESCITALOPRAM OXALATE 20 MG/1
20 TABLET ORAL DAILY
Qty: 90 TABLET | Refills: 1 | Status: SHIPPED | OUTPATIENT
Start: 2019-06-14 | End: 2020-03-17 | Stop reason: SDUPTHER

## 2019-06-14 RX ORDER — LOSARTAN POTASSIUM 50 MG/1
50 TABLET ORAL 2 TIMES DAILY
Qty: 180 TABLET | Refills: 2 | Status: SHIPPED | OUTPATIENT
Start: 2019-06-14 | End: 2020-03-17 | Stop reason: SDUPTHER

## 2019-06-14 RX ORDER — METOPROLOL SUCCINATE 50 MG/1
50 TABLET, EXTENDED RELEASE ORAL DAILY
Qty: 90 TABLET | Refills: 1 | Status: SHIPPED | OUTPATIENT
Start: 2019-06-14 | End: 2020-03-17 | Stop reason: SDUPTHER

## 2019-06-14 NOTE — PROGRESS NOTES
"Subjective:       Patient ID: Dayami Mina is a 68 y.o. female.    Chief Complaint: Establish Care and Follow-up    F/u diverticulitis    HPI: 69 y/o last week developed worsening nausea diarrhea and BRBPR seen in ED CT scan showed diffuse thickening of distal colon w/o abscess treated for diverticulitis with seven days of cipro and flagyl. Stools have normalized no longer having blood with bowel movements tolerating PO she endorses two days of generalized fatigue no orthostatic symptoms no palpitations no LE swelling    Review of Systems   Constitutional: Positive for fatigue. Negative for activity change, appetite change, fever and unexpected weight change.   HENT: Negative for ear pain, rhinorrhea and sore throat.    Eyes: Negative for discharge and visual disturbance.   Respiratory: Negative for chest tightness, shortness of breath and wheezing.    Cardiovascular: Negative for chest pain, palpitations and leg swelling.   Gastrointestinal: Negative for abdominal pain, constipation and diarrhea.   Endocrine: Negative for cold intolerance and heat intolerance.   Genitourinary: Negative for dysuria and hematuria.   Musculoskeletal: Negative for joint swelling and neck stiffness.   Skin: Negative for rash.   Neurological: Negative for dizziness, syncope, weakness and headaches.   Psychiatric/Behavioral: Negative for suicidal ideas.       Objective:     Vitals:    06/14/19 1121 06/14/19 1140   BP: (!) 144/73 132/70   BP Location: Right arm    Patient Position: Sitting    Pulse: 79 72   Resp: 17    Temp: 98 °F (36.7 °C)    TempSrc: Oral    SpO2: 96%    Weight: 79.2 kg (174 lb 9.7 oz)    Height: 4' 11" (1.499 m)           Physical Exam   Constitutional: She is oriented to person, place, and time. She appears well-developed and well-nourished.   Negative orthostatic   HENT:   Head: Normocephalic and atraumatic.   Eyes: Conjunctivae are normal. No scleral icterus.   Neck: Normal range of motion. "   Cardiovascular: Normal rate and regular rhythm. Exam reveals no gallop and no friction rub.   No murmur heard.  Pulmonary/Chest: Effort normal and breath sounds normal. She has no wheezes. She has no rales.   Abdominal: Soft. Bowel sounds are normal. There is no tenderness. There is no rebound and no guarding.   Musculoskeletal: Normal range of motion. She exhibits no edema or tenderness.   Neurological: She is alert and oriented to person, place, and time. No cranial nerve deficit.   Skin: Skin is warm and dry.   Psychiatric: She has a normal mood and affect.       Assessment and Plan   1. Diverticulitis of large intestine with perforation and abscess with bleeding  Has follow up 6/24 with GI with planned csope and EGD  - CBC auto differential; Future  - Basic metabolic panel; Future    2. Essential hypertension  At goal continue curretn medications  - metoprolol succinate (TOPROL-XL) 50 MG 24 hr tablet; Take 1 tablet (50 mg total) by mouth once daily.  Dispense: 90 tablet; Refill: 1  - losartan (COZAAR) 50 MG tablet; Take 1 tablet (50 mg total) by mouth 2 (two) times daily.  Dispense: 180 tablet; Refill: 2    3. Recurrent major depressive disorder, in full remission  Stable on lexapro continue  - escitalopram oxalate (LEXAPRO) 20 MG tablet; Take 1 tablet (20 mg total) by mouth once daily.  Dispense: 90 tablet; Refill: 1

## 2019-07-03 ENCOUNTER — OFFICE VISIT (OUTPATIENT)
Dept: CARDIOLOGY | Facility: CLINIC | Age: 69
End: 2019-07-03
Payer: MEDICARE

## 2019-07-03 VITALS
SYSTOLIC BLOOD PRESSURE: 149 MMHG | OXYGEN SATURATION: 98 % | HEIGHT: 59 IN | BODY MASS INDEX: 35.56 KG/M2 | DIASTOLIC BLOOD PRESSURE: 67 MMHG | WEIGHT: 176.38 LBS | HEART RATE: 71 BPM

## 2019-07-03 DIAGNOSIS — J43.1 PANLOBULAR EMPHYSEMA: Primary | ICD-10-CM

## 2019-07-03 DIAGNOSIS — R00.2 PALPITATIONS: ICD-10-CM

## 2019-07-03 DIAGNOSIS — I10 ESSENTIAL HYPERTENSION: Chronic | ICD-10-CM

## 2019-07-03 DIAGNOSIS — E78.2 MIXED HYPERLIPIDEMIA: Chronic | ICD-10-CM

## 2019-07-03 DIAGNOSIS — R07.89 CHEST PAIN, ATYPICAL: ICD-10-CM

## 2019-07-03 DIAGNOSIS — R06.09 DOE (DYSPNEA ON EXERTION): ICD-10-CM

## 2019-07-03 PROCEDURE — 99999 PR PBB SHADOW E&M-EST. PATIENT-LVL III: CPT | Mod: PBBFAC,,, | Performed by: INTERNAL MEDICINE

## 2019-07-03 PROCEDURE — 99214 OFFICE O/P EST MOD 30 MIN: CPT | Mod: 25,S$PBB,, | Performed by: INTERNAL MEDICINE

## 2019-07-03 PROCEDURE — 99214 PR OFFICE/OUTPT VISIT, EST, LEVL IV, 30-39 MIN: ICD-10-PCS | Mod: 25,S$PBB,, | Performed by: INTERNAL MEDICINE

## 2019-07-03 PROCEDURE — 93010 ELECTROCARDIOGRAM REPORT: CPT | Mod: S$PBB,,, | Performed by: INTERNAL MEDICINE

## 2019-07-03 PROCEDURE — 99999 PR PBB SHADOW E&M-EST. PATIENT-LVL III: ICD-10-PCS | Mod: PBBFAC,,, | Performed by: INTERNAL MEDICINE

## 2019-07-03 PROCEDURE — 93010 EKG 12-LEAD: ICD-10-PCS | Mod: S$PBB,,, | Performed by: INTERNAL MEDICINE

## 2019-07-03 PROCEDURE — 99213 OFFICE O/P EST LOW 20 MIN: CPT | Mod: PBBFAC,25 | Performed by: INTERNAL MEDICINE

## 2019-07-03 PROCEDURE — 93005 ELECTROCARDIOGRAM TRACING: CPT | Mod: PBBFAC | Performed by: INTERNAL MEDICINE

## 2019-07-03 NOTE — PROGRESS NOTES
Subjective:    Patient ID:  Dayami Mina is a 69 y.o. female who presents for follow-up of Chest Pain      HPI     HTN, COPD, CRI, palpitations     Stress test 8/1/17  LVEF: 66 %  Impression: NORMAL MYOCARDIAL PERFUSION  1. The perfusion scan is free of evidence for myocardial ischemia or injury.   2. There is a mild intensity fixed defect in the anterior wall of the left ventricle, secondary to breast attenuation.   3. Resting wall motion is physiologic.   4. Resting LV function is normal.      Echo 8/1/17    1 - Low normal to mildly depressed left ventricular systolic function (EF 50-55%).      Holter 8/1/17  1. Sinus rhythm with heart rates varying between 56 and 105 bpm with an average of 72 bpm.     VENTRICULAR ARRHYTHMIAS  1. There were very frequent PVCs totalling 4114 and averaging 171 per hour.     2. There were no episodes of ventricular tachycardia.    SUPRA VENTRICULAR ARRHYTHMIAS  1. There were very rare PACs recorded totalling 1 and averaging less than 1 per hour.     2. There were no episodes of sustained supraventricular tachycardia.    SINUS NODE FUNCTION  1. There was no evidence of high grade SA manuel block.     AV CONDUCTION  1. There was no evidence of high grade AV block.      Admitted 12/2/17  Mrs. Dayami Mina is a 67 y.o. female with essential hypertension, hyperlipidemia (.2 Apr 2015), GERD, and depression who presents to [facility] ED with complaints of dyspnea today.  She shortness of breath started gradually throughout the day and she thinks it was triggered by some renovations bring done in her home.  Some plywood was being placed in her home and the installers says it was treated plywood.  She started to have some wheezing and coughing that was productive of greenish sputum.  She went to bed to take a nap but woke up still having these symptoms.  She tried her albuterol inhalers without relief and decided to seek ED evaluation.  She denies any fevers, chills,  chest pain, pleurisy, palpitations, diaphoresis, nausea, vomiting, hemoptysis, nor any lower extremity pain or swelling.  She did not have any sick contacts or recent travel, and denies any similar episodes in the past.  Of note, she has been following with Dr. Vishal Mcmahan who initially felt that she had COPD and treated her for that for three years before changing his opinion.  She did smoke but quit in 1988.       Mrs. Dayami Mina is a 67 y.o. female with essential hypertension, hyperlipidemia (.2 Apr 2015), GERD, and depression who presents to [facility] ED with complaints of dyspnea .She shortness of breath started gradually throughout the day and she thinks it was triggered by some renovations bring done in her home.  Some plywood was being placed in her home and the installers says it was treated plywood.  She started to have some wheezing and coughing that was productive of greenish sputum.  She went to bed to take a nap but woke up still having these symptoms.  She tried her albuterol inhalers without relief and decided to seek ED evaluation.  She denies any fevers, chills, chest pain, pleurisy, palpitations, diaphoresis, nausea, vomiting, hemoptysis, nor any lower extremity pain or swelling.  She did not have any sick contacts or recent travel, and denies any similar episodes in the past.  Of note, she has been following with Dr. Vishal Mcmahan who initially felt that she had COPD and treated her for that for three years before changing his opinion,after couple PFT study show no sign of COPD,  She did smoke but quit in 1988. She was started on supplemental oxygen for hypoxia with nebulizer treatment,CTA chest show no PE,no consolidation,or fluid overload,SOB and hypoxic episode may is duo to expose to chemical agent.her SOB,and  Hypoxia resolved,she was stable on RA,she improved better than expected,she has been discharged home with follow up with her pulmonologist ,she has already  appointment.      1/22/18 Denies CP  SOB stable  EKG NSR - ok  Needs clearance for toe surgery     1/18/19 Denies CP  Stable SOB  EKG NSR 1st degree AVB otherwise ok  Upcoming knee replacement     Recently Dx with diverticulitis - scheduled for upcoming colonoscopy  Some fatigue  BP runs low on occasion  EKG NSR low voltage otherwise ok    Review of Systems   Constitution: Negative for decreased appetite.   HENT: Negative for ear discharge.    Eyes: Negative for blurred vision.   Respiratory: Negative for hemoptysis.    Endocrine: Negative for polyphagia.   Hematologic/Lymphatic: Negative for adenopathy.   Skin: Negative for color change.   Musculoskeletal: Negative for joint swelling.   Genitourinary: Negative for bladder incontinence.   Neurological: Negative for brief paralysis.   Psychiatric/Behavioral: Negative for hallucinations.   Allergic/Immunologic: Negative for hives.        Objective:    Physical Exam   Constitutional: She is oriented to person, place, and time. She appears well-developed and well-nourished.   HENT:   Head: Normocephalic and atraumatic.   Eyes: Pupils are equal, round, and reactive to light. Conjunctivae are normal.   Neck: Normal range of motion. Neck supple.   Cardiovascular: Normal rate, normal heart sounds and intact distal pulses.   Pulmonary/Chest: Effort normal and breath sounds normal.   Abdominal: Soft. Bowel sounds are normal.   Musculoskeletal: Normal range of motion.   Neurological: She is alert and oriented to person, place, and time.   Skin: Skin is warm and dry.         Assessment:       1. Panlobular emphysema    2. Essential hypertension    3. Mixed hyperlipidemia    4. Palpitations    5. ARANGO (dyspnea on exertion)    6. Chest pain, atypical         Plan:       Continue Rx  OV 6 months with echo

## 2019-07-08 ENCOUNTER — TELEPHONE (OUTPATIENT)
Dept: FAMILY MEDICINE | Facility: CLINIC | Age: 69
End: 2019-07-08

## 2019-07-08 ENCOUNTER — LAB VISIT (OUTPATIENT)
Dept: LAB | Facility: HOSPITAL | Age: 69
End: 2019-07-08
Payer: MEDICARE

## 2019-07-08 DIAGNOSIS — K57.92 ACUTE DIVERTICULITIS: ICD-10-CM

## 2019-07-08 LAB
BASOPHILS # BLD AUTO: 0.04 K/UL (ref 0–0.2)
BASOPHILS NFR BLD: 0.6 % (ref 0–1.9)
DIFFERENTIAL METHOD: ABNORMAL
EOSINOPHIL # BLD AUTO: 0.3 K/UL (ref 0–0.5)
EOSINOPHIL NFR BLD: 4.6 % (ref 0–8)
ERYTHROCYTE [DISTWIDTH] IN BLOOD BY AUTOMATED COUNT: 15.5 % (ref 11.5–14.5)
HCT VFR BLD AUTO: 40.1 % (ref 37–48.5)
HGB BLD-MCNC: 12.7 G/DL (ref 12–16)
LYMPHOCYTES # BLD AUTO: 2.5 K/UL (ref 1–4.8)
LYMPHOCYTES NFR BLD: 35.3 % (ref 18–48)
MCH RBC QN AUTO: 26.6 PG (ref 27–31)
MCHC RBC AUTO-ENTMCNC: 31.7 G/DL (ref 32–36)
MCV RBC AUTO: 84 FL (ref 82–98)
MONOCYTES # BLD AUTO: 0.7 K/UL (ref 0.3–1)
MONOCYTES NFR BLD: 9.4 % (ref 4–15)
NEUTROPHILS # BLD AUTO: 3.6 K/UL (ref 1.8–7.7)
NEUTROPHILS NFR BLD: 50.2 % (ref 38–73)
PLATELET # BLD AUTO: 276 K/UL (ref 150–350)
PMV BLD AUTO: 10.2 FL (ref 9.2–12.9)
RBC # BLD AUTO: 4.77 M/UL (ref 4–5.4)
WBC # BLD AUTO: 7.14 K/UL (ref 3.9–12.7)

## 2019-07-08 PROCEDURE — 85025 COMPLETE CBC W/AUTO DIFF WBC: CPT

## 2019-07-08 PROCEDURE — 36415 COLL VENOUS BLD VENIPUNCTURE: CPT | Mod: PN

## 2019-07-08 NOTE — TELEPHONE ENCOUNTER
----- Message from Antwan Warren NP sent at 7/8/2019 11:50 AM CDT -----  Please call patient and let her know that her blood count is improved since her emergency room visit.  Thank you

## 2019-07-08 NOTE — TELEPHONE ENCOUNTER
I spoke to the pt and gave her the results below. Pt verbalizes understanding and reports that she has her colonoscopy scheduled for next Monday.

## 2019-07-15 LAB — HM COLONOSCOPY: NORMAL

## 2019-07-29 DIAGNOSIS — J44.89 COPD WITH ASTHMA: ICD-10-CM

## 2019-07-31 RX ORDER — UMECLIDINIUM BROMIDE AND VILANTEROL TRIFENATATE 62.5; 25 UG/1; UG/1
POWDER RESPIRATORY (INHALATION)
OUTPATIENT
Start: 2019-07-31

## 2019-08-05 DIAGNOSIS — J44.89 COPD WITH ASTHMA: ICD-10-CM

## 2019-08-05 RX ORDER — UMECLIDINIUM BROMIDE AND VILANTEROL TRIFENATATE 62.5; 25 UG/1; UG/1
POWDER RESPIRATORY (INHALATION)
Qty: 60 EACH | Refills: 5 | Status: SHIPPED | OUTPATIENT
Start: 2019-08-05 | End: 2019-10-16 | Stop reason: SDUPTHER

## 2019-08-14 ENCOUNTER — OFFICE VISIT (OUTPATIENT)
Dept: FAMILY MEDICINE | Facility: CLINIC | Age: 69
End: 2019-08-14
Payer: MEDICARE

## 2019-08-14 VITALS
OXYGEN SATURATION: 95 % | RESPIRATION RATE: 17 BRPM | HEIGHT: 59 IN | DIASTOLIC BLOOD PRESSURE: 67 MMHG | HEART RATE: 77 BPM | WEIGHT: 175.94 LBS | BODY MASS INDEX: 35.47 KG/M2 | SYSTOLIC BLOOD PRESSURE: 116 MMHG | TEMPERATURE: 99 F

## 2019-08-14 DIAGNOSIS — I10 ESSENTIAL HYPERTENSION: Primary | Chronic | ICD-10-CM

## 2019-08-14 DIAGNOSIS — E66.01 SEVERE OBESITY (BMI 35.0-39.9) WITH COMORBIDITY: ICD-10-CM

## 2019-08-14 DIAGNOSIS — J43.1 PANLOBULAR EMPHYSEMA: ICD-10-CM

## 2019-08-14 DIAGNOSIS — J34.89 RHINORRHEA: ICD-10-CM

## 2019-08-14 PROCEDURE — 99999 PR PBB SHADOW E&M-EST. PATIENT-LVL III: ICD-10-PCS | Mod: PBBFAC,,, | Performed by: INTERNAL MEDICINE

## 2019-08-14 PROCEDURE — 99214 OFFICE O/P EST MOD 30 MIN: CPT | Mod: S$PBB,,, | Performed by: INTERNAL MEDICINE

## 2019-08-14 PROCEDURE — 99999 PR PBB SHADOW E&M-EST. PATIENT-LVL III: CPT | Mod: PBBFAC,,, | Performed by: INTERNAL MEDICINE

## 2019-08-14 PROCEDURE — 99213 OFFICE O/P EST LOW 20 MIN: CPT | Mod: PBBFAC,PN | Performed by: INTERNAL MEDICINE

## 2019-08-14 PROCEDURE — 99214 PR OFFICE/OUTPT VISIT, EST, LEVL IV, 30-39 MIN: ICD-10-PCS | Mod: S$PBB,,, | Performed by: INTERNAL MEDICINE

## 2019-08-14 RX ORDER — IPRATROPIUM BROMIDE 42 UG/1
2 SPRAY, METERED NASAL 4 TIMES DAILY
Qty: 15 ML | Refills: 0 | Status: SHIPPED | OUTPATIENT
Start: 2019-08-14 | End: 2019-11-13

## 2019-08-15 NOTE — PROGRESS NOTES
"Subjective:       Patient ID: Dayami Mina is a 69 y.o. female.    Chief Complaint: Establish Care and Follow-up    F/u chronic conditions    HPI 70 y/o w/ HTN COPD presents for follow up. Since last visit had colonoscopy for follow up recent diverticulitis. No polyps single lipoma since in descending colon. She feels well no further melena or BRBPR. Moving bowels once to twice daily no straining. Breathing at baseline using anoro daily as prescribed    Review of Systems   Constitutional: Negative for activity change, appetite change, fatigue, fever and unexpected weight change.   HENT: Negative for ear pain, rhinorrhea and sore throat.    Eyes: Negative for discharge and visual disturbance.   Respiratory: Negative for chest tightness, shortness of breath and wheezing.    Cardiovascular: Negative for chest pain, palpitations and leg swelling.   Gastrointestinal: Negative for abdominal pain, constipation and diarrhea.   Endocrine: Negative for cold intolerance and heat intolerance.   Genitourinary: Negative for dysuria and hematuria.   Musculoskeletal: Negative for joint swelling and neck stiffness.   Skin: Negative for rash.   Neurological: Negative for dizziness, syncope, weakness and headaches.   Psychiatric/Behavioral: Negative for suicidal ideas.       Objective:     Vitals:    08/14/19 1327   BP: 116/67   BP Location: Right arm   Patient Position: Sitting   Pulse: 77   Resp: 17   Temp: 98.8 °F (37.1 °C)   TempSrc: Oral   SpO2: 95%   Weight: 79.8 kg (175 lb 14.8 oz)   Height: 4' 11" (1.499 m)          Physical Exam   Constitutional: She is oriented to person, place, and time. She appears well-developed and well-nourished.   HENT:   Head: Normocephalic and atraumatic.   Eyes: Conjunctivae are normal. No scleral icterus.   Neck: Normal range of motion.   Cardiovascular: Normal rate and regular rhythm. Exam reveals no gallop and no friction rub.   No murmur heard.  Pulmonary/Chest: Effort normal and " breath sounds normal. She has no wheezes. She has no rales.   Abdominal: Soft. Bowel sounds are normal. There is no tenderness. There is no rebound and no guarding.   Musculoskeletal: Normal range of motion. She exhibits no edema or tenderness.   Neurological: She is alert and oriented to person, place, and time. No cranial nerve deficit.   Skin: Skin is warm and dry.   Psychiatric: She has a normal mood and affect.       Assessment and Plan   1. Essential hypertension  At goal continue current medication    2. Panlobular emphysema  Well controlled on daily laba/ics continue    3. Severe obesity (BMI 35.0-39.9) with comorbidity  The patient is asked to make an attempt to improve diet and exercise patterns to aid in medical management of this problem.      4. Rhinorrhea  atrovent nose spray tid prn  - ipratropium (ATROVENT) 42 mcg (0.06 %) nasal spray; 2 sprays by Nasal route 4 (four) times daily.  Dispense: 15 mL; Refill: 0

## 2019-08-19 ENCOUNTER — TELEPHONE (OUTPATIENT)
Dept: FAMILY MEDICINE | Facility: CLINIC | Age: 69
End: 2019-08-19

## 2019-08-19 DIAGNOSIS — J43.1 PANLOBULAR EMPHYSEMA: Primary | ICD-10-CM

## 2019-08-19 RX ORDER — PREDNISONE 20 MG/1
40 TABLET ORAL DAILY
Qty: 10 TABLET | Refills: 0 | Status: SHIPPED | OUTPATIENT
Start: 2019-08-19 | End: 2019-08-24

## 2019-08-19 RX ORDER — DOXYCYCLINE 100 MG/1
100 CAPSULE ORAL 2 TIMES DAILY
Qty: 14 CAPSULE | Refills: 0 | Status: SHIPPED | OUTPATIENT
Start: 2019-08-19 | End: 2019-10-16 | Stop reason: ALTCHOICE

## 2019-08-19 NOTE — TELEPHONE ENCOUNTER
----- Message from Kevon Moody sent at 8/19/2019  3:24 PM CDT -----  Contact: Dayami [phone]  Type:  Needs Medical Advice    Who Called: Dayami     Symptoms (please be specific): sinus infection, coughing up green mucus and phlegm    How long has patient had these symptoms:  Since visit on August 14    Pharmacy name and phone [place] [place] [address]      Would the patient rather a call back or a response via [facility]? Call back    Best Call Back Number: [phone]

## 2019-08-19 NOTE — TELEPHONE ENCOUNTER
I spoke to the pt and advised medications sent to [facility] and if no improvement by next week she will need OV. Pt verbalizes understanding.

## 2019-08-27 ENCOUNTER — PATIENT MESSAGE (OUTPATIENT)
Dept: FAMILY MEDICINE | Facility: CLINIC | Age: 69
End: 2019-08-27

## 2019-08-28 ENCOUNTER — OFFICE VISIT (OUTPATIENT)
Dept: FAMILY MEDICINE | Facility: CLINIC | Age: 69
End: 2019-08-28
Payer: MEDICARE

## 2019-08-28 ENCOUNTER — TELEPHONE (OUTPATIENT)
Dept: FAMILY MEDICINE | Facility: CLINIC | Age: 69
End: 2019-08-28

## 2019-08-28 VITALS
WEIGHT: 173.31 LBS | OXYGEN SATURATION: 96 % | TEMPERATURE: 98 F | RESPIRATION RATE: 16 BRPM | BODY MASS INDEX: 34.94 KG/M2 | HEART RATE: 80 BPM | DIASTOLIC BLOOD PRESSURE: 66 MMHG | HEIGHT: 59 IN | SYSTOLIC BLOOD PRESSURE: 128 MMHG

## 2019-08-28 DIAGNOSIS — B96.89 ACUTE BACTERIAL SINUSITIS: Primary | ICD-10-CM

## 2019-08-28 DIAGNOSIS — J01.90 ACUTE BACTERIAL SINUSITIS: Primary | ICD-10-CM

## 2019-08-28 PROCEDURE — 99214 OFFICE O/P EST MOD 30 MIN: CPT | Mod: S$PBB,,, | Performed by: NURSE PRACTITIONER

## 2019-08-28 PROCEDURE — 99213 OFFICE O/P EST LOW 20 MIN: CPT | Mod: PBBFAC,PN | Performed by: NURSE PRACTITIONER

## 2019-08-28 PROCEDURE — 99999 PR PBB SHADOW E&M-EST. PATIENT-LVL III: ICD-10-PCS | Mod: PBBFAC,,, | Performed by: NURSE PRACTITIONER

## 2019-08-28 PROCEDURE — 99214 PR OFFICE/OUTPT VISIT, EST, LEVL IV, 30-39 MIN: ICD-10-PCS | Mod: S$PBB,,, | Performed by: NURSE PRACTITIONER

## 2019-08-28 PROCEDURE — 99999 PR PBB SHADOW E&M-EST. PATIENT-LVL III: CPT | Mod: PBBFAC,,, | Performed by: NURSE PRACTITIONER

## 2019-08-28 RX ORDER — AMOXICILLIN AND CLAVULANATE POTASSIUM 875; 125 MG/1; MG/1
1 TABLET, FILM COATED ORAL EVERY 12 HOURS
Qty: 14 TABLET | Refills: 0 | Status: SHIPPED | OUTPATIENT
Start: 2019-08-28 | End: 2019-10-16 | Stop reason: ALTCHOICE

## 2019-08-28 NOTE — TELEPHONE ENCOUNTER
"----- Message from Caio Michel sent at 8/27/2019  4:55 PM CDT -----  Contact: Self  Type: Patient Call Back    Who called:Self    What is the request in detail: She states she finished her medication and still has symptoms. She wants to know what to do next.    "Cold"     doxycycline (MONODOX) 100 MG capsule    Can the clinic reply by [facility]?No    Would the patient rather a call back or a response via [facility]? Callback    Best call back number:[phone]    "

## 2019-08-28 NOTE — PROGRESS NOTES
Routine Office Visit    Patient Name: Dayami Mina    : 1950  MRN: [id number]    Chief Complaint:  Cough    Subjective:  Dayami is a 69 y.o. female who presents today for:    1.  Cough - states that she started having a cold 10 days ago.  Was treated with prednisone and doxycycline by her PCP.  Her symptoms originally went away but now they have returned in the last 3-4 days.  States that her left ear still hurts and endorses a productive cough with green mucus, headache, nasal congestion, sinus pain, sinus headaches, runny nose, and postnasal drip.  She is compliant with her nasal sprays as well as her a Norco.  She also takes a Claritin daily for allergy symptoms.  She denies any shortness of breath, chest pain, wheezing, or palpitations and states that she feels like her symptoms are coming from her upper airway as opposed her lungs.  Denies any fevers or chills.  States that her daughter recently had bronchitis.  She has not had to use her albuterol inhaler in the last 3-4 days.    Past Medical History  Past Medical History:   Diagnosis Date    Benign colon polyp 2cm     Depression     Hyperlipidemia     Hypertension        Past Surgical History  Past Surgical History:   Procedure Laterality Date    APPENDECTOMY      BACK SURGERY      breast implants      CLOSED REDUCTION FINGER DISLOCATION      FOOT SURGERY      HYSTERECTOMY      KNEE ARTHROSCOPY Left     MASTECTOMY      SHOULDER ARTHROSCOPY      SINUS SURGERY         Family History  Family History   Problem Relation Age of Onset    Diabetes Mother     Diabetes Father     Stroke Paternal Grandfather     Diabetes Sister        Social History  Social History     Socioeconomic History    Marital status:      Spouse name: Not on file    Number of children: Not on file    Years of education: Not on file    Highest education level: Not on file   Occupational History    Not on file   Social Needs    Financial resource strain:  Not on file    Food insecurity:     Worry: Not on file     Inability: Not on file    Transportation needs:     Medical: Not on file     Non-medical: Not on file   Tobacco Use    Smoking status: Former Smoker     Last attempt to quit: 1988     Years since quittin.6    Smokeless tobacco: Never Used   Substance and Sexual Activity    Alcohol use: No    Drug use: No    Sexual activity: Never   Lifestyle    Physical activity:     Days per week: Not on file     Minutes per session: Not on file    Stress: Not on file   Relationships    Social connections:     Talks on phone: Not on file     Gets together: Not on file     Attends Advent service: Not on file     Active member of club or organization: Not on file     Attends meetings of clubs or organizations: Not on file     Relationship status: Not on file   Other Topics Concern    Not on file   Social History Narrative    Not on file       Current Medications  Current Outpatient Medications on File Prior to Visit   Medication Sig Dispense Refill    albuterol (PROVENTIL) 2.5 mg /3 mL (0.083 %) nebulizer solution Take 3 mLs (2.5 mg total) by nebulization every 6 (six) hours as needed for Wheezing or Shortness of Breath. Rescue 1 Box 0    allopurinol (ZYLOPRIM) 100 MG tablet Take 1 tablet (100 mg total) by mouth once daily. 90 tablet 1    ammonium lactate (LAC-HYDRIN) 12 % lotion Apply topically 2 (two) times daily as needed for Dry Skin. 500 g 5    ANORO ELLIPTA 62.5-25 mcg/actuation DsDv INHALE 1 PUFF BY MOUTH DAILY 60 each 5    aspirin (ECOTRIN) 81 MG EC tablet Take 81 mg by mouth once daily.      blood sugar diagnostic Strp Uses Freestyle meter to monitor readings once daily 50 strip 6    BOOSTRIX TDAP 2.5-8-5 Lf-mcg-Lf/0.5mL Syrg injection       butalbital-acetaminophen-caffeine -40 mg (FIORICET, ESGIC) -40 mg per tablet       doxycycline (MONODOX) 100 MG capsule Take 1 capsule (100 mg total) by mouth 2 (two) times daily. 14  capsule 0    escitalopram oxalate (LEXAPRO) 20 MG tablet Take 1 tablet (20 mg total) by mouth once daily. 90 tablet 1    esomeprazole (NEXIUM) 20 MG capsule Take 20 mg by mouth.      fluticasone (FLONASE) 50 mcg/actuation nasal spray 1 spray (50 mcg total) by Each Nare route once daily. 15.8 mL 0    FOLIC ACID/MULTIVIT-MIN/LUTEIN (CENTRUM SILVER ORAL) Take by mouth.      gabapentin (NEURONTIN) 100 MG capsule 100 mg. 2 tabs in the am and 2 tabs in the pm      HYDROcodone-acetaminophen (NORCO) 7.5-325 mg per tablet Take 1 tablet by mouth.      ipratropium (ATROVENT) 42 mcg (0.06 %) nasal spray 2 sprays by Nasal route 4 (four) times daily. 15 mL 0    lancets Misc 1 lancet by Misc.(Non-Drug; Combo Route) route once daily. 50 each 6    loratadine (CLARITIN) 10 mg tablet Take 10 mg by mouth once daily.      losartan (COZAAR) 50 MG tablet Take 1 tablet (50 mg total) by mouth 2 (two) times daily. 180 tablet 2    metoprolol succinate (TOPROL-XL) 50 MG 24 hr tablet Take 1 tablet (50 mg total) by mouth once daily. 90 tablet 1    MULTIVIT-IRON-MIN-FOLIC ACID 3,500-18-0.4 UNIT-MG-MG ORAL CHEW Take by mouth.      multivitamin capsule Take 1 capsule by mouth once daily.      nystatin (MYCOSTATIN) powder Apply topically 3 (three) times daily. For rash 60 g 2    potassium chloride SA (K-DUR,KLOR-CON) 20 MEQ tablet Take 1 tablet (20 mEq total) by mouth once daily. 10 tablet 2    pravastatin (PRAVACHOL) 40 MG tablet Take 1 tablet (40 mg total) by mouth once daily. 90 tablet 2    promethazine (PHENERGAN) 12.5 MG Tab Take 1 tablet (12.5 mg total) by mouth every 6 (six) hours as needed. 10 tablet 0     No current facility-administered medications on file prior to visit.        Allergies   Review of patient's allergies indicates:   Allergen Reactions    Sulfa (sulfonamide antibiotics) Anaphylaxis    Refresh redness relief [phenylephrin-polyvinyl alcohol] Blisters       Review of Systems (Pertinent positives)  Review of  "Systems   Constitutional: Negative for chills, diaphoresis, fever and weight loss.   HENT: Positive for congestion, ear pain, sinus pain and sore throat. Negative for ear discharge, hearing loss, nosebleeds and tinnitus.         Runny nose and postnasal drip   Eyes: Negative.    Respiratory: Positive for cough and sputum production. Negative for hemoptysis, shortness of breath, wheezing and stridor.    Cardiovascular: Negative for chest pain, palpitations, orthopnea, claudication, leg swelling and PND.   Gastrointestinal: Negative.    Genitourinary: Negative.    Musculoskeletal: Negative.    Neurological: Negative.    Endo/Heme/Allergies: Negative.    Psychiatric/Behavioral: Negative.        /66 (BP Location: Right arm, Patient Position: Sitting, BP Method: Small (Manual))   Pulse 80   Temp 97.6 °F (36.4 °C) (Oral)   Resp 16   Ht 4' 11" (1.499 m)   Wt 78.6 kg (173 lb 4.5 oz)   SpO2 96%   BMI 35.00 kg/m²     Physical Exam   Constitutional: She appears well-developed and well-nourished. No distress.   HENT:   Head: Normocephalic and atraumatic.   Right Ear: Tympanic membrane, external ear and ear canal normal.   Left Ear: Tympanic membrane, external ear and ear canal normal.   Nose: Mucosal edema and rhinorrhea present. Right sinus exhibits maxillary sinus tenderness and frontal sinus tenderness. Left sinus exhibits maxillary sinus tenderness and frontal sinus tenderness.   Mouth/Throat: Uvula is midline, oropharynx is clear and moist and mucous membranes are normal. Tonsils are 0 on the right. Tonsils are 0 on the left. No tonsillar exudate.       Eyes: Pupils are equal, round, and reactive to light. Conjunctivae and EOM are normal.   Neck: Normal range of motion. Neck supple.   Cardiovascular: Normal rate, regular rhythm, normal heart sounds and intact distal pulses. Exam reveals no gallop and no friction rub.   No murmur heard.  Pulmonary/Chest: Effort normal and breath sounds normal. No stridor. No " respiratory distress. She has no wheezes. She has no rales. She exhibits no tenderness.   Abdominal: Soft. Bowel sounds are normal.   Skin: She is not diaphoretic.        Assessment/Plan:  Dayami Mina is a 69 y.o. female who presents today for :    Dayami was seen today for cough.    Diagnoses and all orders for this visit:    Acute bacterial sinusitis  -     amoxicillin-clavulanate 875-125mg (AUGMENTIN) 875-125 mg per tablet; Take 1 tablet by mouth every 12 (twelve) hours.     Symptoms more likely due to bacterial sinusitis at this point.  Breath sounds are completely clear no rales no wheezing no evidence of consolidation on exam.  Will treat with Augmentin as patient has already taken doxycycline.  Potential side effects of Augmentin were discussed.  Continue to take nasal sprays as well as daily Claritin.  No need for further steroids at this point.  Patient is to continue nasal sprays and daily Claritin.  Drink plenty of water.   Use warm salt water gargles or tea with honey as needed for sore throat.  Follow-up to the clinic or contact the clinic if no relief in signs or symptoms in the next 4-5 days.  Patient verbalized understanding of instructions.        This office note has been dictated.  This dictation has been generated using M-Modal Fluency Direct dictation; some phonetic errors may occur.   My collaborating physician is Dr. Guanaco Sheffield.

## 2019-09-03 ENCOUNTER — TELEPHONE (OUTPATIENT)
Dept: FAMILY MEDICINE | Facility: CLINIC | Age: 69
End: 2019-09-03

## 2019-09-03 DIAGNOSIS — B96.89 ACUTE BACTERIAL SINUSITIS: Primary | ICD-10-CM

## 2019-09-03 DIAGNOSIS — J01.90 ACUTE BACTERIAL SINUSITIS: Primary | ICD-10-CM

## 2019-09-03 RX ORDER — AMOXICILLIN AND CLAVULANATE POTASSIUM 875; 125 MG/1; MG/1
1 TABLET, FILM COATED ORAL EVERY 12 HOURS
Qty: 6 TABLET | Refills: 0 | Status: SHIPPED | OUTPATIENT
Start: 2019-09-03 | End: 2019-09-06

## 2019-09-03 RX ORDER — PROMETHAZINE HYDROCHLORIDE AND DEXTROMETHORPHAN HYDROBROMIDE 6.25; 15 MG/5ML; MG/5ML
5 SYRUP ORAL NIGHTLY PRN
Qty: 118 ML | Refills: 0 | Status: SHIPPED | OUTPATIENT
Start: 2019-09-03 | End: 2019-09-13

## 2019-09-03 NOTE — TELEPHONE ENCOUNTER
----- Message from Odette Polanco sent at 9/3/2019 11:51 AM CDT -----  Contact: pt  Type: Patient Call Back    Who called:pt    What is the request in detail:pt is still sick from last week. Wants to discuss medications. Call pt    Can the clinic reply by [facility]?    Would the patient rather a call back or a response via [facility]? Call back    Best call back number:[phone]    Additional Information:

## 2019-09-03 NOTE — TELEPHONE ENCOUNTER
Pt calls to report that her cough is starting to be productive but she only has two antibiotic pills left. She is requesting more antibiotics be sent in as she is just starting to feel better. She also is requesting something to take for cough at night as her cough it keeping her awake.

## 2019-10-16 ENCOUNTER — OFFICE VISIT (OUTPATIENT)
Dept: FAMILY MEDICINE | Facility: CLINIC | Age: 69
End: 2019-10-16
Payer: MEDICARE

## 2019-10-16 VITALS
OXYGEN SATURATION: 95 % | SYSTOLIC BLOOD PRESSURE: 118 MMHG | RESPIRATION RATE: 17 BRPM | BODY MASS INDEX: 36.26 KG/M2 | DIASTOLIC BLOOD PRESSURE: 67 MMHG | HEART RATE: 76 BPM | WEIGHT: 179.88 LBS | TEMPERATURE: 98 F | HEIGHT: 59 IN

## 2019-10-16 DIAGNOSIS — J43.1 PANLOBULAR EMPHYSEMA: Primary | ICD-10-CM

## 2019-10-16 DIAGNOSIS — R53.83 FATIGUE, UNSPECIFIED TYPE: ICD-10-CM

## 2019-10-16 DIAGNOSIS — Z23 NEED FOR INFLUENZA VACCINATION: ICD-10-CM

## 2019-10-16 DIAGNOSIS — J44.89 COPD WITH ASTHMA: ICD-10-CM

## 2019-10-16 PROCEDURE — 99999 PR PBB SHADOW E&M-EST. PATIENT-LVL III: ICD-10-PCS | Mod: PBBFAC,,, | Performed by: INTERNAL MEDICINE

## 2019-10-16 PROCEDURE — 99213 OFFICE O/P EST LOW 20 MIN: CPT | Mod: PBBFAC,25,PN | Performed by: INTERNAL MEDICINE

## 2019-10-16 PROCEDURE — 99999 PR PBB SHADOW E&M-EST. PATIENT-LVL III: CPT | Mod: PBBFAC,,, | Performed by: INTERNAL MEDICINE

## 2019-10-16 PROCEDURE — 99214 PR OFFICE/OUTPT VISIT, EST, LEVL IV, 30-39 MIN: ICD-10-PCS | Mod: S$PBB,,, | Performed by: INTERNAL MEDICINE

## 2019-10-16 PROCEDURE — 90662 IIV NO PRSV INCREASED AG IM: CPT | Mod: PBBFAC,PN

## 2019-10-16 PROCEDURE — 99214 OFFICE O/P EST MOD 30 MIN: CPT | Mod: S$PBB,,, | Performed by: INTERNAL MEDICINE

## 2019-10-16 RX ORDER — PREDNISONE 20 MG/1
40 TABLET ORAL DAILY
Qty: 10 TABLET | Refills: 0 | Status: SHIPPED | OUTPATIENT
Start: 2019-10-16 | End: 2019-10-28 | Stop reason: ALTCHOICE

## 2019-10-16 NOTE — PROGRESS NOTES
"Subjective:       Patient ID: Dayami Mina is a 69 y.o. female.    Chief Complaint: Sinus Problem; Flu Vaccine; Nausea; Establish Care; Spasms (bodyaches/); and sleepy all the time    Fatigue    HPI: 68 y/o w/ HTN COPD presents to follow up fatigue. For last six weeks daily fatigue falling asleep in chair no motivation. No pain denies fever/ occasional clear to green sputum no dysphagia no LE swelling no chagne in breathing when lying flat. Appetite "okay" weight is unchanged. Denies change in bowel habits. Using maintenance inhaler daily no palpitations or racing heart    Review of Systems   Constitutional: Positive for activity change. Negative for appetite change, fatigue, fever and unexpected weight change.   HENT: Positive for hearing loss. Negative for ear pain, rhinorrhea, sore throat and trouble swallowing.    Eyes: Negative for discharge.   Respiratory: Negative for chest tightness, shortness of breath and wheezing.    Cardiovascular: Negative for chest pain, palpitations and leg swelling.   Gastrointestinal: Negative for abdominal pain, blood in stool, constipation, diarrhea and vomiting.   Endocrine: Positive for polydipsia. Negative for cold intolerance, heat intolerance and polyuria.   Genitourinary: Negative for difficulty urinating, dysuria, hematuria and menstrual problem.   Musculoskeletal: Positive for arthralgias, joint swelling and neck pain. Negative for neck stiffness.   Skin: Negative for rash.   Neurological: Positive for weakness and headaches. Negative for dizziness and syncope.   Psychiatric/Behavioral: Negative for confusion, dysphoric mood and suicidal ideas.       Objective:     Vitals:    10/16/19 1412   BP: 118/67   BP Location: Right arm   Patient Position: Sitting   Pulse: 76   Resp: 17   Temp: 97.9 °F (36.6 °C)   TempSrc: Oral   SpO2: 95%   Weight: 81.6 kg (179 lb 14.3 oz)   Height: 4' 11" (1.499 m)          Physical Exam   Constitutional: She is oriented to person, " place, and time. She appears well-developed and well-nourished.   HENT:   Head: Normocephalic and atraumatic.   Eyes: Conjunctivae are normal. No scleral icterus.   Neck: Normal range of motion.   Cardiovascular: Normal rate and regular rhythm. Exam reveals no gallop and no friction rub.   No murmur heard.  Pulmonary/Chest: Effort normal and breath sounds normal. She has no wheezes. She has no rales.   Abdominal: Soft. Bowel sounds are normal. There is no tenderness. There is no rebound and no guarding.   Musculoskeletal: Normal range of motion. She exhibits no edema or tenderness.   Neurological: She is alert and oriented to person, place, and time. No cranial nerve deficit.   Skin: Skin is warm and dry.   Psychiatric: She has a normal mood and affect.     xr w/o consolidation or effusion   Assessment and Plan   1. Panlobular emphysema  Repeat renal function short steroid course follow up with me in two weeks to have echocardiogram prior  - Basic metabolic panel; Future  - Brain natriuretic peptide; Future  - X-Ray Chest PA And Lateral; Future    2. Fatigue, unspecified type  Blood coutn and thyroid will treat for exacerbation as above  - CBC auto differential; Future  - TSH; Future

## 2019-10-21 ENCOUNTER — PATIENT MESSAGE (OUTPATIENT)
Dept: FAMILY MEDICINE | Facility: CLINIC | Age: 69
End: 2019-10-21

## 2019-10-21 ENCOUNTER — OFFICE VISIT (OUTPATIENT)
Dept: CARDIOLOGY | Facility: CLINIC | Age: 69
End: 2019-10-21
Payer: MEDICARE

## 2019-10-21 VITALS
BODY MASS INDEX: 36.44 KG/M2 | WEIGHT: 180.75 LBS | OXYGEN SATURATION: 96 % | SYSTOLIC BLOOD PRESSURE: 179 MMHG | DIASTOLIC BLOOD PRESSURE: 72 MMHG | HEART RATE: 72 BPM | HEIGHT: 59 IN

## 2019-10-21 DIAGNOSIS — I10 ESSENTIAL HYPERTENSION: Chronic | ICD-10-CM

## 2019-10-21 DIAGNOSIS — R07.89 CHEST PAIN, ATYPICAL: ICD-10-CM

## 2019-10-21 DIAGNOSIS — E78.2 MIXED HYPERLIPIDEMIA: Chronic | ICD-10-CM

## 2019-10-21 DIAGNOSIS — I10 HYPERTENSION: ICD-10-CM

## 2019-10-21 DIAGNOSIS — R06.09 DOE (DYSPNEA ON EXERTION): ICD-10-CM

## 2019-10-21 DIAGNOSIS — R00.2 PALPITATIONS: ICD-10-CM

## 2019-10-21 DIAGNOSIS — J43.1 PANLOBULAR EMPHYSEMA: Primary | ICD-10-CM

## 2019-10-21 PROCEDURE — 99214 PR OFFICE/OUTPT VISIT, EST, LEVL IV, 30-39 MIN: ICD-10-PCS | Mod: S$PBB,,, | Performed by: INTERNAL MEDICINE

## 2019-10-21 PROCEDURE — 99999 PR PBB SHADOW E&M-EST. PATIENT-LVL III: CPT | Mod: PBBFAC,,, | Performed by: INTERNAL MEDICINE

## 2019-10-21 PROCEDURE — 93010 ELECTROCARDIOGRAM REPORT: CPT | Mod: S$PBB,,, | Performed by: INTERNAL MEDICINE

## 2019-10-21 PROCEDURE — 99213 OFFICE O/P EST LOW 20 MIN: CPT | Mod: PBBFAC | Performed by: INTERNAL MEDICINE

## 2019-10-21 PROCEDURE — 99999 PR PBB SHADOW E&M-EST. PATIENT-LVL III: ICD-10-PCS | Mod: PBBFAC,,, | Performed by: INTERNAL MEDICINE

## 2019-10-21 PROCEDURE — 93010 EKG 12-LEAD: ICD-10-PCS | Mod: S$PBB,,, | Performed by: INTERNAL MEDICINE

## 2019-10-21 PROCEDURE — 99214 OFFICE O/P EST MOD 30 MIN: CPT | Mod: S$PBB,,, | Performed by: INTERNAL MEDICINE

## 2019-10-21 PROCEDURE — 93005 ELECTROCARDIOGRAM TRACING: CPT | Mod: PBBFAC | Performed by: INTERNAL MEDICINE

## 2019-10-21 RX ORDER — ALLOPURINOL 100 MG/1
TABLET ORAL
Qty: 90 TABLET | Refills: 1 | Status: SHIPPED | OUTPATIENT
Start: 2019-10-21 | End: 2020-07-14 | Stop reason: SDUPTHER

## 2019-10-21 NOTE — PROGRESS NOTES
Subjective:    Patient ID:  Dayami Mina is a 69 y.o. female who presents for follow-up of Fatigue      HPI     HTN, COPD, CRI, palpitations     Stress test 8/1/17  LVEF: 66 %  Impression: NORMAL MYOCARDIAL PERFUSION  1. The perfusion scan is free of evidence for myocardial ischemia or injury.   2. There is a mild intensity fixed defect in the anterior wall of the left ventricle, secondary to breast attenuation.   3. Resting wall motion is physiologic.   4. Resting LV function is normal.      Echo 8/1/17    1 - Low normal to mildly depressed left ventricular systolic function (EF 50-55%).      Holter 8/1/17  1. Sinus rhythm with heart rates varying between 56 and 105 bpm with an average of 72 bpm.     VENTRICULAR ARRHYTHMIAS  1. There were very frequent PVCs totalling 4114 and averaging 171 per hour.     2. There were no episodes of ventricular tachycardia.    SUPRA VENTRICULAR ARRHYTHMIAS  1. There were very rare PACs recorded totalling 1 and averaging less than 1 per hour.     2. There were no episodes of sustained supraventricular tachycardia.    SINUS NODE FUNCTION  1. There was no evidence of high grade SA manuel block.     AV CONDUCTION  1. There was no evidence of high grade AV block.      Admitted 12/2/17  Mrs. Dayami Mina is a 67 y.o. female with essential hypertension, hyperlipidemia (.2 Apr 2015), GERD, and depression who presents to [facility] ED with complaints of dyspnea today.  She shortness of breath started gradually throughout the day and she thinks it was triggered by some renovations bring done in her home.  Some plywood was being placed in her home and the installers says it was treated plywood.  She started to have some wheezing and coughing that was productive of greenish sputum.  She went to bed to take a nap but woke up still having these symptoms.  She tried her albuterol inhalers without relief and decided to seek ED evaluation.  She denies any fevers, chills, chest  pain, pleurisy, palpitations, diaphoresis, nausea, vomiting, hemoptysis, nor any lower extremity pain or swelling.  She did not have any sick contacts or recent travel, and denies any similar episodes in the past.  Of note, she has been following with Dr. Vishal Mcmahan who initially felt that she had COPD and treated her for that for three years before changing his opinion.  She did smoke but quit in 1988.       Mrs. Dayami Mina is a 67 y.o. female with essential hypertension, hyperlipidemia (.2 Apr 2015), GERD, and depression who presents to [facility] ED with complaints of dyspnea .She shortness of breath started gradually throughout the day and she thinks it was triggered by some renovations bring done in her home.  Some plywood was being placed in her home and the installers says it was treated plywood.  She started to have some wheezing and coughing that was productive of greenish sputum.  She went to bed to take a nap but woke up still having these symptoms.  She tried her albuterol inhalers without relief and decided to seek ED evaluation.  She denies any fevers, chills, chest pain, pleurisy, palpitations, diaphoresis, nausea, vomiting, hemoptysis, nor any lower extremity pain or swelling.  She did not have any sick contacts or recent travel, and denies any similar episodes in the past.  Of note, she has been following with Dr. Vishal Mcmahan who initially felt that she had COPD and treated her for that for three years before changing his opinion,after couple PFT study show no sign of COPD,  She did smoke but quit in 1988. She was started on supplemental oxygen for hypoxia with nebulizer treatment,CTA chest show no PE,no consolidation,or fluid overload,SOB and hypoxic episode may is duo to expose to chemical agent.her SOB,and  Hypoxia resolved,she was stable on RA,she improved better than expected,she has been discharged home with follow up with her pulmonologist ,she has already  "appointment.      1/22/18 Denies CP  SOB stable  EKG NSR - ok  Needs clearance for toe surgery     1/18/19 Denies CP  Stable SOB  EKG NSR 1st degree AVB otherwise ok  Upcoming knee replacement     7/3/19 Recently Dx with diverticulitis - scheduled for upcoming colonoscopy  Some fatigue  BP runs low on occasion  EKG NSR low voltage otherwise ok    Saw Dr French 10/16/19  HPI: 70 y/o w/ HTN COPD presents to follow up fatigue. For last six weeks daily fatigue falling asleep in chair no motivation. No pain denies fever/ occasional clear to green sputum no dysphagia no LE swelling no chagne in breathing when lying flat. Appetite "okay" weight is unchanged. Denies change in bowel habits. Using maintenance inhaler daily no palpitations or racing heart    Labs 10/16/19  K 4.3  Cr 1.1  BNP 45    Reports worsening fatigue  Getting over recently prolonged sinus infection  EKG NSR - ok    Review of Systems   Constitution: Negative for decreased appetite.   HENT: Negative for ear discharge.    Eyes: Negative for blurred vision.   Respiratory: Negative for hemoptysis.    Endocrine: Negative for polyphagia.   Hematologic/Lymphatic: Negative for adenopathy.   Skin: Negative for color change.   Musculoskeletal: Negative for joint swelling.   Genitourinary: Negative for bladder incontinence.   Neurological: Negative for brief paralysis.   Psychiatric/Behavioral: Negative for hallucinations.   Allergic/Immunologic: Negative for hives.        Objective:    Physical Exam   Constitutional: She is oriented to person, place, and time. She appears well-developed and well-nourished.   HENT:   Head: Normocephalic and atraumatic.   Eyes: Pupils are equal, round, and reactive to light. Conjunctivae are normal.   Neck: Normal range of motion. Neck supple.   Cardiovascular: Normal rate, normal heart sounds and intact distal pulses.   Pulmonary/Chest: Effort normal and breath sounds normal.   Abdominal: Soft. Bowel sounds are normal. "   Musculoskeletal: Normal range of motion.   Neurological: She is alert and oriented to person, place, and time.   Skin: Skin is warm and dry.         Assessment:       1. Panlobular emphysema    2. Essential hypertension    3. Mixed hyperlipidemia    4. Palpitations    5. ARANGO (dyspnea on exertion)    6. Chest pain, atypical         Plan:       Echo and lexiscan myoview for worsening fatigue symptoms

## 2019-10-28 ENCOUNTER — OFFICE VISIT (OUTPATIENT)
Dept: FAMILY MEDICINE | Facility: CLINIC | Age: 69
End: 2019-10-28
Payer: MEDICARE

## 2019-10-28 VITALS
DIASTOLIC BLOOD PRESSURE: 68 MMHG | HEART RATE: 67 BPM | TEMPERATURE: 98 F | WEIGHT: 183.19 LBS | OXYGEN SATURATION: 95 % | SYSTOLIC BLOOD PRESSURE: 110 MMHG | BODY MASS INDEX: 38.45 KG/M2 | HEIGHT: 58 IN

## 2019-10-28 DIAGNOSIS — I10 ESSENTIAL HYPERTENSION: Chronic | ICD-10-CM

## 2019-10-28 DIAGNOSIS — J43.1 PANLOBULAR EMPHYSEMA: Primary | ICD-10-CM

## 2019-10-28 DIAGNOSIS — R53.83 FATIGUE, UNSPECIFIED TYPE: ICD-10-CM

## 2019-10-28 DIAGNOSIS — R05.3 PERSISTENT COUGH: ICD-10-CM

## 2019-10-28 PROCEDURE — 99214 OFFICE O/P EST MOD 30 MIN: CPT | Mod: S$PBB,,, | Performed by: INTERNAL MEDICINE

## 2019-10-28 PROCEDURE — 99213 OFFICE O/P EST LOW 20 MIN: CPT | Mod: PBBFAC,PN | Performed by: INTERNAL MEDICINE

## 2019-10-28 PROCEDURE — 99999 PR PBB SHADOW E&M-EST. PATIENT-LVL III: CPT | Mod: PBBFAC,,, | Performed by: INTERNAL MEDICINE

## 2019-10-28 PROCEDURE — 99214 PR OFFICE/OUTPT VISIT, EST, LEVL IV, 30-39 MIN: ICD-10-PCS | Mod: S$PBB,,, | Performed by: INTERNAL MEDICINE

## 2019-10-28 PROCEDURE — 99999 PR PBB SHADOW E&M-EST. PATIENT-LVL III: ICD-10-PCS | Mod: PBBFAC,,, | Performed by: INTERNAL MEDICINE

## 2019-10-28 NOTE — PROGRESS NOTES
"Subjective:       Patient ID: Dayami Mina is a 69 y.o. female.    Chief Complaint: Follow-up    F/u fatigue    HPI: 70 y/o w/ chronic neck/back pain HTN COPD presents for follow up with her daughter (Frieda). Last visit in evaluation of chronic fatigue and productive cough treated with seven day course of oral prednisone. se notes after second day energy improves and less coughing, but after stopping prednisone symptoms have returned. Feels very tired throughout day easily falls asleep while sitting in recliner. She does admit to watching television late most nights (to bed between midnight and 2 a.m.) and then sleeping late into morning. Mood "fine" denies depression or anhedonia. Looking forwardt o spending time at [employer] next month no LE swelling weight unchanged    Review of Systems   Constitutional: Positive for fatigue. Negative for activity change, appetite change, fever and unexpected weight change.   HENT: Negative for ear pain, rhinorrhea and sore throat.    Eyes: Negative for discharge and visual disturbance.   Respiratory: Negative for chest tightness, shortness of breath and wheezing.    Cardiovascular: Negative for chest pain, palpitations and leg swelling.   Gastrointestinal: Negative for abdominal pain, constipation and diarrhea.   Endocrine: Negative for cold intolerance and heat intolerance.   Genitourinary: Negative for dysuria and hematuria.   Musculoskeletal: Negative for joint swelling and neck stiffness.   Skin: Negative for rash.   Neurological: Negative for dizziness, syncope, weakness and headaches.   Psychiatric/Behavioral: Negative for suicidal ideas.       Objective:     Vitals:    10/28/19 1258   BP: 110/68   Pulse: 67   Temp: 97.7 °F (36.5 °C)   TempSrc: Oral   SpO2: 95%   Weight: 83.1 kg (183 lb 3.2 oz)   Height: 4' 10" (1.473 m)          Physical Exam   Constitutional: She is oriented to person, place, and time. She appears well-developed and well-nourished.   HENT: "   Head: Normocephalic and atraumatic.   Eyes: Conjunctivae are normal. No scleral icterus.   Neck: Normal range of motion.   Cardiovascular: Normal rate and regular rhythm. Exam reveals no gallop and no friction rub.   No murmur heard.  Pulmonary/Chest: Effort normal and breath sounds normal. She has no wheezes. She has no rales.   Abdominal: Soft. Bowel sounds are normal. There is no tenderness. There is no rebound and no guarding.   Musculoskeletal: Normal range of motion. She exhibits no edema or tenderness.   Neurological: She is alert and oriented to person, place, and time. No cranial nerve deficit.   Skin: Skin is warm and dry.   Psychiatric: She has a normal mood and affect.       Assessment and Plan   1. Panlobular emphysema  With persistant cough and normal chest xray but some improvement with steroid course. Consider atypical pneumonia (vs MAC). Check non constrast chest ct to evaluate  - CT Chest Without Contrast; Future    2. Essential hypertension  At goal    3. Persistent cough  As above  - CT Chest Without Contrast; Future    4. Fatigue, unspecified type  Normal thyroid renal and blood count CT of chest as above, if normal consider sleep medicine evaluation for KELLEY

## 2019-10-30 ENCOUNTER — HOSPITAL ENCOUNTER (OUTPATIENT)
Dept: RADIOLOGY | Facility: HOSPITAL | Age: 69
Discharge: HOME | End: 2019-10-30
Attending: INTERNAL MEDICINE
Payer: MEDICARE

## 2019-10-30 ENCOUNTER — HOSPITAL ENCOUNTER (OUTPATIENT)
Dept: CARDIOLOGY | Facility: HOSPITAL | Age: 69
Discharge: HOME | End: 2019-10-30
Attending: INTERNAL MEDICINE
Payer: MEDICARE

## 2019-10-30 VITALS — HEIGHT: 58 IN | WEIGHT: 183 LBS | BODY MASS INDEX: 38.41 KG/M2

## 2019-10-30 DIAGNOSIS — R06.09 DOE (DYSPNEA ON EXERTION): ICD-10-CM

## 2019-10-30 DIAGNOSIS — R07.89 CHEST PAIN, ATYPICAL: ICD-10-CM

## 2019-10-30 DIAGNOSIS — R00.2 PALPITATIONS: ICD-10-CM

## 2019-10-30 DIAGNOSIS — I10 ESSENTIAL HYPERTENSION: Chronic | ICD-10-CM

## 2019-10-30 DIAGNOSIS — I10 ESSENTIAL HYPERTENSION: ICD-10-CM

## 2019-10-30 DIAGNOSIS — E78.2 MIXED HYPERLIPIDEMIA: ICD-10-CM

## 2019-10-30 DIAGNOSIS — J43.1 PANLOBULAR EMPHYSEMA: ICD-10-CM

## 2019-10-30 DIAGNOSIS — E78.2 MIXED HYPERLIPIDEMIA: Chronic | ICD-10-CM

## 2019-10-30 LAB
AORTIC ROOT ANNULUS: 2.66 CM
AORTIC VALVE CUSP SEPERATION: 1.61 CM
ASCENDING AORTA: 2.64 CM
AV INDEX (PROSTH): 0.59
AV MEAN GRADIENT: 4 MMHG
AV PEAK GRADIENT: 7 MMHG
AV VALVE AREA: 1.73 CM2
AV VELOCITY RATIO: 0.55
BSA FOR ECHO PROCEDURE: 1.84 M2
CV ECHO LV RWT: 0.55 CM
CV STRESS BASE HR: 71 BPM
DIASTOLIC BLOOD PRESSURE: 77 MMHG
DOP CALC AO PEAK VEL: 1.34 M/S
DOP CALC AO VTI: 33.76 CM
DOP CALC LVOT AREA: 3 CM2
DOP CALC LVOT DIAMETER: 1.94 CM
DOP CALC LVOT PEAK VEL: 0.74 M/S
DOP CALC LVOT STROKE VOLUME: 58.56 CM3
DOP CALCLVOT PEAK VEL VTI: 19.82 CM
E WAVE DECELERATION TIME: 218.28 MSEC
E/A RATIO: 0.95
E/E' RATIO: 8.67 M/S
ECHO LV POSTERIOR WALL: 1.01 CM (ref 0.6–1.1)
FRACTIONAL SHORTENING: 35 % (ref 28–44)
INTERVENTRICULAR SEPTUM: 1.04 CM (ref 0.6–1.1)
IVRT: 0.12 MSEC
LA MAJOR: 4.91 CM
LA MINOR: 5 CM
LA WIDTH: 3.32 CM
LEFT ATRIUM SIZE: 2.88 CM
LEFT ATRIUM VOLUME INDEX: 23 ML/M2
LEFT ATRIUM VOLUME: 40.27 CM3
LEFT INTERNAL DIMENSION IN SYSTOLE: 2.38 CM (ref 2.1–4)
LEFT VENTRICLE DIASTOLIC VOLUME INDEX: 32.39 ML/M2
LEFT VENTRICLE DIASTOLIC VOLUME: 56.81 ML
LEFT VENTRICLE MASS INDEX: 65 G/M2
LEFT VENTRICLE SYSTOLIC VOLUME INDEX: 11.3 ML/M2
LEFT VENTRICLE SYSTOLIC VOLUME: 19.78 ML
LEFT VENTRICULAR INTERNAL DIMENSION IN DIASTOLE: 3.66 CM (ref 3.5–6)
LEFT VENTRICULAR MASS: 114.7 G
LV LATERAL E/E' RATIO: 7.8 M/S
LV SEPTAL E/E' RATIO: 9.75 M/S
MV PEAK A VEL: 0.82 M/S
MV PEAK E VEL: 0.78 M/S
NUC STRESS DIASTOLIC VOLUME INDEX: 58
NUC STRESS EJECTION FRACTION: 75 %
NUC STRESS SYSTOLIC VOLUME INDEX: 15
OHS CV CPX 85 PERCENT MAX PREDICTED HEART RATE MALE: 122
OHS CV CPX MAX PREDICTED HEART RATE: 144
OHS CV CPX PATIENT IS FEMALE: 1
OHS CV CPX PATIENT IS MALE: 0
OHS CV CPX PEAK DIASTOLIC BLOOD PRESSURE: 70 MMHG
OHS CV CPX PEAK HEAR RATE: 100 BPM
OHS CV CPX PEAK RATE PRESSURE PRODUCT: NORMAL
OHS CV CPX PEAK SYSTOLIC BLOOD PRESSURE: 137 MMHG
OHS CV CPX PERCENT MAX PREDICTED HEART RATE ACHIEVED: 70
OHS CV CPX RATE PRESSURE PRODUCT PRESENTING: NORMAL
PISA TR MAX VEL: 1.32 M/S
PULM VEIN S/D RATIO: 1.39
PV PEAK D VEL: 0.44 M/S
PV PEAK S VEL: 0.61 M/S
RA MAJOR: 3.93 CM
RA PRESSURE: 8 MMHG
RA WIDTH: 3.33 CM
RIGHT VENTRICULAR END-DIASTOLIC DIMENSION: 2.82 CM
RV TISSUE DOPPLER FREE WALL SYSTOLIC VELOCITY 1 (APICAL 4 CHAMBER VIEW): 9.96 CM/S
SINUS: 2.63 CM
STJ: 2.25 CM
STRESS ECHO TARGET HR: 127 BPM
SYSTOLIC BLOOD PRESSURE: 146 MMHG
TDI LATERAL: 0.1 M/S
TDI SEPTAL: 0.08 M/S
TDI: 0.09 M/S
TR MAX PG: 7 MMHG
TRICUSPID ANNULAR PLANE SYSTOLIC EXCURSION: 2.5 CM
TV REST PULMONARY ARTERY PRESSURE: 15 MMHG

## 2019-10-30 PROCEDURE — 93018 STRESS TEST WITH MYOCARDIAL PERFUSION (CUPID ONLY): ICD-10-PCS | Mod: ,,, | Performed by: INTERNAL MEDICINE

## 2019-10-30 PROCEDURE — 78452 HT MUSCLE IMAGE SPECT MULT: CPT | Mod: 26,,, | Performed by: INTERNAL MEDICINE

## 2019-10-30 PROCEDURE — 93016 STRESS TEST WITH MYOCARDIAL PERFUSION (CUPID ONLY): ICD-10-PCS | Mod: ,,, | Performed by: INTERNAL MEDICINE

## 2019-10-30 PROCEDURE — 93017 CV STRESS TEST TRACING ONLY: CPT

## 2019-10-30 PROCEDURE — 63600175 PHARM REV CODE 636 W HCPCS: Performed by: INTERNAL MEDICINE

## 2019-10-30 PROCEDURE — A9502 TC99M TETROFOSMIN: HCPCS

## 2019-10-30 PROCEDURE — 78452 STRESS TEST WITH MYOCARDIAL PERFUSION (CUPID ONLY): ICD-10-PCS | Mod: 26,,, | Performed by: INTERNAL MEDICINE

## 2019-10-30 PROCEDURE — 93018 CV STRESS TEST I&R ONLY: CPT | Mod: ,,, | Performed by: INTERNAL MEDICINE

## 2019-10-30 PROCEDURE — 93016 CV STRESS TEST SUPVJ ONLY: CPT | Mod: ,,, | Performed by: INTERNAL MEDICINE

## 2019-10-30 PROCEDURE — 93306 TTE W/DOPPLER COMPLETE: CPT

## 2019-10-30 PROCEDURE — 93306 ECHO (CUPID ONLY): ICD-10-PCS | Mod: 26,,, | Performed by: INTERNAL MEDICINE

## 2019-10-30 PROCEDURE — 93306 TTE W/DOPPLER COMPLETE: CPT | Mod: 26,,, | Performed by: INTERNAL MEDICINE

## 2019-10-30 RX ORDER — REGADENOSON 0.08 MG/ML
0.4 INJECTION, SOLUTION INTRAVENOUS ONCE
Status: COMPLETED | OUTPATIENT
Start: 2019-10-30 | End: 2019-10-30

## 2019-10-30 RX ADMIN — REGADENOSON 0.4 MG: 0.08 INJECTION, SOLUTION INTRAVENOUS at 10:10

## 2019-11-12 ENCOUNTER — PATIENT MESSAGE (OUTPATIENT)
Dept: ADMINISTRATIVE | Facility: OTHER | Age: 69
End: 2019-11-12

## 2019-11-13 ENCOUNTER — OFFICE VISIT (OUTPATIENT)
Dept: CARDIOLOGY | Facility: CLINIC | Age: 69
End: 2019-11-13
Payer: MEDICARE

## 2019-11-13 VITALS
HEART RATE: 67 BPM | WEIGHT: 183.63 LBS | SYSTOLIC BLOOD PRESSURE: 172 MMHG | OXYGEN SATURATION: 94 % | DIASTOLIC BLOOD PRESSURE: 72 MMHG | BODY MASS INDEX: 38.54 KG/M2 | HEIGHT: 58 IN

## 2019-11-13 DIAGNOSIS — J43.1 PANLOBULAR EMPHYSEMA: Primary | ICD-10-CM

## 2019-11-13 DIAGNOSIS — I10 ESSENTIAL HYPERTENSION: Chronic | ICD-10-CM

## 2019-11-13 DIAGNOSIS — R07.89 CHEST PAIN, ATYPICAL: ICD-10-CM

## 2019-11-13 DIAGNOSIS — R00.2 PALPITATIONS: ICD-10-CM

## 2019-11-13 DIAGNOSIS — E78.2 MIXED HYPERLIPIDEMIA: Chronic | ICD-10-CM

## 2019-11-13 DIAGNOSIS — R06.09 DOE (DYSPNEA ON EXERTION): ICD-10-CM

## 2019-11-13 PROCEDURE — 99213 OFFICE O/P EST LOW 20 MIN: CPT | Mod: PBBFAC | Performed by: INTERNAL MEDICINE

## 2019-11-13 PROCEDURE — 99999 PR PBB SHADOW E&M-EST. PATIENT-LVL III: ICD-10-PCS | Mod: PBBFAC,,, | Performed by: INTERNAL MEDICINE

## 2019-11-13 PROCEDURE — 99213 OFFICE O/P EST LOW 20 MIN: CPT | Mod: S$PBB,,, | Performed by: INTERNAL MEDICINE

## 2019-11-13 PROCEDURE — 99213 PR OFFICE/OUTPT VISIT, EST, LEVL III, 20-29 MIN: ICD-10-PCS | Mod: S$PBB,,, | Performed by: INTERNAL MEDICINE

## 2019-11-13 PROCEDURE — 99999 PR PBB SHADOW E&M-EST. PATIENT-LVL III: CPT | Mod: PBBFAC,,, | Performed by: INTERNAL MEDICINE

## 2019-11-13 RX ORDER — MINERAL OIL
180 ENEMA (ML) RECTAL DAILY
COMMUNITY
Start: 2019-11-01 | End: 2024-03-20

## 2019-11-13 NOTE — PROGRESS NOTES
Subjective:    Patient ID:  Dayami Mina is a 69 y.o. female who presents for follow-up of Results      HPI     HTN, COPD, CRI, palpitations     Stress test 10/30/19    Normal [place] myocardial perfusion study.    The perfusion scan is free of evidence from myocardial ischemia or injury.    Gated perfusion images showed an ejection fraction of 75 % post stress.    There is normal wall motion post stress.     Echo 10/30/19  · Normal left ventricular systolic function. The estimated ejection fraction is 60%  · Concentric left ventricular remodeling.  · No wall motion abnormalities.  · Normal right ventricular systolic function.     Holter 8/1/17  1. Sinus rhythm with heart rates varying between 56 and 105 bpm with an average of 72 bpm.     VENTRICULAR ARRHYTHMIAS  1. There were very frequent PVCs totalling 4114 and averaging 171 per hour.     2. There were no episodes of ventricular tachycardia.    SUPRA VENTRICULAR ARRHYTHMIAS  1. There were very rare PACs recorded totalling 1 and averaging less than 1 per hour.     2. There were no episodes of sustained supraventricular tachycardia.    SINUS NODE FUNCTION  1. There was no evidence of high grade SA manuel block.     AV CONDUCTION  1. There was no evidence of high grade AV block.      Admitted 12/2/17  Mrs. Dayami Mina is a 67 y.o. female with essential hypertension, hyperlipidemia (.2 Apr 2015), GERD, and depression who presents to [facility] ED with complaints of dyspnea today.  She shortness of breath started gradually throughout the day and she thinks it was triggered by some renovations bring done in her home.  Some plywood was being placed in her home and the installers says it was treated plywood.  She started to have some wheezing and coughing that was productive of greenish sputum.  She went to bed to take a nap but woke up still having these symptoms.  She tried her albuterol inhalers without relief and decided to seek ED evaluation.   She denies any fevers, chills, chest pain, pleurisy, palpitations, diaphoresis, nausea, vomiting, hemoptysis, nor any lower extremity pain or swelling.  She did not have any sick contacts or recent travel, and denies any similar episodes in the past.  Of note, she has been following with Dr. Vishal Mcmahan who initially felt that she had COPD and treated her for that for three years before changing his opinion.  She did smoke but quit in 1988.       Mrs. Dayami Mina is a 67 y.o. female with essential hypertension, hyperlipidemia (.2 Apr 2015), GERD, and depression who presents to [facility] ED with complaints of dyspnea .She shortness of breath started gradually throughout the day and she thinks it was triggered by some renovations bring done in her home.  Some plywood was being placed in her home and the installers says it was treated plywood.  She started to have some wheezing and coughing that was productive of greenish sputum.  She went to bed to take a nap but woke up still having these symptoms.  She tried her albuterol inhalers without relief and decided to seek ED evaluation.  She denies any fevers, chills, chest pain, pleurisy, palpitations, diaphoresis, nausea, vomiting, hemoptysis, nor any lower extremity pain or swelling.  She did not have any sick contacts or recent travel, and denies any similar episodes in the past.  Of note, she has been following with Dr. Vishal Mcmahan who initially felt that she had COPD and treated her for that for three years before changing his opinion,after couple PFT study show no sign of COPD,  She did smoke but quit in 1988. She was started on supplemental oxygen for hypoxia with nebulizer treatment,CTA chest show no PE,no consolidation,or fluid overload,SOB and hypoxic episode may is duo to expose to chemical agent.her SOB,and  Hypoxia resolved,she was stable on RA,she improved better than expected,she has been discharged home with follow up with her  "pulmonologist ,she has already appointment.      1/22/18 Denies CP  SOB stable  EKG NSR - ok  Needs clearance for toe surgery     1/18/19 Denies CP  Stable SOB  EKG NSR 1st degree AVB otherwise ok  Upcoming knee replacement     7/3/19 Recently Dx with diverticulitis - scheduled for upcoming colonoscopy  Some fatigue  BP runs low on occasion  EKG NSR low voltage otherwise ok     Saw Dr French 10/16/19  HPI: 68 y/o w/ HTN COPD presents to follow up fatigue. For last six weeks daily fatigue falling asleep in chair no motivation. No pain denies fever/ occasional clear to green sputum no dysphagia no LE swelling no chagne in breathing when lying flat. Appetite "okay" weight is unchanged. Denies change in bowel habits. Using maintenance inhaler daily no palpitations or racing heart     Labs 10/16/19  K 4.3  Cr 1.1  BNP 45     10/21/19 Reports worsening fatigue  Getting over recently prolonged sinus infection  EKG NSR - ok    11/13/19 Still with fatigue and sinus issues  Denies CP  Mild stable ARANGO    Review of Systems   Constitution: Negative for decreased appetite.   HENT: Negative for ear discharge.    Eyes: Negative for blurred vision.   Respiratory: Negative for hemoptysis.    Endocrine: Negative for polyphagia.   Hematologic/Lymphatic: Negative for adenopathy.   Skin: Negative for color change.   Musculoskeletal: Negative for joint swelling.   Genitourinary: Negative for bladder incontinence.   Neurological: Negative for brief paralysis.   Psychiatric/Behavioral: Negative for hallucinations.   Allergic/Immunologic: Negative for hives.        Objective:    Physical Exam   Constitutional: She is oriented to person, place, and time. She appears well-developed and well-nourished.   HENT:   Head: Normocephalic and atraumatic.   Eyes: Pupils are equal, round, and reactive to light. Conjunctivae are normal.   Neck: Normal range of motion. Neck supple.   Cardiovascular: Normal rate, normal heart sounds and intact " distal pulses.   Pulmonary/Chest: Effort normal and breath sounds normal.   Abdominal: Soft. Bowel sounds are normal.   Musculoskeletal: Normal range of motion.   Neurological: She is alert and oriented to person, place, and time.   Skin: Skin is warm and dry.         Assessment:       1. Panlobular emphysema    2. Essential hypertension    3. Mixed hyperlipidemia    4. Palpitations    5. ARANGO (dyspnea on exertion)    6. Chest pain, atypical         Plan:       Cardiac stable  OV 6 months

## 2019-11-19 ENCOUNTER — OFFICE VISIT (OUTPATIENT)
Dept: FAMILY MEDICINE | Facility: CLINIC | Age: 69
End: 2019-11-19
Payer: MEDICARE

## 2019-11-19 VITALS
HEIGHT: 58 IN | WEIGHT: 181.19 LBS | RESPIRATION RATE: 16 BRPM | SYSTOLIC BLOOD PRESSURE: 125 MMHG | TEMPERATURE: 98 F | BODY MASS INDEX: 38.04 KG/M2 | OXYGEN SATURATION: 98 % | HEART RATE: 73 BPM | DIASTOLIC BLOOD PRESSURE: 77 MMHG

## 2019-11-19 DIAGNOSIS — M79.621 AXILLARY PAIN, RIGHT: ICD-10-CM

## 2019-11-19 DIAGNOSIS — M25.511 ACUTE PAIN OF RIGHT SHOULDER: Primary | ICD-10-CM

## 2019-11-19 PROCEDURE — 99999 PR PBB SHADOW E&M-EST. PATIENT-LVL III: CPT | Mod: PBBFAC,,, | Performed by: NURSE PRACTITIONER

## 2019-11-19 PROCEDURE — 99213 OFFICE O/P EST LOW 20 MIN: CPT | Mod: PBBFAC,25,PN | Performed by: NURSE PRACTITIONER

## 2019-11-19 PROCEDURE — 99999 PR PBB SHADOW E&M-EST. PATIENT-LVL III: ICD-10-PCS | Mod: PBBFAC,,, | Performed by: NURSE PRACTITIONER

## 2019-11-19 PROCEDURE — 1159F PR MEDICATION LIST DOCUMENTED IN MEDICAL RECORD: ICD-10-PCS | Mod: ,,, | Performed by: NURSE PRACTITIONER

## 2019-11-19 PROCEDURE — 1159F MED LIST DOCD IN RCRD: CPT | Mod: ,,, | Performed by: NURSE PRACTITIONER

## 2019-11-19 PROCEDURE — 99214 PR OFFICE/OUTPT VISIT, EST, LEVL IV, 30-39 MIN: ICD-10-PCS | Mod: S$PBB,,, | Performed by: NURSE PRACTITIONER

## 2019-11-19 PROCEDURE — 99214 OFFICE O/P EST MOD 30 MIN: CPT | Mod: S$PBB,,, | Performed by: NURSE PRACTITIONER

## 2019-11-19 NOTE — PROGRESS NOTES
"Routine Office Visit    Patient Name: Dayami Mina    : 1950  MRN: [id number]    Chief Complaint:  Shoulder pain    Subjective:  Dayami is a 69 y.o. female who presents today for:    1.  Shoulder pain - patient presents for pain of her right shoulder.  She states that she has been having right anterior/posterior aching shoulder pain which started in the last 2 weeks.  No specific inciting event or injury for this pain.  The pain does not radiate anywhere.  Denies any numbness or tingling of the right upper extremity.  Of note, she does endorse some upper chest tenderness near her right pectoral region.  She states that she feels a knot" in the area.  She is worried that this is a swollen lymph node because she has had fibrocystic disease before requiring bilateral mastectomies.  She however denies any breast changes.  She denies any discharge or redness to the area near the knot, but it is tender to palpation.  Overall she has tried nothing for the symptoms.  Denies any fevers, chills, chest pain, nausea, vomiting, diarrhea, or abdominal pain. Denies any shortness of breath, wheezing, or palpitations.  Patient is known to me and this is the extent of her concerns at this time.    Past Medical History  Past Medical History:   Diagnosis Date    Benign colon polyp 2cm     Depression     Hyperlipidemia     Hypertension        Past Surgical History  Past Surgical History:   Procedure Laterality Date    APPENDECTOMY      BACK SURGERY      breast implants      CLOSED REDUCTION FINGER DISLOCATION      FOOT SURGERY      HYSTERECTOMY      KNEE ARTHROSCOPY Left     MASTECTOMY      SHOULDER ARTHROSCOPY      SINUS SURGERY         Family History  Family History   Problem Relation Age of Onset    Diabetes Mother     Diabetes Father     Stroke Paternal Grandfather     Diabetes Sister        Social History  Social History     Socioeconomic History    Marital status:      Spouse name: Not on " file    Number of children: Not on file    Years of education: Not on file    Highest education level: Not on file   Occupational History    Not on file   Social Needs    Financial resource strain: Not on file    Food insecurity:     Worry: Not on file     Inability: Not on file    Transportation needs:     Medical: Not on file     Non-medical: Not on file   Tobacco Use    Smoking status: Former Smoker     Last attempt to quit: 1988     Years since quittin.9    Smokeless tobacco: Never Used   Substance and Sexual Activity    Alcohol use: No    Drug use: No    Sexual activity: Never   Lifestyle    Physical activity:     Days per week: Not on file     Minutes per session: Not on file    Stress: Not on file   Relationships    Social connections:     Talks on phone: Not on file     Gets together: Not on file     Attends Temple service: Not on file     Active member of club or organization: Not on file     Attends meetings of clubs or organizations: Not on file     Relationship status: Not on file   Other Topics Concern    Not on file   Social History Narrative    Not on file       Current Medications  Current Outpatient Medications on File Prior to Visit   Medication Sig Dispense Refill    albuterol (PROVENTIL) 2.5 mg /3 mL (0.083 %) nebulizer solution Take 3 mLs (2.5 mg total) by nebulization every 6 (six) hours as needed for Wheezing or Shortness of Breath. Rescue 1 Box 0    allopurinol (ZYLOPRIM) 100 MG tablet TAKE ONE Tablet BY MOUTH EVERY DAY 90 tablet 1    ammonium lactate (LAC-HYDRIN) 12 % lotion Apply topically 2 (two) times daily as needed for Dry Skin. 500 g 5    aspirin (ECOTRIN) 81 MG EC tablet Take 81 mg by mouth once daily.      butalbital-acetaminophen-caffeine -40 mg (FIORICET, ESGIC) -40 mg per tablet       escitalopram oxalate (LEXAPRO) 20 MG tablet Take 1 tablet (20 mg total) by mouth once daily. 90 tablet 1    esomeprazole (NEXIUM) 20 MG capsule Take 20  mg by mouth.      fexofenadine (ALLEGRA ALLERGY) 180 MG tablet       gabapentin (NEURONTIN) 100 MG capsule 100 mg. 2 tabs in the am and 2 tabs in the pm      HYDROcodone-acetaminophen (NORCO) 7.5-325 mg per tablet Take 1 tablet by mouth.      losartan (COZAAR) 50 MG tablet Take 1 tablet (50 mg total) by mouth 2 (two) times daily. 180 tablet 2    metoprolol succinate (TOPROL-XL) 50 MG 24 hr tablet Take 1 tablet (50 mg total) by mouth once daily. 90 tablet 1    multivitamin capsule Take 1 capsule by mouth once daily.      nystatin (MYCOSTATIN) powder Apply topically 3 (three) times daily. For rash 60 g 2    potassium chloride SA (K-DUR,KLOR-CON) 20 MEQ tablet Take 1 tablet (20 mEq total) by mouth once daily. 10 tablet 2    pravastatin (PRAVACHOL) 40 MG tablet Take 1 tablet (40 mg total) by mouth once daily. 90 tablet 2    promethazine (PHENERGAN) 12.5 MG Tab Take 1 tablet (12.5 mg total) by mouth every 6 (six) hours as needed. 10 tablet 0    umeclidinium-vilanterol (ANORO ELLIPTA) 62.5-25 mcg/actuation DsDv Take 1 puff by mouth once daily. Controller 60 each 5     No current facility-administered medications on file prior to visit.        Allergies   Review of patient's allergies indicates:   Allergen Reactions    Sulfa (sulfonamide antibiotics) Anaphylaxis    Refresh redness relief [phenylephrin-polyvinyl alcohol] Blisters       Review of Systems (Pertinent positives)  Review of Systems   Constitutional: Negative for chills, diaphoresis, fever, malaise/fatigue and weight loss.   HENT: Negative.    Eyes: Negative.    Respiratory: Negative.    Cardiovascular: Negative.    Gastrointestinal: Negative.    Genitourinary: Negative.    Musculoskeletal: Positive for joint pain and myalgias. Negative for back pain, falls and neck pain.   Skin: Negative.    Neurological: Negative.    Endo/Heme/Allergies: Negative.    Psychiatric/Behavioral: Negative.        /77 (BP Location: Left arm, Patient Position:  "Sitting, BP Method: Medium (Automatic))   Pulse 73   Temp 98.2 °F (36.8 °C) (Oral)   Resp 16   Ht 4' 10" (1.473 m)   Wt 82.2 kg (181 lb 3.5 oz)   SpO2 98%   BMI 37.87 kg/m²     Physical Exam   Constitutional: She is oriented to person, place, and time. She appears well-developed and well-nourished. No distress.   Eyes: Pupils are equal, round, and reactive to light. Conjunctivae and EOM are normal.   Neck: Normal range of motion.   Cardiovascular: Normal rate, regular rhythm, normal heart sounds and intact distal pulses. Exam reveals no gallop and no friction rub.   No murmur heard.  Pulmonary/Chest: Effort normal and breath sounds normal. No stridor. No respiratory distress. She has no wheezes. She has no rales. She exhibits tenderness.       Abdominal: Soft. Bowel sounds are normal. She exhibits no distension and no mass. There is no tenderness. There is no rebound and no guarding. No hernia.   Musculoskeletal: Normal range of motion.        Right shoulder: She exhibits tenderness. She exhibits normal range of motion, no bony tenderness, no swelling, no effusion, no crepitus, no deformity, no laceration, no pain, no spasm, normal pulse and normal strength.   R shoulder - normal strength and range of motion; tenderness exhibited at the posterior and anterior aspects of the right shoulder   Lymphadenopathy:     She has no axillary adenopathy.   No axillary lymphadenopathy noted   Neurological: She is alert and oriented to person, place, and time.   Skin: Skin is warm and dry. Capillary refill takes less than 2 seconds. She is not diaphoretic.   Vitals reviewed.       Assessment/Plan:  Dayami Mina is a 69 y.o. female who presents today for :    Dayami was seen today for arm pain.    Diagnoses and all orders for this visit:    Acute pain of right shoulder  -     X-ray Shoulder 2 or More Views Right; Future    Will check x-ray per patient's request.  Will follow up with x-ray results.  Encouraged " patient to use heat/ice and Tylenol.  She states that she believes this is from her arthritis.  She has an outside [facility] physician and she states that she will follow up with them if needed for injection.  Encouraged patient to avoid NSAIDs given her kidney disease.    Axillary pain, right  -     US Soft Tissue Axilla; Future     No axillary lymphadenopathy noted. Patient reports no other breast changes.  She does have a palpable swelling near her right pectoralis muscle.  Will get ultrasound of the area to assess.  This is possibly from a cyst.  No discharge or redness noted to the area.    Will follow up with lab results.  Patient is to follow-up to the clinic in the meantime for any other acute concerns.        This office note has been dictated.  This dictation has been generated using M-Modal Fluency Direct dictation; some phonetic errors may occur.   My collaborating physician is Dr. Guanaco Sheffield.

## 2019-11-21 ENCOUNTER — TELEPHONE (OUTPATIENT)
Dept: FAMILY MEDICINE | Facility: CLINIC | Age: 69
End: 2019-11-21

## 2019-11-22 ENCOUNTER — HOSPITAL ENCOUNTER (OUTPATIENT)
Dept: RADIOLOGY | Facility: HOSPITAL | Age: 69
Discharge: HOME | End: 2019-11-22
Attending: NURSE PRACTITIONER
Payer: MEDICARE

## 2019-11-22 DIAGNOSIS — M79.621 AXILLARY PAIN, RIGHT: ICD-10-CM

## 2019-11-22 PROCEDURE — 76882 US LMTD JT/FCL EVL NVASC XTR: CPT | Mod: TC

## 2019-11-22 PROCEDURE — 76882 US LMTD JT/FCL EVL NVASC XTR: CPT | Mod: 26,,, | Performed by: RADIOLOGY

## 2019-11-22 PROCEDURE — 76882 US SOFT TISSUE AXILLA: ICD-10-PCS | Mod: 26,,, | Performed by: RADIOLOGY

## 2019-11-25 ENCOUNTER — TELEPHONE (OUTPATIENT)
Dept: FAMILY MEDICINE | Facility: CLINIC | Age: 69
End: 2019-11-25

## 2019-11-25 DIAGNOSIS — N64.4 BREAST PAIN: Primary | ICD-10-CM

## 2019-11-25 RX ORDER — AMOXICILLIN AND CLAVULANATE POTASSIUM 875; 125 MG/1; MG/1
1 TABLET, FILM COATED ORAL 2 TIMES DAILY
Qty: 14 TABLET | Refills: 0 | Status: SHIPPED | OUTPATIENT
Start: 2019-11-25 | End: 2020-01-20 | Stop reason: CLARIF

## 2019-11-25 NOTE — TELEPHONE ENCOUNTER
Patient called and identification verified by name and date of birth.  Notified patient of ultrasound results.  Recommended MRI to be done this week, however patient states that she is out of town and has no way to come back until this Sunday.  She states that the area is still painful, but denies any redness to the area or fevers.  Patient is allergic to Bactrim so will send Augmentin to be taken for 7 days.  Will write MRI of the breast refer to Plastic surgery for further evaluation.  Will call patient to schedule MRI.  Patient was strictly encouraged to contact the clinic, or go to her nearest hospital if she develops any worsening pain, redness, fevers, or chills the meantime.  Again, recommended MRI to be done this week, however patient states that there is no possible way she can come back in town until next Monday.  patient verbalized understanding of instructions.

## 2019-11-26 ENCOUNTER — TELEPHONE (OUTPATIENT)
Dept: FAMILY MEDICINE | Facility: CLINIC | Age: 69
End: 2019-11-26

## 2019-11-26 NOTE — TELEPHONE ENCOUNTER
----- Message from Antwan Warren NP sent at 11/25/2019  3:41 PM CST -----  Katty,    Please call this patient to schedule her MRI and labs for next Monday. I've already talked to her about the results of the US. Thank you

## 2019-12-03 ENCOUNTER — HOSPITAL ENCOUNTER (OUTPATIENT)
Dept: RADIOLOGY | Facility: HOSPITAL | Age: 69
Discharge: HOME | End: 2019-12-03
Attending: NURSE PRACTITIONER
Payer: MEDICARE

## 2019-12-03 DIAGNOSIS — N64.4 BREAST PAIN: ICD-10-CM

## 2019-12-03 LAB
CREAT SERPL-MCNC: 0.9 MG/DL (ref 0.5–1.4)
SAMPLE: NORMAL

## 2019-12-03 PROCEDURE — 77049 MRI BREAST W/WO CONTRAST, W/CAD, BILATERAL: ICD-10-PCS | Mod: 26,,, | Performed by: RADIOLOGY

## 2019-12-03 PROCEDURE — 25500020 PHARM REV CODE 255: Performed by: NURSE PRACTITIONER

## 2019-12-03 PROCEDURE — A9577 INJ MULTIHANCE: HCPCS | Performed by: NURSE PRACTITIONER

## 2019-12-03 PROCEDURE — 77049 MRI BREAST C-+ W/CAD BI: CPT | Mod: 26,,, | Performed by: RADIOLOGY

## 2019-12-03 PROCEDURE — C8937 CAD BREAST MRI: HCPCS | Mod: TC

## 2019-12-03 RX ADMIN — GADOBENATE DIMEGLUMINE 18 ML: 529 INJECTION, SOLUTION INTRAVENOUS at 06:12

## 2019-12-04 ENCOUNTER — TELEPHONE (OUTPATIENT)
Dept: FAMILY MEDICINE | Facility: CLINIC | Age: 69
End: 2019-12-04

## 2019-12-04 NOTE — TELEPHONE ENCOUNTER
Pt calls regarding Plastic surgery referral. She would like PCP recommendation of which plastic surgery provider she should see? She has been contacted by the referral department but she wants a name of a provider before she schedules. Please advise.

## 2019-12-05 ENCOUNTER — TELEPHONE (OUTPATIENT)
Dept: FAMILY MEDICINE | Facility: CLINIC | Age: 69
End: 2019-12-05

## 2019-12-05 ENCOUNTER — TELEPHONE (OUTPATIENT)
Dept: PLASTIC SURGERY | Facility: CLINIC | Age: 69
End: 2019-12-05

## 2019-12-05 NOTE — TELEPHONE ENCOUNTER
----- Message from Tanja Galindo sent at 12/5/2019  1:40 PM CST -----  Contact: self [phone]  .Type:  Patient Returning Call    Who Called: self     Who Left Message for Patient: Antwan Warren    Does the patient know what this is regarding?: MRI results.     Would the patient rather a call back or a response via [facility]?  Call back     Best Call Back Number: [phone]

## 2019-12-05 NOTE — TELEPHONE ENCOUNTER
Spoke to patient's daughter regarding MRI results.  Notified patient's daughter of results.  Patient's daughter is requesting a sooner appointment for plastic surgery referral, I will reach out the plastic surgeries office to see if they can get her in to be seen sooner.

## 2019-12-05 NOTE — TELEPHONE ENCOUNTER
Pt referred to [facility] by Antwan Warren NP.  Pt possibly has a leak within her implant.  I contacted pt to schedule consult.      Pt completed a breast MRI on Tuesday and is awaiting results.  She would prefer holding off on scheduling with us until the results are in.      Pt given [facility] office number and asked to call at her convenience.

## 2019-12-05 NOTE — TELEPHONE ENCOUNTER
Patient called and identified by name and . Notified of MRI results. She has the number to plastic surgery's office and will contact them to schedule an appt.

## 2019-12-06 ENCOUNTER — TELEPHONE (OUTPATIENT)
Dept: FAMILY MEDICINE | Facility: CLINIC | Age: 69
End: 2019-12-06

## 2019-12-06 DIAGNOSIS — L03.313 CELLULITIS OF CHEST WALL: Primary | ICD-10-CM

## 2019-12-06 RX ORDER — DOXYCYCLINE HYCLATE 100 MG
100 TABLET ORAL 2 TIMES DAILY
Qty: 14 TABLET | Refills: 0 | Status: SHIPPED | OUTPATIENT
Start: 2019-12-06 | End: 2020-01-20 | Stop reason: CLARIF

## 2019-12-06 NOTE — TELEPHONE ENCOUNTER
Patient advised to contact the Plastic Surgeon and request a sooner appointment. Patient also requesting an antibiotic to be called in until she see's the plastic surgeon on 12/18. Please advise.

## 2019-12-06 NOTE — TELEPHONE ENCOUNTER
----- Message from Olimpia Walton sent at 12/6/2019  9:39 AM CST -----  Contact: Self  Type: Patient Call Back    Who called:Dayami    What is the request in detail:Patient called to discuss an earlier appointment with plastic surgery. Patient is also requesting antibiotics (if needed) to be sent to pharmacy. Please call to advise    Can the clinic reply by [facility]?    Would the patient rather a call back or a response via [facility]? Call    Best call back number:[phone]

## 2019-12-09 DIAGNOSIS — R60.0 LOWER EXTREMITY EDEMA: ICD-10-CM

## 2019-12-09 RX ORDER — POTASSIUM CHLORIDE 20 MEQ/1
20 TABLET, EXTENDED RELEASE ORAL DAILY
Qty: 90 TABLET | Refills: 2 | Status: SHIPPED | OUTPATIENT
Start: 2019-12-09 | End: 2021-03-10

## 2019-12-09 RX ORDER — POTASSIUM CHLORIDE 20 MEQ/1
20 TABLET, EXTENDED RELEASE ORAL DAILY
Qty: 10 TABLET | Refills: 2 | Status: CANCELLED | OUTPATIENT
Start: 2019-12-09

## 2019-12-18 ENCOUNTER — OFFICE VISIT (OUTPATIENT)
Dept: PLASTIC SURGERY | Facility: CLINIC | Age: 69
End: 2019-12-18
Payer: MEDICARE

## 2019-12-18 ENCOUNTER — TELEPHONE (OUTPATIENT)
Dept: FAMILY MEDICINE | Facility: CLINIC | Age: 69
End: 2019-12-18

## 2019-12-18 VITALS
HEIGHT: 58 IN | DIASTOLIC BLOOD PRESSURE: 71 MMHG | BODY MASS INDEX: 38.27 KG/M2 | WEIGHT: 182.31 LBS | HEART RATE: 65 BPM | SYSTOLIC BLOOD PRESSURE: 141 MMHG

## 2019-12-18 DIAGNOSIS — Z85.3 PERSONAL HISTORY OF BREAST CANCER: Primary | ICD-10-CM

## 2019-12-18 PROCEDURE — 99213 OFFICE O/P EST LOW 20 MIN: CPT | Mod: PBBFAC | Performed by: SURGERY

## 2019-12-18 PROCEDURE — 99203 PR OFFICE/OUTPT VISIT, NEW, LEVL III, 30-44 MIN: ICD-10-PCS | Mod: S$PBB,,, | Performed by: SURGERY

## 2019-12-18 PROCEDURE — 1125F PR PAIN SEVERITY QUANTIFIED, PAIN PRESENT: ICD-10-PCS | Mod: ,,, | Performed by: SURGERY

## 2019-12-18 PROCEDURE — 1159F PR MEDICATION LIST DOCUMENTED IN MEDICAL RECORD: ICD-10-PCS | Mod: ,,, | Performed by: SURGERY

## 2019-12-18 PROCEDURE — 1159F MED LIST DOCD IN RCRD: CPT | Mod: ,,, | Performed by: SURGERY

## 2019-12-18 PROCEDURE — 1125F AMNT PAIN NOTED PAIN PRSNT: CPT | Mod: ,,, | Performed by: SURGERY

## 2019-12-18 PROCEDURE — 99203 OFFICE O/P NEW LOW 30 MIN: CPT | Mod: S$PBB,,, | Performed by: SURGERY

## 2019-12-18 PROCEDURE — 99999 PR PBB SHADOW E&M-EST. PATIENT-LVL III: CPT | Mod: PBBFAC,,, | Performed by: SURGERY

## 2019-12-18 PROCEDURE — 99999 PR PBB SHADOW E&M-EST. PATIENT-LVL III: ICD-10-PCS | Mod: PBBFAC,,, | Performed by: SURGERY

## 2019-12-18 NOTE — PROGRESS NOTES
Dictation #1  MRN:[id number]  [facility]:[id number]  Patient presents to Plastic surgery Clinic after having a bilateral mastectomy 1 breast in 1980 07/01/1988 she underwent breast reconstruction by Dr. Montiel in 1988 has had multiple implant exchanges since that time 1 in 2005 01/2008 she currently has silicone implants in the subpectoral position.  On  She recently had I believe an ultrasound and an MRI I would like the MRI she has bilateral ruptured silicone implants the ruptures or intracapsular.  At a long discussion with the patient and her family that she needs to have the implants removed and the capsules removed.  She can also simply have them removed and replaced with another silicone implant these being the new a gummy bear type implants which have no of liquid silicone.  She has had a latissimus dorsi flap on the left side in the past.  Due to the width of her chest in the subpectoral position her reconstruction from 2008 does not look that good.  None the less she has done well for all these years with that.  I will recommend any type of flap surgery for her.  She did bring it up and do not think that she is a good candidate due to her weight.  She is also has COPD and on inhaler so I do not believe she is a candidate for a DIP flap.  We will go ahead and schedule her for bilateral implant exchange with capsulectomy

## 2019-12-19 ENCOUNTER — TELEPHONE (OUTPATIENT)
Dept: FAMILY MEDICINE | Facility: CLINIC | Age: 69
End: 2019-12-19

## 2020-01-02 ENCOUNTER — TELEPHONE (OUTPATIENT)
Dept: PLASTIC SURGERY | Facility: CLINIC | Age: 70
End: 2020-01-02

## 2020-01-08 ENCOUNTER — OFFICE VISIT (OUTPATIENT)
Dept: SURGERY | Facility: CLINIC | Age: 70
End: 2020-01-08
Payer: MEDICARE

## 2020-01-08 VITALS
WEIGHT: 181.19 LBS | DIASTOLIC BLOOD PRESSURE: 77 MMHG | SYSTOLIC BLOOD PRESSURE: 138 MMHG | HEART RATE: 80 BPM | HEIGHT: 58 IN | BODY MASS INDEX: 38.04 KG/M2

## 2020-01-08 DIAGNOSIS — Z98.82 H/O BILATERAL BREAST IMPLANTS: Primary | ICD-10-CM

## 2020-01-08 PROCEDURE — 1125F AMNT PAIN NOTED PAIN PRSNT: CPT | Mod: S$GLB,,, | Performed by: SURGERY

## 2020-01-08 PROCEDURE — 99204 OFFICE O/P NEW MOD 45 MIN: CPT | Mod: S$GLB,,, | Performed by: SURGERY

## 2020-01-08 PROCEDURE — 1159F MED LIST DOCD IN RCRD: CPT | Mod: S$GLB,,, | Performed by: SURGERY

## 2020-01-08 PROCEDURE — 99204 PR OFFICE/OUTPT VISIT, NEW, LEVL IV, 45-59 MIN: ICD-10-PCS | Mod: S$GLB,,, | Performed by: SURGERY

## 2020-01-08 PROCEDURE — 1159F PR MEDICATION LIST DOCUMENTED IN MEDICAL RECORD: ICD-10-PCS | Mod: S$GLB,,, | Performed by: SURGERY

## 2020-01-08 PROCEDURE — 1125F PR PAIN SEVERITY QUANTIFIED, PAIN PRESENT: ICD-10-PCS | Mod: S$GLB,,, | Performed by: SURGERY

## 2020-01-08 NOTE — H&P (VIEW-ONLY)
Subjective:       Patient ID: Dayami Mina is a 69 y.o. female.    Chief Complaint: Breast Problem    HPI 68 yo female with bilateral breasts implants with pain and MRI silicone leaks  Review of Systems   Constitutional: Negative.  Negative for activity change and unexpected weight change.   HENT: Positive for hearing loss. Negative for rhinorrhea and trouble swallowing.    Eyes: Negative.  Negative for discharge and visual disturbance.   Respiratory: Positive for wheezing. Negative for chest tightness.    Cardiovascular: Negative.  Negative for chest pain and palpitations.   Gastrointestinal: Negative.  Negative for blood in stool, constipation, diarrhea and vomiting.   Endocrine: Negative.  Negative for polydipsia and polyuria.   Genitourinary: Negative for difficulty urinating, dysuria, hematuria and menstrual problem.   Musculoskeletal: Positive for arthralgias and neck pain. Negative for joint swelling.   Skin: Negative.    Allergic/Immunologic: Negative.    Neurological: Positive for headaches. Negative for weakness.   Hematological: Negative.    Psychiatric/Behavioral: Negative.  Negative for confusion and dysphoric mood.   All other systems reviewed and are negative.      Objective:      Physical Exam   Constitutional: She is oriented to person, place, and time. She appears well-developed and well-nourished.   HENT:   Head: Normocephalic and atraumatic.   Right Ear: External ear normal.   Left Ear: External ear normal.   Nose: Nose normal.   Mouth/Throat: Oropharynx is clear and moist.   Eyes: Pupils are equal, round, and reactive to light. Conjunctivae and EOM are normal.   Neck: Normal range of motion. Neck supple.   Cardiovascular: Normal rate, regular rhythm, normal heart sounds and intact distal pulses.   Pulmonary/Chest: Effort normal and breath sounds normal. Right breast exhibits mass. Right breast exhibits no inverted nipple, no nipple discharge, no skin change and no tenderness. Left  breast exhibits no inverted nipple, no mass, no nipple discharge, no skin change and no tenderness.       Abdominal: Soft. Bowel sounds are normal.   Musculoskeletal: Normal range of motion.   Neurological: She is alert and oriented to person, place, and time. She has normal reflexes.   Skin: Skin is warm and dry.   Psychiatric: She has a normal mood and affect. Her behavior is normal. Thought content normal.   Vitals reviewed.      Assessment:       1. H/O bilateral breast implants      needs removal  Plan:       I will refer her to a PS for second opinion

## 2020-01-14 ENCOUNTER — TELEPHONE (OUTPATIENT)
Dept: SURGERY | Facility: CLINIC | Age: 70
End: 2020-01-14

## 2020-01-14 DIAGNOSIS — R22.31 ARM MASS, RIGHT: Primary | ICD-10-CM

## 2020-01-14 NOTE — TELEPHONE ENCOUNTER
Pt notified joint surgery with Dr. Clarke scheduled for 1/23/20-owb Pt instructed to d/c ASA 5 days before surgery and to come to our office the day of preop to sign her consent-pt verbalized understanding.

## 2020-01-20 ENCOUNTER — TELEPHONE (OUTPATIENT)
Dept: SURGERY | Facility: CLINIC | Age: 70
End: 2020-01-20

## 2020-01-20 ENCOUNTER — HOSPITAL ENCOUNTER (OUTPATIENT)
Dept: PREADMISSION TESTING | Facility: HOSPITAL | Age: 70
Discharge: HOME | End: 2020-01-20
Attending: SURGERY
Payer: MEDICARE

## 2020-01-20 VITALS
OXYGEN SATURATION: 96 % | BODY MASS INDEX: 38.73 KG/M2 | SYSTOLIC BLOOD PRESSURE: 119 MMHG | TEMPERATURE: 97 F | HEART RATE: 68 BPM | WEIGHT: 184.5 LBS | RESPIRATION RATE: 17 BRPM | DIASTOLIC BLOOD PRESSURE: 55 MMHG | HEIGHT: 58 IN

## 2020-01-20 DIAGNOSIS — Z01.818 PRE-OP TESTING: Primary | ICD-10-CM

## 2020-01-20 LAB
ANION GAP SERPL CALC-SCNC: 11 MMOL/L (ref 8–16)
BASOPHILS # BLD AUTO: 0.05 K/UL (ref 0–0.2)
BASOPHILS NFR BLD: 0.7 % (ref 0–1.9)
BILIRUB UR QL STRIP: NEGATIVE
BUN SERPL-MCNC: 22 MG/DL (ref 8–23)
CALCIUM SERPL-MCNC: 9.6 MG/DL (ref 8.7–10.5)
CHLORIDE SERPL-SCNC: 103 MMOL/L (ref 95–110)
CLARITY UR: ABNORMAL
CO2 SERPL-SCNC: 23 MMOL/L (ref 23–29)
COLOR UR: YELLOW
CREAT SERPL-MCNC: 1 MG/DL (ref 0.5–1.4)
DIFFERENTIAL METHOD: ABNORMAL
EOSINOPHIL # BLD AUTO: 0.4 K/UL (ref 0–0.5)
EOSINOPHIL NFR BLD: 5 % (ref 0–8)
ERYTHROCYTE [DISTWIDTH] IN BLOOD BY AUTOMATED COUNT: 13.5 % (ref 11.5–14.5)
EST. GFR  (AFRICAN AMERICAN): >60 ML/MIN/1.73 M^2
EST. GFR  (NON AFRICAN AMERICAN): 57 ML/MIN/1.73 M^2
GLUCOSE SERPL-MCNC: 98 MG/DL (ref 70–110)
GLUCOSE UR QL STRIP: NEGATIVE
HCT VFR BLD AUTO: 40.8 % (ref 37–48.5)
HGB BLD-MCNC: 12.9 G/DL (ref 12–16)
HGB UR QL STRIP: NEGATIVE
IMM GRANULOCYTES # BLD AUTO: 0.03 K/UL (ref 0–0.04)
IMM GRANULOCYTES NFR BLD AUTO: 0.4 % (ref 0–0.5)
KETONES UR QL STRIP: NEGATIVE
LEUKOCYTE ESTERASE UR QL STRIP: NEGATIVE
LYMPHOCYTES # BLD AUTO: 2.1 K/UL (ref 1–4.8)
LYMPHOCYTES NFR BLD: 28.2 % (ref 18–48)
MCH RBC QN AUTO: 27.5 PG (ref 27–31)
MCHC RBC AUTO-ENTMCNC: 31.6 G/DL (ref 32–36)
MCV RBC AUTO: 87 FL (ref 82–98)
MONOCYTES # BLD AUTO: 0.8 K/UL (ref 0.3–1)
MONOCYTES NFR BLD: 10.2 % (ref 4–15)
NEUTROPHILS # BLD AUTO: 4.1 K/UL (ref 1.8–7.7)
NEUTROPHILS NFR BLD: 55.5 % (ref 38–73)
NITRITE UR QL STRIP: NEGATIVE
NRBC BLD-RTO: 0 /100 WBC
PH UR STRIP: 6 [PH] (ref 5–8)
PLATELET # BLD AUTO: 256 K/UL (ref 150–350)
PMV BLD AUTO: 10 FL (ref 9.2–12.9)
POTASSIUM SERPL-SCNC: 4.6 MMOL/L (ref 3.5–5.1)
PROT UR QL STRIP: NEGATIVE
RBC # BLD AUTO: 4.69 M/UL (ref 4–5.4)
SODIUM SERPL-SCNC: 137 MMOL/L (ref 136–145)
SP GR UR STRIP: 1.01 (ref 1–1.03)
URN SPEC COLLECT METH UR: ABNORMAL
UROBILINOGEN UR STRIP-ACNC: NEGATIVE EU/DL
WBC # BLD AUTO: 7.34 K/UL (ref 3.9–12.7)

## 2020-01-20 PROCEDURE — 80048 BASIC METABOLIC PNL TOTAL CA: CPT

## 2020-01-20 PROCEDURE — 81003 URINALYSIS AUTO W/O SCOPE: CPT

## 2020-01-20 PROCEDURE — 36415 COLL VENOUS BLD VENIPUNCTURE: CPT

## 2020-01-20 PROCEDURE — 85025 COMPLETE CBC W/AUTO DIFF WBC: CPT

## 2020-01-20 NOTE — TELEPHONE ENCOUNTER
Frieda was calling to inform us pt needed to go back to lab to have her labs drawn again.  Frieda contacted pt.        ----- Message from Piedad Desir sent at 1/20/2020 12:33 PM CST -----  Contact: Frieda/ [facility]/  [phone]  Type: Patient Call Back    Who called:  Frieda    What is the request in detail:  Patient is needing to go back to the lab to have blood redrawn     Would the patient rather a call back or a response via [facility]?   Call back    Best call back number:  [phone]

## 2020-01-20 NOTE — DISCHARGE INSTRUCTIONS
"  Your surgery is scheduled for _Thursday Jan. 23, 2020_.    Call [phone] between 2 p.m. and 5 p.m. on   Wednesday__ to find out your arrival time for the day of your surgery.      Please report to SAME DAY SURGERY UNIT on the 2nd FLOOR at _______ a.m.  Use front door entrance. The doors open at 0530 am.      If you need WHEELCHAIR assistance please call  [phone] from your cell phone or "[phone]"  from the  hospital courtesy phone located to the right after you enter the hospital lobby.      INSTRUCTIONS IMPORTANT!!!  ¨ Do not eat or drink after 12 midnight-including water. OK to brush teeth, no   gum, candy or mints!    ¨ Take only these medicines with a small swallow of water-morning of surgery.  Take Lexapro, Nexium, Gabapentin, and Metoprolol with water.    _x___  Prep instructions:    SHOWER    _x___  Please shower using Hibiclens soap the night before AND  the morning of                  your surgery/procedure. Do not use Hibiclens on your face or genitals             _x___  No shaving of procedural area at least 4-5 days before surgery due to  increased risk of skin irritation and/or possible infection.  _x___  Do not wear makeup, including mascara. WEARING EYE MAKEUP MAY  LEAD TO SERIOUS EYE INJURY during surgery.  _x___  No powder, lotions or creams to your body.  _x___  You may wear only deodorant on the day of surgery.  _x___  Please remove all jewelry, including piercings and leave at home.  _x___  No money or valuables needed. Please leave at home.  You may bring your cell phone.  _x___  Please bring any documents given by your doctor.  __x__  If going home the same day, arrange for a ride home. You will not be able to   drive if Anesthesia was used.    __x__  Wear loose fitting clothing. Allow for dressings, bandages.  __x__  Stop Aspirin, Ibuprofen, Motrin and Aleve at least 3-5 days before surgery, unless otherwise instructed by your doctor, or the nurse.              You MAY use Tylenol/acetaminophen " until day of surgery.    __x__  Call MD for temperature above 101 degrees.        __x__ Stop taking any Fish Oil supplement or any Vitamins that contain Vitamin E at least 5 days prior to surgery.          I have read or had read and explained to me, and understand the above information.  Additional comments or instructions:Please call   [phone] if you have any questions regarding the instructions above.

## 2020-01-23 ENCOUNTER — PATIENT MESSAGE (OUTPATIENT)
Dept: SURGERY | Facility: HOSPITAL | Age: 70
End: 2020-01-23

## 2020-01-23 ENCOUNTER — TELEPHONE (OUTPATIENT)
Dept: SURGERY | Facility: CLINIC | Age: 70
End: 2020-01-23

## 2020-01-23 ENCOUNTER — ANESTHESIA EVENT (OUTPATIENT)
Dept: SURGERY | Facility: HOSPITAL | Age: 70
End: 2020-01-23
Payer: MEDICARE

## 2020-01-23 ENCOUNTER — HOSPITAL ENCOUNTER (OUTPATIENT)
Facility: HOSPITAL | Age: 70
Discharge: HOME | End: 2020-01-23
Attending: SURGERY | Admitting: SURGERY
Payer: MEDICARE

## 2020-01-23 ENCOUNTER — ANESTHESIA (OUTPATIENT)
Dept: SURGERY | Facility: HOSPITAL | Age: 70
End: 2020-01-23
Payer: MEDICARE

## 2020-01-23 VITALS
RESPIRATION RATE: 18 BRPM | HEIGHT: 58 IN | DIASTOLIC BLOOD PRESSURE: 67 MMHG | OXYGEN SATURATION: 94 % | BODY MASS INDEX: 38.73 KG/M2 | WEIGHT: 184.5 LBS | TEMPERATURE: 98 F | HEART RATE: 63 BPM | SYSTOLIC BLOOD PRESSURE: 145 MMHG

## 2020-01-23 DIAGNOSIS — Z90.13 ABSENCE OF BREAST, BILATERAL: Primary | ICD-10-CM

## 2020-01-23 PROCEDURE — 24075 EXC ARM/ELBOW LES SC < 3 CM: CPT | Mod: RT,,, | Performed by: SURGERY

## 2020-01-23 PROCEDURE — 63600175 PHARM REV CODE 636 W HCPCS: Performed by: SURGERY

## 2020-01-23 PROCEDURE — 37000009 HC ANESTHESIA EA ADD 15 MINS: Performed by: SURGERY

## 2020-01-23 PROCEDURE — C1729 CATH, DRAINAGE: HCPCS | Performed by: SURGERY

## 2020-01-23 PROCEDURE — D9220A PRA ANESTHESIA: ICD-10-PCS | Mod: ANES,,, | Performed by: ANESTHESIOLOGY

## 2020-01-23 PROCEDURE — 24075 PR EXC TUMOR SOFT TISS UPPER ARM/ELBOW SUBQ <3CM: ICD-10-PCS | Mod: RT,,, | Performed by: SURGERY

## 2020-01-23 PROCEDURE — 88300 SURGICAL PATH GROSS: CPT | Mod: 26,,, | Performed by: PATHOLOGY

## 2020-01-23 PROCEDURE — 88300 SURGICAL PATH GROSS: CPT | Mod: 59 | Performed by: PATHOLOGY

## 2020-01-23 PROCEDURE — D9220A PRA ANESTHESIA: ICD-10-PCS | Mod: CRNA,,, | Performed by: NURSE ANESTHETIST, CERTIFIED REGISTERED

## 2020-01-23 PROCEDURE — D9220A PRA ANESTHESIA: Mod: ANES,,, | Performed by: ANESTHESIOLOGY

## 2020-01-23 PROCEDURE — 88300 PR  SURG PATH,GROSS,LEVEL I: ICD-10-PCS | Mod: 26,,, | Performed by: PATHOLOGY

## 2020-01-23 PROCEDURE — 88304 TISSUE EXAM BY PATHOLOGIST: CPT | Mod: 26,,, | Performed by: PATHOLOGY

## 2020-01-23 PROCEDURE — S0020 INJECTION, BUPIVICAINE HYDRO: HCPCS | Performed by: SURGERY

## 2020-01-23 PROCEDURE — 25000003 PHARM REV CODE 250: Performed by: PLASTIC SURGERY

## 2020-01-23 PROCEDURE — 88304 PR  SURG PATH,LEVEL III: ICD-10-PCS | Mod: 26,,, | Performed by: PATHOLOGY

## 2020-01-23 PROCEDURE — C9290 INJ, BUPIVACAINE LIPOSOME: HCPCS | Performed by: SURGERY

## 2020-01-23 PROCEDURE — 25000003 PHARM REV CODE 250: Performed by: NURSE ANESTHETIST, CERTIFIED REGISTERED

## 2020-01-23 PROCEDURE — 71000015 HC POSTOP RECOV 1ST HR: Performed by: SURGERY

## 2020-01-23 PROCEDURE — D9220A PRA ANESTHESIA: Mod: CRNA,,, | Performed by: NURSE ANESTHETIST, CERTIFIED REGISTERED

## 2020-01-23 PROCEDURE — 88304 TISSUE EXAM BY PATHOLOGIST: CPT | Mod: 59 | Performed by: PATHOLOGY

## 2020-01-23 PROCEDURE — 63600175 PHARM REV CODE 636 W HCPCS: Performed by: NURSE ANESTHETIST, CERTIFIED REGISTERED

## 2020-01-23 PROCEDURE — 25000003 PHARM REV CODE 250: Performed by: SURGERY

## 2020-01-23 PROCEDURE — 71000033 HC RECOVERY, INTIAL HOUR: Performed by: SURGERY

## 2020-01-23 PROCEDURE — 36000706: Performed by: SURGERY

## 2020-01-23 PROCEDURE — 71000016 HC POSTOP RECOV ADDL HR: Performed by: SURGERY

## 2020-01-23 PROCEDURE — 63600175 PHARM REV CODE 636 W HCPCS: Performed by: ANESTHESIOLOGY

## 2020-01-23 PROCEDURE — S0028 INJECTION, FAMOTIDINE, 20 MG: HCPCS | Performed by: NURSE ANESTHETIST, CERTIFIED REGISTERED

## 2020-01-23 PROCEDURE — 71000039 HC RECOVERY, EACH ADD'L HOUR: Performed by: SURGERY

## 2020-01-23 PROCEDURE — 37000008 HC ANESTHESIA 1ST 15 MINUTES: Performed by: SURGERY

## 2020-01-23 PROCEDURE — 36000707: Performed by: SURGERY

## 2020-01-23 RX ORDER — HYDROMORPHONE HYDROCHLORIDE 2 MG/ML
0.2 INJECTION, SOLUTION INTRAMUSCULAR; INTRAVENOUS; SUBCUTANEOUS EVERY 5 MIN PRN
Status: DISCONTINUED | OUTPATIENT
Start: 2020-01-23 | End: 2020-01-23 | Stop reason: HOSPADM

## 2020-01-23 RX ORDER — ONDANSETRON 2 MG/ML
INJECTION INTRAMUSCULAR; INTRAVENOUS
Status: DISCONTINUED | OUTPATIENT
Start: 2020-01-23 | End: 2020-01-23

## 2020-01-23 RX ORDER — PROPOFOL 10 MG/ML
VIAL (ML) INTRAVENOUS
Status: DISCONTINUED | OUTPATIENT
Start: 2020-01-23 | End: 2020-01-23

## 2020-01-23 RX ORDER — HYDROCODONE BITARTRATE AND ACETAMINOPHEN 5; 325 MG/1; MG/1
1 TABLET ORAL EVERY 4 HOURS PRN
Status: DISCONTINUED | OUTPATIENT
Start: 2020-01-23 | End: 2020-01-23 | Stop reason: HOSPADM

## 2020-01-23 RX ORDER — ROCURONIUM BROMIDE 10 MG/ML
INJECTION, SOLUTION INTRAVENOUS
Status: DISCONTINUED | OUTPATIENT
Start: 2020-01-23 | End: 2020-01-23

## 2020-01-23 RX ORDER — FAMOTIDINE 10 MG/ML
INJECTION INTRAVENOUS
Status: DISCONTINUED | OUTPATIENT
Start: 2020-01-23 | End: 2020-01-23

## 2020-01-23 RX ORDER — GLYCOPYRROLATE 0.2 MG/ML
INJECTION INTRAMUSCULAR; INTRAVENOUS
Status: DISCONTINUED | OUTPATIENT
Start: 2020-01-23 | End: 2020-01-23

## 2020-01-23 RX ORDER — SODIUM CHLORIDE, SODIUM LACTATE, POTASSIUM CHLORIDE, CALCIUM CHLORIDE 600; 310; 30; 20 MG/100ML; MG/100ML; MG/100ML; MG/100ML
INJECTION, SOLUTION INTRAVENOUS CONTINUOUS PRN
Status: DISCONTINUED | OUTPATIENT
Start: 2020-01-23 | End: 2020-01-23

## 2020-01-23 RX ORDER — LIDOCAINE HCL/PF 100 MG/5ML
SYRINGE (ML) INTRAVENOUS
Status: DISCONTINUED | OUTPATIENT
Start: 2020-01-23 | End: 2020-01-23

## 2020-01-23 RX ORDER — METOCLOPRAMIDE HYDROCHLORIDE 5 MG/ML
INJECTION INTRAMUSCULAR; INTRAVENOUS
Status: DISCONTINUED | OUTPATIENT
Start: 2020-01-23 | End: 2020-01-23

## 2020-01-23 RX ORDER — MUPIROCIN 20 MG/G
OINTMENT TOPICAL 2 TIMES DAILY
Status: DISCONTINUED | OUTPATIENT
Start: 2020-01-23 | End: 2020-01-23 | Stop reason: HOSPADM

## 2020-01-23 RX ORDER — SODIUM CHLORIDE 0.9 % (FLUSH) 0.9 %
3 SYRINGE (ML) INJECTION
Status: DISCONTINUED | OUTPATIENT
Start: 2020-01-23 | End: 2020-01-23 | Stop reason: HOSPADM

## 2020-01-23 RX ORDER — FENTANYL CITRATE 50 UG/ML
INJECTION, SOLUTION INTRAMUSCULAR; INTRAVENOUS
Status: DISCONTINUED | OUTPATIENT
Start: 2020-01-23 | End: 2020-01-23

## 2020-01-23 RX ORDER — PHENYLEPHRINE HYDROCHLORIDE 10 MG/ML
INJECTION INTRAVENOUS
Status: DISCONTINUED | OUTPATIENT
Start: 2020-01-23 | End: 2020-01-23

## 2020-01-23 RX ORDER — NEOSTIGMINE METHYLSULFATE 1 MG/ML
INJECTION, SOLUTION INTRAVENOUS
Status: DISCONTINUED | OUTPATIENT
Start: 2020-01-23 | End: 2020-01-23

## 2020-01-23 RX ORDER — DEXAMETHASONE SODIUM PHOSPHATE 4 MG/ML
INJECTION, SOLUTION INTRA-ARTICULAR; INTRALESIONAL; INTRAMUSCULAR; INTRAVENOUS; SOFT TISSUE
Status: DISCONTINUED | OUTPATIENT
Start: 2020-01-23 | End: 2020-01-23

## 2020-01-23 RX ORDER — HYDROMORPHONE HYDROCHLORIDE 2 MG/ML
0.5 INJECTION, SOLUTION INTRAMUSCULAR; INTRAVENOUS; SUBCUTANEOUS EVERY 5 MIN PRN
Status: DISCONTINUED | OUTPATIENT
Start: 2020-01-23 | End: 2020-01-23 | Stop reason: HOSPADM

## 2020-01-23 RX ORDER — LABETALOL HYDROCHLORIDE 5 MG/ML
INJECTION, SOLUTION INTRAVENOUS
Status: DISCONTINUED | OUTPATIENT
Start: 2020-01-23 | End: 2020-01-23

## 2020-01-23 RX ORDER — BUPIVACAINE HYDROCHLORIDE 2.5 MG/ML
INJECTION, SOLUTION INFILTRATION; PERINEURAL
Status: DISCONTINUED | OUTPATIENT
Start: 2020-01-23 | End: 2020-01-23 | Stop reason: HOSPADM

## 2020-01-23 RX ORDER — MIDAZOLAM HYDROCHLORIDE 1 MG/ML
INJECTION, SOLUTION INTRAMUSCULAR; INTRAVENOUS
Status: DISCONTINUED | OUTPATIENT
Start: 2020-01-23 | End: 2020-01-23

## 2020-01-23 RX ADMIN — PROPOFOL 50 MG: 10 INJECTION, EMULSION INTRAVENOUS at 08:01

## 2020-01-23 RX ADMIN — PHENYLEPHRINE HYDROCHLORIDE 100 MCG: 10 INJECTION INTRAVENOUS at 09:01

## 2020-01-23 RX ADMIN — VANCOMYCIN HYDROCHLORIDE 500 MG: 500 INJECTION, POWDER, LYOPHILIZED, FOR SOLUTION INTRAVENOUS at 07:01

## 2020-01-23 RX ADMIN — ROCURONIUM BROMIDE 20 MG: 10 INJECTION, SOLUTION INTRAVENOUS at 09:01

## 2020-01-23 RX ADMIN — DEXAMETHASONE SODIUM PHOSPHATE 4 MG: 4 INJECTION, SOLUTION INTRAMUSCULAR; INTRAVENOUS at 07:01

## 2020-01-23 RX ADMIN — PHENYLEPHRINE HYDROCHLORIDE 100 MCG: 10 INJECTION INTRAVENOUS at 07:01

## 2020-01-23 RX ADMIN — FAMOTIDINE 20 MG: 10 INJECTION INTRAVENOUS at 07:01

## 2020-01-23 RX ADMIN — PHENYLEPHRINE HYDROCHLORIDE 50 MCG: 10 INJECTION INTRAVENOUS at 08:01

## 2020-01-23 RX ADMIN — ROCURONIUM BROMIDE 50 MG: 10 INJECTION, SOLUTION INTRAVENOUS at 07:01

## 2020-01-23 RX ADMIN — HYDROMORPHONE HYDROCHLORIDE 0.2 MG: 2 INJECTION, SOLUTION INTRAMUSCULAR; INTRAVENOUS; SUBCUTANEOUS at 10:01

## 2020-01-23 RX ADMIN — NEOSTIGMINE METHYLSULFATE 4 MG: 1 INJECTION INTRAVENOUS at 09:01

## 2020-01-23 RX ADMIN — PHENYLEPHRINE HYDROCHLORIDE 100 MCG: 10 INJECTION INTRAVENOUS at 08:01

## 2020-01-23 RX ADMIN — LABETALOL HYDROCHLORIDE 5 MG: 5 INJECTION, SOLUTION INTRAVENOUS at 09:01

## 2020-01-23 RX ADMIN — SODIUM CHLORIDE, SODIUM LACTATE, POTASSIUM CHLORIDE, AND CALCIUM CHLORIDE: .6; .31; .03; .02 INJECTION, SOLUTION INTRAVENOUS at 07:01

## 2020-01-23 RX ADMIN — FENTANYL CITRATE 100 MCG: 50 INJECTION INTRAMUSCULAR; INTRAVENOUS at 07:01

## 2020-01-23 RX ADMIN — ONDANSETRON 4 MG: 2 INJECTION, SOLUTION INTRAMUSCULAR; INTRAVENOUS at 09:01

## 2020-01-23 RX ADMIN — PROPOFOL 200 MG: 10 INJECTION, EMULSION INTRAVENOUS at 07:01

## 2020-01-23 RX ADMIN — GLYCOPYRROLATE 0.4 MG: 0.2 INJECTION, SOLUTION INTRAMUSCULAR; INTRAVENOUS at 09:01

## 2020-01-23 RX ADMIN — MIDAZOLAM HYDROCHLORIDE 1 MG: 1 INJECTION, SOLUTION INTRAMUSCULAR; INTRAVENOUS at 07:01

## 2020-01-23 RX ADMIN — LIDOCAINE HYDROCHLORIDE 100 MG: 20 INJECTION, SOLUTION INTRAVENOUS at 07:01

## 2020-01-23 RX ADMIN — HYDROCODONE BITARTRATE AND ACETAMINOPHEN 1 TABLET: 5; 325 TABLET ORAL at 11:01

## 2020-01-23 RX ADMIN — METOCLOPRAMIDE 10 MG: 5 INJECTION, SOLUTION INTRAMUSCULAR; INTRAVENOUS at 07:01

## 2020-01-23 RX ADMIN — GLYCOPYRROLATE 0.2 MG: 0.2 INJECTION, SOLUTION INTRAMUSCULAR; INTRAVENOUS at 08:01

## 2020-01-23 NOTE — INTERVAL H&P NOTE
The patient has been examined and the H&P has been reviewed:    I concur with the findings and no changes have occurred since H&P was written.1/13/20    Anesthesia/Surgery risks, benefits and alternative options discussed and understood by patient/family.          Active Hospital Problems    Diagnosis  POA    Absence of breast, bilateral [Z90.13]  Not Applicable      Resolved Hospital Problems   No resolved problems to display.

## 2020-01-23 NOTE — ANESTHESIA POSTPROCEDURE EVALUATION
Anesthesia Post Evaluation    Patient: Dayami Mina    Procedure(s) Performed: Procedure(s) (LRB):  EXCISION, MASS, UPPER EXTREMITY (Right)  CAPSULECTOMY, BREAST (Bilateral)  REMOVAL, IMPLANT, BREAST (Bilateral)    Final Anesthesia Type: general    Patient location during evaluation: PACU  Patient participation: Yes- Able to Participate  Level of consciousness: sedated  Post-procedure vital signs: reviewed and stable  Pain management: adequate  Airway patency: patent    PONV status at discharge: No PONV  Anesthetic complications: no      Cardiovascular status: blood pressure returned to baseline  Respiratory status: unassisted  Hydration status: euvolemic  Follow-up not needed.          Vitals Value Taken Time   /68 1/23/2020 11:04 AM   Temp 36.4 °C (97.6 °F) 1/23/2020 11:04 AM   Pulse 61 1/23/2020 11:04 AM   Resp 16 1/23/2020 11:04 AM   SpO2 93 % 1/23/2020 11:04 AM         Event Time     Out of Recovery 11:01:00          Pain/Miranda Score: Pain Rating Prior to Med Admin: 6 (1/23/2020 11:15 AM)  Pain Rating Post Med Admin: 3 (1/23/2020 10:50 AM)  Miranda Score: 9 (1/23/2020 10:50 AM)

## 2020-01-23 NOTE — PLAN OF CARE
PACU Miranda 9/10 Pt easily arouses to voice. Dressings are c/d/i. ANOOP drains are intact. Drainage emptied and documented. Pain 4/10. She denies nausea at this time. Safety precautions maintained.    Patient transferred to [facility] [place] in stable condition.

## 2020-01-23 NOTE — DISCHARGE SUMMARY
DATE OF ADMISSION:  01/23/2020    DATE OF DISCHARGE:  01/23/2020    The patient was admitted on 01/23/2020, discharged the same day after bilateral   implant material removal and open capsulectomy by me and right arm lipoma ____   with instruction to share the drain output daily, followup in my office in 1   week, will be Tuesday and prescription already for pain medication and   antibiotic was given to the patient.  We are going to follow her, like I said   Tuesday.      ABBIE/IN  dd: 01/23/2020 09:39:59 (CST)  td: 01/23/2020 12:14:37 (CST)  Doc ID   #[id number]  Job ID #[id number]    CC:

## 2020-01-23 NOTE — BRIEF OP NOTE
[facility]  Brief Operative Note    Surgery Date: 1/23/2020     Surgeon(s) and Role:  Panel 1:     * Sherman Rojas MD - Primary  Panel 2:     * Levi Clarke MD - Primary    Assisting Surgeon: None    Pre-op Diagnosis:  Arm mass, right [R22.31]    Post-op Diagnosis:  Post-Op Diagnosis Codes:     * Arm mass, right [R22.31]     * Absence of breast, bilateral [Z90.13]     * Leakage of breast implant, initial encounter [T85.43XA]    Procedure(s) (LRB):  EXCISION, MASS, UPPER EXTREMITY (Right)  CAPSULECTOMY, BREAST (Bilateral)  REMOVAL, IMPLANT, BREAST (Bilateral)    Anesthesia: General    Description of the findings of the procedure(s): rupture implants bilateral    Estimated Blood Loss: 25cc         Specimens: capsule, implants  Specimen (12h ago, onward)    None            Discharge Note    OUTCOME: Patient tolerated treatment/procedure well without complication and is now ready for discharge.    DISPOSITION: Home or Self Care    FINAL DIAGNOSIS:  <principal problem not specified>    FOLLOWUP: With primary care provider

## 2020-01-23 NOTE — TRANSFER OF CARE
"Anesthesia Transfer of Care Note    Patient: Dayami Mina    Procedure(s) Performed: Procedure(s) (LRB):  EXCISION, MASS, UPPER EXTREMITY (Right)  CAPSULECTOMY, BREAST (Bilateral)  REMOVAL, IMPLANT, BREAST (Bilateral)    Patient location: PACU    Anesthesia Type: general    Transport from OR: Transported from OR on room air with adequate spontaneous ventilation    Post pain: adequate analgesia    Post assessment: no apparent anesthetic complications and tolerated procedure well    Post vital signs: stable    Level of consciousness: awake, alert and oriented    Nausea/Vomiting: no nausea/vomiting    Complications: none    Transfer of care protocol was followed      Last vitals:   Visit Vitals  BP (!) 140/65 (BP Location: Left arm, Patient Position: Lying)   Pulse 75   Temp 36.7 °C (98 °F) (Oral)   Resp 20   Ht 4' 10" (1.473 m)   Wt 83.7 kg (184 lb 8.4 oz)   SpO2 96%   Breastfeeding? No   BMI 38.57 kg/m²     "

## 2020-01-23 NOTE — TELEPHONE ENCOUNTER
Spoke with pt daughter and scheduled her mom post op for next thursday with .              ----- Message from Jocelin Watson sent at 1/23/2020  2:48 PM CST -----  Contact: Frieda Scales (Daughter)  // # [phone]  Name of Who is Calling: Frieda Scales (Daughter)        What is the request in detail:   Patient's daughter called requesting to schedule her mother's Post-op visit. I did attempt to schedule per request but was unsuccessful.   Please give a call back at your earliest convenience and further advise.      THANKS!      Reply by [facility]:  no      Call Back: Frieda Scales (Daughter)  // # [phone]

## 2020-01-23 NOTE — DISCHARGE INSTRUCTIONS
Dr. Rojas   Office # [phone]     Dr. Clarke  Office # [phone]    Discharge Instructions for Same Day Surgery     Call the office for and appointment if one has not already been made.     Diet: Drink plenty of fluids the first 48 hours and you may resume your   usual diet.     Activity: No heavy lifting (over 10 pounds), pushing or pulling until your   post op visit. Your doctor's office may have told you to limit your lifting to less weight, or even no weight.  Be sure to follow those instructions.    Note: You may ride in a car and you may drive when comfortable.     Do not drive, drink alcohol, or sign legal documents for 24 hours, or if taking narcotic pain medication.    Dressings: Remove the dressing on your arm 24 hours after surgery. Sponge bathe only until clinic visit with Dr. Clarke    Dermbiju / Prineotape    or a material like it was used on your incision.   It is like a liquid glue.   Do not peel or try to remove it it will start to fall off in 7-10 days on its' own.   No tub baths, swimming pools, hot tubs or submersion of the incision until your surgeon says it's ok.     If you have steri strips on your arm incision ( appears to be strips of white tape) on   your incision, leave them on.     Drains: If you have a drain, measure and record the drainage. Bring this information with you on your office visit.     Medical: Call the doctor for any of the following problems: fever above 101,   severe pain, bleeding, or abdominal distention (swelling).   If constipated you may take any stool softener you choose.     Occasionally small areas of skin numbness or an unpleasant skin sensation can result. Also, you may find that your incision is swollen and tender for a few days.  Some redness around sutures and staples is a normal reaction, but if the discomfort persists or worsens, call you doctor.   Fall Prevention  Millions of people fall every year and injure themselves. You may have had  anesthesia or sedation which may increase your risk of falling. You may have health issues that put you at an increased risk of falling.     Here are ways to reduce your risk of falling.  ·   · Make your home safe by keeping walkways clear of objects you may trip over.  · Use non-slip pads under rugs. Do not use area rugs or small throw rugs.  · Use non-slip mats in bathtubs and showers.  · Install handrails and lights on staircases.  · Do not walk in poorly lit areas.  · Do not stand on chairs or wobbly ladders.  · Use caution when reaching overhead or looking upward. This position can cause a loss of balance.  · Be sure your shoes fit properly, have non-slip bottoms and are in good condition.   · Wear shoes both inside and out. Avoid going barefoot or wearing slippers.  · Be cautious when going up and down stairs, curbs, and when walking on uneven sidewalks.  · If your balance is poor, consider using a cane or walker.  · If your fall was related to alcohol use, stop or limit alcohol intake.   · If your fall was related to use of sleeping medicines, talk to your doctor about this. You may need to reduce your dosage at bedtime if you awaken during the night to go to the bathroom.    · To reduce the need for nighttime bathroom trips:  ¨ Avoid drinking fluids for several hours before going to bed  ¨ Empty your bladder before going to bed  ¨ Men can keep a urinal at the bedside  · Stay as active as you can. Balance, flexibility, strength, and endurance all come from exercise. They all play a role in preventing falls. Ask your healthcare provider which types of activity are right for you.  · Get your vision checked on a regular basis.  · If you have pets, know where they are before you stand up or walk so you don't trip over them.  Use night lights.                                             -Exparel information-      To help control your pain after surgery, your surgeon injected Exparel (bupicacaine liposome  injectable suspension) into your surgical incision just before the end of the procedure.      Exparel is a local analgesic that contains the local anesthetic bupivacaine..  Local anesthetics provide pain relief by numbing the tissue around the surgical site.    Exparel is specifically designed to release pain medication over time an can control pain for up to 72 hours.    In addition to Exparel, your surgeon may provide other pain medications to control your pain.    Each patient is different and responds differently to pain medication.  Depending on how you respond to Exparel, you may require less additional pain medication during your recovery.    Side effects can occur with any medication and it is important not to ignore anything you may be experiencing.  Some patients who received Exparel experienced nausea, vomiting, or constipation.  Rarely, patients who receive bupivacaine (the active ingredient in Exparel) have experienced numbness and tingling in their mouth or lips, lightheadedness, or anxiety.  Speak with your doctor right away if you think you may be experiencing any of these sensations, or if you have other questions regarding possible side effects.    Products that contain bupivacaine, like Exparel, may cause a temporary loss of sensation or the ability to move in the area where bupivacaine was injected.    Other formulations of bupivacaine should not be administered within 96 hours following administrations of Exparel.      Do not remove the teal colored bracelet that you have on for 96 hours.  (4 DAYS)    This bracelet will let other health care workers know that you have received Exparel, and not to give you bupivacaine during this 96 hours.

## 2020-01-23 NOTE — INTERVAL H&P NOTE
The patient has been examined and the H&P has been reviewed:    I concur with the findings and no changes have occurred since H&P was written.    Anesthesia/Surgery risks, benefits and alternative options discussed and understood by patient/family.          Active Hospital Problems    Diagnosis  POA    Absence of breast, bilateral [Z90.13]  Not Applicable      Resolved Hospital Problems   No resolved problems to display.

## 2020-01-23 NOTE — OP NOTE
DATE OF PROCEDURE:  01/23/2020    SURGEON:  Levi Clarke M.D.    PREOPERATIVE DIAGNOSIS:  Ruptured bilateral breast implant plus breast   reconstruction.    POSTOPERATIVE DIAGNOSIS:  Ruptured bilateral breast implant plus breast   reconstruction.    PROCEDURE PERFORMED:  Excision of breast implant material and open capsulectomy.    ANESTHESIA:  General endotracheal anesthesia.    INDICATIONS:  It was a combined procedure with Dr. Sherman Rojas, who removed a   lipoma on the right arm.  The patient has a history of bilateral mastectomy with   reconstruction in the past, has previous problem with implant rupture and she   has an MRI, which showed rupture of both implants and she was symptomatic with   pain mainly in the right.  The patient was consulted by Dr. Rojas and   recommendation to go ahead and remove the implant and open capsulectomy.    Operative procedure as well as complications were explained to the patient who   understood and signed the consent.    PROCEDURE IN DETAIL:  Under sterile condition,  general endotracheal anesthesia,   after preparation in the usual fashion, a previous incision was done starting   in the left side.  She had like a zigzag incision, it was removed on the scar   and then dissection all the way to the pectoralis and to see the capsule, the   capsule was dissected anterior and posterior portion and removed and about 90%   with a small amount of the posterior capsule was attached to the rib, I was   unable to remove it.  The implant was removed completely with some rupture of   the implant.  The area was irrigated with bacitracin solution.  A #19 Sivakumar was   placed and anchored with a 2-0 nylon.  Then, the wound was closed with a 3-0   Monocryl dermis and then subcuticular.  The #19 Sivakumar was attached with a 2-0   nylon.  Then, the right side was approached.  She had a different incision where   the scar was removed with the Z-plasty at the end and then opening.  Then,    dissection of the capsule was done.  This one was more attached to the posterior   ____ the implant was completely ruptured and all the material was removed and   the capsule was removed, probably like 50% of the posterior capsule was   attached.  The area was irrigated with bacitracin solution after removal of the   implant material and a #19 Sivakumar was placed and anchored with a 2-0 nylon and   then closure in the same fashion using 3-0 Monocryl dermis and subcuticular.    Dermabond tape was applied in both sides with a Biopatch in the drain and then   the patient received 500 mg of vancomycin.  The patient was sent to the Recovery   Room and then to Same Day Surgery.  We will discharge after okayed by   Anesthesia.      ABBIE/IN  dd: 01/23/2020 09:39:59 (CST)  td: 01/23/2020 12:03:23 (CST)  Doc ID   #[id number]  Job ID #[id number]    CC:

## 2020-01-23 NOTE — ANESTHESIA PREPROCEDURE EVALUATION
01/23/2020  Dayami Harper is a 69 y.o., female.She is NPO for removal of upper extremity mass and bilateral capsulectomy due to ruptured implants. H/O PONV is noted and plan established.     Anesthesia Evaluation    I have reviewed the Patient Summary Reports.     I have reviewed the Medications.     Review of Systems  Anesthesia Hx:  Hx of Anesthetic complications    Cardiovascular:   Hypertension        Pulmonary:   COPD    Renal/:   Denies Chronic Renal Disease.     Hepatic/GI:   GERD        Physical Exam  General:  Obesity    Airway/Jaw/Neck:  Airway Findings: Mouth Opening: Normal Tongue: Normal  General Airway Assessment: Adult  Mallampati: III       Chest/Lungs:  Chest/Lungs Findings: Clear to auscultation     Heart/Vascular:  Heart Findings: Rate: Normal  Rhythm: Regular Rhythm         Results for MRS MEREDITH HARPER (MRN [id number]) as of 1/23/2020 06:44   Ref. Range 1/20/2020 12:13   Sodium Latest Ref Range: 136 - 145 mmol/L 137   Potassium Latest Ref Range: 3.5 - 5.1 mmol/L 4.6   Chloride Latest Ref Range: 95 - 110 mmol/L 103   CO2 Latest Ref Range: 23 - 29 mmol/L 23   Anion Gap Latest Ref Range: 8 - 16 mmol/L 11   BUN, Bld Latest Ref Range: 8 - 23 mg/dL 22   Creatinine Latest Ref Range: 0.5 - 1.4 mg/dL 1.0   Results for MRS MEREDITH HARPER (MRN [id number]) as of 1/23/2020 06:44   Ref. Range 10/16/2019 15:07   WBC Latest Ref Range: 3.90 - 12.70 K/uL 6.26   RBC Latest Ref Range: 4.00 - 5.40 M/uL 4.50   Hemoglobin Latest Ref Range: 12.0 - 16.0 g/dL 12.4   Hematocrit Latest Ref Range: 37.0 - 48.5 % 39.1   MCV Latest Ref Range: 82 - 98 fL 87   MCH Latest Ref Range: 27.0 - 31.0 pg 27.6   MCHC Latest Ref Range: 32.0 - 36.0 g/dL 31.7 (L)   RDW Latest Ref Range: 11.5 - 14.5 % 14.4   Platelets Latest Ref Range: 150 - 350 K/uL 296     Stress Findings A pharmacological stress test  was performed using regadenoson. The patient reported headaches during the stress test.   Baseline ECG The Baseline ECG reveals sinus rhythm.   Stress/Recovery ECG There were no arrhythmias during stress.   ECG Conclusion ECG was uninterpretable.         Anesthesia Plan  Type of Anesthesia, risks & benefits discussed:  Anesthesia Type:  regional  Patient's Preference:   Intra-op Monitoring Plan: standard ASA monitors  Intra-op Monitoring Plan Comments:   Post Op Pain Control Plan:   Post Op Pain Control Plan Comments:   Induction:   IV  Beta Blocker:  Patient is on a Beta-Blocker and has received one dose within the past 24 hours (No further documentation required).       Informed Consent: Patient understands risks and agrees with Anesthesia plan.  Questions answered.   ASA Score: 3     Day of Surgery Review of History & Physical:            Ready For Surgery From Anesthesia Perspective.

## 2020-01-28 LAB
FINAL PATHOLOGIC DIAGNOSIS: NORMAL
FINAL PATHOLOGIC DIAGNOSIS: NORMAL
GROSS: NORMAL
GROSS: NORMAL

## 2020-01-30 ENCOUNTER — OFFICE VISIT (OUTPATIENT)
Dept: SURGERY | Facility: CLINIC | Age: 70
End: 2020-01-30
Payer: MEDICARE

## 2020-01-30 VITALS
WEIGHT: 182.75 LBS | DIASTOLIC BLOOD PRESSURE: 70 MMHG | HEIGHT: 58 IN | HEART RATE: 67 BPM | BODY MASS INDEX: 38.36 KG/M2 | SYSTOLIC BLOOD PRESSURE: 106 MMHG

## 2020-01-30 DIAGNOSIS — D17.21 LIPOMA OF RIGHT UPPER EXTREMITY: Primary | ICD-10-CM

## 2020-01-30 PROCEDURE — 99024 PR POST-OP FOLLOW-UP VISIT: ICD-10-PCS | Mod: S$GLB,POP,, | Performed by: SURGERY

## 2020-01-30 PROCEDURE — 99024 POSTOP FOLLOW-UP VISIT: CPT | Mod: S$GLB,POP,, | Performed by: SURGERY

## 2020-01-30 NOTE — PROGRESS NOTES
Subjective:       Patient ID: Dayami Mina is a 69 y.o. female.    Chief Complaint: Post-op Evaluation    HPI 70 yo female with excision of lipoma without complaints  Review of Systems   Constitutional: Negative.    HENT: Negative.    Eyes: Negative.    Respiratory: Negative.    Cardiovascular: Negative.    Gastrointestinal: Negative.    Endocrine: Negative.    Musculoskeletal: Negative.    Skin: Negative.    Allergic/Immunologic: Negative.    Neurological: Negative.    Hematological: Negative.    Psychiatric/Behavioral: Negative.    All other systems reviewed and are negative.      Objective:      Physical Exam   Constitutional: She is oriented to person, place, and time. She appears well-developed and well-nourished.   HENT:   Head: Normocephalic and atraumatic.   Right Ear: External ear normal.   Left Ear: External ear normal.   Nose: Nose normal.   Mouth/Throat: Oropharynx is clear and moist.   Eyes: Pupils are equal, round, and reactive to light. Conjunctivae and EOM are normal.   Neck: Normal range of motion. Neck supple.   Cardiovascular: Normal rate, regular rhythm, normal heart sounds and intact distal pulses.   Pulmonary/Chest: Effort normal and breath sounds normal.   Abdominal: Soft. Bowel sounds are normal.   Musculoskeletal: Normal range of motion.   Neurological: She is alert and oriented to person, place, and time. She has normal reflexes.   Skin: Skin is warm and dry.   Psychiatric: She has a normal mood and affect. Her behavior is normal. Thought content normal.   Vitals reviewed.      Assessment:       1. Lipoma of right upper extremity        Plan:       I will see her back prn

## 2020-02-04 NOTE — OP NOTE
[facility]  General Surgery  Operative Note    SUMMARY     Date of Procedure: 1/23/2020     Procedure: Procedure(s) (LRB):  EXCISION, MASS, UPPER EXTREMITY (Right)  CAPSULECTOMY, BREAST (Bilateral)  REMOVAL, IMPLANT, BREAST (Bilateral)       Surgeon(s) and Role:  Panel 1:     * Sherman Rojas MD - Primary  Panel 2:     * Levi Clarke MD - Primary    Assisting Surgeon:  Marvin Adame MD    Pre-Operative Diagnosis: Arm mass, right [R22.31]    Post-Operative Diagnosis: Post-Op Diagnosis Codes:     * Arm mass, right [R22.31]     * Absence of breast, bilateral [Z90.13]     * Leakage of breast implant, initial encounter [T85.43XA]    Anesthesia: General    Technical Procedures Used:  Patient was taken to the operating room placed on operating table supine position. Her right arm was prepped and draped the usual sterile fashion.  Incision was made overlying this mass it was the deepened to a fatty tumor was circumferentially dissected and removed sent to pathology for analysis.  Hemostasis was obtained electrocautery the wounds then closed in layers with absorbable suture Dermabond tape was placed and the rest of the operation was turned over to Dr. Vince krishnan for his portion.    Description of the Findings of the Procedure:  2-3 cm fatty tumor    Significant Surgical Tasks Conducted by the Assistant(s), if Applicable:  Greater than 50%    Complications: No    Estimated Blood Loss (EBL):  Minimal           Implants: * No implants in log *    Specimens:   Specimen (12h ago, onward)    None                  Condition: Good    Disposition: PACU - hemodynamically stable.    Attestation: I was present and scrubbed for the entire procedure.

## 2020-02-14 ENCOUNTER — PATIENT MESSAGE (OUTPATIENT)
Dept: FAMILY MEDICINE | Facility: CLINIC | Age: 70
End: 2020-02-14

## 2020-03-11 DIAGNOSIS — J44.89 COPD WITH ASTHMA: ICD-10-CM

## 2020-03-12 RX ORDER — UMECLIDINIUM BROMIDE AND VILANTEROL TRIFENATATE 62.5; 25 UG/1; UG/1
POWDER RESPIRATORY (INHALATION)
Qty: 60 EACH | Refills: 5 | Status: SHIPPED | OUTPATIENT
Start: 2020-03-12 | End: 2020-09-17

## 2020-03-17 ENCOUNTER — PATIENT MESSAGE (OUTPATIENT)
Dept: FAMILY MEDICINE | Facility: CLINIC | Age: 70
End: 2020-03-17

## 2020-03-17 DIAGNOSIS — F33.42 RECURRENT MAJOR DEPRESSIVE DISORDER, IN FULL REMISSION: ICD-10-CM

## 2020-03-17 DIAGNOSIS — J20.9 ACUTE BRONCHITIS, UNSPECIFIED ORGANISM: Primary | ICD-10-CM

## 2020-03-17 DIAGNOSIS — I10 ESSENTIAL HYPERTENSION: Chronic | ICD-10-CM

## 2020-03-17 RX ORDER — AZITHROMYCIN 250 MG/1
TABLET, FILM COATED ORAL
Qty: 6 TABLET | Refills: 0 | Status: SHIPPED | OUTPATIENT
Start: 2020-03-17 | End: 2020-03-22

## 2020-03-17 RX ORDER — METOPROLOL SUCCINATE 50 MG/1
50 TABLET, EXTENDED RELEASE ORAL DAILY
Qty: 90 TABLET | Refills: 1 | Status: SHIPPED | OUTPATIENT
Start: 2020-03-17 | End: 2020-09-07

## 2020-03-17 RX ORDER — ESCITALOPRAM OXALATE 20 MG/1
20 TABLET ORAL DAILY
Qty: 90 TABLET | Refills: 1 | Status: SHIPPED | OUTPATIENT
Start: 2020-03-17 | End: 2020-09-07

## 2020-03-17 RX ORDER — LOSARTAN POTASSIUM 50 MG/1
50 TABLET ORAL 2 TIMES DAILY
Qty: 180 TABLET | Refills: 1 | Status: SHIPPED | OUTPATIENT
Start: 2020-03-17 | End: 2020-09-07

## 2020-04-02 ENCOUNTER — TELEPHONE (OUTPATIENT)
Dept: FAMILY MEDICINE | Facility: CLINIC | Age: 70
End: 2020-04-02

## 2020-04-02 NOTE — TELEPHONE ENCOUNTER
----- Message from Olimpia Walton sent at 4/2/2020  4:28 PM CDT -----  Contact: Self  Type: Patient Call Back    Who called:Dayami    What is the request in detail: Patient called to request azithromycin or another Rx because patient has recurrent symptoms of allergies. Please call to clarify and discuss     Can the clinic reply by [facility]?    Would the patient rather a call back or a response via [facility]?Call    Best call back number:[phone]

## 2020-04-02 NOTE — TELEPHONE ENCOUNTER
I spoke to the pt and advised to try a sinus rinse and scheduled her for a virtual visit tomorrow.

## 2020-04-03 ENCOUNTER — OFFICE VISIT (OUTPATIENT)
Dept: FAMILY MEDICINE | Facility: CLINIC | Age: 70
End: 2020-04-03
Payer: MEDICARE

## 2020-04-03 DIAGNOSIS — J43.1 PANLOBULAR EMPHYSEMA: ICD-10-CM

## 2020-04-03 DIAGNOSIS — J30.2 SEASONAL ALLERGIC RHINITIS, UNSPECIFIED TRIGGER: Primary | ICD-10-CM

## 2020-04-03 PROCEDURE — 99214 PR OFFICE/OUTPT VISIT, EST, LEVL IV, 30-39 MIN: ICD-10-PCS | Mod: 95,,, | Performed by: FAMILY MEDICINE

## 2020-04-03 PROCEDURE — 99214 OFFICE O/P EST MOD 30 MIN: CPT | Mod: 95,,, | Performed by: FAMILY MEDICINE

## 2020-04-03 NOTE — PROGRESS NOTES
Subjective:       Patient ID: Dayami Mina is a 69 y.o. female.    Chief Complaint: No chief complaint on file.      HPI  68 yo female w/ h/o copd, seen for sinus congestion. States it started a few days ago. Was given zpak about 2 weeks ago but states her symptoms have returned. Endorses mild cough, postnasal drip, and congestion. Denies any f/c, sob, fatigue, or weakness. Takes otc meds for allergies. States she is taking her anoro daily. Tried her albuterol few days ago only.    Review of Systems   Constitutional: Negative.    HENT: Positive for congestion, postnasal drip, rhinorrhea and sinus pressure.    Respiratory: Positive for cough. Negative for shortness of breath and wheezing.    Cardiovascular: Negative.    Gastrointestinal: Negative.    Endocrine: Negative.    Genitourinary: Negative.    Musculoskeletal: Negative.    Neurological: Negative.    Psychiatric/Behavioral: Negative.           Past Medical History:   Diagnosis Date    Benign colon polyp 2cm     Depression     Hyperlipidemia     Hypertension     PONV (postoperative nausea and vomiting)      Past Surgical History:   Procedure Laterality Date    APPENDECTOMY      BACK SURGERY      BREAST CAPSULECTOMY Bilateral 1/23/2020    Procedure: CAPSULECTOMY, BREAST;  Surgeon: Levi Clarke MD;  Location: [facility] OR;  Service: Plastics;  Laterality: Bilateral;  CLARKE OK WITH 7:15AM START BOTH WILL WORK AT SAME TIME    breast implants      CLOSED REDUCTION FINGER DISLOCATION      FOOT SURGERY      HYSTERECTOMY      JOINT REPLACEMENT Left     total Lt knee    KNEE ARTHROSCOPY Left     MASTECTOMY      REMOVAL OF BREAST IMPLANT Bilateral 1/23/2020    Procedure: REMOVAL, IMPLANT, BREAST;  Surgeon: Levi Clarke MD;  Location: [facility] OR;  Service: Plastics;  Laterality: Bilateral;    SHOULDER ARTHROSCOPY      SINUS SURGERY      SURGICAL REMOVAL OF MASS OF UPPER EXTREMITY Right 1/23/2020    Procedure: EXCISION, MASS, UPPER EXTREMITY;   Surgeon: Sherman Rojas MD;  Location: [facility] OR;  Service: General;  Laterality: Right;  joint with Dr. Clarke--RN PREOP 2020  THEY WILL WORK AT SAME TIME     Family History   Problem Relation Age of Onset    Diabetes Mother     Diabetes Father     Stroke Paternal Grandfather     Diabetes Sister      Social History     Socioeconomic History    Marital status:      Spouse name: Not on file    Number of children: Not on file    Years of education: Not on file    Highest education level: Not on file   Occupational History    Not on file   Social Needs    Financial resource strain: Not on file    Food insecurity:     Worry: Not on file     Inability: Not on file    Transportation needs:     Medical: Not on file     Non-medical: Not on file   Tobacco Use    Smoking status: Former Smoker     Last attempt to quit: 1988     Years since quittin.2    Smokeless tobacco: Never Used   Substance and Sexual Activity    Alcohol use: No    Drug use: No    Sexual activity: Not Currently     Partners: Male   Lifestyle    Physical activity:     Days per week: Not on file     Minutes per session: Not on file    Stress: Not on file   Relationships    Social connections:     Talks on phone: Not on file     Gets together: Not on file     Attends Holiness service: Not on file     Active member of club or organization: Not on file     Attends meetings of clubs or organizations: Not on file     Relationship status: Not on file   Other Topics Concern    Not on file   Social History Narrative    Not on file       Current Outpatient Medications:     acetaminophen/caffeine (EXCEDRIN TENSION HEADACHE ORAL), Take by mouth., Disp: , Rfl:     albuterol (PROVENTIL) 2.5 mg /3 mL (0.083 %) nebulizer solution, Take 3 mLs (2.5 mg total) by nebulization every 6 (six) hours as needed for Wheezing or Shortness of Breath. Rescue, Disp: 1 Box, Rfl: 0    allopurinol (ZYLOPRIM) 100 MG tablet, TAKE ONE Tablet BY  MOUTH EVERY DAY (Patient taking differently: 3 (three) times a week. ), Disp: 90 tablet, Rfl: 1    ammonium lactate (LAC-HYDRIN) 12 % lotion, Apply topically 2 (two) times daily as needed for Dry Skin., Disp: 500 g, Rfl: 5    ANORO ELLIPTA 62.5-25 mcg/actuation DsDv, INHALE 1 PUFF BY MOUTH DAILY, Disp: 60 each, Rfl: 5    aspirin (ECOTRIN) 81 MG EC tablet, Take 81 mg by mouth once daily., Disp: , Rfl:     butalbital-acetaminophen-caffeine -40 mg (FIORICET, ESGIC) -40 mg per tablet, daily as needed. , Disp: , Rfl:     escitalopram oxalate (LEXAPRO) 20 MG tablet, Take 1 tablet (20 mg total) by mouth once daily., Disp: 90 tablet, Rfl: 1    esomeprazole (NEXIUM) 20 MG capsule, Take 20 mg by mouth., Disp: , Rfl:     fexofenadine (ALLEGRA ALLERGY) 180 MG tablet, , Disp: , Rfl:     gabapentin (NEURONTIN) 100 MG capsule, 100 mg. 2 tabs in the am and 2 tabs in the pm, Disp: , Rfl:     HYDROcodone-acetaminophen (NORCO) 7.5-325 mg per tablet, Take 1 tablet by mouth., Disp: , Rfl:     losartan (COZAAR) 50 MG tablet, Take 1 tablet (50 mg total) by mouth 2 (two) times daily., Disp: 180 tablet, Rfl: 1    metoprolol succinate (TOPROL-XL) 50 MG 24 hr tablet, Take 1 tablet (50 mg total) by mouth once daily., Disp: 90 tablet, Rfl: 1    multivitamin capsule, Take 1 capsule by mouth once daily., Disp: , Rfl:     nystatin (MYCOSTATIN) powder, Apply topically 3 (three) times daily. For rash, Disp: 60 g, Rfl: 2    potassium chloride SA (K-DUR,KLOR-CON) 20 MEQ tablet, Take 1 tablet (20 mEq total) by mouth once daily., Disp: 90 tablet, Rfl: 2    pravastatin (PRAVACHOL) 40 MG tablet, Take 1 tablet (40 mg total) by mouth once daily., Disp: 90 tablet, Rfl: 2    promethazine (PHENERGAN) 12.5 MG Tab, Take 1 tablet (12.5 mg total) by mouth every 6 (six) hours as needed., Disp: 10 tablet, Rfl: 0  No current facility-administered medications for this visit.     Facility-Administered Medications Ordered in Other Visits:      vancomycin 500 mg in dextrose 5 % 100 mL IVPB (ready to mix system), 500 mg, Intravenous, On Call Procedure, Sherman Rojas MD, 500 mg at 01/23/20 0740   Objective:      There were no vitals filed for this visit.    Physical Exam   Constitutional: She is oriented to person, place, and time. No distress.   HENT:   Head: Normocephalic and atraumatic.   Eyes: Conjunctivae are normal.   Neck: Neck supple.   Musculoskeletal: Normal range of motion.   Neurological: She is alert and oriented to person, place, and time.   Skin: No rash noted. She is not diaphoretic.   Psychiatric: She has a normal mood and affect. Her behavior is normal. Judgment and thought content normal.          Assessment:       1. Seasonal allergic rhinitis, unspecified trigger    2. Panlobular emphysema        Plan:       Seasonal allergic rhinitis, unspecified trigger  Advised pt to increase allegra temp to bid for a few days. Can take nasocort bid.  - Advised pt to call if symptoms last a total of 10-14 days. Advised pt about otc meds to use. Advised pt about honey and saltwater gargling PRN.    Panlobular emphysema  Advised pt to use albuterol up to q6hrs for cough      If symptoms worsen pt was advised to go to ed    Low suspicion of covid since pt has been isolated for all of last month and does not leave.    Follow up if symptoms worsen or fail to improve.            Sammy Renner MD      Patient note was created using Yvolver.  Any errors in syntax or even information may not have been identified and edited on initial review prior to signing this note.    The patient location is: home  The chief complaint leading to consultation is: allergies  Visit type: Virtual visit with synchronous audio and video  Total time spent with patient: 10mins  Each patient to whom he or she provides medical services by telemedicine is:  (1) informed of the relationship between the physician and patient and the respective role of any other health care  provider with respect to management of the patient; and (2) notified that he or she may decline to receive medical services by telemedicine and may withdraw from such care at any time.

## 2020-04-25 DIAGNOSIS — E78.5 HYPERLIPIDEMIA, UNSPECIFIED HYPERLIPIDEMIA TYPE: Chronic | ICD-10-CM

## 2020-04-27 RX ORDER — PRAVASTATIN SODIUM 40 MG/1
TABLET ORAL
Qty: 90 TABLET | Refills: 2 | Status: SHIPPED | OUTPATIENT
Start: 2020-04-27 | End: 2021-01-21

## 2020-06-02 ENCOUNTER — OFFICE VISIT (OUTPATIENT)
Dept: SURGERY | Facility: CLINIC | Age: 70
End: 2020-06-02
Payer: MEDICARE

## 2020-06-02 VITALS
BODY MASS INDEX: 37.79 KG/M2 | SYSTOLIC BLOOD PRESSURE: 117 MMHG | HEART RATE: 69 BPM | DIASTOLIC BLOOD PRESSURE: 76 MMHG | WEIGHT: 180 LBS | HEIGHT: 58 IN

## 2020-06-02 DIAGNOSIS — L53.9 SKIN ERYTHEMA: Primary | ICD-10-CM

## 2020-06-02 PROCEDURE — 99214 OFFICE O/P EST MOD 30 MIN: CPT | Mod: S$GLB,,, | Performed by: SURGERY

## 2020-06-02 PROCEDURE — 99214 PR OFFICE/OUTPT VISIT, EST, LEVL IV, 30-39 MIN: ICD-10-PCS | Mod: S$GLB,,, | Performed by: SURGERY

## 2020-06-02 RX ORDER — CEPHALEXIN 250 MG/1
250 CAPSULE ORAL 4 TIMES DAILY
Qty: 28 CAPSULE | Refills: 0 | Status: SHIPPED | OUTPATIENT
Start: 2020-06-02 | End: 2020-11-03 | Stop reason: ALTCHOICE

## 2020-06-02 NOTE — PROGRESS NOTES
Subjective:       Patient ID: Dayami Mina is a 69 y.o. female.    Chief Complaint: Follow-up (6 month f/u )    HPI 68 yo female s/p excision of bilateral implants with some mild erythema in her right flap  Review of Systems   Constitutional: Negative.    HENT: Negative.    Eyes: Negative.    Respiratory: Negative.    Cardiovascular: Negative.    Gastrointestinal: Negative.    Endocrine: Negative.    Musculoskeletal: Negative.    Skin: Negative.    Allergic/Immunologic: Negative.    Neurological: Negative.    Hematological: Negative.    Psychiatric/Behavioral: Negative.    All other systems reviewed and are negative.      Objective:      Physical Exam   Constitutional: She is oriented to person, place, and time. She appears well-developed and well-nourished.   HENT:   Head: Normocephalic and atraumatic.   Right Ear: External ear normal.   Left Ear: External ear normal.   Nose: Nose normal.   Mouth/Throat: Oropharynx is clear and moist.   Eyes: Pupils are equal, round, and reactive to light. Conjunctivae and EOM are normal.   Neck: Normal range of motion. Neck supple.   Cardiovascular: Normal rate, regular rhythm, normal heart sounds and intact distal pulses.   Pulmonary/Chest: Effort normal and breath sounds normal. Right breast exhibits skin change.       Abdominal: Soft. Bowel sounds are normal.   Musculoskeletal: Normal range of motion.   Neurological: She is alert and oriented to person, place, and time. She has normal reflexes.   Skin: Skin is warm and dry.   Psychiatric: She has a normal mood and affect. Her behavior is normal. Thought content normal.   Vitals reviewed.      Assessment:       1. Skin erythema        Plan:       I will start oral abx and then have her follow up

## 2020-06-10 ENCOUNTER — TELEPHONE (OUTPATIENT)
Dept: SURGERY | Facility: CLINIC | Age: 70
End: 2020-06-10

## 2020-06-10 NOTE — TELEPHONE ENCOUNTER
Spoke to pt she is still having redness on her right side.   notified.  Pt advised  wants her to see Dr. Clarke-pt verbalized understanding.      ----- Message from Anna Mederos sent at 6/10/2020 12:58 PM CDT -----  Contact: self  Type: Patient Call Back    Who called:self    What is the request in detail:breast is red on right side    Can the clinic reply by [facility]?no    Would the patient rather a call back or a response via [facility]? call    Best call back number:

## 2020-06-15 DIAGNOSIS — N64.4 BREAST PAIN: Primary | ICD-10-CM

## 2020-07-14 RX ORDER — ALLOPURINOL 100 MG/1
100 TABLET ORAL DAILY
Qty: 90 TABLET | Refills: 1 | Status: SHIPPED | OUTPATIENT
Start: 2020-07-14 | End: 2021-03-10 | Stop reason: SDUPTHER

## 2020-07-17 ENCOUNTER — OFFICE VISIT (OUTPATIENT)
Dept: FAMILY MEDICINE | Facility: CLINIC | Age: 70
End: 2020-07-17
Payer: MEDICARE

## 2020-07-17 ENCOUNTER — NURSE TRIAGE (OUTPATIENT)
Dept: ADMINISTRATIVE | Facility: CLINIC | Age: 70
End: 2020-07-17

## 2020-07-17 DIAGNOSIS — K59.1 FUNCTIONAL DIARRHEA: Primary | ICD-10-CM

## 2020-07-17 PROCEDURE — 99213 OFFICE O/P EST LOW 20 MIN: CPT | Mod: 95,,, | Performed by: FAMILY MEDICINE

## 2020-07-17 PROCEDURE — 99213 PR OFFICE/OUTPT VISIT, EST, LEVL III, 20-29 MIN: ICD-10-PCS | Mod: 95,,, | Performed by: FAMILY MEDICINE

## 2020-07-17 RX ORDER — DIPHENOXYLATE HYDROCHLORIDE AND ATROPINE SULFATE 2.5; .025 MG/1; MG/1
1-2 TABLET ORAL 4 TIMES DAILY PRN
Qty: 30 TABLET | Refills: 0 | Status: SHIPPED | OUTPATIENT
Start: 2020-07-17 | End: 2020-11-03 | Stop reason: ALTCHOICE

## 2020-07-17 NOTE — PROGRESS NOTES
The patient location is: [facility]  The chief complaint leading to consultation is: diarrhea    Visit type: audiovisual    Face to Face time with patient: 10 minutes  10 minutes of total time spent on the encounter, which includes face to face time and non-face to face time preparing to see the patient (eg, review of tests), Obtaining and/or reviewing separately obtained history, Documenting clinical information in the electronic or other health record, Independently interpreting results (not separately reported) and communicating results to the patient/family/caregiver, or Care coordination (not separately reported).         Each patient to whom he or she provides medical services by telemedicine is:  (1) informed of the relationship between the physician and patient and the respective role of any other health care provider with respect to management of the patient; and (2) notified that he or she may decline to receive medical services by telemedicine and may withdraw from such care at any time.    Notes:       Subjective:       Patient ID: Dayami Mina is a 70 y.o. female.    Chief Complaint: Diarrhea    Diarrhea   This is a new problem. Episode onset: 3 days. The problem has been unchanged. The stool consistency is described as watery. Pertinent negatives include no abdominal pain, arthralgias, bloating, chills, coughing, fever, headaches, increased  flatus, myalgias, sweats, URI, vomiting or weight loss. Nothing aggravates the symptoms. There are no known risk factors. Treatments tried: Imodium. The treatment provided no relief. There is no history of bowel resection, inflammatory bowel disease, irritable bowel syndrome, malabsorption, a recent abdominal surgery or short gut syndrome.        Review of Systems   Constitutional: Negative for activity change, appetite change, chills, fatigue, fever, unexpected weight change and weight loss.   Respiratory: Negative for cough.    Gastrointestinal:  Positive for diarrhea. Negative for abdominal pain, bloating, blood in stool, change in bowel habit, constipation, flatus, nausea, vomiting, reflux, fecal incontinence and change in bowel habit.   Endocrine: Negative for polyphagia and polyuria.   Genitourinary: Negative for decreased urine volume, dysuria, frequency, hematuria and urgency.   Musculoskeletal: Negative for arthralgias and myalgias.   Neurological: Negative for headaches.         Objective:      Physical Exam  Constitutional:       General: She is not in acute distress.     Appearance: She is not ill-appearing, toxic-appearing or diaphoretic.   Pulmonary:      Effort: Pulmonary effort is normal.   Neurological:      Mental Status: She is alert.         Assessment:       1. Functional diarrhea        Plan:       Dayami was seen today for diarrhea.    Diagnoses and all orders for this visit:    Functional diarrhea  -     diphenoxylate-atropine 2.5-0.025 mg (LOMOTIL) 2.5-0.025 mg per tablet; Take 1-2 tablets by mouth 4 (four) times daily as needed for Diarrhea.    Advised good hydration.  Add Lomotil  Discussed signs of diverticulitis to watch out for and if develops, seek immediate car.

## 2020-07-17 NOTE — TELEPHONE ENCOUNTER
Pt c/o diarrhea x 3 days without relief from imodium. Pt denies other symptoms. Pt advised per protocol and pt verbalizes understanding. Pt called back to make virtual appointment today.     Reason for Disposition   MODERATE diarrhea (e.g., 4-6 times / day more than normal) and present > 48 hours (2 days)    Additional Information   Negative: Shock suspected (e.g., cold/pale/clammy skin, too weak to stand, low BP, rapid pulse)   Negative: Difficult to awaken or acting confused (e.g., disoriented, slurred speech)   Negative: Sounds like a life-threatening emergency to the triager   Negative: SEVERE abdominal pain (e.g., excruciating) and present > 1 hour   Negative: SEVERE abdominal pain and age > 60   Negative: Bloody, black, or tarry bowel movements   Negative: SEVERE diarrhea (e.g., 7 or more times / day more than normal) and age > 60 years   Negative: Constant abdominal pain lasting > 2 hours   Negative: Drinking very little and has signs of dehydration (e.g., no urine > 12 hours, very dry mouth, very lightheaded)   Negative: Patient sounds very sick or weak to the triager   Negative: SEVERE diarrhea (e.g., 7 or more times / day more than normal) and present > 24 hours (1 day)    Protocols used: DIARRHEA-A-OH

## 2020-07-30 ENCOUNTER — OFFICE VISIT (OUTPATIENT)
Dept: CARDIOLOGY | Facility: CLINIC | Age: 70
End: 2020-07-30
Payer: MEDICARE

## 2020-07-30 VITALS
WEIGHT: 187.38 LBS | OXYGEN SATURATION: 96 % | DIASTOLIC BLOOD PRESSURE: 71 MMHG | HEART RATE: 72 BPM | HEIGHT: 59 IN | BODY MASS INDEX: 37.77 KG/M2 | SYSTOLIC BLOOD PRESSURE: 157 MMHG

## 2020-07-30 DIAGNOSIS — R07.89 CHEST PAIN, ATYPICAL: ICD-10-CM

## 2020-07-30 DIAGNOSIS — J43.2 CENTRILOBULAR EMPHYSEMA: Primary | ICD-10-CM

## 2020-07-30 DIAGNOSIS — I10 HYPERTENSION: ICD-10-CM

## 2020-07-30 DIAGNOSIS — R00.2 PALPITATIONS: ICD-10-CM

## 2020-07-30 DIAGNOSIS — E78.00 PURE HYPERCHOLESTEROLEMIA: Chronic | ICD-10-CM

## 2020-07-30 DIAGNOSIS — R06.09 DOE (DYSPNEA ON EXERTION): ICD-10-CM

## 2020-07-30 DIAGNOSIS — I10 ESSENTIAL HYPERTENSION: Chronic | ICD-10-CM

## 2020-07-30 PROCEDURE — 99214 OFFICE O/P EST MOD 30 MIN: CPT | Mod: PBBFAC,25 | Performed by: INTERNAL MEDICINE

## 2020-07-30 PROCEDURE — 99999 PR PBB SHADOW E&M-EST. PATIENT-LVL IV: ICD-10-PCS | Mod: PBBFAC,,, | Performed by: INTERNAL MEDICINE

## 2020-07-30 PROCEDURE — 99214 OFFICE O/P EST MOD 30 MIN: CPT | Mod: S$PBB,,, | Performed by: INTERNAL MEDICINE

## 2020-07-30 PROCEDURE — 93010 ELECTROCARDIOGRAM REPORT: CPT | Mod: S$PBB,,, | Performed by: INTERNAL MEDICINE

## 2020-07-30 PROCEDURE — 93010 EKG 12-LEAD: ICD-10-PCS | Mod: S$PBB,,, | Performed by: INTERNAL MEDICINE

## 2020-07-30 PROCEDURE — 99214 PR OFFICE/OUTPT VISIT, EST, LEVL IV, 30-39 MIN: ICD-10-PCS | Mod: S$PBB,,, | Performed by: INTERNAL MEDICINE

## 2020-07-30 PROCEDURE — 99999 PR PBB SHADOW E&M-EST. PATIENT-LVL IV: CPT | Mod: PBBFAC,,, | Performed by: INTERNAL MEDICINE

## 2020-07-30 PROCEDURE — 93005 ELECTROCARDIOGRAM TRACING: CPT | Mod: PBBFAC | Performed by: INTERNAL MEDICINE

## 2020-07-30 RX ORDER — FUROSEMIDE 20 MG/1
20 TABLET ORAL DAILY PRN
Qty: 30 TABLET | Refills: 11 | Status: SHIPPED | OUTPATIENT
Start: 2020-07-30 | End: 2022-09-13

## 2020-07-30 NOTE — PROGRESS NOTES
Subjective:    Patient ID:  Dayami Mina is a 70 y.o. female who presents for follow-up of Edema      HPI     HTN, COPD, CRI, palpitations     Stress test 10/30/19    Normal [place] myocardial perfusion study.    The perfusion scan is free of evidence from myocardial ischemia or injury.    Gated perfusion images showed an ejection fraction of 75 % post stress.    There is normal wall motion post stress.     Echo 10/30/19  · Normal left ventricular systolic function. The estimated ejection fraction is 60%  · Concentric left ventricular remodeling.  · No wall motion abnormalities.  · Normal right ventricular systolic function.     Holter 8/1/17  1. Sinus rhythm with heart rates varying between 56 and 105 bpm with an average of 72 bpm.     VENTRICULAR ARRHYTHMIAS  1. There were very frequent PVCs totalling 4114 and averaging 171 per hour.     2. There were no episodes of ventricular tachycardia.    SUPRA VENTRICULAR ARRHYTHMIAS  1. There were very rare PACs recorded totalling 1 and averaging less than 1 per hour.     2. There were no episodes of sustained supraventricular tachycardia.    SINUS NODE FUNCTION  1. There was no evidence of high grade SA manuel block.     AV CONDUCTION  1. There was no evidence of high grade AV block.      Admitted 12/2/17  Mrs. Dayami Mina is a 67 y.o. female with essential hypertension, hyperlipidemia (.2 Apr 2015), GERD, and depression who presents to [facility] ED with complaints of dyspnea today.  She shortness of breath started gradually throughout the day and she thinks it was triggered by some renovations bring done in her home.  Some plywood was being placed in her home and the installers says it was treated plywood.  She started to have some wheezing and coughing that was productive of greenish sputum.  She went to bed to take a nap but woke up still having these symptoms.  She tried her albuterol inhalers without relief and decided to seek ED evaluation.   She denies any fevers, chills, chest pain, pleurisy, palpitations, diaphoresis, nausea, vomiting, hemoptysis, nor any lower extremity pain or swelling.  She did not have any sick contacts or recent travel, and denies any similar episodes in the past.  Of note, she has been following with Dr. Vishal Mcmahan who initially felt that she had COPD and treated her for that for three years before changing his opinion.  She did smoke but quit in 1988.       Mrs. Dayami Mina is a 67 y.o. female with essential hypertension, hyperlipidemia (.2 Apr 2015), GERD, and depression who presents to [facility] ED with complaints of dyspnea .She shortness of breath started gradually throughout the day and she thinks it was triggered by some renovations bring done in her home.  Some plywood was being placed in her home and the installers says it was treated plywood.  She started to have some wheezing and coughing that was productive of greenish sputum.  She went to bed to take a nap but woke up still having these symptoms.  She tried her albuterol inhalers without relief and decided to seek ED evaluation.  She denies any fevers, chills, chest pain, pleurisy, palpitations, diaphoresis, nausea, vomiting, hemoptysis, nor any lower extremity pain or swelling.  She did not have any sick contacts or recent travel, and denies any similar episodes in the past.  Of note, she has been following with Dr. Vishal Mcmahan who initially felt that she had COPD and treated her for that for three years before changing his opinion,after couple PFT study show no sign of COPD,  She did smoke but quit in 1988. She was started on supplemental oxygen for hypoxia with nebulizer treatment,CTA chest show no PE,no consolidation,or fluid overload,SOB and hypoxic episode may is duo to expose to chemical agent.her SOB,and  Hypoxia resolved,she was stable on RA,she improved better than expected,she has been discharged home with follow up with her  "pulmonologist ,she has already appointment.      1/22/18 Denies CP  SOB stable  EKG NSR - ok  Needs clearance for toe surgery     1/18/19 Denies CP  Stable SOB  EKG NSR 1st degree AVB otherwise ok  Upcoming knee replacement     7/3/19 Recently Dx with diverticulitis - scheduled for upcoming colonoscopy  Some fatigue  BP runs low on occasion  EKG NSR low voltage otherwise ok     Saw Dr French 10/16/19  HPI: 68 y/o w/ HTN COPD presents to follow up fatigue. For last six weeks daily fatigue falling asleep in chair no motivation. No pain denies fever/ occasional clear to green sputum no dysphagia no LE swelling no chagne in breathing when lying flat. Appetite "okay" weight is unchanged. Denies change in bowel habits. Using maintenance inhaler daily no palpitations or racing heart     Labs 10/16/19  K 4.3  Cr 1.1  BNP 45     10/21/19 Reports worsening fatigue  Getting over recently prolonged sinus infection  EKG NSR - ok     11/13/19 Still with fatigue and sinus issues  Denies CP  Mild stable ARANGO    7/30/20 Back for LE edema. On exam there is 1+ edema at most  Stable ARANGO  Denies CP  EKG NSR - ok    Review of Systems   Constitution: Negative for decreased appetite.   HENT: Negative for ear discharge.    Eyes: Negative for blurred vision.   Respiratory: Negative for hemoptysis.    Endocrine: Negative for polyphagia.   Hematologic/Lymphatic: Negative for adenopathy.   Skin: Negative for color change.   Musculoskeletal: Negative for joint swelling.   Genitourinary: Negative for bladder incontinence.   Neurological: Negative for brief paralysis.   Psychiatric/Behavioral: Negative for hallucinations.   Allergic/Immunologic: Negative for hives.        Objective:    Physical Exam   Constitutional: She is oriented to person, place, and time. She appears well-developed and well-nourished.   HENT:   Head: Normocephalic and atraumatic.   Eyes: Pupils are equal, round, and reactive to light. Conjunctivae are normal.   Neck: " Normal range of motion. Neck supple.   Cardiovascular: Normal rate, normal heart sounds and intact distal pulses.   Pulmonary/Chest: Effort normal and breath sounds normal.   Abdominal: Soft. Bowel sounds are normal.   Musculoskeletal: Normal range of motion.         General: Edema present.   Neurological: She is alert and oriented to person, place, and time.   Skin: Skin is warm and dry.         Assessment:       1. Centrilobular emphysema    2. Essential hypertension    3. Pure hypercholesterolemia    4. Palpitations    5. ARANGO (dyspnea on exertion)    6. Chest pain, atypical         Plan:       Prn lasix for LE edema  OV 1 month with BNP, BMP

## 2020-08-05 ENCOUNTER — OFFICE VISIT (OUTPATIENT)
Dept: FAMILY MEDICINE | Facility: CLINIC | Age: 70
End: 2020-08-05
Payer: MEDICARE

## 2020-08-05 VITALS
WEIGHT: 185.88 LBS | TEMPERATURE: 98 F | HEIGHT: 59 IN | OXYGEN SATURATION: 97 % | BODY MASS INDEX: 37.47 KG/M2 | RESPIRATION RATE: 18 BRPM | DIASTOLIC BLOOD PRESSURE: 66 MMHG | SYSTOLIC BLOOD PRESSURE: 116 MMHG | HEART RATE: 79 BPM

## 2020-08-05 DIAGNOSIS — J34.89 NASAL SEPTAL PERFORATION: ICD-10-CM

## 2020-08-05 DIAGNOSIS — B96.89 ACUTE BACTERIAL SINUSITIS: Primary | ICD-10-CM

## 2020-08-05 DIAGNOSIS — J01.90 ACUTE BACTERIAL SINUSITIS: Primary | ICD-10-CM

## 2020-08-05 PROCEDURE — 99214 OFFICE O/P EST MOD 30 MIN: CPT | Mod: PBBFAC,PN | Performed by: NURSE PRACTITIONER

## 2020-08-05 PROCEDURE — 99999 PR PBB SHADOW E&M-EST. PATIENT-LVL IV: CPT | Mod: PBBFAC,,, | Performed by: NURSE PRACTITIONER

## 2020-08-05 PROCEDURE — 99214 PR OFFICE/OUTPT VISIT, EST, LEVL IV, 30-39 MIN: ICD-10-PCS | Mod: S$PBB,,, | Performed by: NURSE PRACTITIONER

## 2020-08-05 PROCEDURE — 99999 PR PBB SHADOW E&M-EST. PATIENT-LVL IV: ICD-10-PCS | Mod: PBBFAC,,, | Performed by: NURSE PRACTITIONER

## 2020-08-05 PROCEDURE — 99214 OFFICE O/P EST MOD 30 MIN: CPT | Mod: S$PBB,,, | Performed by: NURSE PRACTITIONER

## 2020-08-05 RX ORDER — AMOXICILLIN AND CLAVULANATE POTASSIUM 875; 125 MG/1; MG/1
1 TABLET, FILM COATED ORAL EVERY 12 HOURS
Qty: 20 TABLET | Refills: 0 | Status: SHIPPED | OUTPATIENT
Start: 2020-08-05 | End: 2020-11-03

## 2020-08-06 NOTE — PROGRESS NOTES
Routine Office Visit    Patient Name: Dayami Mina    : 1950  MRN: [id number]    Chief Complaint:  Swollen glands in neck, sinus pressure, nasal congestion    Subjective:  Dayami is a 70 y.o. female who presents today for:    1. Swollen glands in neck, sinus pressure, nasal congestion - patient reports today for a month of some upper respiratory symptoms.  She states that for the last month she has had significant sinus pressure, nasal congestion, and green nasal discharge as well as a slight cough.  No fevers, chills, chest pain, shortness of breath, wheezing, or palpitations.  Of note, she has been treated in the last 2-3 months with 3 rounds of antibiotics.  Most recently she was on a round of doxycycline which was given to her by her plastic surgeon which she finished 2 weeks ago.  She states that because of the possible sinus infection she has swollen glands in her neck.  She also reports me that she has a nasal septal perforation which she has been having for years.  She previously was seeing an ENT for her sinus issues, but she has not seen 1 in some time per her report.    Past Medical History  Past Medical History:   Diagnosis Date    Benign colon polyp 2cm     Depression     Hyperlipidemia     Hypertension     PONV (postoperative nausea and vomiting)        Past Surgical History  Past Surgical History:   Procedure Laterality Date    APPENDECTOMY      BACK SURGERY      BREAST CAPSULECTOMY Bilateral 2020    Procedure: CAPSULECTOMY, BREAST;  Surgeon: Levi Clarke MD;  Location: [facility];  Service: Plastics;  Laterality: Bilateral;  MARQUEZ OK WITH 7:15AM START BOTH WILL WORK AT SAME TIME    breast implants      CLOSED REDUCTION FINGER DISLOCATION      FOOT SURGERY      HYSTERECTOMY      JOINT REPLACEMENT Left     total Lt knee    KNEE ARTHROSCOPY Left     MASTECTOMY      REMOVAL OF BREAST IMPLANT Bilateral 2020    Procedure: REMOVAL, IMPLANT, BREAST;  Surgeon: Levi  CAMPBELL Clarke MD;  Location: [facility] OR;  Service: Plastics;  Laterality: Bilateral;    SHOULDER ARTHROSCOPY      SINUS SURGERY      SURGICAL REMOVAL OF MASS OF UPPER EXTREMITY Right 2020    Procedure: EXCISION, MASS, UPPER EXTREMITY;  Surgeon: Sherman Rojas MD;  Location: [facility] OR;  Service: General;  Laterality: Right;  joint with Dr. Clarke--RN PREOP 2020  THEY WILL WORK AT SAME TIME       Family History  Family History   Problem Relation Age of Onset    Diabetes Mother     Diabetes Father     Stroke Paternal Grandfather     Diabetes Sister        Social History  Social History     Socioeconomic History    Marital status:      Spouse name: Not on file    Number of children: Not on file    Years of education: Not on file    Highest education level: Not on file   Occupational History    Not on file   Social Needs    Financial resource strain: Not on file    Food insecurity     Worry: Not on file     Inability: Not on file    Transportation needs     Medical: Not on file     Non-medical: Not on file   Tobacco Use    Smoking status: Former Smoker     Quit date: 1988     Years since quittin.6    Smokeless tobacco: Never Used   Substance and Sexual Activity    Alcohol use: No    Drug use: No    Sexual activity: Not Currently     Partners: Male   Lifestyle    Physical activity     Days per week: Not on file     Minutes per session: Not on file    Stress: Not on file   Relationships    Social connections     Talks on phone: Not on file     Gets together: Not on file     Attends [employer] service: Not on file     Active member of club or organization: Not on file     Attends meetings of clubs or organizations: Not on file     Relationship status: Not on file   Other Topics Concern    Not on file   Social History Narrative    Not on file       Current Medications  Current Outpatient Medications on File Prior to Visit   Medication Sig Dispense Refill     acetaminophen/caffeine (EXCEDRIN TENSION HEADACHE ORAL) Take by mouth.      allopurinoL (ZYLOPRIM) 100 MG tablet Take 1 tablet (100 mg total) by mouth once daily. 90 tablet 1    ammonium lactate (LAC-HYDRIN) 12 % lotion Apply topically 2 (two) times daily as needed for Dry Skin. 500 g 5    ANORO ELLIPTA 62.5-25 mcg/actuation DsDv INHALE 1 PUFF BY MOUTH DAILY 60 each 5    aspirin (ECOTRIN) 81 MG EC tablet Take 81 mg by mouth once daily.      butalbital-acetaminophen-caffeine -40 mg (FIORICET, ESGIC) -40 mg per tablet daily as needed.       cephALEXin (KEFLEX) 250 MG capsule Take 1 capsule (250 mg total) by mouth 4 (four) times daily. 28 capsule 0    diphenoxylate-atropine 2.5-0.025 mg (LOMOTIL) 2.5-0.025 mg per tablet Take 1-2 tablets by mouth 4 (four) times daily as needed for Diarrhea. 30 tablet 0    escitalopram oxalate (LEXAPRO) 20 MG tablet Take 1 tablet (20 mg total) by mouth once daily. 90 tablet 1    esomeprazole (NEXIUM) 20 MG capsule Take 20 mg by mouth.      fexofenadine (ALLEGRA ALLERGY) 180 MG tablet       furosemide (LASIX) 20 MG tablet Take 1 tablet (20 mg total) by mouth daily as needed (leg swelling). 30 tablet 11    gabapentin (NEURONTIN) 100 MG capsule 100 mg. 2 tabs in the am and 2 tabs in the pm      HYDROcodone-acetaminophen (NORCO) 7.5-325 mg per tablet Take 1 tablet by mouth.      losartan (COZAAR) 50 MG tablet Take 1 tablet (50 mg total) by mouth 2 (two) times daily. 180 tablet 1    metoprolol succinate (TOPROL-XL) 50 MG 24 hr tablet Take 1 tablet (50 mg total) by mouth once daily. 90 tablet 1    multivitamin capsule Take 1 capsule by mouth once daily.      nystatin (MYCOSTATIN) powder Apply topically 3 (three) times daily. For rash 60 g 2    potassium chloride SA (K-DUR,KLOR-CON) 20 MEQ tablet Take 1 tablet (20 mEq total) by mouth once daily. 90 tablet 2    pravastatin (PRAVACHOL) 40 MG tablet TAKE ONE TABLET BY MOUTH ONCE DAILY 90 tablet 2    promethazine  "(PHENERGAN) 12.5 MG Tab Take 1 tablet (12.5 mg total) by mouth every 6 (six) hours as needed. 10 tablet 0    albuterol (PROVENTIL) 2.5 mg /3 mL (0.083 %) nebulizer solution Take 3 mLs (2.5 mg total) by nebulization every 6 (six) hours as needed for Wheezing or Shortness of Breath. Rescue 1 Box 0     Current Facility-Administered Medications on File Prior to Visit   Medication Dose Route Frequency Provider Last Rate Last Dose    vancomycin 500 mg in dextrose 5 % 100 mL IVPB (ready to mix system)  500 mg Intravenous On Call Procedure Sherman Rojas MD   500 mg at 01/23/20 0740       Allergies   Review of patient's allergies indicates:   Allergen Reactions    Ancef [cefazolin] Nausea And Vomiting    Sulfa (sulfonamide antibiotics) Anaphylaxis    Scopolamine Blisters    Refresh redness relief [phenylephrin-polyvinyl alcohol] Blisters       Review of Systems (Pertinent positives)  Review of Systems   Constitutional: Negative for chills and fever.   HENT: Positive for congestion and sinus pain. Negative for ear discharge, ear pain, hearing loss, nosebleeds, sore throat and tinnitus.         +green nasal discharge   Eyes: Negative.    Respiratory: Positive for cough and sputum production. Negative for hemoptysis, shortness of breath, wheezing and stridor.    Cardiovascular: Negative.    Gastrointestinal: Negative.    Genitourinary: Negative.    Musculoskeletal: Negative.    Skin: Negative.    Neurological: Negative.    Endo/Heme/Allergies: Negative.    Psychiatric/Behavioral: Negative.        /66 (BP Location: Left arm, Patient Position: Sitting, BP Method: Large (Manual))   Pulse 79   Temp 97.8 °F (36.6 °C) (Oral)   Resp 18   Ht 4' 10.5" (1.486 m)   Wt 84.3 kg (185 lb 13.6 oz)   SpO2 97%   BMI 38.18 kg/m²     Physical Exam  Vitals signs reviewed.   Constitutional:       General: She is not in acute distress.     Appearance: Normal appearance. She is not ill-appearing, toxic-appearing or diaphoretic. "      Comments: Patient resting comfortably in examination room in no acute distress, conversing appropriately   HENT:      Head: Normocephalic and atraumatic.      Right Ear: Tympanic membrane, ear canal and external ear normal.      Left Ear: Tympanic membrane, ear canal and external ear normal.      Nose: Congestion and rhinorrhea present.      Right Turbinates: Swollen.      Left Turbinates: Swollen.      Right Sinus: Frontal sinus tenderness present.      Left Sinus: Maxillary sinus tenderness and frontal sinus tenderness present.     Neck:      Musculoskeletal: Normal range of motion and neck supple.   Cardiovascular:      Rate and Rhythm: Normal rate and regular rhythm.      Pulses: Normal pulses.      Heart sounds: Normal heart sounds. No murmur. No friction rub. No gallop.    Pulmonary:      Effort: Pulmonary effort is normal. No respiratory distress.      Breath sounds: Normal breath sounds. No stridor. No wheezing, rhonchi or rales.   Chest:      Chest wall: No tenderness.   Abdominal:      General: Abdomen is flat. Bowel sounds are normal.      Palpations: Abdomen is soft.   Lymphadenopathy:      Cervical: Cervical adenopathy present.      Right cervical: Superficial cervical adenopathy present. No deep or posterior cervical adenopathy.     Left cervical: Superficial cervical adenopathy present. No deep or posterior cervical adenopathy.   Skin:     General: Skin is warm and dry.      Capillary Refill: Capillary refill takes less than 2 seconds.   Neurological:      General: No focal deficit present.      Mental Status: She is alert and oriented to person, place, and time.   Psychiatric:         Mood and Affect: Mood normal.         Behavior: Behavior normal.          Assessment/Plan:  Dayami Mina is a 70 y.o. female who presents today for :    Dayami was seen today for adenopathy.    Diagnoses and all orders for this visit:    Acute bacterial sinusitis  -     Ambulatory referral/consult to ENT;  Future  -     amoxicillin-clavulanate 875-125mg (AUGMENTIN) 875-125 mg per tablet; Take 1 tablet by mouth every 12 (twelve) hours.  -     CT Sinuses without Contrast; Future    Nasal septal perforation  -     Ambulatory referral/consult to ENT; Future     Given continued sinus congestion/pressure with multiple rounds of antibiotics will refer to ENT and check CT sinuses.  Treat with Augmentin at this time potential side effects discussed.  Recommended patient stay hydrated and drink plenty of water.  All questions answered.  Patient verbalized understanding of instructions.        This office note has been dictated.  This dictation has been generated using M-Modal Fluency Direct dictation; some phonetic errors may occur.   My collaborating physician is Dr. Guanaco Sheffield.

## 2020-08-07 ENCOUNTER — TELEPHONE (OUTPATIENT)
Dept: OTOLARYNGOLOGY | Facility: CLINIC | Age: 70
End: 2020-08-07

## 2020-08-07 DIAGNOSIS — Z01.812 PRE-PROCEDURE LAB EXAM: Primary | ICD-10-CM

## 2020-08-10 ENCOUNTER — HOSPITAL ENCOUNTER (OUTPATIENT)
Dept: RADIOLOGY | Facility: HOSPITAL | Age: 70
Discharge: HOME | End: 2020-08-10
Attending: NURSE PRACTITIONER
Payer: MEDICARE

## 2020-08-10 DIAGNOSIS — J01.90 ACUTE BACTERIAL SINUSITIS: ICD-10-CM

## 2020-08-10 DIAGNOSIS — B96.89 ACUTE BACTERIAL SINUSITIS: ICD-10-CM

## 2020-08-10 PROCEDURE — 70486 CT MAXILLOFACIAL W/O DYE: CPT | Mod: TC

## 2020-08-10 PROCEDURE — 70486 CT MAXILLOFACIAL W/O DYE: CPT | Mod: 26,,, | Performed by: RADIOLOGY

## 2020-08-10 PROCEDURE — 70486 CT SINUSES WITHOUT CONTRAST: ICD-10-PCS | Mod: 26,,, | Performed by: RADIOLOGY

## 2020-08-11 ENCOUNTER — TELEPHONE (OUTPATIENT)
Dept: FAMILY MEDICINE | Facility: CLINIC | Age: 70
End: 2020-08-11

## 2020-08-11 NOTE — TELEPHONE ENCOUNTER
----- Message from Antwan Warren NP sent at 8/10/2020  2:55 PM CDT -----  Please call patient and let her know about her CT scan results, which show chronic enlargement and a deviated nasal septum.  We should continue with the plan of care is discussed and she should follow-up with ENT.  Thank you

## 2020-08-26 ENCOUNTER — LAB VISIT (OUTPATIENT)
Dept: LAB | Facility: HOSPITAL | Age: 70
End: 2020-08-26
Attending: INTERNAL MEDICINE
Payer: MEDICARE

## 2020-08-26 DIAGNOSIS — J43.2 CENTRILOBULAR EMPHYSEMA: ICD-10-CM

## 2020-08-26 DIAGNOSIS — R00.2 PALPITATIONS: ICD-10-CM

## 2020-08-26 DIAGNOSIS — E78.00 PURE HYPERCHOLESTEROLEMIA: Chronic | ICD-10-CM

## 2020-08-26 DIAGNOSIS — R07.89 CHEST PAIN, ATYPICAL: ICD-10-CM

## 2020-08-26 DIAGNOSIS — R06.09 DOE (DYSPNEA ON EXERTION): ICD-10-CM

## 2020-08-26 DIAGNOSIS — I10 ESSENTIAL HYPERTENSION: Chronic | ICD-10-CM

## 2020-08-26 LAB
ANION GAP SERPL CALC-SCNC: 11 MMOL/L (ref 8–16)
BNP SERPL-MCNC: 50 PG/ML (ref 0–99)
BUN SERPL-MCNC: 18 MG/DL (ref 8–23)
CALCIUM SERPL-MCNC: 9.6 MG/DL (ref 8.7–10.5)
CHLORIDE SERPL-SCNC: 103 MMOL/L (ref 95–110)
CO2 SERPL-SCNC: 25 MMOL/L (ref 23–29)
CREAT SERPL-MCNC: 1 MG/DL (ref 0.5–1.4)
EST. GFR  (AFRICAN AMERICAN): >60 ML/MIN/1.73 M^2
EST. GFR  (NON AFRICAN AMERICAN): 56 ML/MIN/1.73 M^2
GLUCOSE SERPL-MCNC: 102 MG/DL (ref 70–110)
POTASSIUM SERPL-SCNC: 4.5 MMOL/L (ref 3.5–5.1)
SODIUM SERPL-SCNC: 139 MMOL/L (ref 136–145)

## 2020-08-26 PROCEDURE — 80048 BASIC METABOLIC PNL TOTAL CA: CPT

## 2020-08-26 PROCEDURE — 83880 ASSAY OF NATRIURETIC PEPTIDE: CPT

## 2020-08-26 PROCEDURE — 36415 COLL VENOUS BLD VENIPUNCTURE: CPT

## 2020-09-03 ENCOUNTER — PATIENT OUTREACH (OUTPATIENT)
Dept: ADMINISTRATIVE | Facility: OTHER | Age: 70
End: 2020-09-03

## 2020-09-03 ENCOUNTER — OFFICE VISIT (OUTPATIENT)
Dept: CARDIOLOGY | Facility: CLINIC | Age: 70
End: 2020-09-03
Payer: MEDICARE

## 2020-09-03 VITALS
DIASTOLIC BLOOD PRESSURE: 60 MMHG | OXYGEN SATURATION: 97 % | WEIGHT: 178.56 LBS | SYSTOLIC BLOOD PRESSURE: 131 MMHG | HEART RATE: 65 BPM | BODY MASS INDEX: 36.69 KG/M2

## 2020-09-03 DIAGNOSIS — E78.00 PURE HYPERCHOLESTEROLEMIA: Chronic | ICD-10-CM

## 2020-09-03 DIAGNOSIS — R06.09 DOE (DYSPNEA ON EXERTION): ICD-10-CM

## 2020-09-03 DIAGNOSIS — I10 ESSENTIAL HYPERTENSION: Chronic | ICD-10-CM

## 2020-09-03 DIAGNOSIS — R07.89 CHEST PAIN, ATYPICAL: ICD-10-CM

## 2020-09-03 DIAGNOSIS — J43.2 CENTRILOBULAR EMPHYSEMA: Primary | ICD-10-CM

## 2020-09-03 DIAGNOSIS — R00.2 PALPITATIONS: ICD-10-CM

## 2020-09-03 PROCEDURE — 99999 PR PBB SHADOW E&M-EST. PATIENT-LVL IV: ICD-10-PCS | Mod: PBBFAC,,, | Performed by: INTERNAL MEDICINE

## 2020-09-03 PROCEDURE — 99213 PR OFFICE/OUTPT VISIT, EST, LEVL III, 20-29 MIN: ICD-10-PCS | Mod: S$PBB,,, | Performed by: INTERNAL MEDICINE

## 2020-09-03 PROCEDURE — 99214 OFFICE O/P EST MOD 30 MIN: CPT | Mod: PBBFAC | Performed by: INTERNAL MEDICINE

## 2020-09-03 PROCEDURE — 99213 OFFICE O/P EST LOW 20 MIN: CPT | Mod: S$PBB,,, | Performed by: INTERNAL MEDICINE

## 2020-09-03 PROCEDURE — 99999 PR PBB SHADOW E&M-EST. PATIENT-LVL IV: CPT | Mod: PBBFAC,,, | Performed by: INTERNAL MEDICINE

## 2020-09-03 NOTE — PROGRESS NOTES
Subjective:    Patient ID:  Dayami Mina is a 70 y.o. female who presents for follow-up of Emphysema      HPI     HTN, COPD, CRI, palpitations     Stress test 10/30/19    Normal [place] myocardial perfusion study.    The perfusion scan is free of evidence from myocardial ischemia or injury.    Gated perfusion images showed an ejection fraction of 75 % post stress.    There is normal wall motion post stress.     Echo 10/30/19  · Normal left ventricular systolic function. The estimated ejection fraction is 60%  · Concentric left ventricular remodeling.  · No wall motion abnormalities.  · Normal right ventricular systolic function.     Holter 8/1/17  1. Sinus rhythm with heart rates varying between 56 and 105 bpm with an average of 72 bpm.     VENTRICULAR ARRHYTHMIAS  1. There were very frequent PVCs totalling 4114 and averaging 171 per hour.     2. There were no episodes of ventricular tachycardia.    SUPRA VENTRICULAR ARRHYTHMIAS  1. There were very rare PACs recorded totalling 1 and averaging less than 1 per hour.     2. There were no episodes of sustained supraventricular tachycardia.    SINUS NODE FUNCTION  1. There was no evidence of high grade SA manuel block.     AV CONDUCTION  1. There was no evidence of high grade AV block.      Admitted 12/2/17  Mrs. Dayami Mina is a 67 y.o. female with essential hypertension, hyperlipidemia (.2 Apr 2015), GERD, and depression who presents to [facility] ED with complaints of dyspnea today.  She shortness of breath started gradually throughout the day and she thinks it was triggered by some renovations bring done in her home.  Some plywood was being placed in her home and the installers says it was treated plywood.  She started to have some wheezing and coughing that was productive of greenish sputum.  She went to bed to take a nap but woke up still having these symptoms.  She tried her albuterol inhalers without relief and decided to seek ED  evaluation.  She denies any fevers, chills, chest pain, pleurisy, palpitations, diaphoresis, nausea, vomiting, hemoptysis, nor any lower extremity pain or swelling.  She did not have any sick contacts or recent travel, and denies any similar episodes in the past.  Of note, she has been following with Dr. Vishal Mcmahan who initially felt that she had COPD and treated her for that for three years before changing his opinion.  She did smoke but quit in 1988.       Mrs. Dayami Mina is a 67 y.o. female with essential hypertension, hyperlipidemia (.2 Apr 2015), GERD, and depression who presents to [facility] ED with complaints of dyspnea .She shortness of breath started gradually throughout the day and she thinks it was triggered by some renovations bring done in her home.  Some plywood was being placed in her home and the installers says it was treated plywood.  She started to have some wheezing and coughing that was productive of greenish sputum.  She went to bed to take a nap but woke up still having these symptoms.  She tried her albuterol inhalers without relief and decided to seek ED evaluation.  She denies any fevers, chills, chest pain, pleurisy, palpitations, diaphoresis, nausea, vomiting, hemoptysis, nor any lower extremity pain or swelling.  She did not have any sick contacts or recent travel, and denies any similar episodes in the past.  Of note, she has been following with Dr. Vishal Mcmahan who initially felt that she had COPD and treated her for that for three years before changing his opinion,after couple PFT study show no sign of COPD,  She did smoke but quit in 1988. She was started on supplemental oxygen for hypoxia with nebulizer treatment,CTA chest show no PE,no consolidation,or fluid overload,SOB and hypoxic episode may is duo to expose to chemical agent.her SOB,and  Hypoxia resolved,she was stable on RA,she improved better than expected,she has been discharged home with follow up with  "her pulmonologist ,she has already appointment.      1/22/18 Denies CP  SOB stable  EKG NSR - ok  Needs clearance for toe surgery     1/18/19 Denies CP  Stable SOB  EKG NSR 1st degree AVB otherwise ok  Upcoming knee replacement     7/3/19 Recently Dx with diverticulitis - scheduled for upcoming colonoscopy  Some fatigue  BP runs low on occasion  EKG NSR low voltage otherwise ok     Saw Dr French 10/16/19  HPI: 68 y/o w/ HTN COPD presents to follow up fatigue. For last six weeks daily fatigue falling asleep in chair no motivation. No pain denies fever/ occasional clear to green sputum no dysphagia no LE swelling no chagne in breathing when lying flat. Appetite "okay" weight is unchanged. Denies change in bowel habits. Using maintenance inhaler daily no palpitations or racing heart        10/21/19 Reports worsening fatigue  Getting over recently prolonged sinus infection  EKG NSR - ok     11/13/19 Still with fatigue and sinus issues  Denies CP  Mild stable ARANGO     7/30/20 Back for LE edema. On exam there is 1+ edema at most  Stable ARANGO  Denies CP  EKG NSR - ok  Prn lasix for LE edema  OV 1 month with BNP, BMP    Labs 8/26/20  K 4.5  Cr 1.0  BNP 50    9/3/20 LE edema resolved after taking lasix for 3 days  Denies CP or SOB    Review of Systems   Constitution: Negative for decreased appetite.   HENT: Negative for ear discharge.    Eyes: Negative for blurred vision.   Respiratory: Negative for hemoptysis.    Endocrine: Negative for polyphagia.   Hematologic/Lymphatic: Negative for adenopathy.   Skin: Negative for color change.   Musculoskeletal: Negative for joint swelling.   Genitourinary: Negative for bladder incontinence.   Neurological: Negative for brief paralysis.   Psychiatric/Behavioral: Negative for hallucinations.   Allergic/Immunologic: Negative for hives.        Objective:    Physical Exam   Constitutional: She is oriented to person, place, and time. She appears well-developed and well-nourished. "   HENT:   Head: Normocephalic and atraumatic.   Eyes: Pupils are equal, round, and reactive to light. Conjunctivae are normal.   Neck: Normal range of motion. Neck supple.   Cardiovascular: Normal rate, normal heart sounds and intact distal pulses.   Pulmonary/Chest: Effort normal and breath sounds normal.   Abdominal: Soft. Bowel sounds are normal.   Musculoskeletal: Normal range of motion.         General: Edema present.   Neurological: She is alert and oriented to person, place, and time.   Skin: Skin is warm and dry.         Assessment:       1. Centrilobular emphysema    2. Essential hypertension    3. Pure hypercholesterolemia    4. Palpitations    5. ARANGO (dyspnea on exertion)    6. Chest pain, atypical         Plan:       Continue Rx for HTN, HLD, LE edema  OV 6 months

## 2020-09-03 NOTE — PROGRESS NOTES
Health Maintenance Due   Topic Date Due    Shingles Vaccine (1 of 2) 06/25/1998    DEXA SCAN  03/04/2019    Influenza Vaccine (1) 08/01/2020     Updates were requested from care everywhere.  Chart was reviewed for overdue Proactive [facility] Encounters (TABITHA) topics (CRS, Breast Cancer Screening, Eye exam)  Health Maintenance has been updated.  LINKS immunization registry triggered.  Immunizations were reconciled.

## 2020-11-03 ENCOUNTER — OFFICE VISIT (OUTPATIENT)
Dept: FAMILY MEDICINE | Facility: CLINIC | Age: 70
End: 2020-11-03
Payer: MEDICARE

## 2020-11-03 VITALS
RESPIRATION RATE: 18 BRPM | SYSTOLIC BLOOD PRESSURE: 137 MMHG | DIASTOLIC BLOOD PRESSURE: 82 MMHG | HEIGHT: 58 IN | BODY MASS INDEX: 38.09 KG/M2 | TEMPERATURE: 98 F | WEIGHT: 181.44 LBS | HEART RATE: 72 BPM | OXYGEN SATURATION: 95 %

## 2020-11-03 DIAGNOSIS — F33.42 RECURRENT MAJOR DEPRESSIVE DISORDER, IN FULL REMISSION: ICD-10-CM

## 2020-11-03 DIAGNOSIS — Z23 NEED FOR SHINGLES VACCINE: ICD-10-CM

## 2020-11-03 DIAGNOSIS — E66.01 SEVERE OBESITY (BMI 35.0-39.9) WITH COMORBIDITY: ICD-10-CM

## 2020-11-03 DIAGNOSIS — I10 ESSENTIAL HYPERTENSION: Primary | Chronic | ICD-10-CM

## 2020-11-03 DIAGNOSIS — E78.00 PURE HYPERCHOLESTEROLEMIA: Chronic | ICD-10-CM

## 2020-11-03 PROCEDURE — 99999 PR PBB SHADOW E&M-EST. PATIENT-LVL V: CPT | Mod: PBBFAC,,, | Performed by: INTERNAL MEDICINE

## 2020-11-03 PROCEDURE — 90750 HZV VACC RECOMBINANT IM: CPT | Mod: PBBFAC,PN

## 2020-11-03 PROCEDURE — 99215 OFFICE O/P EST HI 40 MIN: CPT | Mod: PBBFAC,PN,25 | Performed by: INTERNAL MEDICINE

## 2020-11-03 PROCEDURE — 99214 OFFICE O/P EST MOD 30 MIN: CPT | Mod: S$PBB,,, | Performed by: INTERNAL MEDICINE

## 2020-11-03 PROCEDURE — 99999 PR PBB SHADOW E&M-EST. PATIENT-LVL V: ICD-10-PCS | Mod: PBBFAC,,, | Performed by: INTERNAL MEDICINE

## 2020-11-03 PROCEDURE — 99214 PR OFFICE/OUTPT VISIT, EST, LEVL IV, 30-39 MIN: ICD-10-PCS | Mod: S$PBB,,, | Performed by: INTERNAL MEDICINE

## 2020-11-03 NOTE — PROGRESS NOTES
"Subjective:       Patient ID: Dayami Mina is a 70 y.o. female.    Chief Complaint: Annual Exam    F/u chronic conditions    HPI: 71 y/o w/ HTN chronic back pain (cullechia group) presents for scheduled follow up. Had right balloon sinoplasty two months ago for persistent sinus pressure. Symptoms significantly improved not using any nasal sprays. No LE swelling. Breathing "Fine" using laba/ics once daily    Review of Systems   Constitutional: Negative for activity change, appetite change, fatigue, fever and unexpected weight change.   HENT: Negative for ear pain, rhinorrhea and sore throat.    Eyes: Negative for discharge and visual disturbance.   Respiratory: Negative for chest tightness, shortness of breath and wheezing.    Cardiovascular: Negative for chest pain, palpitations and leg swelling.   Gastrointestinal: Negative for abdominal pain, constipation and diarrhea.   Endocrine: Negative for cold intolerance and heat intolerance.   Genitourinary: Negative for dysuria and hematuria.   Musculoskeletal: Negative for joint swelling and neck stiffness.   Skin: Negative for rash.   Neurological: Negative for dizziness, syncope, weakness and headaches.   Psychiatric/Behavioral: Negative for suicidal ideas.       Objective:     Vitals:    11/03/20 1311   BP: 137/82   BP Location: Right arm   Patient Position: Sitting   BP Method: Medium (Automatic)   Pulse: 72   Resp: 18   Temp: 98.3 °F (36.8 °C)   TempSrc: Oral   SpO2: 95%   Weight: 82.3 kg (181 lb 7 oz)   Height: 4' 10" (1.473 m)          Physical Exam  Constitutional:       Appearance: She is well-developed.   HENT:      Head: Normocephalic and atraumatic.   Eyes:      General: No scleral icterus.     Conjunctiva/sclera: Conjunctivae normal.   Neck:      Musculoskeletal: Normal range of motion.   Cardiovascular:      Rate and Rhythm: Normal rate and regular rhythm.      Heart sounds: No murmur. No friction rub. No gallop.    Pulmonary:      Effort: " Pulmonary effort is normal.      Breath sounds: Normal breath sounds. No wheezing or rales.   Abdominal:      General: Bowel sounds are normal.      Palpations: Abdomen is soft.      Tenderness: There is no abdominal tenderness. There is no guarding or rebound.   Musculoskeletal: Normal range of motion.         General: No tenderness.      Right lower leg: No edema.      Left lower leg: No edema.   Skin:     General: Skin is warm and dry.   Neurological:      Mental Status: She is alert and oriented to person, place, and time.      Cranial Nerves: No cranial nerve deficit.   Psychiatric:         Mood and Affect: Mood normal.         Behavior: Behavior normal.         Thought Content: Thought content normal.         Assessment and Plan   1. Severe obesity (BMI 35.0-39.9) with comorbidity  The patient is asked to make an attempt to improve diet and exercise patterns to aid in medical management of this problem.    2. Recurrent major depressive disorder, in full remission  Stable with ssri continue    3. Essential hypertension  At goal [facility] ccb and arb no logner using loop diuretic as edema resolved  - CBC Auto Differential; Future  - Comprehensive Metabolic Panel; Future    4. Pure hypercholesterolemia  On statin cotninue  - Lipid Panel; Future    5. Need for shingles vaccine  shingrix #1 today  - (In Office Administered) Zoster Recombinant Vaccine

## 2021-01-14 ENCOUNTER — PATIENT MESSAGE (OUTPATIENT)
Dept: FAMILY MEDICINE | Facility: CLINIC | Age: 71
End: 2021-01-14

## 2021-01-15 ENCOUNTER — PATIENT MESSAGE (OUTPATIENT)
Dept: FAMILY MEDICINE | Facility: CLINIC | Age: 71
End: 2021-01-15

## 2021-01-15 ENCOUNTER — OFFICE VISIT (OUTPATIENT)
Dept: FAMILY MEDICINE | Facility: CLINIC | Age: 71
End: 2021-01-15
Payer: MEDICARE

## 2021-01-15 VITALS
HEART RATE: 60 BPM | OXYGEN SATURATION: 98 % | WEIGHT: 176 LBS | HEIGHT: 58 IN | RESPIRATION RATE: 18 BRPM | BODY MASS INDEX: 36.94 KG/M2 | TEMPERATURE: 98 F | SYSTOLIC BLOOD PRESSURE: 130 MMHG | DIASTOLIC BLOOD PRESSURE: 60 MMHG

## 2021-01-15 DIAGNOSIS — J44.1 COPD EXACERBATION: Primary | ICD-10-CM

## 2021-01-15 PROCEDURE — 99214 PR OFFICE/OUTPT VISIT, EST, LEVL IV, 30-39 MIN: ICD-10-PCS | Mod: S$PBB,,, | Performed by: NURSE PRACTITIONER

## 2021-01-15 PROCEDURE — 99999 PR PBB SHADOW E&M-EST. PATIENT-LVL IV: CPT | Mod: PBBFAC,,, | Performed by: NURSE PRACTITIONER

## 2021-01-15 PROCEDURE — 99214 OFFICE O/P EST MOD 30 MIN: CPT | Mod: PBBFAC,PN | Performed by: NURSE PRACTITIONER

## 2021-01-15 PROCEDURE — 99999 PR PBB SHADOW E&M-EST. PATIENT-LVL IV: ICD-10-PCS | Mod: PBBFAC,,, | Performed by: NURSE PRACTITIONER

## 2021-01-15 PROCEDURE — 99214 OFFICE O/P EST MOD 30 MIN: CPT | Mod: S$PBB,,, | Performed by: NURSE PRACTITIONER

## 2021-01-15 RX ORDER — PREDNISONE 20 MG/1
40 TABLET ORAL DAILY
Qty: 10 TABLET | Refills: 0 | Status: SHIPPED | OUTPATIENT
Start: 2021-01-15 | End: 2021-03-10 | Stop reason: ALTCHOICE

## 2021-03-03 ENCOUNTER — LAB VISIT (OUTPATIENT)
Dept: LAB | Facility: HOSPITAL | Age: 71
End: 2021-03-03
Attending: INTERNAL MEDICINE
Payer: MEDICARE

## 2021-03-03 DIAGNOSIS — I10 ESSENTIAL HYPERTENSION: Chronic | ICD-10-CM

## 2021-03-03 DIAGNOSIS — J44.89 COPD WITH ASTHMA: ICD-10-CM

## 2021-03-03 DIAGNOSIS — E78.00 PURE HYPERCHOLESTEROLEMIA: Chronic | ICD-10-CM

## 2021-03-03 LAB
ALBUMIN SERPL BCP-MCNC: 3.8 G/DL (ref 3.5–5.2)
ALP SERPL-CCNC: 46 U/L (ref 55–135)
ALT SERPL W/O P-5'-P-CCNC: 25 U/L (ref 10–44)
ANION GAP SERPL CALC-SCNC: 9 MMOL/L (ref 8–16)
AST SERPL-CCNC: 18 U/L (ref 10–40)
BASOPHILS # BLD AUTO: 0.04 K/UL (ref 0–0.2)
BASOPHILS NFR BLD: 0.6 % (ref 0–1.9)
BILIRUB SERPL-MCNC: 0.4 MG/DL (ref 0.1–1)
BUN SERPL-MCNC: 25 MG/DL (ref 8–23)
CALCIUM SERPL-MCNC: 9 MG/DL (ref 8.7–10.5)
CHLORIDE SERPL-SCNC: 104 MMOL/L (ref 95–110)
CHOLEST SERPL-MCNC: 180 MG/DL (ref 120–199)
CHOLEST/HDLC SERPL: 3.9 {RATIO} (ref 2–5)
CO2 SERPL-SCNC: 25 MMOL/L (ref 23–29)
CREAT SERPL-MCNC: 0.9 MG/DL (ref 0.5–1.4)
DIFFERENTIAL METHOD: ABNORMAL
EOSINOPHIL # BLD AUTO: 0.2 K/UL (ref 0–0.5)
EOSINOPHIL NFR BLD: 2.7 % (ref 0–8)
ERYTHROCYTE [DISTWIDTH] IN BLOOD BY AUTOMATED COUNT: 13.8 % (ref 11.5–14.5)
EST. GFR  (AFRICAN AMERICAN): >60 ML/MIN/1.73 M^2
EST. GFR  (NON AFRICAN AMERICAN): >60 ML/MIN/1.73 M^2
GLUCOSE SERPL-MCNC: 88 MG/DL (ref 70–110)
HCT VFR BLD AUTO: 39.7 % (ref 37–48.5)
HDLC SERPL-MCNC: 46 MG/DL (ref 40–75)
HDLC SERPL: 25.6 % (ref 20–50)
HGB BLD-MCNC: 12.6 G/DL (ref 12–16)
IMM GRANULOCYTES # BLD AUTO: 0.01 K/UL (ref 0–0.04)
IMM GRANULOCYTES NFR BLD AUTO: 0.1 % (ref 0–0.5)
LDLC SERPL CALC-MCNC: 79.8 MG/DL (ref 63–159)
LYMPHOCYTES # BLD AUTO: 2.9 K/UL (ref 1–4.8)
LYMPHOCYTES NFR BLD: 40.8 % (ref 18–48)
MCH RBC QN AUTO: 26.9 PG (ref 27–31)
MCHC RBC AUTO-ENTMCNC: 31.7 G/DL (ref 32–36)
MCV RBC AUTO: 85 FL (ref 82–98)
MONOCYTES # BLD AUTO: 0.6 K/UL (ref 0.3–1)
MONOCYTES NFR BLD: 9.1 % (ref 4–15)
NEUTROPHILS # BLD AUTO: 3.3 K/UL (ref 1.8–7.7)
NEUTROPHILS NFR BLD: 46.7 % (ref 38–73)
NONHDLC SERPL-MCNC: 134 MG/DL
NRBC BLD-RTO: 0 /100 WBC
PLATELET # BLD AUTO: 257 K/UL (ref 150–350)
PMV BLD AUTO: 10.8 FL (ref 9.2–12.9)
POTASSIUM SERPL-SCNC: 3.7 MMOL/L (ref 3.5–5.1)
PROT SERPL-MCNC: 6.9 G/DL (ref 6–8.4)
RBC # BLD AUTO: 4.69 M/UL (ref 4–5.4)
SODIUM SERPL-SCNC: 138 MMOL/L (ref 136–145)
TRIGL SERPL-MCNC: 271 MG/DL (ref 30–150)
WBC # BLD AUTO: 7.04 K/UL (ref 3.9–12.7)

## 2021-03-03 PROCEDURE — 80061 LIPID PANEL: CPT | Performed by: INTERNAL MEDICINE

## 2021-03-03 PROCEDURE — 36415 COLL VENOUS BLD VENIPUNCTURE: CPT | Mod: PN | Performed by: INTERNAL MEDICINE

## 2021-03-03 PROCEDURE — 80053 COMPREHEN METABOLIC PANEL: CPT | Performed by: INTERNAL MEDICINE

## 2021-03-03 PROCEDURE — 85025 COMPLETE CBC W/AUTO DIFF WBC: CPT | Performed by: INTERNAL MEDICINE

## 2021-03-03 RX ORDER — FLUTICASONE FUROATE AND VILANTEROL 100; 25 UG/1; UG/1
1 POWDER RESPIRATORY (INHALATION) DAILY
Qty: 60 EACH | Refills: 1 | Status: SHIPPED | OUTPATIENT
Start: 2021-03-03 | End: 2021-03-10 | Stop reason: SDUPTHER

## 2021-03-03 RX ORDER — TIOTROPIUM BROMIDE AND OLODATEROL 3.124; 2.736 UG/1; UG/1
SPRAY, METERED RESPIRATORY (INHALATION)
Refills: 0 | OUTPATIENT
Start: 2021-03-03

## 2021-03-08 ENCOUNTER — OFFICE VISIT (OUTPATIENT)
Dept: CARDIOLOGY | Facility: CLINIC | Age: 71
End: 2021-03-08
Payer: MEDICARE

## 2021-03-08 VITALS
DIASTOLIC BLOOD PRESSURE: 72 MMHG | HEART RATE: 60 BPM | BODY MASS INDEX: 37.79 KG/M2 | WEIGHT: 180 LBS | HEIGHT: 58 IN | SYSTOLIC BLOOD PRESSURE: 126 MMHG | OXYGEN SATURATION: 96 %

## 2021-03-08 DIAGNOSIS — R07.89 CHEST PAIN, ATYPICAL: ICD-10-CM

## 2021-03-08 DIAGNOSIS — R06.09 DOE (DYSPNEA ON EXERTION): Primary | ICD-10-CM

## 2021-03-08 DIAGNOSIS — I10 ESSENTIAL HYPERTENSION: Chronic | ICD-10-CM

## 2021-03-08 DIAGNOSIS — E78.00 PURE HYPERCHOLESTEROLEMIA: Chronic | ICD-10-CM

## 2021-03-08 DIAGNOSIS — J43.2 CENTRILOBULAR EMPHYSEMA: ICD-10-CM

## 2021-03-08 DIAGNOSIS — R00.2 PALPITATIONS: ICD-10-CM

## 2021-03-08 PROCEDURE — 99214 OFFICE O/P EST MOD 30 MIN: CPT | Mod: S$PBB,,, | Performed by: INTERNAL MEDICINE

## 2021-03-08 PROCEDURE — 99999 PR PBB SHADOW E&M-EST. PATIENT-LVL IV: ICD-10-PCS | Mod: PBBFAC,,, | Performed by: INTERNAL MEDICINE

## 2021-03-08 PROCEDURE — 93005 ELECTROCARDIOGRAM TRACING: CPT | Mod: PBBFAC | Performed by: INTERNAL MEDICINE

## 2021-03-08 PROCEDURE — 99214 OFFICE O/P EST MOD 30 MIN: CPT | Mod: PBBFAC | Performed by: INTERNAL MEDICINE

## 2021-03-08 PROCEDURE — 93010 ELECTROCARDIOGRAM REPORT: CPT | Mod: S$PBB,,, | Performed by: INTERNAL MEDICINE

## 2021-03-08 PROCEDURE — 99999 PR PBB SHADOW E&M-EST. PATIENT-LVL IV: CPT | Mod: PBBFAC,,, | Performed by: INTERNAL MEDICINE

## 2021-03-08 PROCEDURE — 93010 EKG 12-LEAD: ICD-10-PCS | Mod: S$PBB,,, | Performed by: INTERNAL MEDICINE

## 2021-03-08 PROCEDURE — 99214 PR OFFICE/OUTPT VISIT, EST, LEVL IV, 30-39 MIN: ICD-10-PCS | Mod: S$PBB,,, | Performed by: INTERNAL MEDICINE

## 2021-03-10 ENCOUNTER — OFFICE VISIT (OUTPATIENT)
Dept: FAMILY MEDICINE | Facility: CLINIC | Age: 71
End: 2021-03-10
Payer: MEDICARE

## 2021-03-10 VITALS
HEART RATE: 80 BPM | BODY MASS INDEX: 37.83 KG/M2 | WEIGHT: 181 LBS | OXYGEN SATURATION: 95 % | DIASTOLIC BLOOD PRESSURE: 74 MMHG | TEMPERATURE: 98 F | SYSTOLIC BLOOD PRESSURE: 126 MMHG | RESPIRATION RATE: 16 BRPM

## 2021-03-10 DIAGNOSIS — J43.2 CENTRILOBULAR EMPHYSEMA: Primary | ICD-10-CM

## 2021-03-10 DIAGNOSIS — F33.42 RECURRENT MAJOR DEPRESSIVE DISORDER, IN FULL REMISSION: ICD-10-CM

## 2021-03-10 DIAGNOSIS — G44.229 CHRONIC TENSION-TYPE HEADACHE, NOT INTRACTABLE: ICD-10-CM

## 2021-03-10 DIAGNOSIS — E66.01 SEVERE OBESITY (BMI 35.0-39.9) WITH COMORBIDITY: ICD-10-CM

## 2021-03-10 DIAGNOSIS — I10 ESSENTIAL HYPERTENSION: ICD-10-CM

## 2021-03-10 DIAGNOSIS — Z78.0 ASYMPTOMATIC MENOPAUSAL STATE: ICD-10-CM

## 2021-03-10 PROBLEM — R07.89 CHEST PAIN, ATYPICAL: Status: RESOLVED | Noted: 2017-07-21 | Resolved: 2021-03-10

## 2021-03-10 PROBLEM — R06.09 DOE (DYSPNEA ON EXERTION): Status: RESOLVED | Noted: 2017-07-21 | Resolved: 2021-03-10

## 2021-03-10 PROCEDURE — 99214 PR OFFICE/OUTPT VISIT, EST, LEVL IV, 30-39 MIN: ICD-10-PCS | Mod: S$PBB,,, | Performed by: INTERNAL MEDICINE

## 2021-03-10 PROCEDURE — 99214 OFFICE O/P EST MOD 30 MIN: CPT | Mod: S$PBB,,, | Performed by: INTERNAL MEDICINE

## 2021-03-10 PROCEDURE — 99999 PR PBB SHADOW E&M-EST. PATIENT-LVL III: ICD-10-PCS | Mod: PBBFAC,,, | Performed by: INTERNAL MEDICINE

## 2021-03-10 PROCEDURE — 99213 OFFICE O/P EST LOW 20 MIN: CPT | Mod: PBBFAC,PN | Performed by: INTERNAL MEDICINE

## 2021-03-10 PROCEDURE — 99999 PR PBB SHADOW E&M-EST. PATIENT-LVL III: CPT | Mod: PBBFAC,,, | Performed by: INTERNAL MEDICINE

## 2021-03-10 RX ORDER — BUTALBITAL, ACETAMINOPHEN AND CAFFEINE 50; 325; 40 MG/1; MG/1; MG/1
1 TABLET ORAL EVERY 6 HOURS PRN
Qty: 30 TABLET | Refills: 0 | Status: SHIPPED | OUTPATIENT
Start: 2021-03-10 | End: 2022-09-13

## 2021-03-10 RX ORDER — ESCITALOPRAM OXALATE 20 MG/1
20 TABLET ORAL DAILY
Qty: 90 TABLET | Refills: 3 | Status: SHIPPED | OUTPATIENT
Start: 2021-03-10 | End: 2021-09-23 | Stop reason: SDUPTHER

## 2021-03-10 RX ORDER — ALLOPURINOL 100 MG/1
100 TABLET ORAL DAILY
Qty: 90 TABLET | Refills: 3 | Status: SHIPPED | OUTPATIENT
Start: 2021-03-10 | End: 2022-09-13 | Stop reason: SDUPTHER

## 2021-03-10 RX ORDER — LOSARTAN POTASSIUM 50 MG/1
50 TABLET ORAL 2 TIMES DAILY
Qty: 180 TABLET | Refills: 3 | Status: SHIPPED | OUTPATIENT
Start: 2021-03-10 | End: 2021-09-23 | Stop reason: SDUPTHER

## 2021-03-10 RX ORDER — FLUTICASONE FUROATE AND VILANTEROL 100; 25 UG/1; UG/1
1 POWDER RESPIRATORY (INHALATION) DAILY
Qty: 60 EACH | Refills: 3 | Status: SHIPPED | OUTPATIENT
Start: 2021-03-10 | End: 2021-09-01

## 2021-03-10 RX ORDER — METOPROLOL SUCCINATE 50 MG/1
50 TABLET, EXTENDED RELEASE ORAL DAILY
Qty: 90 TABLET | Refills: 1 | Status: SHIPPED | OUTPATIENT
Start: 2021-03-10 | End: 2021-08-22

## 2021-03-10 RX ORDER — ALBUTEROL SULFATE 90 UG/1
2 AEROSOL, METERED RESPIRATORY (INHALATION) EVERY 6 HOURS PRN
Qty: 8 G | Refills: 1 | Status: SHIPPED | OUTPATIENT
Start: 2021-03-10 | End: 2021-05-02

## 2021-03-24 ENCOUNTER — PATIENT MESSAGE (OUTPATIENT)
Dept: FAMILY MEDICINE | Facility: CLINIC | Age: 71
End: 2021-03-24

## 2021-03-25 ENCOUNTER — OFFICE VISIT (OUTPATIENT)
Dept: FAMILY MEDICINE | Facility: CLINIC | Age: 71
End: 2021-03-25
Payer: MEDICARE

## 2021-03-25 ENCOUNTER — PATIENT MESSAGE (OUTPATIENT)
Dept: FAMILY MEDICINE | Facility: CLINIC | Age: 71
End: 2021-03-25

## 2021-03-25 VITALS
SYSTOLIC BLOOD PRESSURE: 102 MMHG | DIASTOLIC BLOOD PRESSURE: 50 MMHG | BODY MASS INDEX: 37.72 KG/M2 | WEIGHT: 179.69 LBS | HEIGHT: 58 IN | HEART RATE: 85 BPM | RESPIRATION RATE: 19 BRPM | TEMPERATURE: 99 F | OXYGEN SATURATION: 95 %

## 2021-03-25 DIAGNOSIS — R68.83 CHILLS: ICD-10-CM

## 2021-03-25 DIAGNOSIS — R19.7 DIARRHEA: ICD-10-CM

## 2021-03-25 DIAGNOSIS — R05.9 COUGH: ICD-10-CM

## 2021-03-25 DIAGNOSIS — R11.2 NON-INTRACTABLE VOMITING WITH NAUSEA, UNSPECIFIED VOMITING TYPE: Primary | ICD-10-CM

## 2021-03-25 DIAGNOSIS — M79.10 MUSCLE PAIN: ICD-10-CM

## 2021-03-25 DIAGNOSIS — J01.90 ACUTE SINUSITIS, RECURRENCE NOT SPECIFIED, UNSPECIFIED LOCATION: ICD-10-CM

## 2021-03-25 DIAGNOSIS — R11.0 NAUSEA: ICD-10-CM

## 2021-03-25 DIAGNOSIS — R51.9 HEAD ACHE: ICD-10-CM

## 2021-03-25 LAB
INFLUENZA A, MOLECULAR: NEGATIVE
INFLUENZA B, MOLECULAR: NEGATIVE
SPECIMEN SOURCE: NORMAL

## 2021-03-25 PROCEDURE — 99214 PR OFFICE/OUTPT VISIT, EST, LEVL IV, 30-39 MIN: ICD-10-PCS | Mod: S$PBB,,, | Performed by: NURSE PRACTITIONER

## 2021-03-25 PROCEDURE — 99214 OFFICE O/P EST MOD 30 MIN: CPT | Mod: S$PBB,,, | Performed by: NURSE PRACTITIONER

## 2021-03-25 PROCEDURE — U0003 INFECTIOUS AGENT DETECTION BY NUCLEIC ACID (DNA OR RNA); SEVERE ACUTE RESPIRATORY SYNDROME CORONAVIRUS 2 (SARS-COV-2) (CORONAVIRUS DISEASE [COVID-19]), AMPLIFIED PROBE TECHNIQUE, MAKING USE OF HIGH THROUGHPUT TECHNOLOGIES AS DESCRIBED BY CMS-2020-01-R: HCPCS | Performed by: NURSE PRACTITIONER

## 2021-03-25 PROCEDURE — 87502 INFLUENZA DNA AMP PROBE: CPT | Performed by: NURSE PRACTITIONER

## 2021-03-25 PROCEDURE — U0005 INFEC AGEN DETEC AMPLI PROBE: HCPCS | Performed by: NURSE PRACTITIONER

## 2021-03-25 PROCEDURE — 99999 PR PBB SHADOW E&M-EST. PATIENT-LVL IV: ICD-10-PCS | Mod: PBBFAC,,, | Performed by: NURSE PRACTITIONER

## 2021-03-25 PROCEDURE — 99999 PR PBB SHADOW E&M-EST. PATIENT-LVL IV: CPT | Mod: PBBFAC,,, | Performed by: NURSE PRACTITIONER

## 2021-03-25 PROCEDURE — 99214 OFFICE O/P EST MOD 30 MIN: CPT | Mod: PBBFAC,PN | Performed by: NURSE PRACTITIONER

## 2021-03-25 RX ORDER — ONDANSETRON 4 MG/1
4 TABLET, ORALLY DISINTEGRATING ORAL ONCE
Qty: 1 TABLET | Refills: 0 | Status: SHIPPED | OUTPATIENT
Start: 2021-03-25 | End: 2021-03-25

## 2021-03-25 RX ORDER — FLUTICASONE PROPIONATE 50 MCG
1 SPRAY, SUSPENSION (ML) NASAL DAILY
Qty: 15.8 ML | Refills: 0 | Status: SHIPPED | OUTPATIENT
Start: 2021-03-25 | End: 2021-05-24

## 2021-03-26 LAB — SARS-COV-2 RNA RESP QL NAA+PROBE: NOT DETECTED

## 2021-03-30 ENCOUNTER — PATIENT MESSAGE (OUTPATIENT)
Dept: FAMILY MEDICINE | Facility: CLINIC | Age: 71
End: 2021-03-30

## 2021-04-13 ENCOUNTER — PES CALL (OUTPATIENT)
Dept: ADMINISTRATIVE | Facility: CLINIC | Age: 71
End: 2021-04-13

## 2021-04-24 ENCOUNTER — OFFICE VISIT (OUTPATIENT)
Dept: URGENT CARE | Facility: CLINIC | Age: 71
End: 2021-04-24
Payer: MEDICARE

## 2021-04-24 VITALS
SYSTOLIC BLOOD PRESSURE: 152 MMHG | DIASTOLIC BLOOD PRESSURE: 74 MMHG | OXYGEN SATURATION: 95 % | WEIGHT: 176 LBS | TEMPERATURE: 98 F | HEART RATE: 73 BPM | HEIGHT: 58 IN | RESPIRATION RATE: 18 BRPM | BODY MASS INDEX: 36.94 KG/M2

## 2021-04-24 DIAGNOSIS — R31.9 URINARY TRACT INFECTION WITH HEMATURIA, SITE UNSPECIFIED: Primary | ICD-10-CM

## 2021-04-24 DIAGNOSIS — N39.0 URINARY TRACT INFECTION WITH HEMATURIA, SITE UNSPECIFIED: Primary | ICD-10-CM

## 2021-04-24 LAB
BILIRUB UR QL STRIP: NEGATIVE
GLUCOSE UR QL STRIP: NEGATIVE
KETONES UR QL STRIP: NEGATIVE
LEUKOCYTE ESTERASE UR QL STRIP: POSITIVE
PH, POC UA: 6 (ref 5–8)
POC BLOOD, URINE: POSITIVE
POC NITRATES, URINE: NEGATIVE
PROT UR QL STRIP: NEGATIVE
SP GR UR STRIP: 1.01 (ref 1–1.03)
UROBILINOGEN UR STRIP-ACNC: ABNORMAL (ref 0.1–1.1)

## 2021-04-24 PROCEDURE — 99214 OFFICE O/P EST MOD 30 MIN: CPT | Mod: 25,S$GLB,, | Performed by: PHYSICIAN ASSISTANT

## 2021-04-24 PROCEDURE — 81003 POCT URINALYSIS, DIPSTICK, AUTOMATED, W/O SCOPE: ICD-10-PCS | Mod: QW,S$GLB,, | Performed by: PHYSICIAN ASSISTANT

## 2021-04-24 PROCEDURE — 81003 URINALYSIS AUTO W/O SCOPE: CPT | Mod: QW,S$GLB,, | Performed by: PHYSICIAN ASSISTANT

## 2021-04-24 PROCEDURE — 99214 PR OFFICE/OUTPT VISIT, EST, LEVL IV, 30-39 MIN: ICD-10-PCS | Mod: 25,S$GLB,, | Performed by: PHYSICIAN ASSISTANT

## 2021-04-24 RX ORDER — AMOXICILLIN AND CLAVULANATE POTASSIUM 500; 125 MG/1; MG/1
1 TABLET, FILM COATED ORAL 2 TIMES DAILY
Qty: 14 TABLET | Refills: 0 | Status: SHIPPED | OUTPATIENT
Start: 2021-04-24 | End: 2021-05-01

## 2021-05-01 ENCOUNTER — TELEPHONE (OUTPATIENT)
Dept: URGENT CARE | Facility: CLINIC | Age: 71
End: 2021-05-01

## 2021-05-01 LAB
BACTERIA UR CULT: ABNORMAL
BACTERIA UR CULT: ABNORMAL
OTHER ANTIBIOTIC SUSC ISLT: ABNORMAL

## 2021-05-03 ENCOUNTER — CLINICAL SUPPORT (OUTPATIENT)
Dept: FAMILY MEDICINE | Facility: CLINIC | Age: 71
End: 2021-05-03
Payer: MEDICARE

## 2021-05-03 DIAGNOSIS — Z23 NEED FOR SHINGLES VACCINE: Primary | ICD-10-CM

## 2021-05-03 PROCEDURE — 99499 UNLISTED E&M SERVICE: CPT | Mod: S$PBB,,, | Performed by: INTERNAL MEDICINE

## 2021-05-03 PROCEDURE — 99499 NO LOS: ICD-10-PCS | Mod: S$PBB,,, | Performed by: INTERNAL MEDICINE

## 2021-05-03 PROCEDURE — 90471 IMMUNIZATION ADMIN: CPT | Mod: PBBFAC,PO

## 2021-05-03 PROCEDURE — 90750 HZV VACC RECOMBINANT IM: CPT | Mod: PBBFAC,PO

## 2021-06-01 ENCOUNTER — OFFICE VISIT (OUTPATIENT)
Dept: FAMILY MEDICINE | Facility: CLINIC | Age: 71
End: 2021-06-01
Payer: MEDICARE

## 2021-06-01 VITALS
RESPIRATION RATE: 19 BRPM | OXYGEN SATURATION: 97 % | HEIGHT: 58 IN | DIASTOLIC BLOOD PRESSURE: 68 MMHG | WEIGHT: 180.56 LBS | HEART RATE: 68 BPM | SYSTOLIC BLOOD PRESSURE: 130 MMHG | TEMPERATURE: 98 F | BODY MASS INDEX: 37.9 KG/M2

## 2021-06-01 DIAGNOSIS — J30.1 ALLERGIC RHINITIS DUE TO POLLEN, UNSPECIFIED SEASONALITY: Primary | ICD-10-CM

## 2021-06-01 PROCEDURE — 99215 OFFICE O/P EST HI 40 MIN: CPT | Mod: PBBFAC,PN | Performed by: NURSE PRACTITIONER

## 2021-06-01 PROCEDURE — 99999 PR PBB SHADOW E&M-EST. PATIENT-LVL V: ICD-10-PCS | Mod: PBBFAC,,, | Performed by: NURSE PRACTITIONER

## 2021-06-01 PROCEDURE — 99999 PR PBB SHADOW E&M-EST. PATIENT-LVL V: CPT | Mod: PBBFAC,,, | Performed by: NURSE PRACTITIONER

## 2021-06-01 PROCEDURE — 99214 PR OFFICE/OUTPT VISIT, EST, LEVL IV, 30-39 MIN: ICD-10-PCS | Mod: S$PBB,,, | Performed by: NURSE PRACTITIONER

## 2021-06-01 PROCEDURE — 96372 THER/PROPH/DIAG INJ SC/IM: CPT | Mod: PBBFAC,PN

## 2021-06-01 PROCEDURE — 99214 OFFICE O/P EST MOD 30 MIN: CPT | Mod: S$PBB,,, | Performed by: NURSE PRACTITIONER

## 2021-06-01 RX ORDER — CODEINE PHOSPHATE AND GUAIFENESIN 10; 100 MG/5ML; MG/5ML
5 SOLUTION ORAL NIGHTLY PRN
Qty: 118 ML | Refills: 0 | Status: SHIPPED | OUTPATIENT
Start: 2021-06-01 | End: 2021-06-11

## 2021-06-01 RX ORDER — DEXAMETHASONE SODIUM PHOSPHATE 4 MG/ML
4 INJECTION, SOLUTION INTRA-ARTICULAR; INTRALESIONAL; INTRAMUSCULAR; INTRAVENOUS; SOFT TISSUE
Status: COMPLETED | OUTPATIENT
Start: 2021-06-01 | End: 2021-06-01

## 2021-06-01 RX ADMIN — DEXAMETHASONE SODIUM PHOSPHATE 4 MG: 4 INJECTION, SOLUTION INTRAMUSCULAR; INTRAVENOUS at 04:06

## 2021-06-04 DIAGNOSIS — B96.89 ACUTE BACTERIAL SINUSITIS: Primary | ICD-10-CM

## 2021-06-04 DIAGNOSIS — J01.90 ACUTE BACTERIAL SINUSITIS: Primary | ICD-10-CM

## 2021-06-04 RX ORDER — DOXYCYCLINE HYCLATE 100 MG
100 TABLET ORAL 2 TIMES DAILY
Qty: 14 TABLET | Refills: 0 | Status: SHIPPED | OUTPATIENT
Start: 2021-06-04 | End: 2021-07-23

## 2021-06-29 ENCOUNTER — HOSPITAL ENCOUNTER (OUTPATIENT)
Dept: RADIOLOGY | Facility: CLINIC | Age: 71
Discharge: HOME | End: 2021-06-29
Attending: INTERNAL MEDICINE
Payer: MEDICARE

## 2021-06-29 DIAGNOSIS — Z78.0 ASYMPTOMATIC MENOPAUSAL STATE: ICD-10-CM

## 2021-06-29 PROCEDURE — 77080 DXA BONE DENSITY AXIAL: CPT | Mod: TC,PO

## 2021-06-29 PROCEDURE — 77080 DXA BONE DENSITY AXIAL: CPT | Mod: 26,,, | Performed by: INTERNAL MEDICINE

## 2021-06-29 PROCEDURE — 77080 DEXA BONE DENSITY SPINE HIP: ICD-10-PCS | Mod: 26,,, | Performed by: INTERNAL MEDICINE

## 2021-07-22 PROBLEM — J43.2 CENTRILOBULAR EMPHYSEMA: Status: ACTIVE | Noted: 2021-07-22

## 2021-07-23 ENCOUNTER — OFFICE VISIT (OUTPATIENT)
Dept: FAMILY MEDICINE | Facility: CLINIC | Age: 71
End: 2021-07-23
Payer: MEDICARE

## 2021-07-23 VITALS
DIASTOLIC BLOOD PRESSURE: 68 MMHG | HEART RATE: 67 BPM | HEIGHT: 58 IN | WEIGHT: 180.13 LBS | TEMPERATURE: 98 F | BODY MASS INDEX: 37.81 KG/M2 | SYSTOLIC BLOOD PRESSURE: 128 MMHG | OXYGEN SATURATION: 95 %

## 2021-07-23 DIAGNOSIS — M65.9 SYNOVITIS OF RIGHT HAND: Primary | ICD-10-CM

## 2021-07-23 DIAGNOSIS — G61.89 OTHER INFLAMMATORY POLYNEUROPATHIES: ICD-10-CM

## 2021-07-23 DIAGNOSIS — I10 ESSENTIAL HYPERTENSION: ICD-10-CM

## 2021-07-23 PROCEDURE — 99214 OFFICE O/P EST MOD 30 MIN: CPT | Mod: S$PBB,,, | Performed by: INTERNAL MEDICINE

## 2021-07-23 PROCEDURE — 99999 PR PBB SHADOW E&M-EST. PATIENT-LVL V: ICD-10-PCS | Mod: PBBFAC,,, | Performed by: INTERNAL MEDICINE

## 2021-07-23 PROCEDURE — 99999 PR PBB SHADOW E&M-EST. PATIENT-LVL V: CPT | Mod: PBBFAC,,, | Performed by: INTERNAL MEDICINE

## 2021-07-23 PROCEDURE — 99215 OFFICE O/P EST HI 40 MIN: CPT | Mod: PBBFAC,PN | Performed by: INTERNAL MEDICINE

## 2021-07-23 PROCEDURE — 99214 PR OFFICE/OUTPT VISIT, EST, LEVL IV, 30-39 MIN: ICD-10-PCS | Mod: S$PBB,,, | Performed by: INTERNAL MEDICINE

## 2021-07-23 RX ORDER — POTASSIUM CHLORIDE 20 MEQ/1
20 TABLET, EXTENDED RELEASE ORAL DAILY
Qty: 30 TABLET | Refills: 0 | Status: SHIPPED | OUTPATIENT
Start: 2021-07-23 | End: 2021-09-23 | Stop reason: SDUPTHER

## 2021-07-23 RX ORDER — LANOLIN ALCOHOL/MO/W.PET/CERES
100 CREAM (GRAM) TOPICAL DAILY
COMMUNITY

## 2021-07-26 ENCOUNTER — LAB VISIT (OUTPATIENT)
Dept: LAB | Facility: HOSPITAL | Age: 71
End: 2021-07-26
Attending: INTERNAL MEDICINE
Payer: MEDICARE

## 2021-07-26 DIAGNOSIS — G61.89 OTHER INFLAMMATORY POLYNEUROPATHIES: ICD-10-CM

## 2021-07-26 DIAGNOSIS — M65.9 SYNOVITIS OF RIGHT HAND: ICD-10-CM

## 2021-07-26 LAB
ALBUMIN SERPL BCP-MCNC: 3.8 G/DL (ref 3.5–5.2)
ALP SERPL-CCNC: 45 U/L (ref 55–135)
ALT SERPL W/O P-5'-P-CCNC: 23 U/L (ref 10–44)
ANION GAP SERPL CALC-SCNC: 12 MMOL/L (ref 8–16)
AST SERPL-CCNC: 20 U/L (ref 10–40)
BASOPHILS # BLD AUTO: 0.04 K/UL (ref 0–0.2)
BASOPHILS NFR BLD: 0.5 % (ref 0–1.9)
BILIRUB SERPL-MCNC: 0.2 MG/DL (ref 0.1–1)
BUN SERPL-MCNC: 31 MG/DL (ref 8–23)
CALCIUM SERPL-MCNC: 9.3 MG/DL (ref 8.7–10.5)
CHLORIDE SERPL-SCNC: 100 MMOL/L (ref 95–110)
CO2 SERPL-SCNC: 23 MMOL/L (ref 23–29)
CREAT SERPL-MCNC: 1.2 MG/DL (ref 0.5–1.4)
DIFFERENTIAL METHOD: ABNORMAL
EOSINOPHIL # BLD AUTO: 0.2 K/UL (ref 0–0.5)
EOSINOPHIL NFR BLD: 2.5 % (ref 0–8)
ERYTHROCYTE [DISTWIDTH] IN BLOOD BY AUTOMATED COUNT: 14.5 % (ref 11.5–14.5)
EST. GFR  (AFRICAN AMERICAN): 52 ML/MIN/1.73 M^2
EST. GFR  (NON AFRICAN AMERICAN): 45 ML/MIN/1.73 M^2
GLUCOSE SERPL-MCNC: 107 MG/DL (ref 70–110)
HCT VFR BLD AUTO: 38.6 % (ref 37–48.5)
HGB BLD-MCNC: 12.1 G/DL (ref 12–16)
IMM GRANULOCYTES # BLD AUTO: 0.03 K/UL (ref 0–0.04)
IMM GRANULOCYTES NFR BLD AUTO: 0.3 % (ref 0–0.5)
LYMPHOCYTES # BLD AUTO: 2 K/UL (ref 1–4.8)
LYMPHOCYTES NFR BLD: 22.4 % (ref 18–48)
MCH RBC QN AUTO: 27.1 PG (ref 27–31)
MCHC RBC AUTO-ENTMCNC: 31.3 G/DL (ref 32–36)
MCV RBC AUTO: 86 FL (ref 82–98)
MONOCYTES # BLD AUTO: 0.9 K/UL (ref 0.3–1)
MONOCYTES NFR BLD: 9.6 % (ref 4–15)
NEUTROPHILS # BLD AUTO: 5.7 K/UL (ref 1.8–7.7)
NEUTROPHILS NFR BLD: 64.7 % (ref 38–73)
NRBC BLD-RTO: 0 /100 WBC
PLATELET # BLD AUTO: 274 K/UL (ref 150–450)
PMV BLD AUTO: 9.9 FL (ref 9.2–12.9)
POTASSIUM SERPL-SCNC: 5.2 MMOL/L (ref 3.5–5.1)
PROT SERPL-MCNC: 6.9 G/DL (ref 6–8.4)
RBC # BLD AUTO: 4.47 M/UL (ref 4–5.4)
SODIUM SERPL-SCNC: 135 MMOL/L (ref 136–145)
WBC # BLD AUTO: 8.81 K/UL (ref 3.9–12.7)

## 2021-07-26 PROCEDURE — 80053 COMPREHEN METABOLIC PANEL: CPT | Performed by: INTERNAL MEDICINE

## 2021-07-26 PROCEDURE — 86160 COMPLEMENT ANTIGEN: CPT | Performed by: INTERNAL MEDICINE

## 2021-07-26 PROCEDURE — 86160 COMPLEMENT ANTIGEN: CPT | Mod: 59 | Performed by: INTERNAL MEDICINE

## 2021-07-26 PROCEDURE — 86200 CCP ANTIBODY: CPT | Performed by: INTERNAL MEDICINE

## 2021-07-26 PROCEDURE — 85025 COMPLETE CBC W/AUTO DIFF WBC: CPT | Performed by: INTERNAL MEDICINE

## 2021-07-26 PROCEDURE — 86431 RHEUMATOID FACTOR QUANT: CPT | Performed by: INTERNAL MEDICINE

## 2021-07-26 PROCEDURE — 36415 COLL VENOUS BLD VENIPUNCTURE: CPT | Mod: PN | Performed by: INTERNAL MEDICINE

## 2021-07-27 LAB — RHEUMATOID FACT SERPL-ACNC: <10 IU/ML (ref 0–15)

## 2021-07-28 LAB
C3 SERPL-MCNC: 151 MG/DL (ref 50–180)
C4 SERPL-MCNC: 45 MG/DL (ref 11–44)
CCP AB SER IA-ACNC: <0.5 U/ML

## 2021-07-30 ENCOUNTER — PES CALL (OUTPATIENT)
Dept: ADMINISTRATIVE | Facility: CLINIC | Age: 71
End: 2021-07-30

## 2021-08-10 ENCOUNTER — PES CALL (OUTPATIENT)
Dept: ADMINISTRATIVE | Facility: CLINIC | Age: 71
End: 2021-08-10

## 2021-09-23 ENCOUNTER — OFFICE VISIT (OUTPATIENT)
Dept: FAMILY MEDICINE | Facility: CLINIC | Age: 71
End: 2021-09-23
Payer: MEDICARE

## 2021-09-23 VITALS
TEMPERATURE: 98 F | DIASTOLIC BLOOD PRESSURE: 68 MMHG | HEART RATE: 77 BPM | OXYGEN SATURATION: 95 % | BODY MASS INDEX: 38.41 KG/M2 | WEIGHT: 183 LBS | HEIGHT: 58 IN | SYSTOLIC BLOOD PRESSURE: 132 MMHG

## 2021-09-23 DIAGNOSIS — J43.2 CENTRILOBULAR EMPHYSEMA: ICD-10-CM

## 2021-09-23 DIAGNOSIS — F33.42 RECURRENT MAJOR DEPRESSIVE DISORDER, IN FULL REMISSION: ICD-10-CM

## 2021-09-23 DIAGNOSIS — E78.5 HYPERLIPIDEMIA, UNSPECIFIED HYPERLIPIDEMIA TYPE: Chronic | ICD-10-CM

## 2021-09-23 DIAGNOSIS — E66.01 SEVERE OBESITY (BMI 35.0-39.9) WITH COMORBIDITY: ICD-10-CM

## 2021-09-23 DIAGNOSIS — M65.9 SYNOVITIS OF RIGHT HAND: ICD-10-CM

## 2021-09-23 DIAGNOSIS — I10 ESSENTIAL HYPERTENSION: Primary | ICD-10-CM

## 2021-09-23 PROCEDURE — 99214 PR OFFICE/OUTPT VISIT, EST, LEVL IV, 30-39 MIN: ICD-10-PCS | Mod: S$PBB,,, | Performed by: INTERNAL MEDICINE

## 2021-09-23 PROCEDURE — 99999 PR PBB SHADOW E&M-EST. PATIENT-LVL IV: ICD-10-PCS | Mod: PBBFAC,,, | Performed by: INTERNAL MEDICINE

## 2021-09-23 PROCEDURE — 99214 OFFICE O/P EST MOD 30 MIN: CPT | Mod: S$PBB,,, | Performed by: INTERNAL MEDICINE

## 2021-09-23 PROCEDURE — 99999 PR PBB SHADOW E&M-EST. PATIENT-LVL IV: CPT | Mod: PBBFAC,,, | Performed by: INTERNAL MEDICINE

## 2021-09-23 PROCEDURE — 99214 OFFICE O/P EST MOD 30 MIN: CPT | Mod: PBBFAC,PN | Performed by: INTERNAL MEDICINE

## 2021-09-23 RX ORDER — FLUTICASONE FUROATE AND VILANTEROL TRIFENATATE 100; 25 UG/1; UG/1
1 POWDER RESPIRATORY (INHALATION) DAILY
Qty: 60 EACH | Refills: 3 | Status: SHIPPED | OUTPATIENT
Start: 2021-09-23 | End: 2022-02-01 | Stop reason: SDUPTHER

## 2021-09-23 RX ORDER — HYDROCODONE BITARTRATE AND ACETAMINOPHEN 7.5; 325 MG/1; MG/1
1 TABLET ORAL 3 TIMES DAILY PRN
COMMUNITY
Start: 2021-09-20 | End: 2021-10-18

## 2021-09-23 RX ORDER — ESCITALOPRAM OXALATE 20 MG/1
20 TABLET ORAL DAILY
Qty: 90 TABLET | Refills: 3 | Status: SHIPPED | OUTPATIENT
Start: 2021-09-23 | End: 2022-09-25

## 2021-09-23 RX ORDER — PRAVASTATIN SODIUM 40 MG/1
40 TABLET ORAL DAILY
Qty: 90 TABLET | Refills: 3 | Status: SHIPPED | OUTPATIENT
Start: 2021-09-23 | End: 2022-10-17

## 2021-09-23 RX ORDER — LOSARTAN POTASSIUM 50 MG/1
50 TABLET ORAL 2 TIMES DAILY
Qty: 180 TABLET | Refills: 3 | Status: SHIPPED | OUTPATIENT
Start: 2021-09-23 | End: 2022-02-01

## 2021-09-23 RX ORDER — POTASSIUM CHLORIDE 20 MEQ/1
20 TABLET, EXTENDED RELEASE ORAL DAILY
Qty: 30 TABLET | Refills: 0 | Status: SHIPPED | OUTPATIENT
Start: 2021-09-23 | End: 2022-02-01 | Stop reason: SDUPTHER

## 2021-09-23 RX ORDER — METOPROLOL SUCCINATE 50 MG/1
50 TABLET, EXTENDED RELEASE ORAL DAILY
Qty: 90 TABLET | Refills: 3 | Status: SHIPPED | OUTPATIENT
Start: 2021-09-23 | End: 2022-10-24

## 2021-09-27 ENCOUNTER — PES CALL (OUTPATIENT)
Dept: ADMINISTRATIVE | Facility: CLINIC | Age: 71
End: 2021-09-27

## 2021-10-05 ENCOUNTER — OFFICE VISIT (OUTPATIENT)
Dept: CARDIOLOGY | Facility: CLINIC | Age: 71
End: 2021-10-05
Payer: MEDICARE

## 2021-10-05 VITALS
BODY MASS INDEX: 36.47 KG/M2 | HEART RATE: 62 BPM | WEIGHT: 173.75 LBS | OXYGEN SATURATION: 95 % | SYSTOLIC BLOOD PRESSURE: 130 MMHG | DIASTOLIC BLOOD PRESSURE: 72 MMHG | HEIGHT: 58 IN

## 2021-10-05 DIAGNOSIS — E78.00 PURE HYPERCHOLESTEROLEMIA: Chronic | ICD-10-CM

## 2021-10-05 DIAGNOSIS — I10 ESSENTIAL HYPERTENSION: Primary | Chronic | ICD-10-CM

## 2021-10-05 DIAGNOSIS — R00.2 PALPITATIONS: ICD-10-CM

## 2021-10-05 PROCEDURE — 99999 PR PBB SHADOW E&M-EST. PATIENT-LVL IV: CPT | Mod: PBBFAC,,, | Performed by: INTERNAL MEDICINE

## 2021-10-05 PROCEDURE — 99214 OFFICE O/P EST MOD 30 MIN: CPT | Mod: S$PBB,,, | Performed by: INTERNAL MEDICINE

## 2021-10-05 PROCEDURE — 99999 PR PBB SHADOW E&M-EST. PATIENT-LVL IV: ICD-10-PCS | Mod: PBBFAC,,, | Performed by: INTERNAL MEDICINE

## 2021-10-05 PROCEDURE — 99214 OFFICE O/P EST MOD 30 MIN: CPT | Mod: PBBFAC | Performed by: INTERNAL MEDICINE

## 2021-10-05 PROCEDURE — 99214 PR OFFICE/OUTPT VISIT, EST, LEVL IV, 30-39 MIN: ICD-10-PCS | Mod: S$PBB,,, | Performed by: INTERNAL MEDICINE

## 2021-10-15 RX ORDER — POTASSIUM CHLORIDE 20 MEQ/1
TABLET, EXTENDED RELEASE ORAL
Qty: 30 TABLET | Refills: 0 | OUTPATIENT
Start: 2021-10-15

## 2021-10-29 ENCOUNTER — OFFICE VISIT (OUTPATIENT)
Dept: RHEUMATOLOGY | Facility: CLINIC | Age: 71
End: 2021-10-29
Payer: MEDICARE

## 2021-10-29 VITALS
DIASTOLIC BLOOD PRESSURE: 71 MMHG | BODY MASS INDEX: 36.47 KG/M2 | OXYGEN SATURATION: 96 % | RESPIRATION RATE: 16 BRPM | WEIGHT: 173.75 LBS | HEART RATE: 73 BPM | SYSTOLIC BLOOD PRESSURE: 140 MMHG | HEIGHT: 58 IN

## 2021-10-29 DIAGNOSIS — M1A.9XX0 CHRONIC GOUT WITHOUT TOPHUS, UNSPECIFIED CAUSE, UNSPECIFIED SITE: ICD-10-CM

## 2021-10-29 DIAGNOSIS — M15.9 PRIMARY OSTEOARTHRITIS INVOLVING MULTIPLE JOINTS: Primary | ICD-10-CM

## 2021-10-29 DIAGNOSIS — E66.9 CLASS 2 OBESITY WITH BODY MASS INDEX (BMI) OF 36.0 TO 36.9 IN ADULT, UNSPECIFIED OBESITY TYPE, UNSPECIFIED WHETHER SERIOUS COMORBIDITY PRESENT: ICD-10-CM

## 2021-10-29 DIAGNOSIS — Z71.89 COUNSELING AND COORDINATION OF CARE: ICD-10-CM

## 2021-10-29 PROCEDURE — 99999 PR PBB SHADOW E&M-EST. PATIENT-LVL IV: CPT | Mod: PBBFAC,,, | Performed by: INTERNAL MEDICINE

## 2021-10-29 PROCEDURE — 99999 PR PBB SHADOW E&M-EST. PATIENT-LVL IV: ICD-10-PCS | Mod: PBBFAC,,, | Performed by: INTERNAL MEDICINE

## 2021-10-29 PROCEDURE — 99214 OFFICE O/P EST MOD 30 MIN: CPT | Mod: PBBFAC,PN | Performed by: INTERNAL MEDICINE

## 2021-10-29 PROCEDURE — 99204 OFFICE O/P NEW MOD 45 MIN: CPT | Mod: S$PBB,,, | Performed by: INTERNAL MEDICINE

## 2021-10-29 PROCEDURE — 99204 PR OFFICE/OUTPT VISIT, NEW, LEVL IV, 45-59 MIN: ICD-10-PCS | Mod: S$PBB,,, | Performed by: INTERNAL MEDICINE

## 2022-01-10 ENCOUNTER — HOSPITAL ENCOUNTER (OUTPATIENT)
Dept: RADIOLOGY | Facility: HOSPITAL | Age: 72
Discharge: HOME | End: 2022-01-10
Attending: NURSE PRACTITIONER
Payer: MEDICARE

## 2022-01-10 ENCOUNTER — OFFICE VISIT (OUTPATIENT)
Dept: FAMILY MEDICINE | Facility: CLINIC | Age: 72
End: 2022-01-10
Payer: MEDICARE

## 2022-01-10 VITALS
TEMPERATURE: 98 F | DIASTOLIC BLOOD PRESSURE: 68 MMHG | BODY MASS INDEX: 36.75 KG/M2 | HEIGHT: 58 IN | SYSTOLIC BLOOD PRESSURE: 134 MMHG | HEART RATE: 113 BPM | RESPIRATION RATE: 16 BRPM | WEIGHT: 175.06 LBS | OXYGEN SATURATION: 96 %

## 2022-01-10 DIAGNOSIS — R35.0 URINARY FREQUENCY: ICD-10-CM

## 2022-01-10 DIAGNOSIS — R10.32 LEFT LOWER QUADRANT PAIN: Primary | ICD-10-CM

## 2022-01-10 DIAGNOSIS — M46.1 SACROILIITIS, NOT ELSEWHERE CLASSIFIED: ICD-10-CM

## 2022-01-10 DIAGNOSIS — G61.89 OTHER INFLAMMATORY POLYNEUROPATHIES: ICD-10-CM

## 2022-01-10 DIAGNOSIS — E66.01 SEVERE OBESITY (BMI 35.0-39.9) WITH COMORBIDITY: ICD-10-CM

## 2022-01-10 DIAGNOSIS — J43.2 CENTRILOBULAR EMPHYSEMA: ICD-10-CM

## 2022-01-10 DIAGNOSIS — R10.32 LEFT LOWER QUADRANT PAIN: ICD-10-CM

## 2022-01-10 DIAGNOSIS — F33.42 RECURRENT MAJOR DEPRESSIVE DISORDER, IN FULL REMISSION: ICD-10-CM

## 2022-01-10 PROCEDURE — 99214 PR OFFICE/OUTPT VISIT, EST, LEVL IV, 30-39 MIN: ICD-10-PCS | Mod: S$PBB,,, | Performed by: NURSE PRACTITIONER

## 2022-01-10 PROCEDURE — 99215 OFFICE O/P EST HI 40 MIN: CPT | Mod: PBBFAC,PN | Performed by: NURSE PRACTITIONER

## 2022-01-10 PROCEDURE — 99214 OFFICE O/P EST MOD 30 MIN: CPT | Mod: S$PBB,,, | Performed by: NURSE PRACTITIONER

## 2022-01-10 PROCEDURE — 99999 PR PBB SHADOW E&M-EST. PATIENT-LVL V: CPT | Mod: PBBFAC,,, | Performed by: NURSE PRACTITIONER

## 2022-01-10 PROCEDURE — 99999 PR PBB SHADOW E&M-EST. PATIENT-LVL V: ICD-10-PCS | Mod: PBBFAC,,, | Performed by: NURSE PRACTITIONER

## 2022-01-10 RX ORDER — HYDROCODONE BITARTRATE AND ACETAMINOPHEN 7.5; 325 MG/1; MG/1
1 TABLET ORAL
COMMUNITY
Start: 2021-11-18

## 2022-01-10 NOTE — PROGRESS NOTES
Routine Office Visit    Patient Name: Dayami Mina    : 1950  MRN: [id number]    Chief Complaint:  Abdominal pain, diarrhea, urinary frequency    Subjective:  Dayami is a 71 y.o. female who presents today for:    1. Abdominal pain, diarrhea, urinary frequency - patient who is known to me with history of emphysema, hypertension, hyperlipidemia, diverticulitis (last flare 2019) reports today for evaluation.  Last week she was at her hunting camp in [place], [place] developed sharp right lower quadrant abdominal pain which radiates to the left lower quadrant intermittently.  No fevers or chills or nausea or vomiting.  She notes that she is having about 8 bouts of green watery diarrhea per day.  She did go to an urgent care in [place] last week and was tested for flu, strep, and COVID all of which were negative.  She is not having any fevers or chills.  During her symptoms she was not able to eat any solid foods, but was able to drink liquids; she states yesterday she was able to eat some soft foods and keep them down.  She notes that she does feel dehydrated and has not taken her losartan or metoprolol in the last couple of days.  She only takes Lasix as needed and has not been taking that as well.  She notes that 1 of the persons she was with at the hunting [place] did have COVID last week.  In addition, she endorses urinary frequency as well but denies any dysuria, hematuria, or flank pain.    Past Medical History  Past Medical History:   Diagnosis Date    Benign colon polyp 2cm     Depression     Hyperlipidemia     Hypertension     PONV (postoperative nausea and vomiting)        Past Surgical History  Past Surgical History:   Procedure Laterality Date    APPENDECTOMY      BACK SURGERY      BREAST CAPSULECTOMY Bilateral 2020    Procedure: CAPSULECTOMY, BREAST;  Surgeon: Levi Clarke MD;  Location: [facility] OR;  Service: Plastics;  Laterality: Bilateral;  MARQUEZ OK WITH 7:15AM START BOTH  WILL WORK AT SAME TIME    breast implants      CLOSED REDUCTION FINGER DISLOCATION      FOOT SURGERY      HYSTERECTOMY      JOINT REPLACEMENT Left     total Lt knee    KNEE ARTHROSCOPY Left     MASTECTOMY      REMOVAL OF BREAST IMPLANT Bilateral 2020    Procedure: REMOVAL, IMPLANT, BREAST;  Surgeon: Levi Clarke MD;  Location: [facility] OR;  Service: Plastics;  Laterality: Bilateral;    SHOULDER ARTHROSCOPY      SINUS SURGERY      SURGICAL REMOVAL OF MASS OF UPPER EXTREMITY Right 2020    Procedure: EXCISION, MASS, UPPER EXTREMITY;  Surgeon: Sherman Rojas MD;  Location: [facility] OR;  Service: General;  Laterality: Right;  joint with Dr. Clarke--RN PREOP 2020  THEY WILL WORK AT SAME TIME       Family History  Family History   Problem Relation Age of Onset    Diabetes Mother     Diabetes Father     Stroke Paternal Grandfather     Diabetes Sister        Social History  Social History     Socioeconomic History    Marital status:    Tobacco Use    Smoking status: Former Smoker     Quit date: 1988     Years since quittin.0    Smokeless tobacco: Never Used   Substance and Sexual Activity    Alcohol use: No    Drug use: No    Sexual activity: Not Currently     Partners: Male       Current Medications  Current Outpatient Medications on File Prior to Visit   Medication Sig Dispense Refill    acetaminophen/caffeine (EXCEDRIN TENSION HEADACHE ORAL) Take by mouth.      albuterol (PROVENTIL/VENTOLIN HFA) 90 mcg/actuation inhaler INHALE 2 PUFFS BY MOUTH EVERY 6 HOURS AS NEEDED FOR WHEEZING. RESCUE 6.7 g 1    allopurinoL (ZYLOPRIM) 100 MG tablet Take 1 tablet (100 mg total) by mouth once daily. 90 tablet 3    ammonium lactate (LAC-HYDRIN) 12 % lotion Apply topically 2 (two) times daily as needed for Dry Skin. 500 g 5    aspirin (ECOTRIN) 81 MG EC tablet Take 81 mg by mouth once daily.      butalbital-acetaminophen-caffeine -40 mg (FIORICET, ESGIC) -40 mg per  tablet Take 1 tablet by mouth every 6 (six) hours as needed for Headaches. 30 tablet 0    cyanocobalamin (VITAMIN B-12) 1000 MCG tablet Take 100 mcg by mouth once daily.      EScitalopram oxalate (LEXAPRO) 20 MG tablet Take 1 tablet (20 mg total) by mouth once daily. 90 tablet 3    esomeprazole (NEXIUM) 20 MG capsule Take 20 mg by mouth.      fexofenadine (ALLEGRA) 180 MG tablet       fluticasone furoate-vilanteroL (BREO ELLIPTA) 100-25 mcg/dose diskus inhaler Inhale 1 puff into the lungs once daily. Controller 60 each 3    fluticasone propionate (FLONASE) 50 mcg/actuation nasal spray USE 1 SPARY IN EACH NOSTRIL ONCE DAILY 16 mL 2    gabapentin (NEURONTIN) 100 MG capsule 100 mg. 2 tabs in the am and 2 tabs in the pm      HYDROcodone-acetaminophen (NORCO) 7.5-325 mg per tablet Take 1 tablet by mouth.      losartan (COZAAR) 50 MG tablet Take 1 tablet (50 mg total) by mouth 2 (two) times daily. 180 tablet 3    metoprolol succinate (TOPROL-XL) 50 MG 24 hr tablet Take 1 tablet (50 mg total) by mouth once daily. 90 tablet 3    multivitamin capsule Take 1 capsule by mouth once daily.      potassium chloride SA (K-DUR,KLOR-CON) 20 MEQ tablet Take 1 tablet (20 mEq total) by mouth once daily. 30 tablet 0    pravastatin (PRAVACHOL) 40 MG tablet Take 1 tablet (40 mg total) by mouth once daily. 90 tablet 3    furosemide (LASIX) 20 MG tablet Take 1 tablet (20 mg total) by mouth daily as needed (leg swelling). 30 tablet 11     No current facility-administered medications on file prior to visit.       Allergies   Review of patient's allergies indicates:   Allergen Reactions    Ancef [cefazolin] Nausea And Vomiting    Sulfa (sulfonamide antibiotics) Anaphylaxis    Scopolamine Blisters    Refresh redness relief [phenylephrin-polyvinyl alcohol] Blisters       Review of Systems (Pertinent positives)  Review of Systems   Constitutional: Negative for chills, diaphoresis, fever, malaise/fatigue and weight loss.   HENT:  "Negative.    Eyes: Negative.    Respiratory: Negative for cough, hemoptysis, sputum production, shortness of breath and wheezing.    Cardiovascular: Negative for chest pain, palpitations, orthopnea, claudication, leg swelling and PND.   Gastrointestinal: Positive for abdominal pain and diarrhea. Negative for blood in stool, constipation, heartburn, melena, nausea and vomiting.   Genitourinary: Positive for frequency. Negative for dysuria, flank pain, hematuria and urgency.   Musculoskeletal: Positive for back pain (Chronic). Negative for myalgias and neck pain.   Skin: Negative.    Neurological: Negative.  Negative for dizziness, tingling, tremors, sensory change, speech change and headaches.   Endo/Heme/Allergies: Negative.    Psychiatric/Behavioral: Negative.        /68 (BP Location: Left arm, Patient Position: Sitting, BP Method: Medium (Manual))   Pulse (!) 113   Temp 98.2 °F (36.8 °C) (Oral)   Resp 16   Ht 4' 10" (1.473 m)   Wt 79.4 kg (175 lb 0.7 oz)   SpO2 96%   BMI 36.58 kg/m²     Physical Exam  Vitals reviewed.   Constitutional:       General: She is not in acute distress.     Appearance: Normal appearance. She is obese. She is not ill-appearing, toxic-appearing or diaphoretic.   Eyes:      Extraocular Movements: Extraocular movements intact.      Conjunctiva/sclera: Conjunctivae normal.      Pupils: Pupils are equal, round, and reactive to light.   Cardiovascular:      Rate and Rhythm: Regular rhythm. Tachycardia present.      Pulses: Normal pulses.      Heart sounds: Normal heart sounds.      Comments: Tachy but regular  Pulmonary:      Effort: Pulmonary effort is normal. No respiratory distress.      Breath sounds: Normal breath sounds. No stridor. No wheezing.   Abdominal:      General: Bowel sounds are normal.      Palpations: Abdomen is soft.      Tenderness: There is abdominal tenderness in the right lower quadrant and left lower quadrant. There is no right CVA tenderness, left CVA " tenderness, guarding or rebound.      Hernia: No hernia is present.   Musculoskeletal:      Cervical back: Normal range of motion and neck supple.   Skin:     General: Skin is warm and dry.      Capillary Refill: Capillary refill takes less than 2 seconds.   Neurological:      General: No focal deficit present.      Mental Status: She is alert and oriented to person, place, and time.   Psychiatric:         Mood and Affect: Mood normal.         Behavior: Behavior normal.          Assessment/Plan:  Dayami Mina is a 71 y.o. female who presents today for :    Dayami was seen today for diarrhea, fatigue and abdominal pain.    Diagnoses and all orders for this visit:    Left lower quadrant pain  -     URINALYSIS; Future  -     Urine culture; Future  -     CBC Auto Differential; Future  -     Comprehensive Metabolic Panel; Future  -     CT Abdomen Pelvis With Contrast; Future    Differential diagnosis includes colitis, diverticulitis, pyelonephritis, acute cystitis.  Patient unable to make urinary sample at today's visit.  Will order UA and culture home collected.  Check labs today.  Stat CT.  If symptoms worsen in any way patient is to go to the emergency room.  Recommended patient to hold BP meds at this time.  Hold Lasix.  Push fluids.    Urinary frequency  -     URINALYSIS; Future  -     Urine culture; Future    As above.    Sacroiliitis, not elsewhere classified    No acute concerns    Other inflammatory polyneuropathies    No acute concerns    Severe obesity (BMI 35.0-39.9) with comorbidity    No acute concerns    Recurrent major depressive disorder, in full remission    No acute concerns    Centrilobular emphysema    No acute concerns        This office note has been dictated.  This dictation has been generated using M-Modal Fluency Direct dictation; some phonetic errors may occur.   My collaborating physician is Dr. Guanaco Sheffield.

## 2022-01-11 ENCOUNTER — TELEPHONE (OUTPATIENT)
Dept: FAMILY MEDICINE | Facility: CLINIC | Age: 72
End: 2022-01-11
Payer: MEDICARE

## 2022-01-11 ENCOUNTER — HOSPITAL ENCOUNTER (EMERGENCY)
Facility: HOSPITAL | Age: 72
Discharge: HOME | End: 2022-01-11
Attending: EMERGENCY MEDICINE
Payer: MEDICARE

## 2022-01-11 ENCOUNTER — HOSPITAL ENCOUNTER (OUTPATIENT)
Dept: RADIOLOGY | Facility: HOSPITAL | Age: 72
Discharge: HOME | End: 2022-01-11
Attending: NURSE PRACTITIONER
Payer: MEDICARE

## 2022-01-11 VITALS
SYSTOLIC BLOOD PRESSURE: 115 MMHG | HEART RATE: 68 BPM | DIASTOLIC BLOOD PRESSURE: 78 MMHG | BODY MASS INDEX: 36.73 KG/M2 | WEIGHT: 175 LBS | HEIGHT: 58 IN | OXYGEN SATURATION: 92 % | RESPIRATION RATE: 16 BRPM | TEMPERATURE: 99 F

## 2022-01-11 DIAGNOSIS — R19.7 DIARRHEA, UNSPECIFIED TYPE: ICD-10-CM

## 2022-01-11 DIAGNOSIS — R93.429 ABNORMAL CT SCAN, KIDNEY: Primary | ICD-10-CM

## 2022-01-11 LAB
ALBUMIN SERPL BCP-MCNC: 2.9 G/DL (ref 3.5–5.2)
ALP SERPL-CCNC: 52 U/L (ref 55–135)
ALT SERPL W/O P-5'-P-CCNC: 36 U/L (ref 10–44)
ANION GAP SERPL CALC-SCNC: 10 MMOL/L (ref 8–16)
AST SERPL-CCNC: 24 U/L (ref 10–40)
BACTERIA #/AREA URNS HPF: ABNORMAL /HPF
BASOPHILS # BLD AUTO: 0.03 K/UL (ref 0–0.2)
BASOPHILS NFR BLD: 0.4 % (ref 0–1.9)
BILIRUB SERPL-MCNC: 0.4 MG/DL (ref 0.1–1)
BILIRUB UR QL STRIP: NEGATIVE
BUN SERPL-MCNC: 16 MG/DL (ref 8–23)
CALCIUM SERPL-MCNC: 9.4 MG/DL (ref 8.7–10.5)
CHLORIDE SERPL-SCNC: 94 MMOL/L (ref 95–110)
CLARITY UR: CLEAR
CO2 SERPL-SCNC: 27 MMOL/L (ref 23–29)
COLOR UR: YELLOW
CREAT SERPL-MCNC: 0.9 MG/DL (ref 0.5–1.4)
CTP QC/QA: YES
DIFFERENTIAL METHOD: ABNORMAL
EOSINOPHIL # BLD AUTO: 0.1 K/UL (ref 0–0.5)
EOSINOPHIL NFR BLD: 0.8 % (ref 0–8)
ERYTHROCYTE [DISTWIDTH] IN BLOOD BY AUTOMATED COUNT: 13.2 % (ref 11.5–14.5)
EST. GFR  (AFRICAN AMERICAN): >60 ML/MIN/1.73 M^2
EST. GFR  (NON AFRICAN AMERICAN): >60 ML/MIN/1.73 M^2
GLUCOSE SERPL-MCNC: 114 MG/DL (ref 70–110)
GLUCOSE UR QL STRIP: NEGATIVE
HCT VFR BLD AUTO: 35.8 % (ref 37–48.5)
HGB BLD-MCNC: 11.5 G/DL (ref 12–16)
HGB UR QL STRIP: NEGATIVE
HYALINE CASTS #/AREA URNS LPF: 0 /LPF
IMM GRANULOCYTES # BLD AUTO: 0.03 K/UL (ref 0–0.04)
IMM GRANULOCYTES NFR BLD AUTO: 0.4 % (ref 0–0.5)
KETONES UR QL STRIP: NEGATIVE
LACTATE SERPL-SCNC: 0.9 MMOL/L (ref 0.5–2.2)
LEUKOCYTE ESTERASE UR QL STRIP: ABNORMAL
LYMPHOCYTES # BLD AUTO: 1.6 K/UL (ref 1–4.8)
LYMPHOCYTES NFR BLD: 20.6 % (ref 18–48)
MCH RBC QN AUTO: 26.9 PG (ref 27–31)
MCHC RBC AUTO-ENTMCNC: 32.1 G/DL (ref 32–36)
MCV RBC AUTO: 84 FL (ref 82–98)
MICROSCOPIC COMMENT: ABNORMAL
MONOCYTES # BLD AUTO: 1.1 K/UL (ref 0.3–1)
MONOCYTES NFR BLD: 13.6 % (ref 4–15)
NEUTROPHILS # BLD AUTO: 5 K/UL (ref 1.8–7.7)
NEUTROPHILS NFR BLD: 64.2 % (ref 38–73)
NITRITE UR QL STRIP: NEGATIVE
NRBC BLD-RTO: 0 /100 WBC
PH UR STRIP: 7 [PH] (ref 5–8)
PLATELET # BLD AUTO: 273 K/UL (ref 150–450)
PMV BLD AUTO: 9.9 FL (ref 9.2–12.9)
POTASSIUM SERPL-SCNC: 4.5 MMOL/L (ref 3.5–5.1)
PROCALCITONIN SERPL IA-MCNC: 0.48 NG/ML
PROT SERPL-MCNC: 6.9 G/DL (ref 6–8.4)
PROT UR QL STRIP: ABNORMAL
RBC # BLD AUTO: 4.27 M/UL (ref 4–5.4)
RBC #/AREA URNS HPF: 6 /HPF (ref 0–4)
SARS-COV-2 RDRP RESP QL NAA+PROBE: NEGATIVE
SODIUM SERPL-SCNC: 131 MMOL/L (ref 136–145)
SP GR UR STRIP: >1.03 (ref 1–1.03)
URN SPEC COLLECT METH UR: ABNORMAL
UROBILINOGEN UR STRIP-ACNC: NEGATIVE EU/DL
WBC # BLD AUTO: 7.82 K/UL (ref 3.9–12.7)
WBC #/AREA URNS HPF: 25 /HPF (ref 0–5)

## 2022-01-11 PROCEDURE — 87088 URINE BACTERIA CULTURE: CPT | Performed by: EMERGENCY MEDICINE

## 2022-01-11 PROCEDURE — 81000 URINALYSIS NONAUTO W/SCOPE: CPT | Performed by: EMERGENCY MEDICINE

## 2022-01-11 PROCEDURE — 25500020 PHARM REV CODE 255: Performed by: NURSE PRACTITIONER

## 2022-01-11 PROCEDURE — 63600175 PHARM REV CODE 636 W HCPCS: Performed by: EMERGENCY MEDICINE

## 2022-01-11 PROCEDURE — 25000003 PHARM REV CODE 250: Performed by: EMERGENCY MEDICINE

## 2022-01-11 PROCEDURE — U0002 COVID-19 LAB TEST NON-CDC: HCPCS | Performed by: EMERGENCY MEDICINE

## 2022-01-11 PROCEDURE — 99284 EMERGENCY DEPT VISIT MOD MDM: CPT | Mod: 25

## 2022-01-11 PROCEDURE — 87186 SC STD MICRODIL/AGAR DIL: CPT | Performed by: EMERGENCY MEDICINE

## 2022-01-11 PROCEDURE — 87077 CULTURE AEROBIC IDENTIFY: CPT | Performed by: EMERGENCY MEDICINE

## 2022-01-11 PROCEDURE — 80053 COMPREHEN METABOLIC PANEL: CPT | Performed by: EMERGENCY MEDICINE

## 2022-01-11 PROCEDURE — 87086 URINE CULTURE/COLONY COUNT: CPT | Performed by: EMERGENCY MEDICINE

## 2022-01-11 PROCEDURE — 96365 THER/PROPH/DIAG IV INF INIT: CPT

## 2022-01-11 PROCEDURE — 83605 ASSAY OF LACTIC ACID: CPT | Performed by: EMERGENCY MEDICINE

## 2022-01-11 PROCEDURE — 85025 COMPLETE CBC W/AUTO DIFF WBC: CPT | Performed by: EMERGENCY MEDICINE

## 2022-01-11 PROCEDURE — 74177 CT ABD & PELVIS W/CONTRAST: CPT | Mod: TC

## 2022-01-11 PROCEDURE — 74177 CT ABDOMEN PELVIS WITH CONTRAST: ICD-10-PCS | Mod: 26,,, | Performed by: RADIOLOGY

## 2022-01-11 PROCEDURE — 74177 CT ABD & PELVIS W/CONTRAST: CPT | Mod: 26,,, | Performed by: RADIOLOGY

## 2022-01-11 PROCEDURE — 87040 BLOOD CULTURE FOR BACTERIA: CPT | Performed by: EMERGENCY MEDICINE

## 2022-01-11 PROCEDURE — 84145 PROCALCITONIN (PCT): CPT | Performed by: EMERGENCY MEDICINE

## 2022-01-11 RX ORDER — FLUCONAZOLE 150 MG/1
150 TABLET ORAL ONCE AS NEEDED
Qty: 1 TABLET | Refills: 0 | Status: SHIPPED | OUTPATIENT
Start: 2022-01-11 | End: 2022-01-11

## 2022-01-11 RX ORDER — CEPHALEXIN 500 MG/1
500 CAPSULE ORAL EVERY 6 HOURS
Qty: 28 CAPSULE | Refills: 0 | Status: SHIPPED | OUTPATIENT
Start: 2022-01-11 | End: 2022-01-18

## 2022-01-11 RX ADMIN — CEFTRIAXONE 2 G: 2 INJECTION, SOLUTION INTRAVENOUS at 02:01

## 2022-01-11 RX ADMIN — IOHEXOL 75 ML: 350 INJECTION, SOLUTION INTRAVENOUS at 10:01

## 2022-01-11 RX ADMIN — SODIUM CHLORIDE 2382 ML: 0.9 INJECTION, SOLUTION INTRAVENOUS at 02:01

## 2022-01-11 NOTE — ED PROVIDER NOTES
"Encounter Date: 1/11/2022    SCRIBE #1 NOTE: I, Iva Santiago, am scribing for, and in the presence of,  Mary Skelton MD. I have scribed the following portions of the note - Other sections scribed: HPI, ROS, PE.       History     Chief Complaint   Patient presents with    md referral     Had ct scan today.  Pt states md called critical results for uti, with cyst on kidney.  Urgency & frequency with urination     Abdominal Pain     Abd pain with diarrhea.        Dayami Mina is a 71 y.o. female, with a past medical history of HTN, HLD, COPD, Bronchitis, and Allergies, who presents to the ED with fatigue onset 5 days ago. Patient notes that she "slept all day" this Saturday and Sunday. Patient notes associated symptoms of diarrhea (3x per day), frequency (currently resolved), dysuria (currently resolved), abdominal pain (currently resolved), chills, cough, loss of appetite, and increased bowel sounds. Patient was referred to the ED after having abnormal CT scan this morning, blood work, and urinalysis at PCP prior to arrival. No exacerbating or alleviating factors. Patient denies, fever, flank pain, hematochezia, or other associated symptoms. Patient had COVID-19 exposure prior to symptom onset, but tested negative for COVID-19 and flu 4 days ago. Patient is vaccinated and boosted against COVID-19. Patient denies recently taking antibiotics. She reports allergy to sulfa and ancef (she has tolerated keflex in the past, allergy to ancef reported as nausea and vomiting)    The history is provided by the patient.     Review of patient's allergies indicates:   Allergen Reactions    Ancef [cefazolin] Nausea And Vomiting    Sulfa (sulfonamide antibiotics) Anaphylaxis    Scopolamine Blisters    Refresh redness relief [phenylephrin-polyvinyl alcohol] Blisters     Past Medical History:   Diagnosis Date    Benign colon polyp 2cm     Depression     Hyperlipidemia     Hypertension     PONV " (postoperative nausea and vomiting)      Past Surgical History:   Procedure Laterality Date    APPENDECTOMY      BACK SURGERY      BREAST CAPSULECTOMY Bilateral 2020    Procedure: CAPSULECTOMY, BREAST;  Surgeon: Levi Clarke MD;  Location: [facility] OR;  Service: Plastics;  Laterality: Bilateral;  CLARKE OK WITH 7:15AM START BOTH WILL WORK AT SAME TIME    breast implants      CLOSED REDUCTION FINGER DISLOCATION      FOOT SURGERY      HYSTERECTOMY      JOINT REPLACEMENT Left     total Lt knee    KNEE ARTHROSCOPY Left     MASTECTOMY      REMOVAL OF BREAST IMPLANT Bilateral 2020    Procedure: REMOVAL, IMPLANT, BREAST;  Surgeon: Levi Clarke MD;  Location: [facility] OR;  Service: Plastics;  Laterality: Bilateral;    SHOULDER ARTHROSCOPY      SINUS SURGERY      SURGICAL REMOVAL OF MASS OF UPPER EXTREMITY Right 2020    Procedure: EXCISION, MASS, UPPER EXTREMITY;  Surgeon: Sherman Rojas MD;  Location: [facility] OR;  Service: General;  Laterality: Right;  joint with Dr. Clarke--RN PREOP 2020  THEY WILL WORK AT SAME TIME     Family History   Problem Relation Age of Onset    Diabetes Mother     Diabetes Father     Stroke Paternal Grandfather     Diabetes Sister      Social History     Tobacco Use    Smoking status: Former Smoker     Quit date: 1988     Years since quittin.0    Smokeless tobacco: Never Used   Substance Use Topics    Alcohol use: No    Drug use: No     Review of Systems   Constitutional: Positive for appetite change (loss), chills and fatigue. Negative for fever.   HENT: Negative for congestion and sore throat.    Eyes: Negative for visual disturbance.   Respiratory: Positive for cough. Negative for shortness of breath.    Cardiovascular: Negative for chest pain.   Gastrointestinal: Positive for abdominal pain (currently resolved) and diarrhea. Negative for blood in stool, nausea and vomiting.        Positive for increased bowel sounds.   Genitourinary: Positive  for dysuria (currently resolved) and frequency (currently resolved). Negative for flank pain and vaginal discharge.   Skin: Negative for rash.   Neurological: Negative for headaches.   Psychiatric/Behavioral: Negative for decreased concentration.       Physical Exam     Initial Vitals   BP Pulse Resp Temp SpO2   01/11/22 1317 01/11/22 1317 01/11/22 1317 01/11/22 1315 01/11/22 1317   (!) 92/53 91 18 98 °F (36.7 °C) 98 %      MAP       --                Physical Exam    Nursing note and vitals reviewed.  Constitutional: She appears well-developed and well-nourished. She is not diaphoretic. No distress.   HENT:   Mouth/Throat: Oropharynx is clear and moist.   Eyes: Pupils are equal, round, and reactive to light.   Neck: Neck supple.   Cardiovascular: Normal rate and regular rhythm.   Pulmonary/Chest: Breath sounds normal.   Abdominal: Abdomen is soft. There is no abdominal tenderness.   Musculoskeletal:         General: No edema.      Cervical back: Neck supple.      Comments: No CVA tenderness.     Neurological: She is alert and oriented to person, place, and time.   Skin: Skin is warm and dry.   Psychiatric: She has a normal mood and affect.         ED Course   Procedures  Labs Reviewed   CBC W/ AUTO DIFFERENTIAL - Abnormal; Notable for the following components:       Result Value    Hemoglobin 11.5 (*)     Hematocrit 35.8 (*)     MCH 26.9 (*)     Mono # 1.1 (*)     All other components within normal limits   COMPREHENSIVE METABOLIC PANEL - Abnormal; Notable for the following components:    Sodium 131 (*)     Chloride 94 (*)     Glucose 114 (*)     Albumin 2.9 (*)     Alkaline Phosphatase 52 (*)     All other components within normal limits   URINALYSIS, REFLEX TO URINE CULTURE - Abnormal; Notable for the following components:    Specific Gravity, UA >1.030 (*)     Protein, UA 1+ (*)     Leukocytes, UA 1+ (*)     All other components within normal limits    Narrative:     Specimen Source->Urine   PROCALCITONIN -  Abnormal; Notable for the following components:    Procalcitonin 0.48 (*)     All other components within normal limits   URINALYSIS MICROSCOPIC - Abnormal; Notable for the following components:    RBC, UA 6 (*)     WBC, UA 25 (*)     All other components within normal limits    Narrative:     Specimen Source->Urine   SARS-COV-2 RDRP GENE - Normal    Narrative:     This test utilizes isothermal nucleic acid amplification   technology to detect the SARS-CoV-2 RdRp nucleic acid segment.   The analytical sensitivity (limit of detection) is 125 genome   equivalents/mL.   A POSITIVE result implies infection with the SARS-CoV-2 virus;   the patient is presumed to be contagious.     A NEGATIVE result means that SARS-CoV-2 nucleic acids are not   present above the limit of detection. A NEGATIVE result should be   treated as presumptive. It does not rule out the possibility of   COVID-19 and should not be the sole basis for treatment decisions.   If COVID-19 is strongly suspected based on clinical and exposure   history, re-testing using an alternate molecular assay should be   considered.   This test is only for use under the Food and Drug   Administration s Emergency Use Authorization (EUA).   Commercial kits are provided by [employer].   Performance characteristics of the EUA have been independently   verified by [facility] Department of   Pathology and Laboratory Medicine.   _________________________________________________________________   The authorized Fact Sheet for Healthcare Providers and the authorized Fact   Sheet for Patients of the ID NOW COVID-19 are available on the FDA   website:     https://www.fda.gov/media/301415/download  https://www.fda.gov/media/745690/download         CULTURE, BLOOD    Narrative:     Aerobic and anaerobic   CULTURE, BLOOD    Narrative:     Aerobic and anaerobic   CULTURE, URINE   CULTURE, URINE   LACTIC ACID, PLASMA          Imaging Results          X-Ray Chest AP  "Portable (Final result)  Result time 01/11/22 14:43:30    Final result by Ramón Jernigan Jr., MD (01/11/22 14:43:30)                 Impression:      No acute cardiopulmonary disease      Electronically signed by: Ramón Barr Jr  Date:    01/11/2022  Time:    14:43             Narrative:    EXAMINATION:  XR CHEST AP PORTABLE    CLINICAL HISTORY:  Sepsis;    TECHNIQUE:  Single frontal view of the chest was performed.    COMPARISON:  None    FINDINGS:  Cardiomediastinal silhouettewithin normal limits for age.    Lungs grossly clear within limitations of portable technique    Pleura, diaphragm, and thoracic cage grossly unremarkable.                                 Medications   cefTRIAXone (ROCEPHIN) 2 g/50 mL D5W IVPB (0 g Intravenous Stopped 1/11/22 1538)   sodium chloride 0.9% bolus 2,382 mL (0 mLs Intravenous Stopped 1/11/22 1531)     Medical Decision Making:   History:   Old Medical Records: I decided to obtain old medical records.  Initial Assessment:   71-year-old female with history of hypertension, allergies presents for as referral from primary care physician for abnormal CT scan.  Patient had a several day history of fatigue, diarrhea, initially had bladder pressure and frequent urination which has since resolved.  Today, she is actually feeling improved.  She denies fever.  She took her blood pressure medication today.  On exam, blood pressure slightly low, 92/53 at triage and 101/55 on my exam.  No tachycardia.  Abdomen is soft, nontender nondistended.  There is no CVA tenderness.  Overall patient appears well and in no distress.  CT abdomen pelvis reviewed from today shows "0.9 cm fluid attenuation lesion in the upper pole posteriorly within the left kidney represents a cyst.  The right kidney demonstrates 2 wedge-shaped areas of decreased parenchymal attenuation involving the cortex, the largest 1 in the upper pole with an associated cystic component measuring 0.9 cm.  This could represent an " "infectious process such as pyelonephritis with possible developing abscess.  Renal infarct is also within the differential diagnosis.  Lymphoma is a less likely consideration no evidence for nephrolithiasis or hydronephrosis.  There is associated perinephric stranding on the right the ureters are normal in course to the urinary bladder which appears unremarkable." Sepsis workup initiated.  Will cover with Rocephin, patient reports Ancef as an allergy, nausea vomiting in the past but no facial swelling or difficulty breathing.  She has tolerated Keflex before.  Clinical Tests:   Lab Tests: Ordered and Reviewed  Radiological Study: Ordered and Reviewed          Scribe Attestation:   Scribe #1: I performed the above scribed service and the documentation accurately describes the services I performed. I attest to the accuracy of the note.        ED Course as of 01/11/22 2157 Tue Jan 11, 2022   1732 Case reviewed with Dr. Merrill, urology, she has reviewed the patient's CT. There is no leukocytosis, lactic acid is normal, urine with 1+ leukocytes, nitrites negative, no bacteria seen.  Creatinine within normal limits.  Patient continues to deny complaints on reexamination.  Dr. Merrill recommend sending a urine culture, recommends follow-up in urology clinic for reimaging approximately 1 month, okay to discharge on Keflex.  I reviewed these recommendations with the patient and her daughter at bedside.  Advised to return to the ER if needed for new or worsening symptoms.  They state they understand and agree with plan, patient is excited to go home. [LH]      ED Course User Index  [LH] Mary Skelton MD             Clinical Impression:   Final diagnoses:  [R93.429] Abnormal CT scan, kidney (Primary)  [R19.7] Diarrhea, unspecified type       I, Mary Skelton MD, personally performed the services described in this documentation. All medical record entries made by the scribe were at my direction and in my presence. I " have reviewed the chart and agree that the record reflects my personal performance and is accurate and complete.    This dictation has been generated using M-Modal Fluency Direct dictation; some phonetic errors may occur.      ED Disposition Condition    Discharge Stable        ED Prescriptions     Medication Sig Dispense Start Date End Date Auth. Provider    cephALEXin (KEFLEX) 500 MG capsule Take 1 capsule (500 mg total) by mouth every 6 (six) hours. for 7 days 28 capsule 1/11/2022 1/18/2022 Mary Skelton MD    fluconazole (DIFLUCAN) 150 MG Tab (Expires today) Take 1 tablet (150 mg total) by mouth once as needed (yeast infection). 1 tablet 1/11/2022 1/11/2022 Mary Skelton MD        Follow-up Information     Follow up With Specialties Details Why Contact Info    Vida Merrill MD Urology Schedule an appointment as soon as possible for a visit in 4 weeks To recheck your symptoms [address]  [address]  [facility] [address]  [phone]      [facility] Emergency Medicine  As needed, If symptoms worsen [address] [place] [id number] [phone]           Mary Skelton MD  01/11/22 6305

## 2022-01-11 NOTE — ED TRIAGE NOTES
Pt a&ox3, calm and cooperative, sent by provider for possible bladder infection and cyst to kidney. Pt states to endorse chills. Denies hematuria, fever or painful urination. Respirations even/unlabored, NAD noted, sinus on cardiac monitor. 20g iv to right hand, labs drawn and sent.

## 2022-01-11 NOTE — TELEPHONE ENCOUNTER
Spoke with patient's daughter regarding ct results.  Potential abscess formation and pyelonephritis.  Recommended ED evaluation today.  Patient's daughter states that the patient is doing somewhat better today.  Report called to Lesly Rodney at [facility]

## 2022-01-13 LAB — BACTERIA UR CULT: ABNORMAL

## 2022-01-15 LAB
BACTERIA BLD CULT: NORMAL
BACTERIA BLD CULT: NORMAL

## 2022-01-18 ENCOUNTER — LAB VISIT (OUTPATIENT)
Dept: LAB | Facility: HOSPITAL | Age: 72
End: 2022-01-18
Attending: INTERNAL MEDICINE
Payer: MEDICARE

## 2022-01-18 DIAGNOSIS — I10 ESSENTIAL HYPERTENSION: ICD-10-CM

## 2022-01-18 LAB
ANION GAP SERPL CALC-SCNC: 10 MMOL/L (ref 8–16)
BASOPHILS # BLD AUTO: 0.05 K/UL (ref 0–0.2)
BASOPHILS NFR BLD: 0.6 % (ref 0–1.9)
BUN SERPL-MCNC: 18 MG/DL (ref 8–23)
CALCIUM SERPL-MCNC: 8.9 MG/DL (ref 8.7–10.5)
CHLORIDE SERPL-SCNC: 103 MMOL/L (ref 95–110)
CO2 SERPL-SCNC: 22 MMOL/L (ref 23–29)
CREAT SERPL-MCNC: 0.9 MG/DL (ref 0.5–1.4)
DIFFERENTIAL METHOD: ABNORMAL
EOSINOPHIL # BLD AUTO: 0.2 K/UL (ref 0–0.5)
EOSINOPHIL NFR BLD: 2.4 % (ref 0–8)
ERYTHROCYTE [DISTWIDTH] IN BLOOD BY AUTOMATED COUNT: 13.6 % (ref 11.5–14.5)
EST. GFR  (AFRICAN AMERICAN): >60 ML/MIN/1.73 M^2
EST. GFR  (NON AFRICAN AMERICAN): >60 ML/MIN/1.73 M^2
GLUCOSE SERPL-MCNC: 84 MG/DL (ref 70–110)
HCT VFR BLD AUTO: 35.6 % (ref 37–48.5)
HGB BLD-MCNC: 10.9 G/DL (ref 12–16)
IMM GRANULOCYTES # BLD AUTO: 0.13 K/UL (ref 0–0.04)
IMM GRANULOCYTES NFR BLD AUTO: 1.5 % (ref 0–0.5)
LYMPHOCYTES # BLD AUTO: 2.7 K/UL (ref 1–4.8)
LYMPHOCYTES NFR BLD: 30.8 % (ref 18–48)
MCH RBC QN AUTO: 27.3 PG (ref 27–31)
MCHC RBC AUTO-ENTMCNC: 30.6 G/DL (ref 32–36)
MCV RBC AUTO: 89 FL (ref 82–98)
MONOCYTES # BLD AUTO: 1.1 K/UL (ref 0.3–1)
MONOCYTES NFR BLD: 11.9 % (ref 4–15)
NEUTROPHILS # BLD AUTO: 4.7 K/UL (ref 1.8–7.7)
NEUTROPHILS NFR BLD: 52.8 % (ref 38–73)
NRBC BLD-RTO: 0 /100 WBC
PLATELET # BLD AUTO: 479 K/UL (ref 150–450)
PLATELET BLD QL SMEAR: ABNORMAL
PMV BLD AUTO: 10 FL (ref 9.2–12.9)
POTASSIUM SERPL-SCNC: 4 MMOL/L (ref 3.5–5.1)
RBC # BLD AUTO: 3.99 M/UL (ref 4–5.4)
SODIUM SERPL-SCNC: 135 MMOL/L (ref 136–145)
WBC # BLD AUTO: 8.81 K/UL (ref 3.9–12.7)

## 2022-01-18 PROCEDURE — 36415 COLL VENOUS BLD VENIPUNCTURE: CPT | Mod: PN | Performed by: INTERNAL MEDICINE

## 2022-01-18 PROCEDURE — 80048 BASIC METABOLIC PNL TOTAL CA: CPT | Performed by: INTERNAL MEDICINE

## 2022-01-18 PROCEDURE — 85025 COMPLETE CBC W/AUTO DIFF WBC: CPT | Performed by: INTERNAL MEDICINE

## 2022-02-01 ENCOUNTER — OFFICE VISIT (OUTPATIENT)
Dept: FAMILY MEDICINE | Facility: CLINIC | Age: 72
End: 2022-02-01
Payer: MEDICARE

## 2022-02-01 VITALS
RESPIRATION RATE: 17 BRPM | WEIGHT: 175.94 LBS | HEART RATE: 71 BPM | SYSTOLIC BLOOD PRESSURE: 95 MMHG | BODY MASS INDEX: 36.93 KG/M2 | DIASTOLIC BLOOD PRESSURE: 55 MMHG | HEIGHT: 58 IN | OXYGEN SATURATION: 95 % | TEMPERATURE: 98 F

## 2022-02-01 DIAGNOSIS — R93.5 ABNORMAL CT OF THE ABDOMEN: ICD-10-CM

## 2022-02-01 DIAGNOSIS — R25.2 MUSCLE CRAMPS: ICD-10-CM

## 2022-02-01 DIAGNOSIS — I10 ESSENTIAL HYPERTENSION: Chronic | ICD-10-CM

## 2022-02-01 DIAGNOSIS — J43.2 CENTRILOBULAR EMPHYSEMA: ICD-10-CM

## 2022-02-01 DIAGNOSIS — E78.00 PURE HYPERCHOLESTEROLEMIA: Chronic | ICD-10-CM

## 2022-02-01 DIAGNOSIS — D64.9 ANEMIA, UNSPECIFIED TYPE: ICD-10-CM

## 2022-02-01 DIAGNOSIS — R53.83 FATIGUE, UNSPECIFIED TYPE: Primary | ICD-10-CM

## 2022-02-01 PROCEDURE — 99215 OFFICE O/P EST HI 40 MIN: CPT | Mod: PBBFAC,PN | Performed by: NURSE PRACTITIONER

## 2022-02-01 PROCEDURE — 99999 PR PBB SHADOW E&M-EST. PATIENT-LVL V: CPT | Mod: PBBFAC,,, | Performed by: NURSE PRACTITIONER

## 2022-02-01 PROCEDURE — 99215 PR OFFICE/OUTPT VISIT, EST, LEVL V, 40-54 MIN: ICD-10-PCS | Mod: S$PBB,,, | Performed by: NURSE PRACTITIONER

## 2022-02-01 PROCEDURE — 99215 OFFICE O/P EST HI 40 MIN: CPT | Mod: S$PBB,,, | Performed by: NURSE PRACTITIONER

## 2022-02-01 PROCEDURE — 99999 PR PBB SHADOW E&M-EST. PATIENT-LVL V: ICD-10-PCS | Mod: PBBFAC,,, | Performed by: NURSE PRACTITIONER

## 2022-02-01 RX ORDER — FLUTICASONE FUROATE AND VILANTEROL TRIFENATATE 100; 25 UG/1; UG/1
1 POWDER RESPIRATORY (INHALATION) DAILY
Qty: 60 EACH | Refills: 3 | Status: SHIPPED | OUTPATIENT
Start: 2022-02-01 | End: 2022-05-10 | Stop reason: SDUPTHER

## 2022-02-01 RX ORDER — POTASSIUM CHLORIDE 20 MEQ/1
20 TABLET, EXTENDED RELEASE ORAL DAILY
Qty: 30 TABLET | Refills: 0 | Status: SHIPPED | OUTPATIENT
Start: 2022-02-01 | End: 2022-03-03

## 2022-02-01 RX ORDER — LOSARTAN POTASSIUM 50 MG/1
50 TABLET ORAL DAILY
Qty: 90 TABLET | Refills: 0 | Status: SHIPPED | OUTPATIENT
Start: 2022-02-01 | End: 2022-05-10 | Stop reason: SDUPTHER

## 2022-02-01 NOTE — PROGRESS NOTES
Routine Office Visit    Patient Name: Dayami Mina    : 1950  MRN: [id number]    Chief Complaint:  Follow-up    Subjective:  Dayami is a 71 y.o. female who presents today for:    1. Follow-up - patient who is known to me with a history of depression, hyperlipidemia, hypertension reports today for follow-up.  She states she had an appointment with her PCP but was unable to make this due to a scheduling conflict.    She did go to the emergency room as I had recommended her and was treated with IV antibiotics as well as p.o. antibiotics to take home.  She is seeing urology tomorrow.  She finished antibiotics to completion and reports no lingering urinary symptoms denies any dysuria, hematuria, urgency, or flank pain today.    Since being seen 3 weeks ago she has been very fatigued.  She reports no worsening of her chronic shortness of breath and denies any cough, chest pain, wheezing, palpitations, or leg swelling.    She is compliant with her losartan and metoprolol daily.    She needs a refill of her Breo.  She also needs a refill of potassium which she takes as needed when she feels muscle cramping.    She did have labs done 2 weeks ago which showed stable kidney function and minor anemia.  She states that she was treated for anemia in the past with medication but is unsure of which she was taking at that time.  She does note that she has needed B12 injections in the past as well.    This is the extent of her concerns at this time.    Her CT results were as follows:    CT ABDOMEN PELVIS WITH CONTRAST     CLINICAL HISTORY:  LLQ abdominal pain; Left lower quadrant pain     TECHNIQUE:  Low dose axial images, sagittal and coronal reformations were obtained from the lung bases to the pubic symphysis following the IV administration of 75 mL of Omnipaque 350 .  Oral contrast was not given.     COMPARISON:  2019.     FINDINGS:  Lung bases demonstrate bands of opacity that could represent atelectasis or  scarring.  The heart appears normal in size.  Coronary calcifications noted.  No pericardial or pleural fluid.     The liver measures 17.5 cm, within normal limits.  There is diffuse hypoattenuation of the liver parenchyma with respect to the spleen suggestive of hepatic steatosis.  Correlation with LFTs is advised.  No evidence for liver mass or biliary dilation.  Hepatic veins, portal vein and hepatic artery appear patent.  Gallbladder appears unremarkable.  Dystrophic calcification the jemma hepatis is noted, unchanged from prior.  No radiopaque filling defects within the gallbladder biliary tree.  Adrenal glands, spleen, and pancreas appear unremarkable.     Kidneys are normal in size and position.  0.9 cm fluid attenuation lesion in the upper pole posteriorly within the left kidney represents a cyst.  The right kidney demonstrates 2 wedge-shaped areas of decreased parenchymal attenuation involving the cortex, the largest 1 in the upper pole with an associated cystic component measuring 0.9 cm.  This could represent an infectious process such as pyelonephritis with possible developing abscess.  Renal infarct is also within the differential diagnosis.  Lymphoma is a less likely consideration no evidence for nephrolithiasis or hydronephrosis.  There is associated perinephric stranding on the right the ureters are normal in course to the urinary bladder which appears unremarkable.  It appears that the patient underwent prior hysterectomy and possible bilateral salpingo oophorectomy.  Correlation with surgical history advised.     Small hiatal hernia is noted.  The stomach is otherwise unremarkable.  No abnormality identified within the small bowel.  The appendix is not visualized.  There is no inflammatory change in the right lower quadrant.  The colon demonstrates multiple diverticula.  No definite evidence for pericolonic fat stranding to suggest an acute inflammatory process.  There is mild thickening of the  rectosigmoid wall which can be seen in the setting of muscular hyperplasia.  No bowel obstruction or inflammation.  There is no ascites or free air.     The aorta and branches are patent.  There is severe atherosclerosis.  No aneurysm.  Iliac vessels and IVC appear unremarkable.  There is no evidence for lymph node enlargement.  Scattered normal size retroperitoneal lymph nodes are noted.  Small fat containing umbilical hernia noted.  No other hernias are visualized.  The subcutaneous soft tissues demonstrate multiple areas of dystrophic calcification which appears similar to prior studies.  Postsurgical changes of the lumbar spine noted.  Degenerative changes of the spine are also identified.  No evidence for acute fracture or osseous destructive process.     Impression:     1. Findings concerning for right renal pyelonephritis with possible developing abscess as described above.  The possible developing renal abscess is too small for percutaneous sampling.  Renal infarction and less likely lymphoma, are also differential considerations.  Correlation with urinalysis is advised.  2. Diverticulosis without definite evidence for diverticulitis.  3. Hepatic steatosis.  Correlation with LFTs advised.  4. See body of report for details and other incidental findings.  5. Findings were discussed with Antwan Warren on 01/11/2022 at 11:36 am.  6.  This report was flagged in Epic as abnormal.     Past Medical History  Past Medical History:   Diagnosis Date    Benign colon polyp 2cm     Depression     Hyperlipidemia     Hypertension     PONV (postoperative nausea and vomiting)        Past Surgical History  Past Surgical History:   Procedure Laterality Date    APPENDECTOMY      BACK SURGERY      BREAST CAPSULECTOMY Bilateral 1/23/2020    Procedure: CAPSULECTOMY, BREAST;  Surgeon: Levi Clarke MD;  Location: [facility];  Service: Plastics;  Laterality: Bilateral;  MARQUEZ OK WITH 7:15AM START BOTH WILL WORK AT SAME TIME     breast implants      CLOSED REDUCTION FINGER DISLOCATION      FOOT SURGERY      HYSTERECTOMY      JOINT REPLACEMENT Left     total Lt knee    KNEE ARTHROSCOPY Left     MASTECTOMY      REMOVAL OF BREAST IMPLANT Bilateral 2020    Procedure: REMOVAL, IMPLANT, BREAST;  Surgeon: Levi Clarke MD;  Location: [facility] OR;  Service: Plastics;  Laterality: Bilateral;    SHOULDER ARTHROSCOPY      SINUS SURGERY      SURGICAL REMOVAL OF MASS OF UPPER EXTREMITY Right 2020    Procedure: EXCISION, MASS, UPPER EXTREMITY;  Surgeon: Sherman Rojas MD;  Location: [facility] OR;  Service: General;  Laterality: Right;  joint with Dr. Clarke--RN PREOP 2020  THEY WILL WORK AT SAME TIME       Family History  Family History   Problem Relation Age of Onset    Diabetes Mother     Diabetes Father     Stroke Paternal Grandfather     Diabetes Sister        Social History  Social History     Socioeconomic History    Marital status:    Tobacco Use    Smoking status: Former Smoker     Quit date: 1988     Years since quittin.1    Smokeless tobacco: Never Used   Substance and Sexual Activity    Alcohol use: No    Drug use: No    Sexual activity: Not Currently     Partners: Male       Current Medications  Current Outpatient Medications on File Prior to Visit   Medication Sig Dispense Refill    acetaminophen/caffeine (EXCEDRIN TENSION HEADACHE ORAL) Take by mouth.      albuterol (PROVENTIL/VENTOLIN HFA) 90 mcg/actuation inhaler INHALE 2 PUFFS BY MOUTH EVERY 6 HOURS AS NEEDED FOR WHEEZING. RESCUE 6.7 g 1    allopurinoL (ZYLOPRIM) 100 MG tablet Take 1 tablet (100 mg total) by mouth once daily. 90 tablet 3    ammonium lactate (LAC-HYDRIN) 12 % lotion Apply topically 2 (two) times daily as needed for Dry Skin. 500 g 5    aspirin (ECOTRIN) 81 MG EC tablet Take 81 mg by mouth once daily.      butalbital-acetaminophen-caffeine -40 mg (FIORICET, ESGIC) -40 mg per tablet Take 1 tablet by  mouth every 6 (six) hours as needed for Headaches. 30 tablet 0    cyanocobalamin (VITAMIN B-12) 1000 MCG tablet Take 100 mcg by mouth once daily.      EScitalopram oxalate (LEXAPRO) 20 MG tablet Take 1 tablet (20 mg total) by mouth once daily. 90 tablet 3    esomeprazole (NEXIUM) 20 MG capsule Take 20 mg by mouth.      fexofenadine (ALLEGRA) 180 MG tablet       fluticasone propionate (FLONASE) 50 mcg/actuation nasal spray USE 1 SPARY IN EACH NOSTRIL ONCE DAILY 16 mL 2    gabapentin (NEURONTIN) 100 MG capsule 100 mg. 2 tabs in the am and 2 tabs in the pm      HYDROcodone-acetaminophen (NORCO) 7.5-325 mg per tablet Take 1 tablet by mouth.      metoprolol succinate (TOPROL-XL) 50 MG 24 hr tablet Take 1 tablet (50 mg total) by mouth once daily. 90 tablet 3    multivitamin capsule Take 1 capsule by mouth once daily.      pravastatin (PRAVACHOL) 40 MG tablet Take 1 tablet (40 mg total) by mouth once daily. 90 tablet 3    [DISCONTINUED] fluticasone furoate-vilanteroL (BREO ELLIPTA) 100-25 mcg/dose diskus inhaler Inhale 1 puff into the lungs once daily. Controller 60 each 3    [DISCONTINUED] losartan (COZAAR) 50 MG tablet Take 1 tablet (50 mg total) by mouth 2 (two) times daily. 180 tablet 3    [DISCONTINUED] potassium chloride SA (K-DUR,KLOR-CON) 20 MEQ tablet Take 1 tablet (20 mEq total) by mouth once daily. 30 tablet 0    furosemide (LASIX) 20 MG tablet Take 1 tablet (20 mg total) by mouth daily as needed (leg swelling). 30 tablet 11     No current facility-administered medications on file prior to visit.       Allergies   Review of patient's allergies indicates:   Allergen Reactions    Ancef [cefazolin] Nausea And Vomiting    Sulfa (sulfonamide antibiotics) Anaphylaxis    Scopolamine Blisters    Refresh redness relief [phenylephrin-polyvinyl alcohol] Blisters       Review of Systems (Pertinent positives)  Review of Systems   Constitutional: Positive for malaise/fatigue. Negative for chills,  "diaphoresis, fever and weight loss.   HENT: Negative.    Eyes: Negative.    Respiratory: Negative for cough, hemoptysis, sputum production, shortness of breath and wheezing.    Cardiovascular: Negative for chest pain, palpitations, orthopnea, claudication, leg swelling and PND.   Gastrointestinal: Negative for abdominal pain, blood in stool, constipation, diarrhea, heartburn, melena, nausea and vomiting.   Genitourinary: Negative for dysuria, flank pain, frequency and urgency.   Musculoskeletal: Negative.  Negative for myalgias and neck pain.   Skin: Negative.    Neurological: Negative.    Endo/Heme/Allergies: Negative.    Psychiatric/Behavioral: Negative.        BP (!) 95/55 (BP Location: Left arm, Patient Position: Sitting, BP Method: Medium (Manual))   Pulse 71   Temp 97.9 °F (36.6 °C) (Oral)   Resp 17   Ht 4' 10" (1.473 m)   Wt 79.8 kg (175 lb 14.8 oz)   SpO2 95%   BMI 36.77 kg/m²     Physical Exam  Vitals reviewed.   Constitutional:       General: She is not in acute distress.     Appearance: Normal appearance. She is obese. She is not ill-appearing, toxic-appearing or diaphoretic.   HENT:      Head: Normocephalic and atraumatic.   Eyes:      Extraocular Movements: Extraocular movements intact.      Conjunctiva/sclera: Conjunctivae normal.      Pupils: Pupils are equal, round, and reactive to light.   Cardiovascular:      Rate and Rhythm: Normal rate and regular rhythm.      Pulses: Normal pulses.      Heart sounds: Normal heart sounds.   Pulmonary:      Effort: Pulmonary effort is normal.      Breath sounds: Normal breath sounds.   Abdominal:      General: Bowel sounds are normal. There is no distension.      Palpations: There is no mass.      Tenderness: There is no abdominal tenderness.   Musculoskeletal:         General: No swelling or tenderness. Normal range of motion.      Cervical back: Normal range of motion and neck supple. No rigidity or tenderness.   Lymphadenopathy:      Cervical: No " cervical adenopathy.   Skin:     General: Skin is warm and dry.      Capillary Refill: Capillary refill takes less than 2 seconds.   Neurological:      General: No focal deficit present.      Mental Status: She is alert and oriented to person, place, and time.   Psychiatric:         Mood and Affect: Mood normal.         Behavior: Behavior normal.          Assessment/Plan:  Dayami Mina is a 71 y.o. female who presents today for :    Dayami was seen today for results.    Diagnoses and all orders for this visit:    Fatigue, unspecified type  -     Comprehensive Metabolic Panel; Future  -     CBC Auto Differential; Future  -     TSH; Future    I had an in detailed conversation with the patient regarding her symptoms and my findings.  BP is 95/55 today.  Will decrease losartan dose to 50 milligrams daily instead of twice a day.  Continue metoprolol.  Low BP may be contributing to fatigue.  Will check labs in 1 week and have patient follow-up in 1 week.  If BP still low can decrease losartan to 25 milligrams or stop altogether.    Abnormal CT of the abdomen    I discussed the many findings of her abdominal CT with the patient.  As for her hepatic steatosis, I recommended diet and weight loss.  She is following up with Urology tomorrow for her kidneys.  She denies any urinary symptoms today.  Rectosigmoid wall thickening noted on CT scan.  Recommended gastroenterology or colon/rectal evaluation but patient declines at this time.  She states she has been treated for rectal prolapse in the past.    Anemia, unspecified type  -     CBC Auto Differential; Future    Recheck in 1 week.  May be related to recent UTI    Centrilobular emphysema  -     fluticasone furoate-vilanteroL (BREO ELLIPTA) 100-25 mcg/dose diskus inhaler; Inhale 1 puff into the lungs once daily. Controller    Stable. Breo refilled for patient.  Recommended patient to rinse mouth out after each use.    Muscle cramps  -     potassium chloride SA  (K-DUR,KLOR-CON) 20 MEQ tablet; Take 1 tablet (20 mEq total) by mouth once daily.    Refilled.  Patient takes only on an as-needed basis.    Essential hypertension  -     Comprehensive Metabolic Panel; Future  -     CBC Auto Differential; Future  -     TSH; Future  -     Lipid Panel; Future  -     losartan (COZAAR) 50 MG tablet; Take 1 tablet (50 mg total) by mouth once daily.    As above.    Pure hypercholesterolemia  -     Comprehensive Metabolic Panel; Future  -     TSH; Future  -     Lipid Panel; Future    Check thyroid studies and lipid panel with labs.    Recommended patient that if she develops any dizziness or blurred vision to contact us or consider stopping losartan altogether.    Will follow-up with patient in 1 week.  All questions answered.        This office note has been dictated.  This dictation has been generated using M-Modal Fluency Direct dictation; some phonetic errors may occur.   My collaborating physician is Dr. Guanaco Sheffield.

## 2022-02-02 ENCOUNTER — OFFICE VISIT (OUTPATIENT)
Dept: UROLOGY | Facility: CLINIC | Age: 72
End: 2022-02-02
Payer: MEDICARE

## 2022-02-02 VITALS — HEIGHT: 58 IN | BODY MASS INDEX: 36.94 KG/M2 | WEIGHT: 176 LBS

## 2022-02-02 DIAGNOSIS — N28.1 RENAL CYST, LEFT: ICD-10-CM

## 2022-02-02 DIAGNOSIS — N15.1 RENAL ABSCESS: Primary | ICD-10-CM

## 2022-02-02 PROCEDURE — 99999 PR PBB SHADOW E&M-EST. PATIENT-LVL IV: ICD-10-PCS | Mod: PBBFAC,,, | Performed by: STUDENT IN AN ORGANIZED HEALTH CARE EDUCATION/TRAINING PROGRAM

## 2022-02-02 PROCEDURE — 99203 PR OFFICE/OUTPT VISIT, NEW, LEVL III, 30-44 MIN: ICD-10-PCS | Mod: S$PBB,,, | Performed by: STUDENT IN AN ORGANIZED HEALTH CARE EDUCATION/TRAINING PROGRAM

## 2022-02-02 PROCEDURE — 99214 OFFICE O/P EST MOD 30 MIN: CPT | Mod: PBBFAC | Performed by: STUDENT IN AN ORGANIZED HEALTH CARE EDUCATION/TRAINING PROGRAM

## 2022-02-02 PROCEDURE — 99999 PR PBB SHADOW E&M-EST. PATIENT-LVL IV: CPT | Mod: PBBFAC,,, | Performed by: STUDENT IN AN ORGANIZED HEALTH CARE EDUCATION/TRAINING PROGRAM

## 2022-02-02 PROCEDURE — 99203 OFFICE O/P NEW LOW 30 MIN: CPT | Mod: S$PBB,,, | Performed by: STUDENT IN AN ORGANIZED HEALTH CARE EDUCATION/TRAINING PROGRAM

## 2022-02-02 NOTE — PROGRESS NOTES
Patient ID: Dayami Mina is a 71 y.o. female.    Chief Complaint: Renal cyst    Referral: Mary Skelton MD  [address] [place] [address]  [place] [id number]     HPI  71 y.o. who presents to the Urology clinic for evaluation of renal cyst detected while patient was worked up for pyelonephritis. Patient notes hx of one prior UTI to the infection documented 1/11/22. Patient noted symptoms of flank pain, urinary hesitancy, foul smelling urine during her last UTI which required admission. Imaging detected renal abscess, patient did not require intervention aside from conservative management with antibiotics. Patient denies symptoms of dysuria, hematuria during UTI epi. Patient wears depends at night, denies leaking urine overnight. Patient drinks fluids well into the evening. Patient denies incontinence during the day. Patient was previously on a fluid pill- lasix- no longer taking. Patient denies personal or FH of renal disease. Patient denies hx of renal procedures. Patient drinks 3 bottles of water per day in addition to 4 glasses of water. Patient drinks 1 can of soda per week. Patient drinks 3 cups of decaf coffee daily. Patient takes stool softeners for hx of constipation/ rectal prolapse/ status post repair. Patient denies fecal incontinence.  Patient had had 3 vaginal deliveries without vaginal prolapse. Patient denies unexplained weight loss.   Medically Necessary ROS documented in HPI    Past Medical History  Active Ambulatory Problems     Diagnosis Date Noted    Essential hypertension 04/20/2015    Hyperlipidemia 07/07/2015    Palpitations 07/21/2017    Hypoxia 12/02/2017    GERD (gastroesophageal reflux disease) 12/03/2017    Depression 12/03/2017    Absence of breast, bilateral 01/23/2020    Severe obesity (BMI 35.0-39.9) with comorbidity 11/03/2020    Recurrent major depressive disorder, in full remission 11/03/2020    Centrilobular emphysema 07/22/2021    Sacroiliitis, not elsewhere  classified 01/10/2022    Other inflammatory polyneuropathies 01/10/2022     Resolved Ambulatory Problems     Diagnosis Date Noted    COPD (chronic obstructive pulmonary disease) 01/28/2015    Glucose intolerance (impaired glucose tolerance) 04/20/2015    Obesity (BMI 30-39.9) 04/20/2015    Fatigue 04/20/2015    CKD (chronic kidney disease), stage III 02/19/2016    Preop examination 03/07/2017    ARANGO (dyspnea on exertion) 07/21/2017    Chest pain, atypical 07/21/2017     Past Medical History:   Diagnosis Date    Benign colon polyp 2cm     Hypertension     PONV (postoperative nausea and vomiting)          Past Surgical History  Past Surgical History:   Procedure Laterality Date    APPENDECTOMY      BACK SURGERY      BREAST CAPSULECTOMY Bilateral 1/23/2020    Procedure: CAPSULECTOMY, BREAST;  Surgeon: Levi Clarke MD;  Location: [facility] OR;  Service: Plastics;  Laterality: Bilateral;  MARQUEZ OK WITH 7:15AM START BOTH WILL WORK AT SAME TIME    breast implants      CLOSED REDUCTION FINGER DISLOCATION      FOOT SURGERY      HYSTERECTOMY      JOINT REPLACEMENT Left     total Lt knee    KNEE ARTHROSCOPY Left     MASTECTOMY      REMOVAL OF BREAST IMPLANT Bilateral 1/23/2020    Procedure: REMOVAL, IMPLANT, BREAST;  Surgeon: Levi Clarke MD;  Location: [facility] OR;  Service: Plastics;  Laterality: Bilateral;    SHOULDER ARTHROSCOPY      SINUS SURGERY      SURGICAL REMOVAL OF MASS OF UPPER EXTREMITY Right 1/23/2020    Procedure: EXCISION, MASS, UPPER EXTREMITY;  Surgeon: Sherman Rojas MD;  Location: [facility] OR;  Service: General;  Laterality: Right;  joint with Dr. Clarke--RN PREOP 1/20/2020  THEY WILL WORK AT SAME TIME       Social History  Social Connections: Not on file       Medications    Current Outpatient Medications:     acetaminophen/caffeine (EXCEDRIN TENSION HEADACHE ORAL), Take by mouth., Disp: , Rfl:     albuterol (PROVENTIL/VENTOLIN HFA) 90 mcg/actuation inhaler, INHALE 2 PUFFS BY  MOUTH EVERY 6 HOURS AS NEEDED FOR WHEEZING. RESCUE, Disp: 6.7 g, Rfl: 1    allopurinoL (ZYLOPRIM) 100 MG tablet, Take 1 tablet (100 mg total) by mouth once daily., Disp: 90 tablet, Rfl: 3    ammonium lactate (LAC-HYDRIN) 12 % lotion, Apply topically 2 (two) times daily as needed for Dry Skin., Disp: 500 g, Rfl: 5    aspirin (ECOTRIN) 81 MG EC tablet, Take 81 mg by mouth once daily., Disp: , Rfl:     butalbital-acetaminophen-caffeine -40 mg (FIORICET, ESGIC) -40 mg per tablet, Take 1 tablet by mouth every 6 (six) hours as needed for Headaches., Disp: 30 tablet, Rfl: 0    cyanocobalamin (VITAMIN B-12) 1000 MCG tablet, Take 100 mcg by mouth once daily., Disp: , Rfl:     EScitalopram oxalate (LEXAPRO) 20 MG tablet, Take 1 tablet (20 mg total) by mouth once daily., Disp: 90 tablet, Rfl: 3    esomeprazole (NEXIUM) 20 MG capsule, Take 20 mg by mouth., Disp: , Rfl:     fexofenadine (ALLEGRA) 180 MG tablet, , Disp: , Rfl:     fluticasone furoate-vilanteroL (BREO ELLIPTA) 100-25 mcg/dose diskus inhaler, Inhale 1 puff into the lungs once daily. Controller, Disp: 60 each, Rfl: 3    fluticasone propionate (FLONASE) 50 mcg/actuation nasal spray, USE 1 SPARY IN EACH NOSTRIL ONCE DAILY, Disp: 16 mL, Rfl: 2    furosemide (LASIX) 20 MG tablet, Take 1 tablet (20 mg total) by mouth daily as needed (leg swelling)., Disp: 30 tablet, Rfl: 11    gabapentin (NEURONTIN) 100 MG capsule, 100 mg. 2 tabs in the am and 2 tabs in the pm, Disp: , Rfl:     HYDROcodone-acetaminophen (NORCO) 7.5-325 mg per tablet, Take 1 tablet by mouth., Disp: , Rfl:     losartan (COZAAR) 50 MG tablet, Take 1 tablet (50 mg total) by mouth once daily., Disp: 90 tablet, Rfl: 0    metoprolol succinate (TOPROL-XL) 50 MG 24 hr tablet, Take 1 tablet (50 mg total) by mouth once daily., Disp: 90 tablet, Rfl: 3    multivitamin capsule, Take 1 capsule by mouth once daily., Disp: , Rfl:     potassium chloride SA (K-DUR,KLOR-CON) 20 MEQ tablet, Take  1 tablet (20 mEq total) by mouth once daily., Disp: 30 tablet, Rfl: 0    pravastatin (PRAVACHOL) 40 MG tablet, Take 1 tablet (40 mg total) by mouth once daily., Disp: 90 tablet, Rfl: 3    Allergies  Review of patient's allergies indicates:   Allergen Reactions    Ancef [cefazolin] Nausea And Vomiting    Sulfa (sulfonamide antibiotics) Anaphylaxis    Scopolamine Blisters    Refresh redness relief [phenylephrin-polyvinyl alcohol] Blisters       Patient's PMH, FH, Social hx, Medications, allergies reviewed and updated as pertinent to today's visit    Objective:      Physical Exam  Vitals reviewed.   Constitutional:       Appearance: She is well-developed and well-nourished.   HENT:      Head: Normocephalic and atraumatic.   Eyes:      Conjunctiva/sclera: Conjunctivae normal.   Pulmonary:      Effort: Pulmonary effort is normal. No respiratory distress.   Abdominal:      General: Abdomen is flat. There is no distension.      Palpations: Abdomen is soft. There is no mass.      Tenderness: There is no abdominal tenderness. There is no right CVA tenderness, left CVA tenderness or guarding.   Musculoskeletal:      Cervical back: Neck supple.   Skin:     General: Skin is warm.      Findings: No rash.   Neurological:      Mental Status: She is alert and oriented to person, place, and time.   Psychiatric:         Mood and Affect: Mood and affect normal.         Behavior: Behavior normal.              Imaging results: personally reviewed imaging dated 1/11/22    R renal abscess  Left < 1 cm renal cyst  No evidence of obstruction bilaterally  Assessment:       1. Renal cyst, left        Plan:       Simple renal cysts carry less than 3 % risk for malignancy, due to size less than 1 cm, no surveillance recommended.  Reassurance provided to patient    General precautions to prevent urinary tract infections reviewed  Avoid constipation  Drink adequate volume of water, 80 oz    Hx of renal abscess, recommend renal US in 3 months  to ensure resolution and normalization of renal parenchyma. Will call patient with results. Patient can resume routine follow up with PCP.

## 2022-02-08 ENCOUNTER — LAB VISIT (OUTPATIENT)
Dept: LAB | Facility: HOSPITAL | Age: 72
End: 2022-02-08
Attending: NURSE PRACTITIONER
Payer: MEDICARE

## 2022-02-08 DIAGNOSIS — I10 ESSENTIAL HYPERTENSION: Chronic | ICD-10-CM

## 2022-02-08 DIAGNOSIS — R53.83 FATIGUE, UNSPECIFIED TYPE: ICD-10-CM

## 2022-02-08 DIAGNOSIS — D64.9 ANEMIA, UNSPECIFIED TYPE: ICD-10-CM

## 2022-02-08 DIAGNOSIS — E78.00 PURE HYPERCHOLESTEROLEMIA: Chronic | ICD-10-CM

## 2022-02-08 LAB
ALBUMIN SERPL BCP-MCNC: 3.7 G/DL (ref 3.5–5.2)
ALP SERPL-CCNC: 45 U/L (ref 55–135)
ALT SERPL W/O P-5'-P-CCNC: 21 U/L (ref 10–44)
ANION GAP SERPL CALC-SCNC: 12 MMOL/L (ref 8–16)
AST SERPL-CCNC: 17 U/L (ref 10–40)
BASOPHILS # BLD AUTO: 0.03 K/UL (ref 0–0.2)
BASOPHILS NFR BLD: 0.5 % (ref 0–1.9)
BILIRUB SERPL-MCNC: 0.4 MG/DL (ref 0.1–1)
BUN SERPL-MCNC: 14 MG/DL (ref 8–23)
CALCIUM SERPL-MCNC: 9.4 MG/DL (ref 8.7–10.5)
CHLORIDE SERPL-SCNC: 101 MMOL/L (ref 95–110)
CHOLEST SERPL-MCNC: 195 MG/DL (ref 120–199)
CHOLEST/HDLC SERPL: 4.1 {RATIO} (ref 2–5)
CO2 SERPL-SCNC: 28 MMOL/L (ref 23–29)
CREAT SERPL-MCNC: 1 MG/DL (ref 0.5–1.4)
DIFFERENTIAL METHOD: ABNORMAL
EOSINOPHIL # BLD AUTO: 0.3 K/UL (ref 0–0.5)
EOSINOPHIL NFR BLD: 4.4 % (ref 0–8)
ERYTHROCYTE [DISTWIDTH] IN BLOOD BY AUTOMATED COUNT: 14.3 % (ref 11.5–14.5)
EST. GFR  (AFRICAN AMERICAN): >60 ML/MIN/1.73 M^2
EST. GFR  (NON AFRICAN AMERICAN): 56 ML/MIN/1.73 M^2
GLUCOSE SERPL-MCNC: 105 MG/DL (ref 70–110)
HCT VFR BLD AUTO: 39.7 % (ref 37–48.5)
HDLC SERPL-MCNC: 48 MG/DL (ref 40–75)
HDLC SERPL: 24.6 % (ref 20–50)
HGB BLD-MCNC: 11.7 G/DL (ref 12–16)
IMM GRANULOCYTES # BLD AUTO: 0.02 K/UL (ref 0–0.04)
IMM GRANULOCYTES NFR BLD AUTO: 0.3 % (ref 0–0.5)
LDLC SERPL CALC-MCNC: 104.2 MG/DL (ref 63–159)
LYMPHOCYTES # BLD AUTO: 2.4 K/UL (ref 1–4.8)
LYMPHOCYTES NFR BLD: 39.5 % (ref 18–48)
MCH RBC QN AUTO: 26.6 PG (ref 27–31)
MCHC RBC AUTO-ENTMCNC: 29.5 G/DL (ref 32–36)
MCV RBC AUTO: 90 FL (ref 82–98)
MONOCYTES # BLD AUTO: 0.6 K/UL (ref 0.3–1)
MONOCYTES NFR BLD: 9.2 % (ref 4–15)
NEUTROPHILS # BLD AUTO: 2.8 K/UL (ref 1.8–7.7)
NEUTROPHILS NFR BLD: 46.1 % (ref 38–73)
NONHDLC SERPL-MCNC: 147 MG/DL
NRBC BLD-RTO: 0 /100 WBC
PLATELET # BLD AUTO: 253 K/UL (ref 150–450)
PMV BLD AUTO: 10.9 FL (ref 9.2–12.9)
POTASSIUM SERPL-SCNC: 4.3 MMOL/L (ref 3.5–5.1)
PROT SERPL-MCNC: 6.9 G/DL (ref 6–8.4)
RBC # BLD AUTO: 4.4 M/UL (ref 4–5.4)
SODIUM SERPL-SCNC: 141 MMOL/L (ref 136–145)
TRIGL SERPL-MCNC: 214 MG/DL (ref 30–150)
TSH SERPL DL<=0.005 MIU/L-ACNC: 3.76 UIU/ML (ref 0.4–4)
WBC # BLD AUTO: 6.17 K/UL (ref 3.9–12.7)

## 2022-02-08 PROCEDURE — 36415 COLL VENOUS BLD VENIPUNCTURE: CPT | Mod: PN | Performed by: NURSE PRACTITIONER

## 2022-02-08 PROCEDURE — 84443 ASSAY THYROID STIM HORMONE: CPT | Performed by: NURSE PRACTITIONER

## 2022-02-08 PROCEDURE — 80053 COMPREHEN METABOLIC PANEL: CPT | Performed by: NURSE PRACTITIONER

## 2022-02-08 PROCEDURE — 80061 LIPID PANEL: CPT | Performed by: NURSE PRACTITIONER

## 2022-02-08 PROCEDURE — 85025 COMPLETE CBC W/AUTO DIFF WBC: CPT | Performed by: NURSE PRACTITIONER

## 2022-02-09 ENCOUNTER — OFFICE VISIT (OUTPATIENT)
Dept: FAMILY MEDICINE | Facility: CLINIC | Age: 72
End: 2022-02-09
Payer: MEDICARE

## 2022-02-09 VITALS
BODY MASS INDEX: 37.39 KG/M2 | OXYGEN SATURATION: 96 % | WEIGHT: 178.13 LBS | TEMPERATURE: 98 F | RESPIRATION RATE: 18 BRPM | DIASTOLIC BLOOD PRESSURE: 58 MMHG | HEART RATE: 75 BPM | HEIGHT: 58 IN | SYSTOLIC BLOOD PRESSURE: 118 MMHG

## 2022-02-09 DIAGNOSIS — M1A.0410 CHRONIC GOUT OF RIGHT HAND, UNSPECIFIED CAUSE: ICD-10-CM

## 2022-02-09 DIAGNOSIS — M19.041 PRIMARY OSTEOARTHRITIS OF RIGHT HAND: ICD-10-CM

## 2022-02-09 DIAGNOSIS — K64.9 HEMORRHOIDS, UNSPECIFIED HEMORRHOID TYPE: ICD-10-CM

## 2022-02-09 DIAGNOSIS — I10 ESSENTIAL HYPERTENSION: Primary | Chronic | ICD-10-CM

## 2022-02-09 PROCEDURE — 99999 PR PBB SHADOW E&M-EST. PATIENT-LVL V: ICD-10-PCS | Mod: PBBFAC,,, | Performed by: NURSE PRACTITIONER

## 2022-02-09 PROCEDURE — 99999 PR PBB SHADOW E&M-EST. PATIENT-LVL V: CPT | Mod: PBBFAC,,, | Performed by: NURSE PRACTITIONER

## 2022-02-09 PROCEDURE — 99214 OFFICE O/P EST MOD 30 MIN: CPT | Mod: S$PBB,,, | Performed by: NURSE PRACTITIONER

## 2022-02-09 PROCEDURE — 99215 OFFICE O/P EST HI 40 MIN: CPT | Mod: PBBFAC,PN | Performed by: NURSE PRACTITIONER

## 2022-02-09 PROCEDURE — 99214 PR OFFICE/OUTPT VISIT, EST, LEVL IV, 30-39 MIN: ICD-10-PCS | Mod: S$PBB,,, | Performed by: NURSE PRACTITIONER

## 2022-02-09 NOTE — PROGRESS NOTES
Routine Office Visit    Patient Name: Dayami Mina    : 1950  MRN: [id number]    Chief Complaint:  Follow-up    Subjective:  Dayami is a 71 y.o. female who presents today for:    1. Follow-up - patient reports today for follow-up of her blood pressure.  She has been taking the decreased to losartan dose and reports feeling much better today than she did previously.  She would like a printed copy of her CT result so she can bring to her gastroenterologist Dr. Merida.  She states she will be following up with him soon because she has a painful hemorrhoid with sometimes bleeds.  She endorses chronic gout for which he takes allopurinol only 3 times a week.  Reports infrequent flares of her gout.  She is interested in seeing a hand specialist for her arthritis.  She states she has seen a rheumatologist but does not have rheumatoid arthritis.  She notes that the right hand fingers due to deviate towards the ulnar side and she is interested in potentially getting treatment for this.  She is otherwise without acute concerns.  She did have labs done yesterday which I reviewed with the patient.    Past Medical History  Past Medical History:   Diagnosis Date    Benign colon polyp 2cm     Depression     Hyperlipidemia     Hypertension     PONV (postoperative nausea and vomiting)        Past Surgical History  Past Surgical History:   Procedure Laterality Date    APPENDECTOMY      BACK SURGERY      BREAST CAPSULECTOMY Bilateral 2020    Procedure: CAPSULECTOMY, BREAST;  Surgeon: Levi Clarke MD;  Location: [facility];  Service: Plastics;  Laterality: Bilateral;  MARQUEZ OK WITH 7:15AM START BOTH WILL WORK AT SAME TIME    breast implants      CLOSED REDUCTION FINGER DISLOCATION      FOOT SURGERY      HYSTERECTOMY      JOINT REPLACEMENT Left     total Lt knee    KNEE ARTHROSCOPY Left     MASTECTOMY      REMOVAL OF BREAST IMPLANT Bilateral 2020    Procedure: REMOVAL, IMPLANT, BREAST;  Surgeon:  Levi Clarke MD;  Location: [facility] OR;  Service: Plastics;  Laterality: Bilateral;    SHOULDER ARTHROSCOPY      SINUS SURGERY      SURGICAL REMOVAL OF MASS OF UPPER EXTREMITY Right 2020    Procedure: EXCISION, MASS, UPPER EXTREMITY;  Surgeon: Sherman Rojas MD;  Location: [facility] OR;  Service: General;  Laterality: Right;  joint with Dr. Clarke--RN PREOP 2020  THEY WILL WORK AT SAME TIME       Family History  Family History   Problem Relation Age of Onset    Diabetes Mother     Diabetes Father     Stroke Paternal Grandfather     Diabetes Sister        Social History  Social History     Socioeconomic History    Marital status:    Tobacco Use    Smoking status: Former Smoker     Quit date: 1988     Years since quittin.1    Smokeless tobacco: Never Used   Substance and Sexual Activity    Alcohol use: No    Drug use: No    Sexual activity: Not Currently     Partners: Male       Current Medications  Current Outpatient Medications on File Prior to Visit   Medication Sig Dispense Refill    acetaminophen/caffeine (EXCEDRIN TENSION HEADACHE ORAL) Take by mouth.      albuterol (PROVENTIL/VENTOLIN HFA) 90 mcg/actuation inhaler INHALE 2 PUFFS BY MOUTH EVERY 6 HOURS AS NEEDED FOR WHEEZING. RESCUE 6.7 g 1    allopurinoL (ZYLOPRIM) 100 MG tablet Take 1 tablet (100 mg total) by mouth once daily. 90 tablet 3    ammonium lactate (LAC-HYDRIN) 12 % lotion Apply topically 2 (two) times daily as needed for Dry Skin. 500 g 5    aspirin (ECOTRIN) 81 MG EC tablet Take 81 mg by mouth once daily.      butalbital-acetaminophen-caffeine -40 mg (FIORICET, ESGIC) -40 mg per tablet Take 1 tablet by mouth every 6 (six) hours as needed for Headaches. 30 tablet 0    cyanocobalamin (VITAMIN B-12) 1000 MCG tablet Take 100 mcg by mouth once daily.      EScitalopram oxalate (LEXAPRO) 20 MG tablet Take 1 tablet (20 mg total) by mouth once daily. 90 tablet 3    esomeprazole (NEXIUM) 20 MG  capsule Take 20 mg by mouth.      fexofenadine (ALLEGRA) 180 MG tablet       fluticasone furoate-vilanteroL (BREO ELLIPTA) 100-25 mcg/dose diskus inhaler Inhale 1 puff into the lungs once daily. Controller 60 each 3    fluticasone propionate (FLONASE) 50 mcg/actuation nasal spray USE 1 SPARY IN EACH NOSTRIL ONCE DAILY 16 mL 2    gabapentin (NEURONTIN) 100 MG capsule 100 mg. 2 tabs in the am and 2 tabs in the pm      HYDROcodone-acetaminophen (NORCO) 7.5-325 mg per tablet Take 1 tablet by mouth.      losartan (COZAAR) 50 MG tablet Take 1 tablet (50 mg total) by mouth once daily. 90 tablet 0    metoprolol succinate (TOPROL-XL) 50 MG 24 hr tablet Take 1 tablet (50 mg total) by mouth once daily. 90 tablet 3    multivitamin capsule Take 1 capsule by mouth once daily.      potassium chloride SA (K-DUR,KLOR-CON) 20 MEQ tablet Take 1 tablet (20 mEq total) by mouth once daily. 30 tablet 0    pravastatin (PRAVACHOL) 40 MG tablet Take 1 tablet (40 mg total) by mouth once daily. 90 tablet 3    furosemide (LASIX) 20 MG tablet Take 1 tablet (20 mg total) by mouth daily as needed (leg swelling). 30 tablet 11     No current facility-administered medications on file prior to visit.       Allergies   Review of patient's allergies indicates:   Allergen Reactions    Ancef [cefazolin] Nausea And Vomiting    Sulfa (sulfonamide antibiotics) Anaphylaxis    Scopolamine Blisters    Refresh redness relief [phenylephrin-polyvinyl alcohol] Blisters       Review of Systems (Pertinent positives)  Review of Systems   Constitutional: Negative for chills, diaphoresis, fever, malaise/fatigue and weight loss.   HENT: Negative.    Eyes: Negative.    Respiratory: Negative for cough, hemoptysis, sputum production, shortness of breath and wheezing.    Cardiovascular: Negative for chest pain, palpitations, orthopnea, claudication, leg swelling and PND.   Gastrointestinal: Negative.    Genitourinary: Negative.    Musculoskeletal: Positive for  "joint pain. Negative for back pain, falls, myalgias and neck pain.   Skin: Negative.    Neurological: Negative.    Endo/Heme/Allergies: Negative.    Psychiatric/Behavioral: Negative.        BP (!) 118/58 (BP Location: Right arm, Patient Position: Sitting, BP Method: Medium (Manual))   Pulse 75   Temp 97.8 °F (36.6 °C) (Oral)   Resp 18   Ht 4' 10" (1.473 m)   Wt 80.8 kg (178 lb 2.1 oz)   SpO2 96%   BMI 37.23 kg/m²     Physical Exam  Vitals reviewed.   Constitutional:       General: She is not in acute distress.     Appearance: Normal appearance. She is not ill-appearing, toxic-appearing or diaphoretic.   HENT:      Head: Normocephalic and atraumatic.   Cardiovascular:      Rate and Rhythm: Normal rate and regular rhythm.      Pulses: Normal pulses.      Heart sounds: Normal heart sounds.   Pulmonary:      Effort: Pulmonary effort is normal.      Breath sounds: Normal breath sounds.   Abdominal:      General: Bowel sounds are normal. There is no distension.      Palpations: Abdomen is soft. There is no mass.      Tenderness: There is no abdominal tenderness.   Musculoskeletal:      Right hand: Deformity (ulnar deviation of fingers) present.   Skin:     General: Skin is warm and dry.      Capillary Refill: Capillary refill takes less than 2 seconds.   Neurological:      General: No focal deficit present.      Mental Status: She is alert and oriented to person, place, and time.   Psychiatric:         Mood and Affect: Mood normal.         Behavior: Behavior normal.            Assessment/Plan:  Dayami Mina is a 71 y.o. female who presents today for :    Diagnoses and all orders for this visit:    Essential hypertension    Continue current medications.  Will have patient follow-up in 3 months with PCP for blood pressure check    Hemorrhoids, unspecified hemorrhoid type    Patient states she will be following up with her gastroenterologist.  Provided patient with a copy of her recent CT scan    Primary " osteoarthritis of right hand  -     Ambulatory referral/consult to Orthopedics; Future    Number given for patient to schedule orthopedist appointment at [facility] for hand specialty team when desired    Chronic gout of right hand, unspecified cause  -     Uric Acid; Future    Patient agreeable to check uric acid today        This office note has been dictated.  This dictation has been generated using M-Modal Fluency Direct dictation; some phonetic errors may occur.   My collaborating physician is Dr. Guanaco Sheffield.

## 2022-02-11 ENCOUNTER — PES CALL (OUTPATIENT)
Dept: ADMINISTRATIVE | Facility: CLINIC | Age: 72
End: 2022-02-11
Payer: MEDICARE

## 2022-02-14 ENCOUNTER — TELEPHONE (OUTPATIENT)
Dept: FAMILY MEDICINE | Facility: CLINIC | Age: 72
End: 2022-02-14
Payer: MEDICARE

## 2022-02-14 NOTE — TELEPHONE ENCOUNTER
Patient notified of lab results, verbalized understanding. Instructed to call with any questions or concerns----- Message from Antwan Warren NP sent at 2/14/2022 12:59 PM CST -----  Please call patient and let her know her uric acid level looks good.  We can continue the current dose of her medication. Thank you

## 2022-03-04 NOTE — PROGRESS NOTES
[facility] EMERGENCY DEPARTMENT ENCOUNTER    Basic Information  Patient: Shona Orozco Age: 63 year old Sex: female  MRN: [id number] Encounter Date: 3/3/2022, 6:54 PM  PCP: Ifeanyi Mckeon MD    Chief Complaint  Chief Complaint   Patient presents with   • Leg Pain   • Leg Swelling   • Wound Check       History of Present Illness    63 yr female presents to ER with bilateral leg swelling; which has been ongoing since December.  Patient has an appointment in 4 days with the lymphedema clinic. Pt states takes potassium daily;  Patient denies any fever and or chills.  Patient did have a fall in mid December and since then has had swelling also blisters to the lower extremities.      I have reviewed the non physician practitioners documentation, personally taken the patient's history, performed an exam and agree with the physical findings, diagnosis and management plan.      Orders  Medications   sodium polystyrene sulfonate (SPS,KAYEXALATE) suspension 15 g (15 g Oral Given 3/3/22 1953)         Laboratory and Radiology Results    Results for orders placed or performed during the hospital encounter of 03/03/22   Basic Metabolic Panel   Result Value Ref Range    Fasting Status      Sodium 138 135 - 145 mmol/L    Potassium 5.5 (H) 3.4 - 5.1 mmol/L    Chloride 110 (H) 98 - 107 mmol/L    Carbon Dioxide 27 21 - 32 mmol/L    Anion Gap 7 (L) 10 - 20 mmol/L    Glucose 124 (H) 70 - 99 mg/dL    BUN 35 (H) 6 - 20 mg/dL    Creatinine 1.74 (H) 0.51 - 0.95 mg/dL    Glomerular Filtration Rate 30 (L) >=60    BUN/ Creatinine Ratio 20 7 - 25    Calcium 9.4 8.4 - 10.2 mg/dL   CBC with Automated Differential (performable only)   Result Value Ref Range    WBC 6.7 4.2 - 11.0 K/mcL    RBC 3.70 (L) 4.00 - 5.20 mil/mcL    HGB 10.6 (L) 12.0 - 15.5 g/dL    HCT 33.3 (L) 36.0 - 46.5 %    MCV 90.0 78.0 - 100.0 fl    MCH 28.6 26.0 - 34.0 pg    MCHC 31.8 (L) 32.0 - 36.5 g/dL    RDW-CV 13.3 11.0 - 15.0 %    RDW-SD 44.0 39.0 - 50.0 fL     140 -  Subjective:       Patient ID: Dayami Mina is a 69 y.o. female.    Chief Complaint: Breast Problem    HPI 70 yo female with bilateral breasts implants with pain and MRI silicone leaks  Review of Systems   Constitutional: Negative.  Negative for activity change and unexpected weight change.   HENT: Positive for hearing loss. Negative for rhinorrhea and trouble swallowing.    Eyes: Negative.  Negative for discharge and visual disturbance.   Respiratory: Positive for wheezing. Negative for chest tightness.    Cardiovascular: Negative.  Negative for chest pain and palpitations.   Gastrointestinal: Negative.  Negative for blood in stool, constipation, diarrhea and vomiting.   Endocrine: Negative.  Negative for polydipsia and polyuria.   Genitourinary: Negative for difficulty urinating, dysuria, hematuria and menstrual problem.   Musculoskeletal: Positive for arthralgias and neck pain. Negative for joint swelling.   Skin: Negative.    Allergic/Immunologic: Negative.    Neurological: Positive for headaches. Negative for weakness.   Hematological: Negative.    Psychiatric/Behavioral: Negative.  Negative for confusion and dysphoric mood.   All other systems reviewed and are negative.      Objective:      Physical Exam   Constitutional: She is oriented to person, place, and time. She appears well-developed and well-nourished.   HENT:   Head: Normocephalic and atraumatic.   Right Ear: External ear normal.   Left Ear: External ear normal.   Nose: Nose normal.   Mouth/Throat: Oropharynx is clear and moist.   Eyes: Pupils are equal, round, and reactive to light. Conjunctivae and EOM are normal.   Neck: Normal range of motion. Neck supple.   Cardiovascular: Normal rate, regular rhythm, normal heart sounds and intact distal pulses.   Pulmonary/Chest: Effort normal and breath sounds normal. Right breast exhibits mass. Right breast exhibits no inverted nipple, no nipple discharge, no skin change and no tenderness. Left  450 K/mcL    NRBC 0 <=0 /100 WBC    Neutrophil, Percent 72 %    Lymphocytes, Percent 12 %    Mono, Percent 9 %    Eosinophils, Percent 6 %    Basophils, Percent 0 %    Immature Granulocytes 1 %    Absolute Neutrophils 4.8 1.8 - 7.7 K/mcL    Absolute Lymphocytes 0.8 (L) 1.0 - 4.0 K/mcL    Absolute Monocytes 0.6 0.3 - 0.9 K/mcL    Absolute Eosinophils  0.4 0.0 - 0.5 K/mcL    Absolute Basophils 0.0 0.0 - 0.3 K/mcL    Absolute Immmature Granulocytes 0.0 0.0 - 0.2 K/mcL   Magnesium   Result Value Ref Range    Magnesium 1.6 (L) 1.7 - 2.4 mg/dL   Rapid SARS-CoV-2 by PCR    Specimen: Nasal, Mid-turbinate; Swab   Result Value Ref Range    Rapid SARS-COV-2 by PCR Not Detected Not Detected / Detected / Presumptive Positive / Inhibitors present    Isolation Guidelines      Procedural Comment         No orders to display         Physical Exam  ED Triage Vitals [03/03/22 1351]   /68   Heart Rate 80   Resp 18   Temp 98.1 °F (36.7 °C)   SpO2 99 %     General:  No acute distress. Alert.  Skin:  Warm. Dry. Intact.  Head:  Normocephalic. Atraumatic.  Eye: EOMI. Normal conjunctiva.  Respiratory:   Respirations are non-labored.  Extremities:  No cyanosis, peripheral vascular changes of the bilateral lower extremities with areas of blisters that are open with questionable drainage that is present, pulses present  Neurological:  Alert; No focal neurological deficit observed. Normal speech.   Psychiatric: Cooperative. Appropriate mood and affect.        ED Course  I participated in the following activities of this patients care: the medical history, the physical exam, medical decision making.   I personally performed: supervision of the patient's care.   The case was discussed with: the non physician practitioners, Midlevel Provider.   Evaluation and management service: I agree with the evaluation and management decisions made in this patient's care.   Results interpretation: I agree with the study interpretation in this patient's  breast exhibits no inverted nipple, no mass, no nipple discharge, no skin change and no tenderness.       Abdominal: Soft. Bowel sounds are normal.   Musculoskeletal: Normal range of motion.   Neurological: She is alert and oriented to person, place, and time. She has normal reflexes.   Skin: Skin is warm and dry.   Psychiatric: She has a normal mood and affect. Her behavior is normal. Thought content normal.   Vitals reviewed.      Assessment:       1. H/O bilateral breast implants      needs removal  Plan:       I will refer her to a PS for second opinion      care, History, physical, EMR documentation completed by Midlevel Provider has been reviewed and agree.     Vitals:    03/03/22 1908 03/03/22 1923 03/03/22 1957 03/03/22 2115   BP:  (!) 141/59 (!) 142/63 (!) 143/63   Pulse:  66 68 75   Resp: 20  15 15   Temp:       TempSrc:       SpO2:  100% 100% 98%   Weight:       Height:                MEDICAL DECISION MAKING:  Sinus rhythm, rate of 69, first-degree AV block, no acute ST wave changes      Impression and Plan    Diagnosis:  1. Bilateral lower extremity edema    2. Hyperkalemia    3. Renal insufficiency        Condition:  STABLE    Disposition:  Discharge 3/3/2022  9:24 PM  Shona Orozco discharge to home/self care.        Follow Up:  Ifeanyi Mckeon MD  [address]  [facility] [id number] [phone]                 Discharge Instructions were provided    Counseled:  Patient, Regarding diagnosis, Regarding diagnostic results, Regarding treatment, and Patient understood    DISCUSSION OF PLAN AND IMPORTANCE OF FOLLOW-UP CARE:  The plan was discussed verbally and key components were summarized in the discharge instructions. All questions were answered. In discussing management and follow-up, they are advised that the plan is based only on information that was available at the time of emergency department evaluation. Additional testing and treatment may be necessary, and outpatient evaluation is frequently required to ensure complete care. At the same time, there is always potential for symptoms to recur or worsen, or for additional signs or symptoms to develop that could substantially affect the treatment plan. If any new or uncontrolled symptoms occur, or if there are any other concerns, they are advised to seek medical evaluation immediately.               Veronica Domingo,   03/04/22 0009

## 2022-03-28 ENCOUNTER — OFFICE VISIT (OUTPATIENT)
Dept: CARDIOLOGY | Facility: CLINIC | Age: 72
End: 2022-03-28
Payer: MEDICARE

## 2022-03-28 VITALS
SYSTOLIC BLOOD PRESSURE: 129 MMHG | HEIGHT: 58 IN | WEIGHT: 181.88 LBS | BODY MASS INDEX: 38.18 KG/M2 | RESPIRATION RATE: 16 BRPM | DIASTOLIC BLOOD PRESSURE: 63 MMHG | HEART RATE: 65 BPM | OXYGEN SATURATION: 96 %

## 2022-03-28 DIAGNOSIS — R00.2 PALPITATIONS: ICD-10-CM

## 2022-03-28 DIAGNOSIS — I10 ESSENTIAL HYPERTENSION: Primary | Chronic | ICD-10-CM

## 2022-03-28 DIAGNOSIS — E78.00 PURE HYPERCHOLESTEROLEMIA: Chronic | ICD-10-CM

## 2022-03-28 PROCEDURE — 99999 PR PBB SHADOW E&M-EST. PATIENT-LVL IV: CPT | Mod: PBBFAC,,, | Performed by: INTERNAL MEDICINE

## 2022-03-28 PROCEDURE — 99999 PR PBB SHADOW E&M-EST. PATIENT-LVL IV: ICD-10-PCS | Mod: PBBFAC,,, | Performed by: INTERNAL MEDICINE

## 2022-03-28 PROCEDURE — 99214 PR OFFICE/OUTPT VISIT, EST, LEVL IV, 30-39 MIN: ICD-10-PCS | Mod: S$PBB,,, | Performed by: INTERNAL MEDICINE

## 2022-03-28 PROCEDURE — 99214 OFFICE O/P EST MOD 30 MIN: CPT | Mod: S$PBB,,, | Performed by: INTERNAL MEDICINE

## 2022-03-28 PROCEDURE — 99214 OFFICE O/P EST MOD 30 MIN: CPT | Mod: PBBFAC | Performed by: INTERNAL MEDICINE

## 2022-03-28 NOTE — PROGRESS NOTES
Subjective:    Patient ID:  Dayami Mina is a 71 y.o. female who presents for follow-up of No chief complaint on file.      HPI     HTN, COPD, CRI, palpitations     Stress test 10/30/19    Normal [place] myocardial perfusion study.    The perfusion scan is free of evidence from myocardial ischemia or injury.    Gated perfusion images showed an ejection fraction of 75 % post stress.    There is normal wall motion post stress.     Echo 10/30/19  · Normal left ventricular systolic function. The estimated ejection fraction is 60%  · Concentric left ventricular remodeling.  · No wall motion abnormalities.  · Normal right ventricular systolic function.     Holter 8/1/17  1. Sinus rhythm with heart rates varying between 56 and 105 bpm with an average of 72 bpm.     VENTRICULAR ARRHYTHMIAS  1. There were very frequent PVCs totalling 4114 and averaging 171 per hour.     2. There were no episodes of ventricular tachycardia.    SUPRA VENTRICULAR ARRHYTHMIAS  1. There were very rare PACs recorded totalling 1 and averaging less than 1 per hour.     2. There were no episodes of sustained supraventricular tachycardia.    SINUS NODE FUNCTION  1. There was no evidence of high grade SA manuel block.     AV CONDUCTION  1. There was no evidence of high grade AV block.      Admitted 12/2/17  Mrs. Dayami Mina is a 67 y.o. female with essential hypertension, hyperlipidemia (.2 Apr 2015), GERD, and depression who presents to [facility] ED with complaints of dyspnea today.  She shortness of breath started gradually throughout the day and she thinks it was triggered by some renovations bring done in her home.  Some plywood was being placed in her home and the installers says it was treated plywood.  She started to have some wheezing and coughing that was productive of greenish sputum.  She went to bed to take a nap but woke up still having these symptoms.  She tried her albuterol inhalers without relief and decided to  seek ED evaluation.  She denies any fevers, chills, chest pain, pleurisy, palpitations, diaphoresis, nausea, vomiting, hemoptysis, nor any lower extremity pain or swelling.  She did not have any sick contacts or recent travel, and denies any similar episodes in the past.  Of note, she has been following with Dr. Vishal Mcmahan who initially felt that she had COPD and treated her for that for three years before changing his opinion.  She did smoke but quit in 1988.       Mrs. Dayami Mina is a 67 y.o. female with essential hypertension, hyperlipidemia (.2 Apr 2015), GERD, and depression who presents to [facility] ED with complaints of dyspnea .She shortness of breath started gradually throughout the day and she thinks it was triggered by some renovations bring done in her home.  Some plywood was being placed in her home and the installers says it was treated plywood.  She started to have some wheezing and coughing that was productive of greenish sputum.  She went to bed to take a nap but woke up still having these symptoms.  She tried her albuterol inhalers without relief and decided to seek ED evaluation.  She denies any fevers, chills, chest pain, pleurisy, palpitations, diaphoresis, nausea, vomiting, hemoptysis, nor any lower extremity pain or swelling.  She did not have any sick contacts or recent travel, and denies any similar episodes in the past.  Of note, she has been following with Dr. Vishal Mcmahan who initially felt that she had COPD and treated her for that for three years before changing his opinion,after couple PFT study show no sign of COPD,  She did smoke but quit in 1988. She was started on supplemental oxygen for hypoxia with nebulizer treatment,CTA chest show no PE,no consolidation,or fluid overload,SOB and hypoxic episode may is duo to expose to chemical agent.her SOB,and  Hypoxia resolved,she was stable on RA,she improved better than expected,she has been discharged home with follow  "up with her pulmonologist ,she has already appointment.      1/22/18 Denies CP  SOB stable  EKG NSR - ok  Needs clearance for toe surgery     1/18/19 Denies CP  Stable SOB  EKG NSR 1st degree AVB otherwise ok  Upcoming knee replacement     7/3/19 Recently Dx with diverticulitis - scheduled for upcoming colonoscopy  Some fatigue  BP runs low on occasion  EKG NSR low voltage otherwise ok     Saw Dr French 10/16/19  HPI: 68 y/o w/ HTN COPD presents to follow up fatigue. For last six weeks daily fatigue falling asleep in chair no motivation. No pain denies fever/ occasional clear to green sputum no dysphagia no LE swelling no chagne in breathing when lying flat. Appetite "okay" weight is unchanged. Denies change in bowel habits. Using maintenance inhaler daily no palpitations or racing heart        10/21/19 Reports worsening fatigue  Getting over recently prolonged sinus infection  EKG NSR - ok     11/13/19 Still with fatigue and sinus issues  Denies CP  Mild stable ARANGO     7/30/20 Back for LE edema. On exam there is 1+ edema at most  Stable ARANGO  Denies CP  EKG NSR - ok  Prn lasix for LE edema  OV 1 month with BNP, BMP        9/3/20 LE edema resolved after taking lasix for 3 days  Denies CP or SOB   Continue Rx for HTN, HLD, LE edema  OV 6 months     3/8/21 Denies CP or SOB. No further LE edema  EKG NSR - ok    Continue Rx for HTN, HLD, LE edema  OV 6 months     10/5/21 Denies CP or SOB  BP controlled    Continue Rx for HTN, HLD, LE edema  OV 6 months      3/28/22 Denies CP or SOB  BP controlled    Review of Systems   Constitutional: Negative for decreased appetite.   HENT: Negative for ear discharge.    Eyes: Negative for blurred vision.   Respiratory: Negative for hemoptysis.    Endocrine: Negative for polyphagia.   Hematologic/Lymphatic: Negative for adenopathy.   Skin: Negative for color change.   Musculoskeletal: Negative for joint swelling.   Genitourinary: Negative for bladder incontinence. "   Neurological: Negative for brief paralysis.   Psychiatric/Behavioral: Negative for hallucinations.   Allergic/Immunologic: Negative for hives.        Objective:    Physical Exam  Constitutional:       Appearance: She is well-developed.   HENT:      Head: Normocephalic and atraumatic.   Eyes:      Conjunctiva/sclera: Conjunctivae normal.      Pupils: Pupils are equal, round, and reactive to light.   Cardiovascular:      Rate and Rhythm: Normal rate.      Pulses: Intact distal pulses.      Heart sounds: Normal heart sounds.   Pulmonary:      Effort: Pulmonary effort is normal.      Breath sounds: Normal breath sounds.   Abdominal:      General: Bowel sounds are normal.      Palpations: Abdomen is soft.   Musculoskeletal:         General: Normal range of motion.      Cervical back: Normal range of motion and neck supple.   Skin:     General: Skin is warm and dry.   Neurological:      Mental Status: She is alert and oriented to person, place, and time.           Assessment:       1. Essential hypertension    2. Pure hypercholesterolemia    3. Palpitations         Plan:         Continue Rx for HTN, HLD, LE edema  OV 6 months

## 2022-05-10 ENCOUNTER — LAB VISIT (OUTPATIENT)
Dept: LAB | Facility: HOSPITAL | Age: 72
End: 2022-05-10
Attending: INTERNAL MEDICINE
Payer: MEDICARE

## 2022-05-10 ENCOUNTER — OFFICE VISIT (OUTPATIENT)
Dept: FAMILY MEDICINE | Facility: CLINIC | Age: 72
End: 2022-05-10
Payer: MEDICARE

## 2022-05-10 VITALS
DIASTOLIC BLOOD PRESSURE: 62 MMHG | OXYGEN SATURATION: 95 % | SYSTOLIC BLOOD PRESSURE: 128 MMHG | WEIGHT: 179.88 LBS | HEIGHT: 58 IN | TEMPERATURE: 98 F | HEART RATE: 67 BPM | BODY MASS INDEX: 37.76 KG/M2

## 2022-05-10 DIAGNOSIS — I10 ESSENTIAL HYPERTENSION: ICD-10-CM

## 2022-05-10 DIAGNOSIS — J43.2 CENTRILOBULAR EMPHYSEMA: ICD-10-CM

## 2022-05-10 DIAGNOSIS — M46.1 SACROILIITIS, NOT ELSEWHERE CLASSIFIED: ICD-10-CM

## 2022-05-10 DIAGNOSIS — H81.13 BENIGN PAROXYSMAL POSITIONAL VERTIGO DUE TO BILATERAL VESTIBULAR DISORDER: Primary | ICD-10-CM

## 2022-05-10 LAB
ANION GAP SERPL CALC-SCNC: 10 MMOL/L (ref 8–16)
BASOPHILS # BLD AUTO: 0.03 K/UL (ref 0–0.2)
BASOPHILS NFR BLD: 0.4 % (ref 0–1.9)
BUN SERPL-MCNC: 19 MG/DL (ref 8–23)
CALCIUM SERPL-MCNC: 9.1 MG/DL (ref 8.7–10.5)
CHLORIDE SERPL-SCNC: 101 MMOL/L (ref 95–110)
CO2 SERPL-SCNC: 28 MMOL/L (ref 23–29)
CREAT SERPL-MCNC: 0.9 MG/DL (ref 0.5–1.4)
DIFFERENTIAL METHOD: ABNORMAL
EOSINOPHIL # BLD AUTO: 0.2 K/UL (ref 0–0.5)
EOSINOPHIL NFR BLD: 2.4 % (ref 0–8)
ERYTHROCYTE [DISTWIDTH] IN BLOOD BY AUTOMATED COUNT: 14.2 % (ref 11.5–14.5)
EST. GFR  (AFRICAN AMERICAN): >60 ML/MIN/1.73 M^2
EST. GFR  (NON AFRICAN AMERICAN): >60 ML/MIN/1.73 M^2
GLUCOSE SERPL-MCNC: 83 MG/DL (ref 70–110)
HCT VFR BLD AUTO: 38.5 % (ref 37–48.5)
HGB BLD-MCNC: 11.8 G/DL (ref 12–16)
IMM GRANULOCYTES # BLD AUTO: 0.02 K/UL (ref 0–0.04)
IMM GRANULOCYTES NFR BLD AUTO: 0.3 % (ref 0–0.5)
LYMPHOCYTES # BLD AUTO: 2.3 K/UL (ref 1–4.8)
LYMPHOCYTES NFR BLD: 31.7 % (ref 18–48)
MCH RBC QN AUTO: 26.7 PG (ref 27–31)
MCHC RBC AUTO-ENTMCNC: 30.6 G/DL (ref 32–36)
MCV RBC AUTO: 87 FL (ref 82–98)
MONOCYTES # BLD AUTO: 0.7 K/UL (ref 0.3–1)
MONOCYTES NFR BLD: 10.3 % (ref 4–15)
NEUTROPHILS # BLD AUTO: 3.9 K/UL (ref 1.8–7.7)
NEUTROPHILS NFR BLD: 54.9 % (ref 38–73)
NRBC BLD-RTO: 0 /100 WBC
PLATELET # BLD AUTO: 266 K/UL (ref 150–450)
PMV BLD AUTO: 10.2 FL (ref 9.2–12.9)
POTASSIUM SERPL-SCNC: 5.1 MMOL/L (ref 3.5–5.1)
RBC # BLD AUTO: 4.42 M/UL (ref 4–5.4)
SODIUM SERPL-SCNC: 139 MMOL/L (ref 136–145)
WBC # BLD AUTO: 7.17 K/UL (ref 3.9–12.7)

## 2022-05-10 PROCEDURE — 80048 BASIC METABOLIC PNL TOTAL CA: CPT | Performed by: INTERNAL MEDICINE

## 2022-05-10 PROCEDURE — 99999 PR PBB SHADOW E&M-EST. PATIENT-LVL IV: ICD-10-PCS | Mod: PBBFAC,,, | Performed by: INTERNAL MEDICINE

## 2022-05-10 PROCEDURE — 85025 COMPLETE CBC W/AUTO DIFF WBC: CPT | Performed by: INTERNAL MEDICINE

## 2022-05-10 PROCEDURE — 36415 COLL VENOUS BLD VENIPUNCTURE: CPT | Mod: PN | Performed by: INTERNAL MEDICINE

## 2022-05-10 PROCEDURE — 99214 PR OFFICE/OUTPT VISIT, EST, LEVL IV, 30-39 MIN: ICD-10-PCS | Mod: S$PBB,,, | Performed by: INTERNAL MEDICINE

## 2022-05-10 PROCEDURE — 99214 OFFICE O/P EST MOD 30 MIN: CPT | Mod: PBBFAC,PN | Performed by: INTERNAL MEDICINE

## 2022-05-10 PROCEDURE — 99214 OFFICE O/P EST MOD 30 MIN: CPT | Mod: S$PBB,,, | Performed by: INTERNAL MEDICINE

## 2022-05-10 PROCEDURE — 99999 PR PBB SHADOW E&M-EST. PATIENT-LVL IV: CPT | Mod: PBBFAC,,, | Performed by: INTERNAL MEDICINE

## 2022-05-10 RX ORDER — MUPIROCIN 20 MG/G
OINTMENT TOPICAL 2 TIMES DAILY
Qty: 15 G | Refills: 0 | Status: ON HOLD | OUTPATIENT
Start: 2022-05-10 | End: 2023-05-09 | Stop reason: HOSPADM

## 2022-05-10 RX ORDER — FLUTICASONE FUROATE AND VILANTEROL TRIFENATATE 100; 25 UG/1; UG/1
1 POWDER RESPIRATORY (INHALATION) DAILY
Qty: 60 EACH | Refills: 3 | Status: SHIPPED | OUTPATIENT
Start: 2022-05-10 | End: 2022-09-21

## 2022-05-10 RX ORDER — ALBUTEROL SULFATE 90 UG/1
AEROSOL, METERED RESPIRATORY (INHALATION)
Qty: 6.7 G | Refills: 1 | Status: SHIPPED | OUTPATIENT
Start: 2022-05-10 | End: 2023-11-27 | Stop reason: SDUPTHER

## 2022-05-10 RX ORDER — MECLIZINE HCL 12.5 MG 12.5 MG/1
12.5 TABLET ORAL 3 TIMES DAILY PRN
Qty: 60 TABLET | Refills: 0 | Status: ON HOLD | OUTPATIENT
Start: 2022-05-10 | End: 2023-05-09 | Stop reason: HOSPADM

## 2022-05-10 RX ORDER — LOSARTAN POTASSIUM 50 MG/1
50 TABLET ORAL DAILY
Qty: 90 TABLET | Refills: 0 | Status: SHIPPED | OUTPATIENT
Start: 2022-05-10 | End: 2023-02-02 | Stop reason: SDUPTHER

## 2022-05-10 NOTE — PROGRESS NOTES
"Subjective:       Patient ID: Dayami Mina is a 71 y.o. female.    Chief Complaint: Follow-up (3 month)    F/u chronic conditions    HPI: 72 y/o w/ HTN COPD chronic low back pain on daily opioid therapy presents alone for scheduled follow up. Feels well planning cruise to [place] end of this month with sister and her daughter would like refill of meclizine for BPPV has flares with sinus issues. Using nasal steroid breathing "fine" no dysphagia weight stable. No LE swelling     Review of Systems   Constitutional: Negative for activity change, appetite change, fatigue, fever and unexpected weight change.   HENT: Negative for ear pain, rhinorrhea and sore throat.    Eyes: Negative for discharge and visual disturbance.   Respiratory: Negative for chest tightness, shortness of breath and wheezing.    Cardiovascular: Negative for chest pain, palpitations and leg swelling.   Gastrointestinal: Negative for abdominal pain, constipation and diarrhea.   Endocrine: Negative for cold intolerance and heat intolerance.   Genitourinary: Negative for dysuria and hematuria.   Musculoskeletal: Positive for back pain. Negative for joint swelling and neck stiffness.   Skin: Negative for rash.   Neurological: Negative for dizziness, syncope, weakness and headaches.   Psychiatric/Behavioral: Negative for suicidal ideas.       Objective:     Vitals:    05/10/22 1427   BP: 128/62   BP Location: Left arm   Patient Position: Sitting   BP Method: Large (Manual)   Pulse: 67   Temp: 98 °F (36.7 °C)   TempSrc: Oral   SpO2: 95%   Weight: 81.6 kg (179 lb 14.3 oz)   Height: 4' 10" (1.473 m)          Physical Exam  Constitutional:       Appearance: She is well-developed.   HENT:      Head: Normocephalic and atraumatic.      Right Ear: Tympanic membrane normal.      Left Ear: Tympanic membrane normal.   Eyes:      Conjunctiva/sclera: Conjunctivae normal.   Cardiovascular:      Rate and Rhythm: Normal rate and regular rhythm.      Heart sounds: No " murmur heard.    No friction rub. No gallop.   Pulmonary:      Effort: Pulmonary effort is normal. No respiratory distress.      Breath sounds: Normal breath sounds. No wheezing or rales.   Abdominal:      Tenderness: There is no guarding.   Musculoskeletal:         General: No tenderness. Normal range of motion.      Cervical back: Normal range of motion.      Right lower leg: No edema.      Left lower leg: No edema.   Skin:     General: Skin is warm and dry.   Neurological:      Mental Status: She is alert and oriented to person, place, and time.      Cranial Nerves: No cranial nerve deficit.         Assessment and Plan   1. Benign paroxysmal positional vertigo due to bilateral vestibular disorder  Prn meclizine  - meclizine (ANTIVERT) 12.5 mg tablet; Take 1 tablet (12.5 mg total) by mouth 3 (three) times daily as needed for Dizziness.  Dispense: 60 tablet; Refill: 0    2. Essential hypertension  At goal continue arb abd beta blocker  - losartan (COZAAR) 50 MG tablet; Take 1 tablet (50 mg total) by mouth once daily.  Dispense: 90 tablet; Refill: 0  - Basic Metabolic Panel; Future    3. Sacroiliitis, not elsewhere classified  Management per neurologist    4. Centrilobular emphysema  Continue laba/ics prn beta agonist  - albuterol (PROVENTIL/VENTOLIN HFA) 90 mcg/actuation inhaler; INHALE 2 PUFFS BY MOUTH EVERY 6 HOURS AS NEEDED FOR WHEEZING. RESCUE  Dispense: 6.7 g; Refill: 1  - fluticasone furoate-vilanteroL (BREO ELLIPTA) 100-25 mcg/dose diskus inhaler; Inhale 1 puff into the lungs once daily. Controller  Dispense: 60 each; Refill: 3  - CBC Auto Differential; Future

## 2022-07-19 ENCOUNTER — PATIENT MESSAGE (OUTPATIENT)
Dept: RESEARCH | Facility: CLINIC | Age: 72
End: 2022-07-19
Payer: MEDICARE

## 2022-08-13 NOTE — ED TRIAGE NOTES
Pt presents with SOB that began PTA. Pt states she was sitting in her living room while renovations were being done in her home. She states treated plywood was being placed when she experienced the pain. Pt also reports CP with inhalations.   
yes
Left AC/yes

## 2022-09-13 ENCOUNTER — OFFICE VISIT (OUTPATIENT)
Dept: FAMILY MEDICINE | Facility: CLINIC | Age: 72
End: 2022-09-13
Payer: MEDICARE

## 2022-09-13 ENCOUNTER — LAB VISIT (OUTPATIENT)
Dept: LAB | Facility: HOSPITAL | Age: 72
End: 2022-09-13
Attending: INTERNAL MEDICINE
Payer: MEDICARE

## 2022-09-13 VITALS
TEMPERATURE: 98 F | BODY MASS INDEX: 38.04 KG/M2 | WEIGHT: 181.19 LBS | SYSTOLIC BLOOD PRESSURE: 106 MMHG | HEIGHT: 58 IN | OXYGEN SATURATION: 97 % | HEART RATE: 87 BPM | DIASTOLIC BLOOD PRESSURE: 56 MMHG

## 2022-09-13 DIAGNOSIS — M46.1 SACROILIITIS, NOT ELSEWHERE CLASSIFIED: ICD-10-CM

## 2022-09-13 DIAGNOSIS — I10 ESSENTIAL HYPERTENSION: Primary | ICD-10-CM

## 2022-09-13 DIAGNOSIS — J43.2 CENTRILOBULAR EMPHYSEMA: ICD-10-CM

## 2022-09-13 DIAGNOSIS — M1A.0410 CHRONIC GOUT OF RIGHT HAND, UNSPECIFIED CAUSE: ICD-10-CM

## 2022-09-13 DIAGNOSIS — R05.9 COUGH: ICD-10-CM

## 2022-09-13 DIAGNOSIS — I10 ESSENTIAL HYPERTENSION: ICD-10-CM

## 2022-09-13 LAB
ALBUMIN SERPL BCP-MCNC: 3.5 G/DL (ref 3.5–5.2)
ALP SERPL-CCNC: 44 U/L (ref 55–135)
ALT SERPL W/O P-5'-P-CCNC: 24 U/L (ref 10–44)
ANION GAP SERPL CALC-SCNC: 12 MMOL/L (ref 8–16)
AST SERPL-CCNC: 26 U/L (ref 10–40)
BASOPHILS # BLD AUTO: 0.03 K/UL (ref 0–0.2)
BASOPHILS NFR BLD: 0.5 % (ref 0–1.9)
BILIRUB SERPL-MCNC: 0.3 MG/DL (ref 0.1–1)
BUN SERPL-MCNC: 20 MG/DL (ref 8–23)
CALCIUM SERPL-MCNC: 9.4 MG/DL (ref 8.7–10.5)
CHLORIDE SERPL-SCNC: 103 MMOL/L (ref 95–110)
CO2 SERPL-SCNC: 23 MMOL/L (ref 23–29)
CREAT SERPL-MCNC: 1 MG/DL (ref 0.5–1.4)
DIFFERENTIAL METHOD: ABNORMAL
EOSINOPHIL # BLD AUTO: 0.2 K/UL (ref 0–0.5)
EOSINOPHIL NFR BLD: 3.2 % (ref 0–8)
ERYTHROCYTE [DISTWIDTH] IN BLOOD BY AUTOMATED COUNT: 13.2 % (ref 11.5–14.5)
EST. GFR  (NO RACE VARIABLE): 59 ML/MIN/1.73 M^2
GLUCOSE SERPL-MCNC: 151 MG/DL (ref 70–110)
HCT VFR BLD AUTO: 35.4 % (ref 37–48.5)
HGB BLD-MCNC: 11.6 G/DL (ref 12–16)
IMM GRANULOCYTES # BLD AUTO: 0.02 K/UL (ref 0–0.04)
IMM GRANULOCYTES NFR BLD AUTO: 0.3 % (ref 0–0.5)
LYMPHOCYTES # BLD AUTO: 2 K/UL (ref 1–4.8)
LYMPHOCYTES NFR BLD: 29.6 % (ref 18–48)
MCH RBC QN AUTO: 28.1 PG (ref 27–31)
MCHC RBC AUTO-ENTMCNC: 32.8 G/DL (ref 32–36)
MCV RBC AUTO: 86 FL (ref 82–98)
MONOCYTES # BLD AUTO: 0.7 K/UL (ref 0.3–1)
MONOCYTES NFR BLD: 10.1 % (ref 4–15)
NEUTROPHILS # BLD AUTO: 3.8 K/UL (ref 1.8–7.7)
NEUTROPHILS NFR BLD: 56.3 % (ref 38–73)
NRBC BLD-RTO: 0 /100 WBC
PLATELET # BLD AUTO: 281 K/UL (ref 150–450)
PMV BLD AUTO: 10.2 FL (ref 9.2–12.9)
POTASSIUM SERPL-SCNC: 4.5 MMOL/L (ref 3.5–5.1)
PROT SERPL-MCNC: 6.7 G/DL (ref 6–8.4)
RBC # BLD AUTO: 4.13 M/UL (ref 4–5.4)
SODIUM SERPL-SCNC: 138 MMOL/L (ref 136–145)
URATE SERPL-MCNC: 6.9 MG/DL (ref 2.4–5.7)
WBC # BLD AUTO: 6.65 K/UL (ref 3.9–12.7)

## 2022-09-13 PROCEDURE — 99999 PR PBB SHADOW E&M-EST. PATIENT-LVL V: ICD-10-PCS | Mod: PBBFAC,,, | Performed by: INTERNAL MEDICINE

## 2022-09-13 PROCEDURE — 99214 OFFICE O/P EST MOD 30 MIN: CPT | Mod: S$PBB,,, | Performed by: INTERNAL MEDICINE

## 2022-09-13 PROCEDURE — 99214 PR OFFICE/OUTPT VISIT, EST, LEVL IV, 30-39 MIN: ICD-10-PCS | Mod: S$PBB,,, | Performed by: INTERNAL MEDICINE

## 2022-09-13 PROCEDURE — 84550 ASSAY OF BLOOD/URIC ACID: CPT | Performed by: INTERNAL MEDICINE

## 2022-09-13 PROCEDURE — 80053 COMPREHEN METABOLIC PANEL: CPT | Performed by: INTERNAL MEDICINE

## 2022-09-13 PROCEDURE — 99999 PR PBB SHADOW E&M-EST. PATIENT-LVL V: CPT | Mod: PBBFAC,,, | Performed by: INTERNAL MEDICINE

## 2022-09-13 PROCEDURE — 99215 OFFICE O/P EST HI 40 MIN: CPT | Mod: PBBFAC,PN | Performed by: INTERNAL MEDICINE

## 2022-09-13 PROCEDURE — 85025 COMPLETE CBC W/AUTO DIFF WBC: CPT | Performed by: INTERNAL MEDICINE

## 2022-09-13 PROCEDURE — 36415 COLL VENOUS BLD VENIPUNCTURE: CPT | Mod: PN | Performed by: INTERNAL MEDICINE

## 2022-09-13 RX ORDER — ALLOPURINOL 100 MG/1
100 TABLET ORAL DAILY
Qty: 90 TABLET | Refills: 3 | Status: SHIPPED | OUTPATIENT
Start: 2022-09-13 | End: 2023-04-27

## 2022-09-13 RX ORDER — FUROSEMIDE 20 MG/1
20 TABLET ORAL DAILY PRN
Qty: 30 TABLET | Refills: 11 | Status: ON HOLD
Start: 2022-09-13 | End: 2023-05-09 | Stop reason: HOSPADM

## 2022-09-13 RX ORDER — BENZONATATE 200 MG/1
200 CAPSULE ORAL NIGHTLY PRN
Qty: 60 CAPSULE | Refills: 1 | Status: SHIPPED | OUTPATIENT
Start: 2022-09-13 | End: 2022-09-23

## 2022-09-13 NOTE — PROGRESS NOTES
"Subjective:       Patient ID: Dayami Mina is a 72 y.o. female.    Chief Complaint: Follow-up (4 month)    F/u chronic conditions    HPI: 73 y/o w/ COPD HTN chronic neck and low back pain (followed by pain management at [facility]) on opioid therapy presents alone for scheduled follow up. In last three months had NGUYEN to both lumbar and cervical spine some improvement in mobility. Usign lasix PRN for leg swelling (one to three times per month) using laba/ics daily no dysphagia. Does have periodic night time cough (using nasal steroid nightly) feels tessalon helps no orthopnea     Review of Systems   Constitutional:  Negative for activity change, appetite change, fatigue, fever and unexpected weight change.   HENT:  Negative for ear pain, rhinorrhea and sore throat.    Eyes:  Negative for discharge and visual disturbance.   Respiratory:  Positive for cough. Negative for chest tightness, shortness of breath and wheezing.    Cardiovascular:  Negative for chest pain, palpitations and leg swelling.   Gastrointestinal:  Negative for abdominal pain, constipation and diarrhea.   Endocrine: Negative for cold intolerance and heat intolerance.   Genitourinary:  Negative for dysuria and hematuria.   Musculoskeletal:  Positive for arthralgias, back pain and neck pain. Negative for joint swelling.   Skin:  Negative for rash.   Neurological:  Negative for dizziness, syncope, weakness and headaches.   Psychiatric/Behavioral:  Negative for suicidal ideas.      Objective:     Vitals:    09/13/22 1428   BP: (!) 106/56   BP Location: Right leg   Patient Position: Sitting   BP Method: Medium (Manual)   Pulse: 87   Temp: 97.7 °F (36.5 °C)   TempSrc: Oral   SpO2: 97%   Weight: 82.2 kg (181 lb 3.5 oz)   Height: 4' 10" (1.473 m)          Physical Exam  Constitutional:       Appearance: She is well-developed.   HENT:      Head: Normocephalic and atraumatic.   Eyes:      Conjunctiva/sclera: Conjunctivae normal.   Cardiovascular: "      Rate and Rhythm: Normal rate and regular rhythm.      Heart sounds: No murmur heard.    No friction rub. No gallop.   Pulmonary:      Effort: Pulmonary effort is normal. No respiratory distress.      Breath sounds: Normal breath sounds. No wheezing or rales.   Musculoskeletal:         General: No tenderness. Normal range of motion.      Cervical back: Normal range of motion.      Right lower leg: No edema.      Left lower leg: No edema.   Skin:     General: Skin is warm and dry.   Neurological:      Mental Status: She is alert and oriented to person, place, and time.      Cranial Nerves: No cranial nerve deficit.       Assessment and Plan   1. Essential hypertension  At goal labs for end organ dysfunction continue beta blocker and arb  - CBC Auto Differential; Future  - Comprehensive Metabolic Panel; Future    2. Centrilobular emphysema  Laba/ics daily    3. Sacroiliitis, not elsewhere classified  Management per pain clinic    4. Cough  Prn tessalon and nightly nasal steroid spray  - benzonatate (TESSALON) 200 MG capsule; Take 1 capsule (200 mg total) by mouth nightly as needed for Cough.  Dispense: 60 capsule; Refill: 1    5. Chronic gout of right hand, unspecified cause  Daily allopurinol repeat renal function and uric acid she reports not taking consistently  - allopurinoL (ZYLOPRIM) 100 MG tablet; Take 1 tablet (100 mg total) by mouth once daily.  Dispense: 90 tablet; Refill: 3  - Uric Acid; Future

## 2022-10-03 DIAGNOSIS — Z71.89 COMPLEX CARE COORDINATION: ICD-10-CM

## 2022-10-17 ENCOUNTER — OFFICE VISIT (OUTPATIENT)
Dept: CARDIOLOGY | Facility: CLINIC | Age: 72
End: 2022-10-17
Payer: MEDICARE

## 2022-10-17 VITALS
OXYGEN SATURATION: 93 % | DIASTOLIC BLOOD PRESSURE: 72 MMHG | HEIGHT: 58 IN | BODY MASS INDEX: 36.91 KG/M2 | RESPIRATION RATE: 18 BRPM | HEART RATE: 68 BPM | SYSTOLIC BLOOD PRESSURE: 118 MMHG | WEIGHT: 175.81 LBS

## 2022-10-17 DIAGNOSIS — R00.2 PALPITATIONS: ICD-10-CM

## 2022-10-17 DIAGNOSIS — E78.00 PURE HYPERCHOLESTEROLEMIA: Chronic | ICD-10-CM

## 2022-10-17 DIAGNOSIS — I10 ESSENTIAL HYPERTENSION: Primary | ICD-10-CM

## 2022-10-17 DIAGNOSIS — J43.2 CENTRILOBULAR EMPHYSEMA: ICD-10-CM

## 2022-10-17 PROCEDURE — 93010 ELECTROCARDIOGRAM REPORT: CPT | Mod: S$PBB,,, | Performed by: INTERNAL MEDICINE

## 2022-10-17 PROCEDURE — 93005 ELECTROCARDIOGRAM TRACING: CPT | Mod: PBBFAC | Performed by: INTERNAL MEDICINE

## 2022-10-17 PROCEDURE — 99214 PR OFFICE/OUTPT VISIT, EST, LEVL IV, 30-39 MIN: ICD-10-PCS | Mod: S$PBB,,, | Performed by: INTERNAL MEDICINE

## 2022-10-17 PROCEDURE — 99214 OFFICE O/P EST MOD 30 MIN: CPT | Mod: S$PBB,,, | Performed by: INTERNAL MEDICINE

## 2022-10-17 PROCEDURE — 99999 PR PBB SHADOW E&M-EST. PATIENT-LVL IV: CPT | Mod: PBBFAC,,, | Performed by: INTERNAL MEDICINE

## 2022-10-17 PROCEDURE — 93010 EKG 12-LEAD: ICD-10-PCS | Mod: S$PBB,,, | Performed by: INTERNAL MEDICINE

## 2022-10-17 PROCEDURE — 99214 OFFICE O/P EST MOD 30 MIN: CPT | Mod: PBBFAC | Performed by: INTERNAL MEDICINE

## 2022-10-17 PROCEDURE — 99999 PR PBB SHADOW E&M-EST. PATIENT-LVL IV: ICD-10-PCS | Mod: PBBFAC,,, | Performed by: INTERNAL MEDICINE

## 2022-10-17 NOTE — PROGRESS NOTES
Subjective:    Patient ID:  Dayami Mina is a 72 y.o. female who presents for follow-up of Follow-up      HPI    HTN, COPD, CRI, palpitations     Stress test 10/30/19    Normal [place] myocardial perfusion study.    The perfusion scan is free of evidence from myocardial ischemia or injury.    Gated perfusion images showed an ejection fraction of 75 % post stress.    There is normal wall motion post stress.     Echo 10/30/19  Normal left ventricular systolic function. The estimated ejection fraction is 60%  Concentric left ventricular remodeling.  No wall motion abnormalities.  Normal right ventricular systolic function.     Holter 8/1/17  1. Sinus rhythm with heart rates varying between 56 and 105 bpm with an average of 72 bpm.     VENTRICULAR ARRHYTHMIAS  1. There were very frequent PVCs totalling 4114 and averaging 171 per hour.     2. There were no episodes of ventricular tachycardia.    SUPRA VENTRICULAR ARRHYTHMIAS  1. There were very rare PACs recorded totalling 1 and averaging less than 1 per hour.     2. There were no episodes of sustained supraventricular tachycardia.    SINUS NODE FUNCTION  1. There was no evidence of high grade SA manuel block.     AV CONDUCTION  1. There was no evidence of high grade AV block.      Admitted 12/2/17  Mrs. Dayami Mina is a 67 y.o. female with essential hypertension, hyperlipidemia (.2 Apr 2015), GERD, and depression who presents to [facility] ED with complaints of dyspnea today.  She shortness of breath started gradually throughout the day and she thinks it was triggered by some renovations bring done in her home.  Some plywood was being placed in her home and the installers says it was treated plywood.  She started to have some wheezing and coughing that was productive of greenish sputum.  She went to bed to take a nap but woke up still having these symptoms.  She tried her albuterol inhalers without relief and decided to seek ED evaluation.  She  denies any fevers, chills, chest pain, pleurisy, palpitations, diaphoresis, nausea, vomiting, hemoptysis, nor any lower extremity pain or swelling.  She did not have any sick contacts or recent travel, and denies any similar episodes in the past.  Of note, she has been following with Dr. Vishal Mcmahan who initially felt that she had COPD and treated her for that for three years before changing his opinion.  She did smoke but quit in 1988.       Mrs. Dayami Mina is a 67 y.o. female with essential hypertension, hyperlipidemia (.2 Apr 2015), GERD, and depression who presents to [facility] ED with complaints of dyspnea .She shortness of breath started gradually throughout the day and she thinks it was triggered by some renovations bring done in her home.  Some plywood was being placed in her home and the installers says it was treated plywood.  She started to have some wheezing and coughing that was productive of greenish sputum.  She went to bed to take a nap but woke up still having these symptoms.  She tried her albuterol inhalers without relief and decided to seek ED evaluation.  She denies any fevers, chills, chest pain, pleurisy, palpitations, diaphoresis, nausea, vomiting, hemoptysis, nor any lower extremity pain or swelling.  She did not have any sick contacts or recent travel, and denies any similar episodes in the past.  Of note, she has been following with Dr. Vishal Mcmahan who initially felt that she had COPD and treated her for that for three years before changing his opinion,after couple PFT study show no sign of COPD,  She did smoke but quit in 1988. She was started on supplemental oxygen for hypoxia with nebulizer treatment,CTA chest show no PE,no consolidation,or fluid overload,SOB and hypoxic episode may is duo to expose to chemical agent.her SOB,and  Hypoxia resolved,she was stable on RA,she improved better than expected,she has been discharged home with follow up with her pulmonologist  ",she has already appointment.      1/22/18 Denies CP  SOB stable  EKG NSR - ok  Needs clearance for toe surgery     1/18/19 Denies CP  Stable SOB  EKG NSR 1st degree AVB otherwise ok  Upcoming knee replacement     7/3/19 Recently Dx with diverticulitis - scheduled for upcoming colonoscopy  Some fatigue  BP runs low on occasion  EKG NSR low voltage otherwise ok     Saw Dr French 10/16/19  HPI: 70 y/o w/ HTN COPD presents to follow up fatigue. For last six weeks daily fatigue falling asleep in chair no motivation. No pain denies fever/ occasional clear to green sputum no dysphagia no LE swelling no chagne in breathing when lying flat. Appetite "okay" weight is unchanged. Denies change in bowel habits. Using maintenance inhaler daily no palpitations or racing heart        10/21/19 Reports worsening fatigue  Getting over recently prolonged sinus infection  EKG NSR - ok     11/13/19 Still with fatigue and sinus issues  Denies CP  Mild stable ARANGO     7/30/20 Back for LE edema. On exam there is 1+ edema at most  Stable ARANGO  Denies CP  EKG NSR - ok  Prn lasix for LE edema  OV 1 month with BNP, BMP        9/3/20 LE edema resolved after taking lasix for 3 days  Denies CP or SOB   Continue Rx for HTN, HLD, LE edema  OV 6 months     3/8/21 Denies CP or SOB. No further LE edema  EKG NSR - ok    Continue Rx for HTN, HLD, LE edema  OV 6 months     10/5/21 Denies CP or SOB  BP controlled    Continue Rx for HTN, HLD, LE edema  OV 6 months      3/28/22 Denies CP or SOB  BP controlled     Continue Rx for HTN, HLD, LE edema  OV 6 months      10/17/22 Denies CP or SOB. LE edema resolved  Chronic back issues  EKG NSR ok       Review of Systems   Constitutional: Negative for decreased appetite.   HENT:  Negative for ear discharge.    Eyes:  Negative for blurred vision.   Respiratory:  Negative for hemoptysis.    Endocrine: Negative for polyphagia.   Hematologic/Lymphatic: Negative for adenopathy.   Skin:  Negative for color " change.   Musculoskeletal:  Negative for joint swelling.   Genitourinary:  Negative for bladder incontinence.   Neurological:  Negative for brief paralysis.   Psychiatric/Behavioral:  Negative for hallucinations.    Allergic/Immunologic: Negative for hives.      Objective:    Physical Exam  Constitutional:       Appearance: She is well-developed.   HENT:      Head: Normocephalic and atraumatic.   Eyes:      Conjunctiva/sclera: Conjunctivae normal.      Pupils: Pupils are equal, round, and reactive to light.   Cardiovascular:      Rate and Rhythm: Normal rate.      Pulses: Intact distal pulses.      Heart sounds: Normal heart sounds.   Pulmonary:      Effort: Pulmonary effort is normal.      Breath sounds: Normal breath sounds.   Abdominal:      General: Bowel sounds are normal.      Palpations: Abdomen is soft.   Musculoskeletal:         General: Normal range of motion.      Cervical back: Normal range of motion and neck supple.   Skin:     General: Skin is warm and dry.   Neurological:      Mental Status: She is alert and oriented to person, place, and time.         Assessment:       1. Essential hypertension    2. Centrilobular emphysema    3. Pure hypercholesterolemia    4. Palpitations         Plan:          Continue Rx for HTN, HLD, LE edema  OV 6 months with echo

## 2023-02-02 ENCOUNTER — OFFICE VISIT (OUTPATIENT)
Dept: FAMILY MEDICINE | Facility: CLINIC | Age: 73
End: 2023-02-02
Payer: MEDICARE

## 2023-02-02 VITALS
TEMPERATURE: 98 F | WEIGHT: 178.13 LBS | DIASTOLIC BLOOD PRESSURE: 70 MMHG | OXYGEN SATURATION: 95 % | RESPIRATION RATE: 18 BRPM | SYSTOLIC BLOOD PRESSURE: 128 MMHG | HEART RATE: 67 BPM | BODY MASS INDEX: 37.39 KG/M2 | HEIGHT: 58 IN

## 2023-02-02 DIAGNOSIS — J43.2 CENTRILOBULAR EMPHYSEMA: ICD-10-CM

## 2023-02-02 DIAGNOSIS — I70.0 AORTIC ATHEROSCLEROSIS: ICD-10-CM

## 2023-02-02 DIAGNOSIS — M46.1 SACROILIITIS, NOT ELSEWHERE CLASSIFIED: ICD-10-CM

## 2023-02-02 DIAGNOSIS — F33.42 RECURRENT MAJOR DEPRESSIVE DISORDER, IN FULL REMISSION: ICD-10-CM

## 2023-02-02 DIAGNOSIS — I10 ESSENTIAL HYPERTENSION: Primary | ICD-10-CM

## 2023-02-02 DIAGNOSIS — E66.01 SEVERE OBESITY (BMI 35.0-39.9) WITH COMORBIDITY: ICD-10-CM

## 2023-02-02 PROBLEM — G61.89 OTHER INFLAMMATORY POLYNEUROPATHIES: Status: RESOLVED | Noted: 2022-01-10 | Resolved: 2023-02-02

## 2023-02-02 PROCEDURE — 99999 PR PBB SHADOW E&M-EST. PATIENT-LVL V: ICD-10-PCS | Mod: PBBFAC,,, | Performed by: INTERNAL MEDICINE

## 2023-02-02 PROCEDURE — 99999 PR PBB SHADOW E&M-EST. PATIENT-LVL V: CPT | Mod: PBBFAC,,, | Performed by: INTERNAL MEDICINE

## 2023-02-02 PROCEDURE — 99215 OFFICE O/P EST HI 40 MIN: CPT | Mod: PBBFAC,PN | Performed by: INTERNAL MEDICINE

## 2023-02-02 PROCEDURE — 99214 OFFICE O/P EST MOD 30 MIN: CPT | Mod: S$PBB,,, | Performed by: INTERNAL MEDICINE

## 2023-02-02 PROCEDURE — 99214 PR OFFICE/OUTPT VISIT, EST, LEVL IV, 30-39 MIN: ICD-10-PCS | Mod: S$PBB,,, | Performed by: INTERNAL MEDICINE

## 2023-02-02 RX ORDER — LOSARTAN POTASSIUM 50 MG/1
50 TABLET ORAL DAILY
Qty: 90 TABLET | Refills: 0 | Status: SHIPPED | OUTPATIENT
Start: 2023-02-02 | End: 2023-05-01

## 2023-02-02 RX ORDER — FLUTICASONE FUROATE AND VILANTEROL 100; 25 UG/1; UG/1
1 POWDER RESPIRATORY (INHALATION) DAILY
Qty: 180 EACH | Refills: 3 | Status: SHIPPED | OUTPATIENT
Start: 2023-02-02 | End: 2023-10-07

## 2023-02-02 NOTE — PROGRESS NOTES
"Subjective:       Patient ID: Dayami Mina is a 72 y.o. female.    Chief Complaint: Follow-up (4 month f/u)    F/u chronic conditions    HPI: 71 y/o w/ HTN MDD COPD not on supplemental O2 presents alone for scheduled follow up. Back "about the same" had NGUYEN in Late Nov 2022 no bowel or bladder incontinence. Breathing "fine" not needed alubuterol inhaler at all recently no LE swelling    Review of Systems   Constitutional:  Negative for activity change, appetite change, fatigue, fever and unexpected weight change.   HENT:  Negative for ear pain, rhinorrhea and sore throat.    Eyes:  Negative for discharge and visual disturbance.   Respiratory:  Negative for chest tightness, shortness of breath and wheezing.    Cardiovascular:  Negative for chest pain, palpitations and leg swelling.   Gastrointestinal:  Negative for abdominal pain, constipation and diarrhea.   Endocrine: Negative for cold intolerance and heat intolerance.   Genitourinary:  Negative for dysuria and hematuria.   Musculoskeletal:  Positive for back pain. Negative for joint swelling and neck stiffness.   Skin:  Negative for rash.   Neurological:  Negative for dizziness, syncope, weakness and headaches.   Psychiatric/Behavioral:  Negative for suicidal ideas.      Objective:     Vitals:    02/02/23 1304   BP: 128/70   Pulse: 67   Resp: 18   Temp: 98 °F (36.7 °C)   TempSrc: Oral   SpO2: 95%   Weight: 80.8 kg (178 lb 2.1 oz)   Height: 4' 10" (1.473 m)          Physical Exam  Constitutional:       Appearance: She is well-developed. She is obese.   HENT:      Head: Normocephalic and atraumatic.   Eyes:      General: No scleral icterus.     Conjunctiva/sclera: Conjunctivae normal.   Cardiovascular:      Rate and Rhythm: Normal rate and regular rhythm.      Heart sounds: No murmur heard.    No friction rub. No gallop.   Pulmonary:      Effort: Pulmonary effort is normal.      Breath sounds: Normal breath sounds. No wheezing or rales.   Abdominal:      " Palpations: Abdomen is soft.      Tenderness: There is no abdominal tenderness. There is no guarding or rebound.   Musculoskeletal:         General: No tenderness. Normal range of motion.      Cervical back: Normal range of motion.      Right lower leg: No edema.      Left lower leg: No edema.   Skin:     General: Skin is warm and dry.   Neurological:      Mental Status: She is alert and oriented to person, place, and time.      Cranial Nerves: No cranial nerve deficit.       Assessment and Plan   1. Essential hypertension  BP at goal continue arb  - CBC Auto Differential; Future  - Comprehensive Metabolic Panel; Future  - losartan (COZAAR) 50 MG tablet; Take 1 tablet (50 mg total) by mouth once daily.  Dispense: 90 tablet; Refill: 0    2. Centrilobular emphysema  On laba/ics continue  - CBC Auto Differential; Future  - fluticasone furoate-vilanteroL (BREO ELLIPTA) 100-25 mcg/dose diskus inhaler; Inhale 1 puff into the lungs once daily.  Dispense: 180 each; Refill: 3    3. Sacroiliitis, not elsewhere classified  Followed by pain management    4. Aortic atherosclerosis  On statin check lft and flp  - Comprehensive Metabolic Panel; Future  - Lipid Panel; Future    5. Severe obesity (BMI 35.0-39.9) with comorbidity  The patient is asked to make an attempt to improve diet and exercise patterns to aid in medical management of this problem.      6. Recurrent major depressive disorder, in full remission  Stable continue ssri

## 2023-02-07 ENCOUNTER — LAB VISIT (OUTPATIENT)
Dept: LAB | Facility: HOSPITAL | Age: 73
End: 2023-02-07
Attending: INTERNAL MEDICINE
Payer: MEDICARE

## 2023-02-07 DIAGNOSIS — J43.2 CENTRILOBULAR EMPHYSEMA: ICD-10-CM

## 2023-02-07 DIAGNOSIS — I10 ESSENTIAL HYPERTENSION: ICD-10-CM

## 2023-02-07 DIAGNOSIS — I70.0 AORTIC ATHEROSCLEROSIS: ICD-10-CM

## 2023-02-07 LAB
ALBUMIN SERPL BCP-MCNC: 3.8 G/DL (ref 3.5–5.2)
ALP SERPL-CCNC: 48 U/L (ref 55–135)
ALT SERPL W/O P-5'-P-CCNC: 30 U/L (ref 10–44)
ANION GAP SERPL CALC-SCNC: 9 MMOL/L (ref 8–16)
AST SERPL-CCNC: 26 U/L (ref 10–40)
BASOPHILS # BLD AUTO: 0.04 K/UL (ref 0–0.2)
BASOPHILS NFR BLD: 0.6 % (ref 0–1.9)
BILIRUB SERPL-MCNC: 0.5 MG/DL (ref 0.1–1)
BUN SERPL-MCNC: 26 MG/DL (ref 8–23)
CALCIUM SERPL-MCNC: 9.6 MG/DL (ref 8.7–10.5)
CHLORIDE SERPL-SCNC: 101 MMOL/L (ref 95–110)
CHOLEST SERPL-MCNC: 213 MG/DL (ref 120–199)
CHOLEST/HDLC SERPL: 4.2 {RATIO} (ref 2–5)
CO2 SERPL-SCNC: 29 MMOL/L (ref 23–29)
CREAT SERPL-MCNC: 1.1 MG/DL (ref 0.5–1.4)
DIFFERENTIAL METHOD: ABNORMAL
EOSINOPHIL # BLD AUTO: 0.2 K/UL (ref 0–0.5)
EOSINOPHIL NFR BLD: 3.5 % (ref 0–8)
ERYTHROCYTE [DISTWIDTH] IN BLOOD BY AUTOMATED COUNT: 14.2 % (ref 11.5–14.5)
EST. GFR  (NO RACE VARIABLE): 53 ML/MIN/1.73 M^2
GLUCOSE SERPL-MCNC: 109 MG/DL (ref 70–110)
HCT VFR BLD AUTO: 39.5 % (ref 37–48.5)
HDLC SERPL-MCNC: 51 MG/DL (ref 40–75)
HDLC SERPL: 23.9 % (ref 20–50)
HGB BLD-MCNC: 12.6 G/DL (ref 12–16)
IMM GRANULOCYTES # BLD AUTO: 0.02 K/UL (ref 0–0.04)
IMM GRANULOCYTES NFR BLD AUTO: 0.3 % (ref 0–0.5)
LDLC SERPL CALC-MCNC: 117.6 MG/DL (ref 63–159)
LYMPHOCYTES # BLD AUTO: 2 K/UL (ref 1–4.8)
LYMPHOCYTES NFR BLD: 30.5 % (ref 18–48)
MCH RBC QN AUTO: 27.3 PG (ref 27–31)
MCHC RBC AUTO-ENTMCNC: 31.9 G/DL (ref 32–36)
MCV RBC AUTO: 86 FL (ref 82–98)
MONOCYTES # BLD AUTO: 0.6 K/UL (ref 0.3–1)
MONOCYTES NFR BLD: 9.4 % (ref 4–15)
NEUTROPHILS # BLD AUTO: 3.6 K/UL (ref 1.8–7.7)
NEUTROPHILS NFR BLD: 55.7 % (ref 38–73)
NONHDLC SERPL-MCNC: 162 MG/DL
NRBC BLD-RTO: 0 /100 WBC
PLATELET # BLD AUTO: 278 K/UL (ref 150–450)
PMV BLD AUTO: 9.8 FL (ref 9.2–12.9)
POTASSIUM SERPL-SCNC: 5.2 MMOL/L (ref 3.5–5.1)
PROT SERPL-MCNC: 7.1 G/DL (ref 6–8.4)
RBC # BLD AUTO: 4.62 M/UL (ref 4–5.4)
SODIUM SERPL-SCNC: 139 MMOL/L (ref 136–145)
TRIGL SERPL-MCNC: 222 MG/DL (ref 30–150)
WBC # BLD AUTO: 6.5 K/UL (ref 3.9–12.7)

## 2023-02-07 PROCEDURE — 36415 COLL VENOUS BLD VENIPUNCTURE: CPT | Mod: PN | Performed by: INTERNAL MEDICINE

## 2023-02-07 PROCEDURE — 80053 COMPREHEN METABOLIC PANEL: CPT | Performed by: INTERNAL MEDICINE

## 2023-02-07 PROCEDURE — 80061 LIPID PANEL: CPT | Performed by: INTERNAL MEDICINE

## 2023-02-07 PROCEDURE — 85025 COMPLETE CBC W/AUTO DIFF WBC: CPT | Performed by: INTERNAL MEDICINE

## 2023-03-13 ENCOUNTER — PES CALL (OUTPATIENT)
Dept: ADMINISTRATIVE | Facility: CLINIC | Age: 73
End: 2023-03-13
Payer: MEDICARE

## 2023-03-17 ENCOUNTER — OFFICE VISIT (OUTPATIENT)
Dept: URGENT CARE | Facility: CLINIC | Age: 73
End: 2023-03-17
Payer: MEDICARE

## 2023-03-17 VITALS
HEART RATE: 72 BPM | DIASTOLIC BLOOD PRESSURE: 75 MMHG | HEIGHT: 58 IN | RESPIRATION RATE: 18 BRPM | BODY MASS INDEX: 37.36 KG/M2 | OXYGEN SATURATION: 95 % | WEIGHT: 178 LBS | TEMPERATURE: 98 F | SYSTOLIC BLOOD PRESSURE: 161 MMHG

## 2023-03-17 DIAGNOSIS — B96.89 BACTERIAL UPPER RESPIRATORY INFECTION: Primary | ICD-10-CM

## 2023-03-17 DIAGNOSIS — J43.2 CENTRILOBULAR EMPHYSEMA: ICD-10-CM

## 2023-03-17 DIAGNOSIS — J06.9 BACTERIAL UPPER RESPIRATORY INFECTION: Primary | ICD-10-CM

## 2023-03-17 DIAGNOSIS — R05.1 ACUTE COUGH: Primary | ICD-10-CM

## 2023-03-17 DIAGNOSIS — I10 ESSENTIAL HYPERTENSION: ICD-10-CM

## 2023-03-17 LAB
CTP QC/QA: YES
SARS-COV-2 AG RESP QL IA.RAPID: NEGATIVE

## 2023-03-17 PROCEDURE — 87811 SARS CORONAVIRUS 2 ANTIGEN POCT, MANUAL READ: ICD-10-PCS | Mod: QW,CR,S$GLB, | Performed by: NURSE PRACTITIONER

## 2023-03-17 PROCEDURE — 71046 XR CHEST PA AND LATERAL: ICD-10-PCS | Mod: S$GLB,,, | Performed by: RADIOLOGY

## 2023-03-17 PROCEDURE — 99214 OFFICE O/P EST MOD 30 MIN: CPT | Mod: S$GLB,,, | Performed by: NURSE PRACTITIONER

## 2023-03-17 PROCEDURE — 71046 X-RAY EXAM CHEST 2 VIEWS: CPT | Mod: S$GLB,,, | Performed by: RADIOLOGY

## 2023-03-17 PROCEDURE — 99214 PR OFFICE/OUTPT VISIT, EST, LEVL IV, 30-39 MIN: ICD-10-PCS | Mod: S$GLB,,, | Performed by: NURSE PRACTITIONER

## 2023-03-17 PROCEDURE — 87811 SARS-COV-2 COVID19 W/OPTIC: CPT | Mod: QW,CR,S$GLB, | Performed by: NURSE PRACTITIONER

## 2023-03-17 RX ORDER — BENZONATATE 200 MG/1
200 CAPSULE ORAL 3 TIMES DAILY PRN
Qty: 30 CAPSULE | Refills: 0 | Status: SHIPPED | OUTPATIENT
Start: 2023-03-17 | End: 2023-03-27

## 2023-03-17 RX ORDER — PREDNISONE 20 MG/1
40 TABLET ORAL DAILY
Qty: 10 TABLET | Refills: 0 | Status: SHIPPED | OUTPATIENT
Start: 2023-03-17 | End: 2023-03-22

## 2023-03-17 RX ORDER — METOPROLOL SUCCINATE 50 MG/1
50 TABLET, EXTENDED RELEASE ORAL DAILY
Qty: 90 TABLET | Refills: 2 | Status: SHIPPED | OUTPATIENT
Start: 2023-03-17 | End: 2023-06-02 | Stop reason: SINTOL

## 2023-03-17 RX ORDER — DOXYCYCLINE 100 MG/1
100 CAPSULE ORAL EVERY 12 HOURS
Qty: 14 CAPSULE | Refills: 0 | Status: SHIPPED | OUTPATIENT
Start: 2023-03-17 | End: 2023-03-24

## 2023-03-17 NOTE — TELEPHONE ENCOUNTER
----- Message from Marium Ching LPN sent at 3/16/2023  4:52 PM CDT -----  Regarding: FW: [phone]    ----- Message -----  From: Galina Jerez  Sent: 3/16/2023   4:47 PM CDT  To: [facility] Staff  Subject: [phone]                                     Type: Patient Call Back    Who called:pt     What is the request in detail:pt is coughing and needs a call she is concerned     Can the clinic reply by [facility]?no     Would the patient rather a call back or a response via [facility]?call back     Best call back number: [phone]    Additional Information:

## 2023-03-17 NOTE — PATIENT INSTRUCTIONS
- Follow up with your PCP or specialty clinic as directed in the next 1-2 weeks if not improved or as needed.  You can call [phone] to schedule an appointment with the appropriate provider.    - Go to the ER or seek medical attention immediately if you develop new or worsening symptoms.    - You must understand that you have received an Urgent Care treatment only and that you may be released before all of your medical problems are known or treated.   - You, the patient, will arrange for follow up care as instructed.   - If your condition worsens or fails to improve we recommend that you receive another evaluation at the ER immediately or contact your PCP to discuss your concerns or return here.

## 2023-03-17 NOTE — PROGRESS NOTES
"Subjective:       Patient ID: Dayami Mina is a 72 y.o. female.    Vitals:  height is 4' 10" (1.473 m) and weight is 80.7 kg (178 lb). Her tympanic temperature is 97.9 °F (36.6 °C). Her blood pressure is 161/75 (abnormal) and her pulse is 72. Her respiration is 18 and oxygen saturation is 95%.     Chief Complaint: URI    72-year-old female with history of COPD, emphysema, and additional medical history as listed below presents to clinic for evaluation of productive cough, congestion, runny nose x1 week.  Patient reports taking over-the-counter allergy medications with minimal relief.  She is vaccinated for COVID, denies any recent sick contacts.  She denies chest pain or shortness of breath.  She is awake and alert, answers questions appropriately, no acute distress noted on today's visit.    Past Medical History:  No date: Benign colon polyp 2cm  No date: Depression  No date: Hyperlipidemia  No date: Hypertension  No date: PONV (postoperative nausea and vomiting)      URI   This is a new problem. The current episode started in the past 7 days. The problem has been unchanged. There has been no fever. Associated symptoms include congestion, coughing, rhinorrhea and a sore throat. Pertinent negatives include no abdominal pain, chest pain, nausea or vomiting. She has tried antihistamine for the symptoms. The treatment provided mild relief.     Constitution: Negative for activity change, appetite change, chills, sweating, fatigue and fever.   HENT:  Positive for congestion and sore throat.    Cardiovascular:  Negative for chest pain.   Respiratory:  Positive for cough, sputum production and COPD. Negative for shortness of breath.    Gastrointestinal:  Negative for abdominal pain, nausea and vomiting.   Musculoskeletal: Negative.    Skin: Negative.    Neurological:  Negative for dizziness.     Objective:      Physical Exam   Constitutional: She is oriented to person, place, and time. She appears well-developed. " She is cooperative.  Non-toxic appearance. She does not appear ill. No distress.   HENT:   Head: Normocephalic and atraumatic.   Ears:   Right Ear: Hearing, tympanic membrane and external ear normal.   Left Ear: Hearing, tympanic membrane and external ear normal.   Nose: Congestion present. No mucosal edema, rhinorrhea or nasal deformity. No epistaxis. Right sinus exhibits no maxillary sinus tenderness and no frontal sinus tenderness. Left sinus exhibits no maxillary sinus tenderness and no frontal sinus tenderness.   Mouth/Throat: Uvula is midline and mucous membranes are normal. No trismus in the jaw. Normal dentition. No uvula swelling. Posterior oropharyngeal erythema present. No oropharyngeal exudate or posterior oropharyngeal edema.   Eyes: Conjunctivae and lids are normal. Right eye exhibits no discharge. Left eye exhibits no discharge.   Neck: Trachea normal and phonation normal. No edema present. No erythema present.   Cardiovascular: Normal rate, normal heart sounds and normal pulses.   Pulmonary/Chest: Effort normal and breath sounds normal. No respiratory distress. She has no decreased breath sounds. She has no rhonchi.   Abdominal: Normal appearance.   Musculoskeletal: Normal range of motion.         General: Normal range of motion.   Neurological: She is alert and oriented to person, place, and time. She exhibits normal muscle tone.   Skin: Skin is warm, dry, intact, not diaphoretic and not pale.   Psychiatric: Her speech is normal and behavior is normal. Mood, judgment and thought content normal.   Nursing note and vitals reviewed.      Results for orders placed or performed in visit on 03/17/23   SARS Coronavirus 2 Antigen, POCT Manual Read   Result Value Ref Range    SARS Coronavirus 2 Antigen Negative Negative     Acceptable Yes      X-Ray Chest PA And Lateral    Result Date: 3/17/2023  EXAMINATION: XR CHEST PA AND LATERAL CLINICAL HISTORY: Acute upper respiratory infection,  unspecified TECHNIQUE: PA and lateral views of the chest were performed. COMPARISON: No 01/11/2022 ne FINDINGS: Heart size normal.  The lungs are clear.  No pleural effusion.  Postsurgical changes noted in the lumbar spine.     See above Electronically signed by: Chito Beaulieu MD Date:    03/17/2023 Time:    13:38     Assessment:       1. Bacterial upper respiratory infection          Plan:         Bacterial upper respiratory infection  -     SARS Coronavirus 2 Antigen, POCT Manual Read  -     X-Ray Chest PA And Lateral; Future; Expected date: 03/17/2023  -     doxycycline (VIBRAMYCIN) 100 MG Cap; Take 1 capsule (100 mg total) by mouth every 12 (twelve) hours. for 7 days  Dispense: 14 capsule; Refill: 0    - Discussed x-ray results with patient, negative COVID test.  Given symptoms and history, will treat for bacterial etiology.  Take antibiotic as directed, start prednisone that was ordered today by your PCP.  Follow-up with PCP.  Patient verbalized understanding and is in agreement with plan.    Patient Instructions   - Follow up with your PCP or specialty clinic as directed in the next 1-2 weeks if not improved or as needed.  You can call [phone] to schedule an appointment with the appropriate provider.    - Go to the ER or seek medical attention immediately if you develop new or worsening symptoms.    - You must understand that you have received an Urgent Care treatment only and that you may be released before all of your medical problems are known or treated.   - You, the patient, will arrange for follow up care as instructed.   - If your condition worsens or fails to improve we recommend that you receive another evaluation at the ER immediately or contact your PCP to discuss your concerns or return here.                 Additional MDM:     Heart Failure Score:   COPD = Yes

## 2023-03-17 NOTE — TELEPHONE ENCOUNTER
No new care gaps identified.  [facility] Embedded Care Gaps. Reference number: [id number]. 3/17/2023   8:27:13 AM JOSHUAT

## 2023-03-17 NOTE — TELEPHONE ENCOUNTER
Spoke with Patient. has taken a at  home Covid test. Tested negative. Sinus symptoms, greens mucus, post nasal drip, coughing. Patient would like medication called in. Please advise.  Change in pharmacy.

## 2023-04-05 ENCOUNTER — OFFICE VISIT (OUTPATIENT)
Dept: CARDIOLOGY | Facility: CLINIC | Age: 73
End: 2023-04-05
Payer: MEDICARE

## 2023-04-05 VITALS
BODY MASS INDEX: 37.11 KG/M2 | HEART RATE: 69 BPM | HEIGHT: 58 IN | SYSTOLIC BLOOD PRESSURE: 132 MMHG | DIASTOLIC BLOOD PRESSURE: 78 MMHG | OXYGEN SATURATION: 97 % | RESPIRATION RATE: 18 BRPM | WEIGHT: 176.81 LBS

## 2023-04-05 DIAGNOSIS — I70.0 AORTIC ATHEROSCLEROSIS: ICD-10-CM

## 2023-04-05 DIAGNOSIS — R00.2 PALPITATIONS: ICD-10-CM

## 2023-04-05 DIAGNOSIS — E78.00 PURE HYPERCHOLESTEROLEMIA: Chronic | ICD-10-CM

## 2023-04-05 DIAGNOSIS — I10 ESSENTIAL HYPERTENSION: Primary | Chronic | ICD-10-CM

## 2023-04-05 PROCEDURE — 99214 OFFICE O/P EST MOD 30 MIN: CPT | Mod: S$PBB,,, | Performed by: INTERNAL MEDICINE

## 2023-04-05 PROCEDURE — 93005 ELECTROCARDIOGRAM TRACING: CPT | Mod: PBBFAC | Performed by: INTERNAL MEDICINE

## 2023-04-05 PROCEDURE — 93010 EKG 12-LEAD: ICD-10-PCS | Mod: S$PBB,,, | Performed by: INTERNAL MEDICINE

## 2023-04-05 PROCEDURE — 99214 PR OFFICE/OUTPT VISIT, EST, LEVL IV, 30-39 MIN: ICD-10-PCS | Mod: S$PBB,,, | Performed by: INTERNAL MEDICINE

## 2023-04-05 PROCEDURE — 99214 OFFICE O/P EST MOD 30 MIN: CPT | Mod: PBBFAC | Performed by: INTERNAL MEDICINE

## 2023-04-05 PROCEDURE — 99999 PR PBB SHADOW E&M-EST. PATIENT-LVL IV: ICD-10-PCS | Mod: PBBFAC,,, | Performed by: INTERNAL MEDICINE

## 2023-04-05 PROCEDURE — 93010 ELECTROCARDIOGRAM REPORT: CPT | Mod: S$PBB,,, | Performed by: INTERNAL MEDICINE

## 2023-04-05 PROCEDURE — 99999 PR PBB SHADOW E&M-EST. PATIENT-LVL IV: CPT | Mod: PBBFAC,,, | Performed by: INTERNAL MEDICINE

## 2023-04-05 NOTE — PROGRESS NOTES
Subjective:   Patient ID:  Dayami Mina is a 72 y.o. female who presents for follow-up of No chief complaint on file.      HPI    HTN, COPD, CRI, palpitations     Stress test 10/30/19    Normal [place] myocardial perfusion study.    The perfusion scan is free of evidence from myocardial ischemia or injury.    Gated perfusion images showed an ejection fraction of 75 % post stress.    There is normal wall motion post stress.     Echo 10/30/19  Normal left ventricular systolic function. The estimated ejection fraction is 60%  Concentric left ventricular remodeling.  No wall motion abnormalities.  Normal right ventricular systolic function.     Holter 8/1/17  1. Sinus rhythm with heart rates varying between 56 and 105 bpm with an average of 72 bpm.     VENTRICULAR ARRHYTHMIAS  1. There were very frequent PVCs totalling 4114 and averaging 171 per hour.     2. There were no episodes of ventricular tachycardia.    SUPRA VENTRICULAR ARRHYTHMIAS  1. There were very rare PACs recorded totalling 1 and averaging less than 1 per hour.     2. There were no episodes of sustained supraventricular tachycardia.    SINUS NODE FUNCTION  1. There was no evidence of high grade SA manuel block.     AV CONDUCTION  1. There was no evidence of high grade AV block.      Admitted 12/2/17  Mrs. Dayami Mina is a 67 y.o. female with essential hypertension, hyperlipidemia (.2 Apr 2015), GERD, and depression who presents to [facility] ED with complaints of dyspnea today.  She shortness of breath started gradually throughout the day and she thinks it was triggered by some renovations bring done in her home.  Some plywood was being placed in her home and the installers says it was treated plywood.  She started to have some wheezing and coughing that was productive of greenish sputum.  She went to bed to take a nap but woke up still having these symptoms.  She tried her albuterol inhalers without relief and decided to seek ED  evaluation.  She denies any fevers, chills, chest pain, pleurisy, palpitations, diaphoresis, nausea, vomiting, hemoptysis, nor any lower extremity pain or swelling.  She did not have any sick contacts or recent travel, and denies any similar episodes in the past.  Of note, she has been following with Dr. Vishal Mcmahan who initially felt that she had COPD and treated her for that for three years before changing his opinion.  She did smoke but quit in 1988.       Mrs. Dayami Mina is a 67 y.o. female with essential hypertension, hyperlipidemia (.2 Apr 2015), GERD, and depression who presents to [facility] ED with complaints of dyspnea .She shortness of breath started gradually throughout the day and she thinks it was triggered by some renovations bring done in her home.  Some plywood was being placed in her home and the installers says it was treated plywood.  She started to have some wheezing and coughing that was productive of greenish sputum.  She went to bed to take a nap but woke up still having these symptoms.  She tried her albuterol inhalers without relief and decided to seek ED evaluation.  She denies any fevers, chills, chest pain, pleurisy, palpitations, diaphoresis, nausea, vomiting, hemoptysis, nor any lower extremity pain or swelling.  She did not have any sick contacts or recent travel, and denies any similar episodes in the past.  Of note, she has been following with Dr. Vishal Mcmahan who initially felt that she had COPD and treated her for that for three years before changing his opinion,after couple PFT study show no sign of COPD,  She did smoke but quit in 1988. She was started on supplemental oxygen for hypoxia with nebulizer treatment,CTA chest show no PE,no consolidation,or fluid overload,SOB and hypoxic episode may is duo to expose to chemical agent.her SOB,and  Hypoxia resolved,she was stable on RA,she improved better than expected,she has been discharged home with follow up with  "her pulmonologist ,she has already appointment.      1/22/18 Denies CP  SOB stable  EKG NSR - ok  Needs clearance for toe surgery     1/18/19 Denies CP  Stable SOB  EKG NSR 1st degree AVB otherwise ok  Upcoming knee replacement     7/3/19 Recently Dx with diverticulitis - scheduled for upcoming colonoscopy  Some fatigue  BP runs low on occasion  EKG NSR low voltage otherwise ok     Saw Dr French 10/16/19  HPI: 68 y/o w/ HTN COPD presents to follow up fatigue. For last six weeks daily fatigue falling asleep in chair no motivation. No pain denies fever/ occasional clear to green sputum no dysphagia no LE swelling no chagne in breathing when lying flat. Appetite "okay" weight is unchanged. Denies change in bowel habits. Using maintenance inhaler daily no palpitations or racing heart        10/21/19 Reports worsening fatigue  Getting over recently prolonged sinus infection  EKG NSR - ok     11/13/19 Still with fatigue and sinus issues  Denies CP  Mild stable ARANGO     7/30/20 Back for LE edema. On exam there is 1+ edema at most  Stable ARANGO  Denies CP  EKG NSR - ok  Prn lasix for LE edema  OV 1 month with BNP, BMP        9/3/20 LE edema resolved after taking lasix for 3 days  Denies CP or SOB   Continue Rx for HTN, HLD, LE edema  OV 6 months     3/8/21 Denies CP or SOB. No further LE edema  EKG NSR - ok    Continue Rx for HTN, HLD, LE edema  OV 6 months     10/5/21 Denies CP or SOB  BP controlled    Continue Rx for HTN, HLD, LE edema  OV 6 months      3/28/22 Denies CP or SOB  BP controlled     Continue Rx for HTN, HLD, LE edema  OV 6 months      10/17/22 Denies CP or SOB. LE edema resolved  Chronic back issues  EKG NSR ok   Continue Rx for HTN, HLD, LE edema   OV 6 months with echo     4/5/23 Echo not done  Denies CP, SOB, or LE edema  BP controlled  EKG NSR 1st degree AVB    Review of Systems   Constitutional: Negative for decreased appetite.   HENT:  Negative for ear discharge.    Eyes:  Negative for blurred " vision.   Respiratory:  Negative for hemoptysis.    Endocrine: Negative for polyphagia.   Hematologic/Lymphatic: Negative for adenopathy.   Skin:  Negative for color change.   Musculoskeletal:  Negative for joint swelling.   Genitourinary:  Negative for bladder incontinence.   Neurological:  Negative for brief paralysis.   Psychiatric/Behavioral:  Negative for hallucinations.    Allergic/Immunologic: Negative for hives.     Objective:   Physical Exam  Constitutional:       Appearance: She is well-developed.   HENT:      Head: Normocephalic and atraumatic.   Eyes:      Conjunctiva/sclera: Conjunctivae normal.      Pupils: Pupils are equal, round, and reactive to light.   Cardiovascular:      Rate and Rhythm: Normal rate.      Pulses: Intact distal pulses.      Heart sounds: Normal heart sounds.   Pulmonary:      Effort: Pulmonary effort is normal.      Breath sounds: Normal breath sounds.   Abdominal:      General: Bowel sounds are normal.      Palpations: Abdomen is soft.   Musculoskeletal:         General: Normal range of motion.      Cervical back: Normal range of motion and neck supple.   Skin:     General: Skin is warm and dry.   Neurological:      Mental Status: She is alert and oriented to person, place, and time.       Assessment:      1. Essential hypertension    2. Pure hypercholesterolemia    3. Palpitations    4. Aortic atherosclerosis        Plan:      Continue Rx for HTN, HLD, LE edema   OV 6 months with echo

## 2023-04-18 ENCOUNTER — PES CALL (OUTPATIENT)
Dept: ADMINISTRATIVE | Facility: CLINIC | Age: 73
End: 2023-04-18
Payer: MEDICARE

## 2023-04-27 DIAGNOSIS — M1A.0410 CHRONIC GOUT OF RIGHT HAND, UNSPECIFIED CAUSE: ICD-10-CM

## 2023-04-27 RX ORDER — ALLOPURINOL 100 MG/1
TABLET ORAL
Qty: 90 TABLET | Refills: 1 | Status: SHIPPED | OUTPATIENT
Start: 2023-04-27 | End: 2023-09-11

## 2023-04-27 NOTE — TELEPHONE ENCOUNTER
No care due was identified.  [facility] Embedded Care Due Messages. Reference number: [id number].   4/27/2023 2:36:09 PM CDT

## 2023-04-27 NOTE — TELEPHONE ENCOUNTER
Refill Decision Note   Dayami Mina  is requesting a refill authorization.  Brief Assessment and Rationale for Refill:  Approve     Medication Therapy Plan:         Comments:     Note composed:4:56 PM 04/27/2023             Appointments     Last Visit   2/2/2023 Ramana French MD   Next Visit   6/2/2023 Ramana French MD

## 2023-05-01 DIAGNOSIS — I10 ESSENTIAL HYPERTENSION: ICD-10-CM

## 2023-05-01 RX ORDER — LOSARTAN POTASSIUM 50 MG/1
TABLET ORAL
Qty: 90 TABLET | Refills: 3 | Status: SHIPPED | OUTPATIENT
Start: 2023-05-01 | End: 2023-11-15 | Stop reason: SDUPTHER

## 2023-05-01 NOTE — TELEPHONE ENCOUNTER
Refill Decision Note   Dayami Mina  is requesting a refill authorization.  Brief Assessment and Rationale for Refill:  Approve     Medication Therapy Plan:         Comments:     No Care Gaps recommended.     Note composed:6:09 PM 05/01/2023

## 2023-05-01 NOTE — TELEPHONE ENCOUNTER
No care due was identified.  [facility] Embedded Care Due Messages. Reference number: [id number].   5/01/2023 1:37:29 PM CDT

## 2023-05-03 DIAGNOSIS — Z71.89 COMPLEX CARE COORDINATION: ICD-10-CM

## 2023-05-08 ENCOUNTER — HOSPITAL ENCOUNTER (OUTPATIENT)
Facility: HOSPITAL | Age: 73
Discharge: HOME | End: 2023-05-09
Attending: EMERGENCY MEDICINE | Admitting: STUDENT IN AN ORGANIZED HEALTH CARE EDUCATION/TRAINING PROGRAM
Payer: MEDICARE

## 2023-05-08 DIAGNOSIS — R07.9 CHEST PAIN: ICD-10-CM

## 2023-05-08 DIAGNOSIS — R55 SYNCOPE: Primary | ICD-10-CM

## 2023-05-08 DIAGNOSIS — E78.00 PURE HYPERCHOLESTEROLEMIA: Chronic | ICD-10-CM

## 2023-05-08 DIAGNOSIS — I65.21 CAROTID ARTERY STENOSIS, SYMPTOMATIC, RIGHT: ICD-10-CM

## 2023-05-08 PROBLEM — F32.A DEPRESSION: Status: ACTIVE | Noted: 2017-12-03

## 2023-05-08 PROBLEM — R09.02 HYPOXIA: Status: RESOLVED | Noted: 2017-12-02 | Resolved: 2023-05-08

## 2023-05-08 PROBLEM — F33.42 RECURRENT MAJOR DEPRESSIVE DISORDER, IN FULL REMISSION: Chronic | Status: ACTIVE | Noted: 2020-11-03

## 2023-05-08 LAB
ALBUMIN SERPL BCP-MCNC: 3.8 G/DL (ref 3.5–5.2)
ALP SERPL-CCNC: 43 U/L (ref 55–135)
ALT SERPL W/O P-5'-P-CCNC: 27 U/L (ref 10–44)
AMPHET+METHAMPHET UR QL: NEGATIVE
ANION GAP SERPL CALC-SCNC: 9 MMOL/L (ref 8–16)
AST SERPL-CCNC: 21 U/L (ref 10–40)
BARBITURATES UR QL SCN>200 NG/ML: NEGATIVE
BASOPHILS # BLD AUTO: 0.04 K/UL (ref 0–0.2)
BASOPHILS NFR BLD: 0.4 % (ref 0–1.9)
BENZODIAZ UR QL SCN>200 NG/ML: NEGATIVE
BILIRUB SERPL-MCNC: 0.3 MG/DL (ref 0.1–1)
BILIRUB UR QL STRIP: NEGATIVE
BNP SERPL-MCNC: 115 PG/ML (ref 0–99)
BUN SERPL-MCNC: 29 MG/DL (ref 8–23)
BZE UR QL SCN: NEGATIVE
CALCIUM SERPL-MCNC: 9.3 MG/DL (ref 8.7–10.5)
CANNABINOIDS UR QL SCN: NEGATIVE
CHLORIDE SERPL-SCNC: 101 MMOL/L (ref 95–110)
CLARITY UR: CLEAR
CO2 SERPL-SCNC: 25 MMOL/L (ref 23–29)
COLOR UR: YELLOW
CREAT SERPL-MCNC: 1 MG/DL (ref 0.5–1.4)
CREAT UR-MCNC: 164 MG/DL (ref 15–325)
DIFFERENTIAL METHOD: ABNORMAL
EOSINOPHIL # BLD AUTO: 0.2 K/UL (ref 0–0.5)
EOSINOPHIL NFR BLD: 1.7 % (ref 0–8)
ERYTHROCYTE [DISTWIDTH] IN BLOOD BY AUTOMATED COUNT: 14.3 % (ref 11.5–14.5)
EST. GFR  (NO RACE VARIABLE): 59 ML/MIN/1.73 M^2
GLUCOSE SERPL-MCNC: 95 MG/DL (ref 70–110)
GLUCOSE UR QL STRIP: NEGATIVE
HCT VFR BLD AUTO: 38.5 % (ref 37–48.5)
HGB BLD-MCNC: 12 G/DL (ref 12–16)
HGB UR QL STRIP: NEGATIVE
IMM GRANULOCYTES # BLD AUTO: 0.03 K/UL (ref 0–0.04)
IMM GRANULOCYTES NFR BLD AUTO: 0.3 % (ref 0–0.5)
KETONES UR QL STRIP: NEGATIVE
LEUKOCYTE ESTERASE UR QL STRIP: ABNORMAL
LYMPHOCYTES # BLD AUTO: 2.1 K/UL (ref 1–4.8)
LYMPHOCYTES NFR BLD: 23 % (ref 18–48)
MCH RBC QN AUTO: 26.8 PG (ref 27–31)
MCHC RBC AUTO-ENTMCNC: 31.2 G/DL (ref 32–36)
MCV RBC AUTO: 86 FL (ref 82–98)
METHADONE UR QL SCN>300 NG/ML: NEGATIVE
MICROSCOPIC COMMENT: NORMAL
MONOCYTES # BLD AUTO: 0.7 K/UL (ref 0.3–1)
MONOCYTES NFR BLD: 7.7 % (ref 4–15)
NEUTROPHILS # BLD AUTO: 6 K/UL (ref 1.8–7.7)
NEUTROPHILS NFR BLD: 66.9 % (ref 38–73)
NITRITE UR QL STRIP: NEGATIVE
NRBC BLD-RTO: 0 /100 WBC
OPIATES UR QL SCN: ABNORMAL
PCP UR QL SCN>25 NG/ML: NEGATIVE
PH UR STRIP: 6 [PH] (ref 5–8)
PLATELET # BLD AUTO: 291 K/UL (ref 150–450)
PMV BLD AUTO: 9.7 FL (ref 9.2–12.9)
POCT GLUCOSE: 91 MG/DL (ref 70–110)
POTASSIUM SERPL-SCNC: 4.9 MMOL/L (ref 3.5–5.1)
PROT SERPL-MCNC: 6.7 G/DL (ref 6–8.4)
PROT UR QL STRIP: ABNORMAL
RBC # BLD AUTO: 4.48 M/UL (ref 4–5.4)
SODIUM SERPL-SCNC: 135 MMOL/L (ref 136–145)
SP GR UR STRIP: 1.03 (ref 1–1.03)
SQUAMOUS #/AREA URNS HPF: 2 /HPF
TOXICOLOGY INFORMATION: ABNORMAL
TROPONIN I SERPL DL<=0.01 NG/ML-MCNC: <0.006 NG/ML (ref 0–0.03)
URN SPEC COLLECT METH UR: ABNORMAL
UROBILINOGEN UR STRIP-ACNC: NEGATIVE EU/DL
WBC # BLD AUTO: 8.91 K/UL (ref 3.9–12.7)
WBC #/AREA URNS HPF: 4 /HPF (ref 0–5)

## 2023-05-08 PROCEDURE — 80307 DRUG TEST PRSMV CHEM ANLYZR: CPT | Performed by: NURSE PRACTITIONER

## 2023-05-08 PROCEDURE — 83880 ASSAY OF NATRIURETIC PEPTIDE: CPT | Performed by: NURSE PRACTITIONER

## 2023-05-08 PROCEDURE — 84484 ASSAY OF TROPONIN QUANT: CPT | Performed by: NURSE PRACTITIONER

## 2023-05-08 PROCEDURE — 80053 COMPREHEN METABOLIC PANEL: CPT | Performed by: NURSE PRACTITIONER

## 2023-05-08 PROCEDURE — 99285 EMERGENCY DEPT VISIT HI MDM: CPT | Mod: 25

## 2023-05-08 PROCEDURE — 25000003 PHARM REV CODE 250: Performed by: HOSPITALIST

## 2023-05-08 PROCEDURE — 93005 ELECTROCARDIOGRAM TRACING: CPT

## 2023-05-08 PROCEDURE — 96372 THER/PROPH/DIAG INJ SC/IM: CPT | Performed by: HOSPITALIST

## 2023-05-08 PROCEDURE — G0378 HOSPITAL OBSERVATION PER HR: HCPCS

## 2023-05-08 PROCEDURE — 25000003 PHARM REV CODE 250: Performed by: EMERGENCY MEDICINE

## 2023-05-08 PROCEDURE — 93010 EKG 12-LEAD: ICD-10-PCS | Mod: ,,, | Performed by: INTERNAL MEDICINE

## 2023-05-08 PROCEDURE — 85025 COMPLETE CBC W/AUTO DIFF WBC: CPT | Performed by: NURSE PRACTITIONER

## 2023-05-08 PROCEDURE — 63600175 PHARM REV CODE 636 W HCPCS: Performed by: HOSPITALIST

## 2023-05-08 PROCEDURE — 93010 ELECTROCARDIOGRAM REPORT: CPT | Mod: ,,, | Performed by: INTERNAL MEDICINE

## 2023-05-08 PROCEDURE — 81000 URINALYSIS NONAUTO W/SCOPE: CPT | Mod: 59 | Performed by: EMERGENCY MEDICINE

## 2023-05-08 PROCEDURE — 82962 GLUCOSE BLOOD TEST: CPT

## 2023-05-08 RX ORDER — BENZONATATE 200 MG/1
CAPSULE ORAL
COMMUNITY
Start: 2023-04-04 | End: 2023-05-08 | Stop reason: CLARIF

## 2023-05-08 RX ORDER — DOCUSATE SODIUM 100 MG/1
200 CAPSULE, LIQUID FILLED ORAL DAILY
Status: DISCONTINUED | OUTPATIENT
Start: 2023-05-09 | End: 2023-05-09 | Stop reason: HOSPADM

## 2023-05-08 RX ORDER — POTASSIUM CHLORIDE 20 MEQ/1
20 TABLET, EXTENDED RELEASE ORAL DAILY PRN
COMMUNITY

## 2023-05-08 RX ORDER — GLUCAGON 1 MG
1 KIT INJECTION
Status: DISCONTINUED | OUTPATIENT
Start: 2023-05-08 | End: 2023-05-09 | Stop reason: HOSPADM

## 2023-05-08 RX ORDER — HYDROCODONE BITARTRATE AND ACETAMINOPHEN 7.5; 325 MG/1; MG/1
1 TABLET ORAL EVERY 8 HOURS PRN
Status: DISCONTINUED | OUTPATIENT
Start: 2023-05-08 | End: 2023-05-09 | Stop reason: HOSPADM

## 2023-05-08 RX ORDER — GABAPENTIN 100 MG/1
100 CAPSULE ORAL NIGHTLY
Status: DISCONTINUED | OUTPATIENT
Start: 2023-05-08 | End: 2023-05-09 | Stop reason: HOSPADM

## 2023-05-08 RX ORDER — BETAHISTINE HCL 100 %
POWDER (GRAM) MISCELLANEOUS
COMMUNITY
Start: 2022-09-09

## 2023-05-08 RX ORDER — LOSARTAN POTASSIUM 25 MG/1
50 TABLET ORAL DAILY
Status: DISCONTINUED | OUTPATIENT
Start: 2023-05-09 | End: 2023-05-08

## 2023-05-08 RX ORDER — BUTALBITAL, ACETAMINOPHEN AND CAFFEINE 300; 40; 50 MG/1; MG/1; MG/1
1 CAPSULE ORAL EVERY 6 HOURS PRN
Status: ON HOLD | COMMUNITY
End: 2023-05-09 | Stop reason: HOSPADM

## 2023-05-08 RX ORDER — SODIUM CHLORIDE 0.9 % (FLUSH) 0.9 %
10 SYRINGE (ML) INJECTION EVERY 8 HOURS PRN
Status: DISCONTINUED | OUTPATIENT
Start: 2023-05-08 | End: 2023-05-09 | Stop reason: HOSPADM

## 2023-05-08 RX ORDER — TALC
6 POWDER (GRAM) TOPICAL NIGHTLY PRN
Status: DISCONTINUED | OUTPATIENT
Start: 2023-05-08 | End: 2023-05-09 | Stop reason: HOSPADM

## 2023-05-08 RX ORDER — MAG HYDROX/ALUMINUM HYD/SIMETH 200-200-20
30 SUSPENSION, ORAL (FINAL DOSE FORM) ORAL 4 TIMES DAILY PRN
Status: DISCONTINUED | OUTPATIENT
Start: 2023-05-08 | End: 2023-05-09 | Stop reason: HOSPADM

## 2023-05-08 RX ORDER — LOSARTAN POTASSIUM 25 MG/1
25 TABLET ORAL ONCE
Status: COMPLETED | OUTPATIENT
Start: 2023-05-08 | End: 2023-05-08

## 2023-05-08 RX ORDER — DEXTROSE 40 %
30 GEL (GRAM) ORAL
Status: DISCONTINUED | OUTPATIENT
Start: 2023-05-08 | End: 2023-05-09 | Stop reason: HOSPADM

## 2023-05-08 RX ORDER — PROCHLORPERAZINE EDISYLATE 5 MG/ML
5 INJECTION INTRAMUSCULAR; INTRAVENOUS EVERY 6 HOURS PRN
Status: DISCONTINUED | OUTPATIENT
Start: 2023-05-08 | End: 2023-05-09 | Stop reason: HOSPADM

## 2023-05-08 RX ORDER — SENNOSIDES 8.6 MG/1
2 TABLET ORAL DAILY
COMMUNITY

## 2023-05-08 RX ORDER — ACETAMINOPHEN 325 MG/1
650 TABLET ORAL EVERY 6 HOURS PRN
Status: DISCONTINUED | OUTPATIENT
Start: 2023-05-08 | End: 2023-05-09 | Stop reason: HOSPADM

## 2023-05-08 RX ORDER — OXYCODONE AND ACETAMINOPHEN 7.5; 325 MG/1; MG/1
1 TABLET ORAL EVERY 4 HOURS PRN
Status: ON HOLD | COMMUNITY
End: 2023-05-09 | Stop reason: HOSPADM

## 2023-05-08 RX ORDER — PRAVASTATIN SODIUM 40 MG/1
40 TABLET ORAL DAILY
Status: DISCONTINUED | OUTPATIENT
Start: 2023-05-09 | End: 2023-05-09 | Stop reason: HOSPADM

## 2023-05-08 RX ORDER — ENOXAPARIN SODIUM 100 MG/ML
40 INJECTION SUBCUTANEOUS EVERY 24 HOURS
Status: DISCONTINUED | OUTPATIENT
Start: 2023-05-08 | End: 2023-05-09 | Stop reason: HOSPADM

## 2023-05-08 RX ORDER — SIMETHICONE 80 MG
1 TABLET,CHEWABLE ORAL 4 TIMES DAILY PRN
Status: DISCONTINUED | OUTPATIENT
Start: 2023-05-08 | End: 2023-05-09 | Stop reason: HOSPADM

## 2023-05-08 RX ORDER — ONDANSETRON 2 MG/ML
4 INJECTION INTRAMUSCULAR; INTRAVENOUS EVERY 8 HOURS PRN
Status: DISCONTINUED | OUTPATIENT
Start: 2023-05-08 | End: 2023-05-09 | Stop reason: HOSPADM

## 2023-05-08 RX ORDER — ESCITALOPRAM OXALATE 10 MG/1
20 TABLET ORAL DAILY
Status: DISCONTINUED | OUTPATIENT
Start: 2023-05-09 | End: 2023-05-09 | Stop reason: HOSPADM

## 2023-05-08 RX ORDER — ALLOPURINOL 100 MG/1
100 TABLET ORAL DAILY
Status: DISCONTINUED | OUTPATIENT
Start: 2023-05-09 | End: 2023-05-09 | Stop reason: HOSPADM

## 2023-05-08 RX ORDER — LANOLIN ALCOHOL/MO/W.PET/CERES
400 CREAM (GRAM) TOPICAL DAILY
COMMUNITY

## 2023-05-08 RX ORDER — DOCUSATE SODIUM 100 MG/1
200 CAPSULE, LIQUID FILLED ORAL DAILY
COMMUNITY

## 2023-05-08 RX ORDER — LOSARTAN POTASSIUM 25 MG/1
50 TABLET ORAL DAILY
Status: DISCONTINUED | OUTPATIENT
Start: 2023-05-09 | End: 2023-05-09 | Stop reason: HOSPADM

## 2023-05-08 RX ORDER — DEXTROSE 40 %
15 GEL (GRAM) ORAL
Status: DISCONTINUED | OUTPATIENT
Start: 2023-05-08 | End: 2023-05-09 | Stop reason: HOSPADM

## 2023-05-08 RX ORDER — ASPIRIN 81 MG/1
81 TABLET ORAL DAILY
Status: DISCONTINUED | OUTPATIENT
Start: 2023-05-09 | End: 2023-05-09 | Stop reason: HOSPADM

## 2023-05-08 RX ORDER — METOPROLOL SUCCINATE 50 MG/1
50 TABLET, EXTENDED RELEASE ORAL DAILY
Status: DISCONTINUED | OUTPATIENT
Start: 2023-05-09 | End: 2023-05-09 | Stop reason: HOSPADM

## 2023-05-08 RX ORDER — NALOXONE HCL 0.4 MG/ML
0.02 VIAL (ML) INJECTION
Status: DISCONTINUED | OUTPATIENT
Start: 2023-05-08 | End: 2023-05-09 | Stop reason: HOSPADM

## 2023-05-08 RX ADMIN — LOSARTAN POTASSIUM 25 MG: 25 TABLET, FILM COATED ORAL at 06:05

## 2023-05-08 RX ADMIN — ENOXAPARIN SODIUM 40 MG: 40 INJECTION SUBCUTANEOUS at 08:05

## 2023-05-08 RX ADMIN — GABAPENTIN 100 MG: 100 CAPSULE ORAL at 08:05

## 2023-05-08 NOTE — PHARMACY MED REC
"Admission Medication History     The home medication history was taken by Selma Thompson.    You may go to "Admission" then "Reconcile Home Medications" tabs to review and/or act upon these items.     The home medication list has been updated by the Pharmacy department.   Please read ALL comments highlighted in yellow.   Please address this information as you see fit.    Feel free to contact us if you have any questions or require assistance.      Medications listed below were obtained from: Patient/family and Analytic software- [employer]  (Not in a hospital admission)          Selma Thompson  [phone]                 .        "

## 2023-05-08 NOTE — HPI
72 y.o. female with retention, hyperlipidemia, GERD, depression presents with a complaint of syncope.  Acute onset this morning, witnessed, was seated at the time and denies trauma, associated with prodrome of lightheadedness.  Reportedly hypotensive with normal CBG on scene.  Denies fever, chills, cough, SOB, chest pain, palpitations, dizziness, n/v/d, or abdominal pain.  Routine labs, BMP, troponin, UA, EKG, chest x-ray, and CT head were all unremarkable for any acute abnormality.  Tox screen positive for opiates, but her home medication prescriptions have not recently changed.  No evidence of infection, denies ETOH or illicit substance use, no recent changes to diet or sleep habits.  Placed in observation.

## 2023-05-08 NOTE — ED NOTES
Pt stated that she came to the er via ems with c/o having 2 witnessed syncopal episodes at home in the chair. Pt stated that she started to get dizzy and then her vision went black and she passed out in her hair. Pt stated that she did not fall or hit anything.

## 2023-05-08 NOTE — ED PROVIDER NOTES
"Encounter Date: 5/8/2023    SCRIBE #1 NOTE: I, Olimpia Vieira, am scribing for, and in the presence of,  Ariadne Mckenzie MD. I have scribed the following portions of the note - Other sections scribed: HPI, ROS, PE.     History     Chief Complaint   Patient presents with    Loss of Consciousness       PT to ER with reports of two witnessed episodes of syncope this morning while sitting in a chair. Pt reports cold sweats with nausea. No c/p CP, SOB,     Dayami Juwan Mina is a 72 y.o. female, with a PMHx of HTN, A-fib, and HLD, who presents to the ED with 2 episodes of loss of consciousness. Patient's daughter reports the patient was sitting at her table talking with an ex-relative, when she suddenly started seeing "black" in her vision. The daughter notes the patient then stared with a "starry glaze", before laying her head on the table (unconscious). The daughter states the patient repeated this sequence twice. The daughter reports over the weekend the patient was complaining of vertigo and has been endorsing meclizine as needed. The daughter notes the patient's BP has been low recently and when she went to her cardiologist, Dr. Chapa, he stated everything was normal. The daughter reports when EMS arrived the patient's standing BP was 90/60 and cbg was 63 however she refused transport to the emergency department at that time. The daughter states the patient has also been complaining of decreased urine in that she feels the urge to urinate, but when goes to the bathroom only a little bit comes out. No other exacerbating or alleviating factors. Patient denies any other associated symptoms. This is the extent of the patient's complaints today in the Emergency Department.       The history is provided by a relative. No  was used.   Review of patient's allergies indicates:   Allergen Reactions    Ancef [cefazolin] Nausea And Vomiting    Sulfa (sulfonamide antibiotics) Anaphylaxis    Scopolamine " Blisters and Other (See Comments)    Phenylephrin-polyvinyl alcohol Blisters and Other (See Comments)     Past Medical History:   Diagnosis Date    Benign colon polyp 2cm     COPD (chronic obstructive pulmonary disease)     Depression     Hyperlipidemia     Hypertension     PONV (postoperative nausea and vomiting)      Past Surgical History:   Procedure Laterality Date    APPENDECTOMY      BACK SURGERY      BREAST CAPSULECTOMY Bilateral 2020    Procedure: CAPSULECTOMY, BREAST;  Surgeon: Levi Clarke MD;  Location: [facility] OR;  Service: Plastics;  Laterality: Bilateral;  MARQUEZ OK WITH 7:15AM START BOTH WILL WORK AT SAME TIME    breast implants      CLOSED REDUCTION FINGER DISLOCATION      FOOT SURGERY      HYSTERECTOMY      JOINT REPLACEMENT Left     total Lt knee    KNEE ARTHROSCOPY Left     MASTECTOMY      REMOVAL OF BREAST IMPLANT Bilateral 2020    Procedure: REMOVAL, IMPLANT, BREAST;  Surgeon: Levi Clarke MD;  Location: [facility] OR;  Service: Plastics;  Laterality: Bilateral;    SHOULDER ARTHROSCOPY      SINUS SURGERY      SURGICAL REMOVAL OF MASS OF UPPER EXTREMITY Right 2020    Procedure: EXCISION, MASS, UPPER EXTREMITY;  Surgeon: Sherman Rojas MD;  Location: [facility] OR;  Service: General;  Laterality: Right;  joint with Dr. Clarke--RN PREOP 2020  THEY WILL WORK AT SAME TIME     Family History   Problem Relation Age of Onset    Diabetes Mother     Diabetes Father     Stroke Paternal Grandfather     Diabetes Sister      Social History     Tobacco Use    Smoking status: Former     Types: Cigarettes     Quit date: 1988     Years since quittin.3    Smokeless tobacco: Never   Substance Use Topics    Alcohol use: No    Drug use: No     Review of Systems   Constitutional:  Negative for activity change, appetite change, chills, fatigue and fever.   HENT:  Negative for congestion, drooling, sore throat and voice change.    Eyes:  Negative for pain and visual disturbance.   Respiratory:   Negative for cough, shortness of breath and wheezing.    Cardiovascular:  Negative for chest pain and leg swelling.   Gastrointestinal:  Negative for abdominal pain, diarrhea, nausea and vomiting.   Genitourinary:  Positive for decreased urine volume. Negative for difficulty urinating, dysuria, flank pain, frequency and urgency.   Musculoskeletal:  Negative for arthralgias, back pain, gait problem, neck pain and neck stiffness.   Skin:  Negative for color change, rash and wound.   Neurological:  Positive for syncope. Negative for tremors, seizures, speech difficulty, weakness, light-headedness, numbness and headaches.   Hematological:  Negative for adenopathy.   Psychiatric/Behavioral:  Negative for agitation and confusion.      Physical Exam     Initial Vitals   BP Pulse Resp Temp SpO2   05/08/23 1348 05/08/23 1348 05/08/23 1348 05/08/23 1525 05/08/23 1348   (!) 157/67 (!) 58 18 97.6 °F (36.4 °C) 96 %      MAP       --                Physical Exam    Nursing note and vitals reviewed.  Constitutional: She appears well-developed and well-nourished. She is not diaphoretic. No distress.   HENT:   Head: Normocephalic and atraumatic.   Right Ear: External ear normal.   Left Ear: External ear normal.   Nose: Nose normal.   Mouth/Throat: Oropharynx is clear and moist. No oropharyngeal exudate.   Eyes: EOM are normal. Pupils are equal, round, and reactive to light. Right eye exhibits no discharge. Left eye exhibits no discharge. No scleral icterus.   Neck: Neck supple. No thyromegaly present. No tracheal deviation present. No JVD present.   Normal range of motion.  Cardiovascular:  Normal rate, regular rhythm, normal heart sounds and intact distal pulses.     Exam reveals no gallop and no friction rub.       No murmur heard.  Pulmonary/Chest: Breath sounds normal. No respiratory distress. She has no wheezes.   Abdominal: Abdomen is soft. Bowel sounds are normal. She exhibits no distension and no mass. There is no abdominal  tenderness. There is no rebound.   Musculoskeletal:         General: No tenderness or edema. Normal range of motion.      Cervical back: Normal range of motion and neck supple.     Neurological: She is alert and oriented to person, place, and time. She has normal strength. No cranial nerve deficit or sensory deficit. GCS score is 15. GCS eye subscore is 4. GCS verbal subscore is 5. GCS motor subscore is 6.   Skin: Skin is warm and dry. Capillary refill takes less than 2 seconds. No rash noted. No erythema.   Psychiatric: She has a normal mood and affect. Thought content normal.       ED Course   Procedures  Labs Reviewed   URINALYSIS, REFLEX TO URINE CULTURE - Abnormal; Notable for the following components:       Result Value    Protein, UA Trace (*)     Leukocytes, UA 1+ (*)     All other components within normal limits    Narrative:     Specimen Source->Urine   CBC W/ AUTO DIFFERENTIAL - Abnormal; Notable for the following components:    MCH 26.8 (*)     MCHC 31.2 (*)     All other components within normal limits   COMPREHENSIVE METABOLIC PANEL - Abnormal; Notable for the following components:    Sodium 135 (*)     BUN 29 (*)     Alkaline Phosphatase 43 (*)     eGFR 59 (*)     All other components within normal limits   B-TYPE NATRIURETIC PEPTIDE - Abnormal; Notable for the following components:     (*)     All other components within normal limits   DRUG SCREEN PANEL, URINE EMERGENCY - Abnormal; Notable for the following components:    Opiate Scrn, Ur Presumptive Positive (*)     All other components within normal limits    Narrative:     Specimen Source->Urine   TROPONIN I   URINALYSIS MICROSCOPIC    Narrative:     Specimen Source->Urine   POCT GLUCOSE   POCT GLUCOSE MONITORING CONTINUOUS     EKG Readings: (Independently Interpreted)   Initial Reading: No STEMI. Rhythm: Normal Sinus Rhythm. Heart Rate: 58. Ectopy: No Ectopy. Conduction: 1st Degree AV Block. ST Segments: Normal ST Segments. T Waves:  Normal. Axis: Normal. Clinical Impression: Normal Sinus Rhythm     Imaging Results              X-Ray Chest AP Portable (Final result)  Result time 05/08/23 16:36:23      Final result by Octavio Strickland MD (05/08/23 16:36:23)                   Impression:      No radiographic acute intrathoracic process seen on this single view.      Electronically signed by: Octavio Strickland MD  Date:    05/08/2023  Time:    16:36               Narrative:    EXAMINATION:  XR CHEST AP PORTABLE    CLINICAL HISTORY:  Syncope and collapse    TECHNIQUE:  Single frontal view of the chest was performed.    COMPARISON:  Chest radiograph 03/17/2023 and CT thorax 12/02/2017    FINDINGS:  Measuring leads overlie the chest.  No detrimental change.Few scattered linear opacities at the lung bases consistent with minimal platelike scarring versus atelectasis.  The lungs are otherwise symmetrically well expanded without consolidation, pleural effusion or pneumothorax.    The cardiac silhouette is normal in size. Pulmonary vasculature and hilar and mediastinal contours are within normal limits.    No acute osseous process seen.  PA and lateral views can be obtained.                                       CT Head Without Contrast (Final result)  Result time 05/08/23 15:22:21      Final result by Tip Taylor MD (05/08/23 15:22:21)                   Impression:      No acute intracranial process.      Electronically signed by: Tip Taylor  Date:    05/08/2023  Time:    15:22               Narrative:    EXAMINATION:  CT HEAD WITHOUT CONTRAST    CLINICAL HISTORY:  Syncope, recurrent;    TECHNIQUE:  Low dose axial CT images obtained throughout the head without intravenous contrast. Sagittal and coronal reconstructions were performed.    COMPARISON:  None.    FINDINGS:  Intracranial compartment:    Ventricles and sulci are normal in size for age without evidence of hydrocephalus. No extra-axial blood or fluid collections.    Mild involutional changes  and chronic microvascular ischemic changes in the periventricular white matter.  No parenchymal mass, hemorrhage, edema or major vascular distribution infarct.    Skull/extracranial contents (limited evaluation): No fracture. Mastoid air cells and paranasal sinuses are essentially clear.                                    X-Rays:   Independently Interpreted Readings:   Other Readings:  Chest x-ray was reviewed.  There is no focal consolidation, pneumothorax, pleural effusion or cardiomegaly  Medications   losartan tablet 25 mg (25 mg Oral Given 5/8/23 1806)     Medical Decision Making:   History:   Old Medical Records: I decided to obtain old medical records.  Clinical Tests:   Lab Tests: Ordered and Reviewed  Radiological Study: Ordered and Reviewed  72-year-old female with a history of multiple medical problems presents emergency department after 2 syncopal episodes prior to arrival.  No recent medication changes.  Denies infectious etiology.  Denies prodromal symptoms.  On exam she is afebrile with stable vital signs.  She is nontoxic appearing.  Her only complaint at this time is fatigue.  Differential diagnosis includes but is not limited to cardiac arrhythmia, dehydration, electrolyte abnormality, symptomatic anemia, viral syndrome, underlying infectious etiology.  I have considered but do not suspect CVA, sepsis, aortic dissection, epilepsy.  Chest x-ray and EKG as above.  Labs were reviewed and are grossly unremarkable.  The patient as well as both of her daughters have been counseled on all of these results and agree with the plan.  Patient will be admitted to Hospital Medicine to observation.  Kedar with Hospital Medicine was contacted and agrees with the plan.  I suspect the patient will likely be consulted to Cardiology while she is admitted to observation.        Scribe Attestation:   Scribe #1: I performed the above scribed service and the documentation accurately describes the services I performed. I  attest to the accuracy of the note.               I personally performed the services described in this documentation. All medical record entries made by the scribe were at my direction and in my presence. I have reviewed the chart and agree that the record reflects my personal performance and is accurate and complete.     Clinical Impression:   Final diagnoses:  [R55] Syncope        ED Disposition Condition    Observation                 Ariadne Mckenzie MD  05/08/23 9413

## 2023-05-08 NOTE — FIRST PROVIDER EVALUATION
" Emergency Department TeleTriage Encounter Note      CHIEF COMPLAINT    Chief Complaint   Patient presents with    Loss of Consciousness       PT to ER with reports of two witnessed episodes of syncope this morning while sitting in a chair. Pt reports cold sweats with nausea. No c/p CP, SOB,       VITAL SIGNS   Initial Vitals [05/08/23 1348]   BP Pulse Resp Temp SpO2   (!) 157/67 (!) 58 18 -- 96 %      MAP       --            ALLERGIES    Review of patient's allergies indicates:   Allergen Reactions    Ancef [cefazolin] Nausea And Vomiting    Sulfa (sulfonamide antibiotics) Anaphylaxis    Scopolamine Blisters and Other (See Comments)    Phenylephrin-polyvinyl alcohol Blisters and Other (See Comments)       PROVIDER TRIAGE NOTE  This is a teletriage evaluation of a 72 y.o. female presenting to the ED complaining of syncope while at rest today. Pt reports two syncopal episodes while sitting in a chair. Denies trauma. Denies CP, SOB, headache, numbness/tingling.  States she feels like she will pass out and then does so.  Currently reports feeling "tired."      Well-appearing, no distress.  Alert, oriented, appropriate.     Initial orders will be placed and care will be transferred to an alternate provider when patient is roomed for a full evaluation. Any additional orders and the final disposition will be determined by that provider.         ORDERS  Labs Reviewed   URINALYSIS, REFLEX TO URINE CULTURE       ED Orders (720h ago, onward)      Start Ordered     Status Ordering Provider    05/08/23 1401 05/08/23 1400  Cardiac Monitoring - Adult  Continuous        Comments: Notify Physician If:    Ordered SHAHLA SAVAGE N.    05/08/23 1401 05/08/23 1400  Pulse Oximetry Continuous  Continuous         Ordered SHAHLA SAVAGE N.    05/08/23 1352 05/08/23 1351  Urinalysis, Reflex to Urine Culture Urine, Clean Catch  STAT         Ordered BETTY WOOD    05/08/23 1352 05/08/23 1351  EKG 12-lead  Once         " Completed by MARTÍN CHEEK on 5/8/2023 at  1:54 PM BETTY WOOD    Unscheduled 05/08/23 1400  CBC auto differential  STAT         Ordered SHAHLA SAVAGE.    Unscheduled 05/08/23 1400  Comprehensive metabolic panel  STAT         Ordered SHAHLA SAVAGE N.    Unscheduled 05/08/23 1400  Brain natriuretic peptide  STAT         Ordered SHAHLA SAVAGE N.    Unscheduled 05/08/23 1400  Insert Saline lock IV  Once         Ordered SHAHLA SAVAGE N.    Unscheduled 05/08/23 1400  EKG 12-lead  Once         Ordered SHAHLA SAVAGE N.    Unscheduled 05/08/23 1400  Vital Signs  Every 2 hours         Ordered SHAHLA SAVAGE.    Unscheduled 05/08/23 1400  POCT glucose  Once         Ordered SHAHLA SAVAGE N.    Unscheduled 05/08/23 1400  Troponin I  STAT         Ordered SHAHLA SAVAGE N.    Unscheduled 05/08/23 1400  Orthostatic vital signs  Once         Ordered SHAHLA SAVAGE.              Virtual Visit Note: The provider triage portion of this emergency department evaluation and documentation was performed via [employer], a HIPAA-compliant telemedicine application, in concert with a tele-presenter in the room. A face to face patient evaluation with one of my colleagues will occur once the patient is placed in an emergency department room.      DISCLAIMER: This note was prepared with [employer] voice recognition transcription software. Garbled syntax, mangled pronouns, and other bizarre constructions may be attributed to that software system.

## 2023-05-09 VITALS
SYSTOLIC BLOOD PRESSURE: 170 MMHG | HEIGHT: 58 IN | TEMPERATURE: 98 F | WEIGHT: 178 LBS | DIASTOLIC BLOOD PRESSURE: 75 MMHG | RESPIRATION RATE: 18 BRPM | BODY MASS INDEX: 37.36 KG/M2 | OXYGEN SATURATION: 97 % | HEART RATE: 65 BPM

## 2023-05-09 LAB
AORTIC ROOT ANNULUS: 2.83 CM
AORTIC VALVE CUSP SEPERATION: 1.5 CM
ASCENDING AORTA: 2.31 CM
AV INDEX (PROSTH): 0.77
AV MEAN GRADIENT: 3 MMHG
AV PEAK GRADIENT: 5 MMHG
AV VALVE AREA: 2.95 CM2
AV VELOCITY RATIO: 0.72
BSA FOR ECHO PROCEDURE: 1.82 M2
CV ECHO LV RWT: 0.35 CM
DOP CALC AO PEAK VEL: 1.16 M/S
DOP CALC AO VTI: 29 CM
DOP CALC LVOT AREA: 3.8 CM2
DOP CALC LVOT DIAMETER: 2.21 CM
DOP CALC LVOT PEAK VEL: 0.83 M/S
DOP CALC LVOT STROKE VOLUME: 85.5 CM3
DOP CALCLVOT PEAK VEL VTI: 22.3 CM
E WAVE DECELERATION TIME: 280.51 MSEC
E/A RATIO: 0.75
E/E' RATIO: 12.33 M/S
ECHO LV POSTERIOR WALL: 0.75 CM (ref 0.6–1.1)
EJECTION FRACTION: 60 %
FRACTIONAL SHORTENING: 34 % (ref 28–44)
INTERVENTRICULAR SEPTUM: 0.79 CM (ref 0.6–1.1)
IVC DIAMETER: 0.83 CM
IVRT: 125.59 MSEC
LA MAJOR: 4.55 CM
LA MINOR: 4.62 CM
LA WIDTH: 3.3 CM
LEFT ATRIUM SIZE: 3.59 CM
LEFT ATRIUM VOLUME INDEX: 26.7 ML/M2
LEFT ATRIUM VOLUME: 46.17 CM3
LEFT INTERNAL DIMENSION IN SYSTOLE: 2.82 CM (ref 2.1–4)
LEFT VENTRICLE DIASTOLIC VOLUME INDEX: 47.18 ML/M2
LEFT VENTRICLE DIASTOLIC VOLUME: 81.62 ML
LEFT VENTRICLE MASS INDEX: 57 G/M2
LEFT VENTRICLE SYSTOLIC VOLUME INDEX: 17.3 ML/M2
LEFT VENTRICLE SYSTOLIC VOLUME: 29.94 ML
LEFT VENTRICULAR INTERNAL DIMENSION IN DIASTOLE: 4.27 CM (ref 3.5–6)
LEFT VENTRICULAR MASS: 99 G
LV LATERAL E/E' RATIO: 10.57 M/S
LV SEPTAL E/E' RATIO: 14.8 M/S
LVOT MG: 1.82 MMHG
LVOT MV: 0.64 CM/S
MV PEAK A VEL: 0.99 M/S
MV PEAK E VEL: 0.74 M/S
MV STENOSIS PRESSURE HALF TIME: 81.35 MS
MV VALVE AREA P 1/2 METHOD: 2.7 CM2
PISA TR MAX VEL: 1.4 M/S
PULM VEIN S/D RATIO: 1.53
PV PEAK D VEL: 0.32 M/S
PV PEAK S VEL: 0.49 M/S
PV PEAK VELOCITY: 0.74 CM/S
RA MAJOR: 4.67 CM
RA PRESSURE: 3 MMHG
RA WIDTH: 2.8 CM
RIGHT VENTRICULAR END-DIASTOLIC DIMENSION: 2.38 CM
RV TISSUE DOPPLER FREE WALL SYSTOLIC VELOCITY 1 (APICAL 4 CHAMBER VIEW): 0.01 CM/S
SINUS: 2.71 CM
STJ: 1.7 CM
TDI LATERAL: 0.07 M/S
TDI SEPTAL: 0.05 M/S
TDI: 0.06 M/S
TR MAX PG: 8 MMHG
TRICUSPID ANNULAR PLANE SYSTOLIC EXCURSION: 2.08 CM
TROPONIN I SERPL DL<=0.01 NG/ML-MCNC: <0.006 NG/ML (ref 0–0.03)
TV PEAK GRADIENT: 1.4 MMHG
TV REST PULMONARY ARTERY PRESSURE: 11 MMHG

## 2023-05-09 PROCEDURE — G0378 HOSPITAL OBSERVATION PER HR: HCPCS

## 2023-05-09 PROCEDURE — 25000003 PHARM REV CODE 250: Performed by: HOSPITALIST

## 2023-05-09 PROCEDURE — 25000003 PHARM REV CODE 250

## 2023-05-09 PROCEDURE — 99222 1ST HOSP IP/OBS MODERATE 55: CPT | Mod: ,,, | Performed by: INTERNAL MEDICINE

## 2023-05-09 PROCEDURE — 36415 COLL VENOUS BLD VENIPUNCTURE: CPT | Performed by: HOSPITALIST

## 2023-05-09 PROCEDURE — 84484 ASSAY OF TROPONIN QUANT: CPT | Performed by: HOSPITALIST

## 2023-05-09 PROCEDURE — 99222 PR INITIAL HOSPITAL CARE,LEVL II: ICD-10-PCS | Mod: ,,, | Performed by: INTERNAL MEDICINE

## 2023-05-09 RX ORDER — MECLIZINE HYDROCHLORIDE 25 MG/1
25 TABLET ORAL 3 TIMES DAILY PRN
Status: DISCONTINUED | OUTPATIENT
Start: 2023-05-09 | End: 2023-05-09 | Stop reason: HOSPADM

## 2023-05-09 RX ORDER — CETIRIZINE HYDROCHLORIDE 5 MG/1
5 TABLET ORAL DAILY
Status: DISCONTINUED | OUTPATIENT
Start: 2023-05-09 | End: 2023-05-09 | Stop reason: HOSPADM

## 2023-05-09 RX ADMIN — LOSARTAN POTASSIUM 50 MG: 25 TABLET, FILM COATED ORAL at 09:05

## 2023-05-09 RX ADMIN — DOCUSATE SODIUM 200 MG: 100 CAPSULE, LIQUID FILLED ORAL at 04:05

## 2023-05-09 RX ADMIN — PRAVASTATIN SODIUM 40 MG: 40 TABLET ORAL at 09:05

## 2023-05-09 RX ADMIN — HYDROCODONE BITARTRATE AND ACETAMINOPHEN 1 TABLET: 7.5; 325 TABLET ORAL at 03:05

## 2023-05-09 RX ADMIN — ALLOPURINOL 100 MG: 100 TABLET ORAL at 09:05

## 2023-05-09 RX ADMIN — CETIRIZINE HYDROCHLORIDE 5 MG: 5 TABLET ORAL at 09:05

## 2023-05-09 RX ADMIN — METOPROLOL SUCCINATE 50 MG: 50 TABLET, EXTENDED RELEASE ORAL at 03:05

## 2023-05-09 RX ADMIN — ESCITALOPRAM OXALATE 20 MG: 10 TABLET ORAL at 09:05

## 2023-05-09 RX ADMIN — ASPIRIN 81 MG: 81 TABLET, COATED ORAL at 09:05

## 2023-05-09 NOTE — NURSING
Discharge instructions given to patient at bedside. Patient verbalized understanding of instructions. Patient states willingness to comply. Saline lock removed. Tele monitoring removed.

## 2023-05-09 NOTE — PLAN OF CARE
05/09/23 1518   Final Note   Assessment Type Final Discharge Note   Anticipated Discharge Disposition Home   Hospital Resources/Appts/Education Provided Appointments scheduled and added to AVS   Post-Acute Status   Post-Acute Authorization Other   Other Status No Post-Acute Service Needs   Discharge Delays  --      Pts nurse Pauly notified that the pt can d/c from CM standpoint

## 2023-05-09 NOTE — SUBJECTIVE & OBJECTIVE
Past Medical History:   Diagnosis Date    Benign colon polyp 2cm     COPD (chronic obstructive pulmonary disease)     Depression     Hyperlipidemia     Hypertension     PONV (postoperative nausea and vomiting)        Past Surgical History:   Procedure Laterality Date    APPENDECTOMY      BACK SURGERY      BREAST CAPSULECTOMY Bilateral 1/23/2020    Procedure: CAPSULECTOMY, BREAST;  Surgeon: Levi Clarke MD;  Location: [facility] OR;  Service: Plastics;  Laterality: Bilateral;  MARQUEZ OK WITH 7:15AM START BOTH WILL WORK AT SAME TIME    breast implants      CLOSED REDUCTION FINGER DISLOCATION      FOOT SURGERY      HYSTERECTOMY      JOINT REPLACEMENT Left     total Lt knee    KNEE ARTHROSCOPY Left     MASTECTOMY      REMOVAL OF BREAST IMPLANT Bilateral 1/23/2020    Procedure: REMOVAL, IMPLANT, BREAST;  Surgeon: Levi Clarke MD;  Location: [facility] OR;  Service: Plastics;  Laterality: Bilateral;    SHOULDER ARTHROSCOPY      SINUS SURGERY      SURGICAL REMOVAL OF MASS OF UPPER EXTREMITY Right 1/23/2020    Procedure: EXCISION, MASS, UPPER EXTREMITY;  Surgeon: Sherman Rojas MD;  Location: [facility] OR;  Service: General;  Laterality: Right;  joint with Dr. Clarke--RN PREOP 1/20/2020  THEY WILL WORK AT SAME TIME       Review of patient's allergies indicates:   Allergen Reactions    Ancef [cefazolin] Nausea And Vomiting    Sulfa (sulfonamide antibiotics) Anaphylaxis    Scopolamine Blisters and Other (See Comments)    Phenylephrin-polyvinyl alcohol Blisters and Other (See Comments)       No current facility-administered medications on file prior to encounter.     Current Outpatient Medications on File Prior to Encounter   Medication Sig    acetaminophen/caffeine (EXCEDRIN TENSION HEADACHE ORAL) Take 1 capsule by mouth daily as needed.    albuterol (PROVENTIL/VENTOLIN HFA) 90 mcg/actuation inhaler INHALE 2 PUFFS BY MOUTH EVERY 6 HOURS AS NEEDED FOR WHEEZING. RESCUE    allopurinoL (ZYLOPRIM) 100 MG tablet TAKE ONE TABLET BY  MOUTH ONCE A DAY    aspirin (ECOTRIN) 81 MG EC tablet Take 81 mg by mouth once daily.    butalbital-acetaminophen-caff (FIORICET) -40 mg Cap Take 1 capsule by mouth every 6 (six) hours as needed.    cyanocobalamin (VITAMIN B-12) 1000 MCG tablet Take 100 mcg by mouth once daily.    docusate sodium (COLACE) 100 MG capsule Take 200 mg by mouth Daily.    EScitalopram oxalate (LEXAPRO) 20 MG tablet TAKE 1 TABLET BY MOUTH EVERY DAY    esomeprazole (NEXIUM) 20 MG capsule Take 20 mg by mouth.    fexofenadine (ALLEGRA) 180 MG tablet Take 180 mg by mouth once daily.    fluticasone furoate-vilanteroL (BREO ELLIPTA) 100-25 mcg/dose diskus inhaler Inhale 1 puff into the lungs once daily.    fluticasone propionate (FLONASE) 50 mcg/actuation nasal spray USE 1 SPARY IN EACH NOSTRIL ONCE DAILY    gabapentin (NEURONTIN) 100 MG capsule 100 mg. 2 tabs in the am and 2 tabs in the pm    HYDROcodone-acetaminophen (NORCO) 7.5-325 mg per tablet Take 1 tablet by mouth.    losartan (COZAAR) 50 MG tablet TAKE ONE TABLET BY MOUTH EVERY DAY    magnesium oxide (MAG-OX) 400 mg (241.3 mg magnesium) tablet Take 400 mg by mouth once daily.    metoprolol succinate (TOPROL-XL) 50 MG 24 hr tablet Take 1 tablet (50 mg total) by mouth once daily.    multivitamin capsule Take 1 capsule by mouth once daily.    potassium chloride SA (K-DUR,KLOR-CON) 20 MEQ tablet Take 20 mEq by mouth daily as needed.    pravastatin (PRAVACHOL) 40 MG tablet TAKE 1 TABLET BY MOUTH EVERY DAY    senna (VEGETABLE LAXATIVE) 8.6 mg tablet Take 2 tablets by mouth once daily.    betahistine HCl (BETAHISTINE, BULK,) 100 % Powd WEEK 1: TAKE ONE 12MG CAPSULE BY MOUTH ONCE A DAY. WEEK 2 AND THEREAFTER: TAKE ONE 12 MG CAPSULE BY MOUTH TWICE DAILY.    furosemide (LASIX) 20 MG tablet Take 1 tablet (20 mg total) by mouth daily as needed (leg swelling). (Patient not taking: Reported on 4/5/2023)    meclizine (ANTIVERT) 12.5 mg tablet Take 1 tablet (12.5 mg total) by mouth 3 (three)  times daily as needed for Dizziness. (Patient not taking: Reported on 2023)    mupirocin (BACTROBAN) 2 % ointment Apply topically 2 (two) times daily. (Patient not taking: Reported on 2023)    oxyCODONE-acetaminophen (PERCOCET) 7.5-325 mg per tablet Take 1 tablet by mouth every 4 (four) hours as needed.    [DISCONTINUED] ammonium lactate (LAC-HYDRIN) 12 % lotion Apply topically 2 (two) times daily as needed for Dry Skin. (Patient not taking: Reported on 2023)    [DISCONTINUED] benzonatate (TESSALON) 200 MG capsule Take by mouth.     Family History       Problem Relation (Age of Onset)    Diabetes Mother, Father, Sister    Stroke Paternal Grandfather          Tobacco Use    Smoking status: Former     Types: Cigarettes     Quit date: 1988     Years since quittin.3    Smokeless tobacco: Never   Substance and Sexual Activity    Alcohol use: No    Drug use: No    Sexual activity: Not Currently     Partners: Male     Review of Systems   Constitutional:  Negative for chills, fatigue and fever.   HENT:  Negative for congestion and rhinorrhea.    Eyes:  Negative for photophobia and visual disturbance.   Respiratory:  Negative for cough and shortness of breath.    Cardiovascular:  Negative for chest pain, palpitations and leg swelling.   Gastrointestinal:  Negative for abdominal pain, diarrhea, nausea and vomiting.   Genitourinary:  Negative for dysuria, frequency and urgency.   Skin:  Negative for pallor, rash and wound.   Neurological:  Positive for syncope and light-headedness. Negative for headaches.   Psychiatric/Behavioral:  Negative for confusion and decreased concentration.    Objective:     Vital Signs (Most Recent):  Temp: 97.5 °F (36.4 °C) (23)  Pulse: (!) 59 (23)  Resp: 18 (23)  BP: (!) 160/68 (23)  SpO2: 100 % (23) Vital Signs (24h Range):  Temp:  [97.5 °F (36.4 °C)-97.6 °F (36.4 °C)] 97.5 °F (36.4 °C)  Pulse:  [57-65] 59  Resp:  [17-18]  18  SpO2:  [96 %-100 %] 100 %  BP: (157-201)/(58-94) 160/68     Weight: 80.7 kg (177 lb 14.6 oz)  Body mass index is 37.18 kg/m².     Physical Exam  Vitals and nursing note reviewed.   Constitutional:       General: She is not in acute distress.     Appearance: She is well-developed.   HENT:      Head: Normocephalic and atraumatic.      Right Ear: External ear normal.      Left Ear: External ear normal.      Nose: Nose normal.   Eyes:      Conjunctiva/sclera: Conjunctivae normal.   Cardiovascular:      Rate and Rhythm: Normal rate and regular rhythm.   Pulmonary:      Effort: Pulmonary effort is normal. No respiratory distress.   Abdominal:      General: There is no distension.      Palpations: Abdomen is soft.   Musculoskeletal:         General: Normal range of motion.   Skin:     General: Skin is warm and dry.   Neurological:      Mental Status: She is alert and oriented to person, place, and time.   Psychiatric:         Thought Content: Thought content normal.              Significant Labs: All pertinent labs within the past 24 hours have been reviewed.    Significant Imaging: I have reviewed all pertinent imaging results/findings within the past 24 hours.

## 2023-05-09 NOTE — H&P
[facility] Medicine  History & Physical    Patient Name: Dayami Mina  MRN: [id number]  Patient Class: OP- Observation  Admission Date: 5/8/2023  Attending Physician: Ramón Bowers III, MD   Primary Care Provider: Ramana French MD         Patient information was obtained from patient, past medical records and ER records.     Subjective:     Principal Problem:Syncope    Chief Complaint:   Chief Complaint   Patient presents with    Loss of Consciousness       PT to ER with reports of two witnessed episodes of syncope this morning while sitting in a chair. Pt reports cold sweats with nausea. No c/p CP, SOB,        HPI: 72 y.o. female with retention, hyperlipidemia, GERD, depression presents with a complaint of syncope.  Acute onset this morning, witnessed, was seated at the time and denies trauma, associated with prodrome of lightheadedness.  Reportedly hypotensive with normal CBG on scene.  Denies fever, chills, cough, SOB, chest pain, palpitations, dizziness, n/v/d, or abdominal pain.  Routine labs, BMP, troponin, UA, EKG, chest x-ray, and CT head were all unremarkable for any acute abnormality.  Tox screen positive for opiates, but her home medication prescriptions have not recently changed.  No evidence of infection, denies ETOH or illicit substance use, no recent changes to diet or sleep habits.  Placed in observation.      Past Medical History:   Diagnosis Date    Benign colon polyp 2cm     COPD (chronic obstructive pulmonary disease)     Depression     Hyperlipidemia     Hypertension     PONV (postoperative nausea and vomiting)        Past Surgical History:   Procedure Laterality Date    APPENDECTOMY      BACK SURGERY      BREAST CAPSULECTOMY Bilateral 1/23/2020    Procedure: CAPSULECTOMY, BREAST;  Surgeon: Levi Clarke MD;  Location: [facility] OR;  Service: Plastics;  Laterality: Bilateral;  MARQUEZ OK WITH 7:15AM START BOTH WILL WORK AT SAME TIME    breast implants       CLOSED REDUCTION FINGER DISLOCATION      FOOT SURGERY      HYSTERECTOMY      JOINT REPLACEMENT Left     total Lt knee    KNEE ARTHROSCOPY Left     MASTECTOMY      REMOVAL OF BREAST IMPLANT Bilateral 1/23/2020    Procedure: REMOVAL, IMPLANT, BREAST;  Surgeon: Levi Clarke MD;  Location: [facility] OR;  Service: Plastics;  Laterality: Bilateral;    SHOULDER ARTHROSCOPY      SINUS SURGERY      SURGICAL REMOVAL OF MASS OF UPPER EXTREMITY Right 1/23/2020    Procedure: EXCISION, MASS, UPPER EXTREMITY;  Surgeon: Sherman Rojas MD;  Location: [facility] OR;  Service: General;  Laterality: Right;  joint with Dr. Clarke--RN PREOP 1/20/2020  THEY WILL WORK AT SAME TIME       Review of patient's allergies indicates:   Allergen Reactions    Ancef [cefazolin] Nausea And Vomiting    Sulfa (sulfonamide antibiotics) Anaphylaxis    Scopolamine Blisters and Other (See Comments)    Phenylephrin-polyvinyl alcohol Blisters and Other (See Comments)       No current facility-administered medications on file prior to encounter.     Current Outpatient Medications on File Prior to Encounter   Medication Sig    acetaminophen/caffeine (EXCEDRIN TENSION HEADACHE ORAL) Take 1 capsule by mouth daily as needed.    albuterol (PROVENTIL/VENTOLIN HFA) 90 mcg/actuation inhaler INHALE 2 PUFFS BY MOUTH EVERY 6 HOURS AS NEEDED FOR WHEEZING. RESCUE    allopurinoL (ZYLOPRIM) 100 MG tablet TAKE ONE TABLET BY MOUTH ONCE A DAY    aspirin (ECOTRIN) 81 MG EC tablet Take 81 mg by mouth once daily.    butalbital-acetaminophen-caff (FIORICET) -40 mg Cap Take 1 capsule by mouth every 6 (six) hours as needed.    cyanocobalamin (VITAMIN B-12) 1000 MCG tablet Take 100 mcg by mouth once daily.    docusate sodium (COLACE) 100 MG capsule Take 200 mg by mouth Daily.    EScitalopram oxalate (LEXAPRO) 20 MG tablet TAKE 1 TABLET BY MOUTH EVERY DAY    esomeprazole (NEXIUM) 20 MG capsule Take 20 mg by mouth.    fexofenadine (ALLEGRA) 180 MG tablet  Take 180 mg by mouth once daily.    fluticasone furoate-vilanteroL (BREO ELLIPTA) 100-25 mcg/dose diskus inhaler Inhale 1 puff into the lungs once daily.    fluticasone propionate (FLONASE) 50 mcg/actuation nasal spray USE 1 SPARY IN EACH NOSTRIL ONCE DAILY    gabapentin (NEURONTIN) 100 MG capsule 100 mg. 2 tabs in the am and 2 tabs in the pm    HYDROcodone-acetaminophen (NORCO) 7.5-325 mg per tablet Take 1 tablet by mouth.    losartan (COZAAR) 50 MG tablet TAKE ONE TABLET BY MOUTH EVERY DAY    magnesium oxide (MAG-OX) 400 mg (241.3 mg magnesium) tablet Take 400 mg by mouth once daily.    metoprolol succinate (TOPROL-XL) 50 MG 24 hr tablet Take 1 tablet (50 mg total) by mouth once daily.    multivitamin capsule Take 1 capsule by mouth once daily.    potassium chloride SA (K-DUR,KLOR-CON) 20 MEQ tablet Take 20 mEq by mouth daily as needed.    pravastatin (PRAVACHOL) 40 MG tablet TAKE 1 TABLET BY MOUTH EVERY DAY    senna (VEGETABLE LAXATIVE) 8.6 mg tablet Take 2 tablets by mouth once daily.    betahistine HCl (BETAHISTINE, BULK,) 100 % Powd WEEK 1: TAKE ONE 12MG CAPSULE BY MOUTH ONCE A DAY. WEEK 2 AND THEREAFTER: TAKE ONE 12 MG CAPSULE BY MOUTH TWICE DAILY.    furosemide (LASIX) 20 MG tablet Take 1 tablet (20 mg total) by mouth daily as needed (leg swelling). (Patient not taking: Reported on 4/5/2023)    meclizine (ANTIVERT) 12.5 mg tablet Take 1 tablet (12.5 mg total) by mouth 3 (three) times daily as needed for Dizziness. (Patient not taking: Reported on 4/5/2023)    mupirocin (BACTROBAN) 2 % ointment Apply topically 2 (two) times daily. (Patient not taking: Reported on 4/5/2023)    oxyCODONE-acetaminophen (PERCOCET) 7.5-325 mg per tablet Take 1 tablet by mouth every 4 (four) hours as needed.    [DISCONTINUED] ammonium lactate (LAC-HYDRIN) 12 % lotion Apply topically 2 (two) times daily as needed for Dry Skin. (Patient not taking: Reported on 4/5/2023)    [DISCONTINUED] benzonatate (TESSALON) 200  MG capsule Take by mouth.     Family History       Problem Relation (Age of Onset)    Diabetes Mother, Father, Sister    Stroke Paternal Grandfather          Tobacco Use    Smoking status: Former     Types: Cigarettes     Quit date: 1988     Years since quittin.3    Smokeless tobacco: Never   Substance and Sexual Activity    Alcohol use: No    Drug use: No    Sexual activity: Not Currently     Partners: Male     Review of Systems   Constitutional:  Negative for chills, fatigue and fever.   HENT:  Negative for congestion and rhinorrhea.    Eyes:  Negative for photophobia and visual disturbance.   Respiratory:  Negative for cough and shortness of breath.    Cardiovascular:  Negative for chest pain, palpitations and leg swelling.   Gastrointestinal:  Negative for abdominal pain, diarrhea, nausea and vomiting.   Genitourinary:  Negative for dysuria, frequency and urgency.   Skin:  Negative for pallor, rash and wound.   Neurological:  Positive for syncope and light-headedness. Negative for headaches.   Psychiatric/Behavioral:  Negative for confusion and decreased concentration.    Objective:     Vital Signs (Most Recent):  Temp: 97.5 °F (36.4 °C) (23)  Pulse: (!) 59 (23)  Resp: 18 (23)  BP: (!) 160/68 (23)  SpO2: 100 % (23) Vital Signs (24h Range):  Temp:  [97.5 °F (36.4 °C)-97.6 °F (36.4 °C)] 97.5 °F (36.4 °C)  Pulse:  [57-65] 59  Resp:  [17-18] 18  SpO2:  [96 %-100 %] 100 %  BP: (157-201)/(58-94) 160/68     Weight: 80.7 kg (177 lb 14.6 oz)  Body mass index is 37.18 kg/m².     Physical Exam  Vitals and nursing note reviewed.   Constitutional:       General: She is not in acute distress.     Appearance: She is well-developed.   HENT:      Head: Normocephalic and atraumatic.      Right Ear: External ear normal.      Left Ear: External ear normal.      Nose: Nose normal.   Eyes:      Conjunctiva/sclera: Conjunctivae normal.   Cardiovascular:      Rate and  Rhythm: Normal rate and regular rhythm.   Pulmonary:      Effort: Pulmonary effort is normal. No respiratory distress.   Abdominal:      General: There is no distension.      Palpations: Abdomen is soft.   Musculoskeletal:         General: Normal range of motion.   Skin:     General: Skin is warm and dry.   Neurological:      Mental Status: She is alert and oriented to person, place, and time.   Psychiatric:         Thought Content: Thought content normal.              Significant Labs: All pertinent labs within the past 24 hours have been reviewed.    Significant Imaging: I have reviewed all pertinent imaging results/findings within the past 24 hours.    Assessment/Plan:     * Syncope  Prodrome of lightheadedness with reported hypotension on scene, unclear etiology, monitor on tele, check echo and carotid US, Cardiology consult, NPO p MN.    Recurrent major depressive disorder, in full remission  Patient has recurrent depression which is moderate and is currently controlled. Will Continue anti-depressant medications. We will not consult psychiatry at this time. Patient does not display psychosis at this time. Continue to monitor closely and adjust plan of care as needed.    Hyperlipidemia  Continue statin    Essential hypertension  Reportedly hypotensive on scene, now hypertensive, continue home antihypertensive medications and monitor blood pressure, adjust as needed.      VTE Risk Mitigation (From admission, onward)         Ordered     enoxaparin injection 40 mg  Daily         05/08/23 1918     IP VTE HIGH RISK PATIENT  Once         05/08/23 1918     Place sequential compression device  Until discontinued         05/08/23 1918                     On 05/08/2023, patient should be placed in hospital observation services under my care in collaboration with Ramón Bowers MD.    Kedar Davidson Jr., APRN, AGACNP-BC  Hospitalist - Department of Hospital Medicine  [facility] [address]  [place] [id number]  Office #: [phone]; Pager #: [phone]

## 2023-05-09 NOTE — PLAN OF CARE
Problem: Adult Inpatient Plan of Care  Goal: Plan of Care Review  Flowsheets (Taken 5/9/2023 0559)  Plan of Care Reviewed With: patient  Goal: Absence of Hospital-Acquired Illness or Injury  Intervention: Identify and Manage Fall Risk  Flowsheets (Taken 5/9/2023 0559)  Safety Promotion/Fall Prevention:   Fall Risk reviewed with patient/family   medications reviewed   lighting adjusted   room near unit station   instructed to call staff for mobility  Intervention: Prevent Skin Injury  Flowsheets (Taken 5/9/2023 0559)  Body Position: position changed independently  Intervention: Prevent and Manage VTE (Venous Thromboembolism) Risk  Flowsheets (Taken 5/9/2023 0559)  VTE Prevention/Management: ROM (active) performed  Range of Motion: active ROM (range of motion) encouraged  Goal: Optimal Comfort and Wellbeing  Intervention: Monitor Pain and Promote Comfort  Flowsheets (Taken 5/9/2023 0559)  Pain Management Interventions: pain management plan reviewed with patient/caregiver  Intervention: Provide Person-Centered Care  Flowsheets (Taken 5/9/2023 0559)  Trust Relationship/Rapport:   care explained   questions answered   questions encouraged   thoughts/feelings acknowledged     Problem: Syncope  Goal: Absence of Syncopal Symptoms  Intervention: Manage Effect of Syncopal Symptoms  Flowsheets (Taken 5/9/2023 0559)  Supportive Measures:   relaxation techniques promoted   verbalization of feelings encouraged  Fluids: NPO

## 2023-05-09 NOTE — ASSESSMENT & PLAN NOTE
Prodrome of lightheadedness with reported hypotension on scene, unclear etiology, monitor on tele, check echo and carotid US, Cardiology consult, NPO p MN.

## 2023-05-09 NOTE — SUBJECTIVE & OBJECTIVE
Past Medical History:   Diagnosis Date    Benign colon polyp 2cm     COPD (chronic obstructive pulmonary disease)     Depression     Hyperlipidemia     Hypertension     PONV (postoperative nausea and vomiting)        Past Surgical History:   Procedure Laterality Date    APPENDECTOMY      BACK SURGERY      BREAST CAPSULECTOMY Bilateral 1/23/2020    Procedure: CAPSULECTOMY, BREAST;  Surgeon: Levi Clarke MD;  Location: [facility] OR;  Service: Plastics;  Laterality: Bilateral;  MARQUEZ OK WITH 7:15AM START BOTH WILL WORK AT SAME TIME    breast implants      CLOSED REDUCTION FINGER DISLOCATION      FOOT SURGERY      HYSTERECTOMY      JOINT REPLACEMENT Left     total Lt knee    KNEE ARTHROSCOPY Left     MASTECTOMY      REMOVAL OF BREAST IMPLANT Bilateral 1/23/2020    Procedure: REMOVAL, IMPLANT, BREAST;  Surgeon: Levi Clarke MD;  Location: [facility] OR;  Service: Plastics;  Laterality: Bilateral;    SHOULDER ARTHROSCOPY      SINUS SURGERY      SURGICAL REMOVAL OF MASS OF UPPER EXTREMITY Right 1/23/2020    Procedure: EXCISION, MASS, UPPER EXTREMITY;  Surgeon: Sherman Rojas MD;  Location: [facility] OR;  Service: General;  Laterality: Right;  joint with Dr. Clarke--RN PREOP 1/20/2020  THEY WILL WORK AT SAME TIME       Review of patient's allergies indicates:   Allergen Reactions    Ancef [cefazolin] Nausea And Vomiting    Sulfa (sulfonamide antibiotics) Anaphylaxis    Scopolamine Blisters and Other (See Comments)    Phenylephrin-polyvinyl alcohol Blisters and Other (See Comments)       No current facility-administered medications on file prior to encounter.     Current Outpatient Medications on File Prior to Encounter   Medication Sig    acetaminophen/caffeine (EXCEDRIN TENSION HEADACHE ORAL) Take 1 capsule by mouth daily as needed.    albuterol (PROVENTIL/VENTOLIN HFA) 90 mcg/actuation inhaler INHALE 2 PUFFS BY MOUTH EVERY 6 HOURS AS NEEDED FOR WHEEZING. RESCUE    allopurinoL (ZYLOPRIM) 100 MG tablet TAKE ONE TABLET BY  MOUTH ONCE A DAY    aspirin (ECOTRIN) 81 MG EC tablet Take 81 mg by mouth once daily.    butalbital-acetaminophen-caff (FIORICET) -40 mg Cap Take 1 capsule by mouth every 6 (six) hours as needed.    cyanocobalamin (VITAMIN B-12) 1000 MCG tablet Take 100 mcg by mouth once daily.    docusate sodium (COLACE) 100 MG capsule Take 200 mg by mouth Daily.    EScitalopram oxalate (LEXAPRO) 20 MG tablet TAKE 1 TABLET BY MOUTH EVERY DAY    esomeprazole (NEXIUM) 20 MG capsule Take 20 mg by mouth.    fexofenadine (ALLEGRA) 180 MG tablet Take 180 mg by mouth once daily.    fluticasone furoate-vilanteroL (BREO ELLIPTA) 100-25 mcg/dose diskus inhaler Inhale 1 puff into the lungs once daily.    fluticasone propionate (FLONASE) 50 mcg/actuation nasal spray USE 1 SPARY IN EACH NOSTRIL ONCE DAILY    gabapentin (NEURONTIN) 100 MG capsule 100 mg. 2 tabs in the am and 2 tabs in the pm    HYDROcodone-acetaminophen (NORCO) 7.5-325 mg per tablet Take 1 tablet by mouth.    losartan (COZAAR) 50 MG tablet TAKE ONE TABLET BY MOUTH EVERY DAY    magnesium oxide (MAG-OX) 400 mg (241.3 mg magnesium) tablet Take 400 mg by mouth once daily.    metoprolol succinate (TOPROL-XL) 50 MG 24 hr tablet Take 1 tablet (50 mg total) by mouth once daily.    multivitamin capsule Take 1 capsule by mouth once daily.    potassium chloride SA (K-DUR,KLOR-CON) 20 MEQ tablet Take 20 mEq by mouth daily as needed.    pravastatin (PRAVACHOL) 40 MG tablet TAKE 1 TABLET BY MOUTH EVERY DAY    senna (VEGETABLE LAXATIVE) 8.6 mg tablet Take 2 tablets by mouth once daily.    betahistine HCl (BETAHISTINE, BULK,) 100 % Powd WEEK 1: TAKE ONE 12MG CAPSULE BY MOUTH ONCE A DAY. WEEK 2 AND THEREAFTER: TAKE ONE 12 MG CAPSULE BY MOUTH TWICE DAILY.    furosemide (LASIX) 20 MG tablet Take 1 tablet (20 mg total) by mouth daily as needed (leg swelling). (Patient not taking: Reported on 4/5/2023)    meclizine (ANTIVERT) 12.5 mg tablet Take 1 tablet (12.5 mg total) by mouth 3 (three)  times daily as needed for Dizziness. (Patient not taking: Reported on 2023)    mupirocin (BACTROBAN) 2 % ointment Apply topically 2 (two) times daily. (Patient not taking: Reported on 2023)    oxyCODONE-acetaminophen (PERCOCET) 7.5-325 mg per tablet Take 1 tablet by mouth every 4 (four) hours as needed.     Family History       Problem Relation (Age of Onset)    Diabetes Mother, Father, Sister    Stroke Paternal Grandfather          Tobacco Use    Smoking status: Former     Types: Cigarettes     Quit date: 1988     Years since quittin.3    Smokeless tobacco: Never   Substance and Sexual Activity    Alcohol use: No    Drug use: No    Sexual activity: Not Currently     Partners: Male     Review of Systems   Constitutional: Negative for decreased appetite, diaphoresis, malaise/fatigue, weight gain and weight loss.   HENT:  Negative for congestion, hearing loss, hoarse voice, nosebleeds, odynophagia, stridor and tinnitus.    Eyes:  Negative for blurred vision, double vision, photophobia, visual disturbance and visual halos.   Cardiovascular:  Positive for syncope. Negative for chest pain, claudication, cyanosis, dyspnea on exertion, irregular heartbeat, leg swelling, near-syncope, orthopnea, palpitations and paroxysmal nocturnal dyspnea.   Respiratory:  Negative for cough, hemoptysis, shortness of breath, sleep disturbances due to breathing, snoring, sputum production and wheezing.    Endocrine: Negative for cold intolerance, heat intolerance, polydipsia, polyphagia and polyuria.   Skin:  Negative for color change, poor wound healing, suspicious lesions and unusual hair distribution.   Musculoskeletal:  Negative for falls, joint pain, muscle cramps, muscle weakness, myalgias, neck pain and stiffness.   Gastrointestinal:  Negative for bloating, abdominal pain, constipation, diarrhea, dysphagia, heartburn, hematemesis, jaundice, melena, nausea and vomiting.   Neurological:  Negative for disturbances in  coordination, excessive daytime sleepiness, dizziness, focal weakness, headaches, light-headedness, loss of balance, numbness, paresthesias, seizures, sensory change, tremors, vertigo and weakness.   Psychiatric/Behavioral:  Negative for altered mental status, depression, hallucinations and memory loss. The patient does not have insomnia and is not nervous/anxious.    Objective:     Vital Signs (Most Recent):  Temp: 98.1 °F (36.7 °C) (05/09/23 0747)  Pulse: (!) 59 (05/09/23 0747)  Resp: 18 (05/09/23 0747)  BP: (!) 178/79 (05/09/23 0747)  SpO2: 95 % (05/09/23 0747) Vital Signs (24h Range):  Temp:  [97.5 °F (36.4 °C)-98.1 °F (36.7 °C)] 98.1 °F (36.7 °C)  Pulse:  [57-69] 59  Resp:  [17-18] 18  SpO2:  [93 %-100 %] 95 %  BP: (120-201)/(56-94) 178/79     Weight: 81 kg (178 lb 9.2 oz)  Body mass index is 37.32 kg/m².    SpO2: 95 %         Intake/Output Summary (Last 24 hours) at 5/9/2023 0941  Last data filed at 5/8/2023 2300  Gross per 24 hour   Intake 600 ml   Output --   Net 600 ml       Lines/Drains/Airways       Drain  Duration                  Closed/Suction Drain 01/23/20 0846 Left Breast Bulb 19 Fr. 1201 days         Closed/Suction Drain 01/23/20 0932 Right Breast Bulb 19 Fr. 1201 days              Peripheral Intravenous Line  Duration                  Peripheral IV - Single Lumen 05/08/23 1524 20 G Right Forearm <1 day                     Physical Exam  Vitals reviewed.   Constitutional:       General: She is not in acute distress.     Appearance: Normal appearance. She is well-developed. She is obese. She is not ill-appearing, toxic-appearing or diaphoretic.   HENT:      Head: Normocephalic.   Neck:      Vascular: No carotid bruit or JVD.   Cardiovascular:      Rate and Rhythm: Normal rate and regular rhythm.      Pulses: Normal pulses.   Pulmonary:      Effort: Pulmonary effort is normal.      Breath sounds: Normal breath sounds.   Abdominal:      General: Bowel sounds are normal.      Palpations: Abdomen is  soft.      Tenderness: There is no abdominal tenderness.   Musculoskeletal:      Right lower leg: No edema.      Left lower leg: No edema.   Neurological:      Mental Status: She is alert and oriented to person, place, and time.   Psychiatric:         Mood and Affect: Mood normal.         Speech: Speech normal.         Behavior: Behavior normal.         Thought Content: Thought content normal.        Significant Labs: CMP   Recent Labs   Lab 05/08/23  1524   *   K 4.9      CO2 25   GLU 95   BUN 29*   CREATININE 1.0   CALCIUM 9.3   PROT 6.7   ALBUMIN 3.8   BILITOT 0.3   ALKPHOS 43*   AST 21   ALT 27   ANIONGAP 9   , CBC   Recent Labs   Lab 05/08/23  1524   WBC 8.91   HGB 12.0   HCT 38.5      , and Troponin   Recent Labs   Lab 05/08/23  1524 05/09/23  0135   TROPONINI <0.006 <0.006       Significant Imaging: EKG:  Sinus bradycardia first-degree AV block

## 2023-05-09 NOTE — PROGRESS NOTES
05/09/23 1536   Vital Signs   Pulse 64   Heart Rate Source Monitor   Resp 18   SpO2 97 %   BP (!) 196/76   MAP (mmHg) 109   BP Location Right arm   Patient Position Lying     BP elevated, PA aware,  states ok to give scheduled metoprolol dose that was held earlier today due to lower HR. Pain medication also given, will recheck BP in approx an hour.

## 2023-05-09 NOTE — ASSESSMENT & PLAN NOTE
05/09/2023: Episode sounds vagal.  Monitor on telemetry.  Recommend outpatient event monitor.  Echocardiogram pending.

## 2023-05-09 NOTE — NURSING TRANSFER
Nursing Transfer Note      5/8/2023     Reason patient is being transferred: Observation    Transfer From: ED To: 330    Transfer via stretcher    Transfer with cardiac monitoring    Transported by transport personnel    Telemetry: Box Number [id number]    Medicines sent: none    Any special needs or follow-up needed: none    Chart send with patient: Yes    Notified: daughter    Patient reassessed at: 2020 on 05/08/2023    Upon arrival to floor, patient walked from stretcher to bed with no assistance needed. Cardiac monitor applied and patient oriented to surroundings. Vital signs obtained and can be found in flow sheets with complete patient assessment. Skin dry and intact with a 20g RFA PIV noted saline locked. Call light in reach and patient instructed to inform the nurse if anything is needed. Patient stable and will continue to be monitored.

## 2023-05-09 NOTE — HOSPITAL COURSE
Dayami Mina was placed under observation for management of syncope.    Throughout her observation stay, Ms. Mina continued to endorse that she was symptom free. Cardiology was consulted who recommended further outpatient workup with event monitor. Echocardiogram was ordered which noted normal LV/RV size and function with EF 60%. 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 Patient and daughters were informed and educated on the findings. Patient's blood pressure continued to remain elevated and she was administered her home anti-hypertensive medications which were held earlier in the day due to low heart rate. Ms. Mina is medically stable and clear for discharge. Strict return precautions have been discussed with, as well as her daughter at bedside and they are in agreement with return precautions.    All findings and plan were explained to the patient and daughter at bedside and over the phone. All questions and concerns were answered. Patient and daughters verbalized understanding. Patient is in stable condition to d/c home and has been informed to follow up with her PCP within the next 7-10 days to discuss her observation stay and to follow-up with Cardiology and Vascular Surgery. Patient has been educated to return to the ED if she experiences any further syncopal episodes, chest pain, shortness of breath, lightheadness, weakness, or discomfort.

## 2023-05-09 NOTE — ASSESSMENT & PLAN NOTE
Reportedly hypotensive on scene, now hypertensive, continue home antihypertensive medications and monitor blood pressure, adjust as needed.

## 2023-05-09 NOTE — NURSING
[facility]  Nurses Note -- 4 Eyes      5/8/2023       Skin assessed on: Admit      [x] No Pressure Injuries Present    []Prevention Measures Documented    [] Yes LDA  for Pressure Injury Previously documented     [] Yes New Pressure Injury Discovered   [] LDA for New Pressure Injury Added      Attending RN:  Jackelin Nava RN     Second RN:  LAEX Herbert

## 2023-05-09 NOTE — CONSULTS
[facility] - Telemetry  Cardiology  Consult Note    Patient Name: Dayami Mina  MRN: [id number]  Admission Date: 5/8/2023  Hospital Length of Stay: 0 days  Code Status: Full Code   Attending Provider: Jeimy Sheriff MD   Consulting Provider: Farooq Clark MD  Primary Care Physician: Ramana French MD  Principal Problem:Syncope    Patient information was obtained from patient, past medical records and ER records.     Inpatient consult to Cardiology  Consult performed by: Farooq Clark MD  Consult ordered by: Kedar Davidson Jr., NP        Subjective:     Chief Complaint:  Syncope     HPI:   Dayami Mina is a 72 y.o. female who presents after a syncopal episode.  Patient states she was sitting at her kitchen table.  A friend of hers came over.  She states that when this friend usually shows up bad news follows.  She was told that a friend of there is is in the hospital and is very sick.  Shortly after speaking with her she felt diaphoretic.  Dizzy.  Subsequently passed out.  Reportedly hypotensive.  She denies any prior episodes.  EKG on admission showed sinus bradycardia with first-degree AV block.  No events noted on telemetry.  Heart rate currently 73.  Troponin negative.  History of palpitations.  On Toprol.      EKG 5/8/2023:  Sinus bradycardia with first-degree AV block  04/05/2023: Sinus rhythm with first-degree AV block    Follows with Dr. Chapa    Stress test 10/30/19    Normal [place] myocardial perfusion study.    The perfusion scan is free of evidence from myocardial ischemia or injury.    Gated perfusion images showed an ejection fraction of 75 % post stress.    There is normal wall motion post stress.     Echo 10/30/19   Normal left ventricular systolic function. The estimated ejection fraction is 60%   Concentric left ventricular remodeling.   No wall motion abnormalities.   Normal right ventricular systolic function.     Holter 8/1/17  1. Sinus rhythm with heart rates varying  between 56 and 105 bpm with an average of 72 bpm.     VENTRICULAR ARRHYTHMIAS  1. There were very frequent PVCs totalling 4114 and averaging 171 per hour.     2. There were no episodes of ventricular tachycardia.    SUPRA VENTRICULAR ARRHYTHMIAS  1. There were very rare PACs recorded totalling 1 and averaging less than 1 per hour.     2. There were no episodes of sustained supraventricular tachycardia.    SINUS NODE FUNCTION  1. There was no evidence of high grade SA manuel block.     AV CONDUCTION  1. There was no evidence of high grade AV block.       Past Medical History:   Diagnosis Date    Benign colon polyp 2cm     COPD (chronic obstructive pulmonary disease)     Depression     Hyperlipidemia     Hypertension     PONV (postoperative nausea and vomiting)        Past Surgical History:   Procedure Laterality Date    APPENDECTOMY      BACK SURGERY      BREAST CAPSULECTOMY Bilateral 1/23/2020    Procedure: CAPSULECTOMY, BREAST;  Surgeon: Levi Clarke MD;  Location: [facility] OR;  Service: Plastics;  Laterality: Bilateral;  MARQUEZ OK WITH 7:15AM START BOTH WILL WORK AT SAME TIME    breast implants      CLOSED REDUCTION FINGER DISLOCATION      FOOT SURGERY      HYSTERECTOMY      JOINT REPLACEMENT Left     total Lt knee    KNEE ARTHROSCOPY Left     MASTECTOMY      REMOVAL OF BREAST IMPLANT Bilateral 1/23/2020    Procedure: REMOVAL, IMPLANT, BREAST;  Surgeon: Levi Clarke MD;  Location: [facility] OR;  Service: Plastics;  Laterality: Bilateral;    SHOULDER ARTHROSCOPY      SINUS SURGERY      SURGICAL REMOVAL OF MASS OF UPPER EXTREMITY Right 1/23/2020    Procedure: EXCISION, MASS, UPPER EXTREMITY;  Surgeon: Sherman Rojas MD;  Location: [facility] OR;  Service: General;  Laterality: Right;  joint with Dr. Clarke--RN PREOP 1/20/2020  THEY WILL WORK AT SAME TIME       Review of patient's allergies indicates:   Allergen Reactions    Ancef [cefazolin] Nausea And Vomiting    Sulfa (sulfonamide antibiotics)  Anaphylaxis    Scopolamine Blisters and Other (See Comments)    Phenylephrin-polyvinyl alcohol Blisters and Other (See Comments)       No current facility-administered medications on file prior to encounter.     Current Outpatient Medications on File Prior to Encounter   Medication Sig    acetaminophen/caffeine (EXCEDRIN TENSION HEADACHE ORAL) Take 1 capsule by mouth daily as needed.    albuterol (PROVENTIL/VENTOLIN HFA) 90 mcg/actuation inhaler INHALE 2 PUFFS BY MOUTH EVERY 6 HOURS AS NEEDED FOR WHEEZING. RESCUE    allopurinoL (ZYLOPRIM) 100 MG tablet TAKE ONE TABLET BY MOUTH ONCE A DAY    aspirin (ECOTRIN) 81 MG EC tablet Take 81 mg by mouth once daily.    butalbital-acetaminophen-caff (FIORICET) -40 mg Cap Take 1 capsule by mouth every 6 (six) hours as needed.    cyanocobalamin (VITAMIN B-12) 1000 MCG tablet Take 100 mcg by mouth once daily.    docusate sodium (COLACE) 100 MG capsule Take 200 mg by mouth Daily.    EScitalopram oxalate (LEXAPRO) 20 MG tablet TAKE 1 TABLET BY MOUTH EVERY DAY    esomeprazole (NEXIUM) 20 MG capsule Take 20 mg by mouth.    fexofenadine (ALLEGRA) 180 MG tablet Take 180 mg by mouth once daily.    fluticasone furoate-vilanteroL (BREO ELLIPTA) 100-25 mcg/dose diskus inhaler Inhale 1 puff into the lungs once daily.    fluticasone propionate (FLONASE) 50 mcg/actuation nasal spray USE 1 SPARY IN EACH NOSTRIL ONCE DAILY    gabapentin (NEURONTIN) 100 MG capsule 100 mg. 2 tabs in the am and 2 tabs in the pm    HYDROcodone-acetaminophen (NORCO) 7.5-325 mg per tablet Take 1 tablet by mouth.    losartan (COZAAR) 50 MG tablet TAKE ONE TABLET BY MOUTH EVERY DAY    magnesium oxide (MAG-OX) 400 mg (241.3 mg magnesium) tablet Take 400 mg by mouth once daily.    metoprolol succinate (TOPROL-XL) 50 MG 24 hr tablet Take 1 tablet (50 mg total) by mouth once daily.    multivitamin capsule Take 1 capsule by mouth once daily.    potassium chloride SA (K-DUR,KLOR-CON) 20 MEQ tablet  Take 20 mEq by mouth daily as needed.    pravastatin (PRAVACHOL) 40 MG tablet TAKE 1 TABLET BY MOUTH EVERY DAY    senna (VEGETABLE LAXATIVE) 8.6 mg tablet Take 2 tablets by mouth once daily.    betahistine HCl (BETAHISTINE, BULK,) 100 % Powd WEEK 1: TAKE ONE 12MG CAPSULE BY MOUTH ONCE A DAY. WEEK 2 AND THEREAFTER: TAKE ONE 12 MG CAPSULE BY MOUTH TWICE DAILY.    furosemide (LASIX) 20 MG tablet Take 1 tablet (20 mg total) by mouth daily as needed (leg swelling). (Patient not taking: Reported on 2023)    meclizine (ANTIVERT) 12.5 mg tablet Take 1 tablet (12.5 mg total) by mouth 3 (three) times daily as needed for Dizziness. (Patient not taking: Reported on 2023)    mupirocin (BACTROBAN) 2 % ointment Apply topically 2 (two) times daily. (Patient not taking: Reported on 2023)    oxyCODONE-acetaminophen (PERCOCET) 7.5-325 mg per tablet Take 1 tablet by mouth every 4 (four) hours as needed.     Family History       Problem Relation (Age of Onset)    Diabetes Mother, Father, Sister    Stroke Paternal Grandfather          Tobacco Use    Smoking status: Former     Types: Cigarettes     Quit date: 1988     Years since quittin.3    Smokeless tobacco: Never   Substance and Sexual Activity    Alcohol use: No    Drug use: No    Sexual activity: Not Currently     Partners: Male     Review of Systems   Constitutional: Negative for decreased appetite, diaphoresis, malaise/fatigue, weight gain and weight loss.   HENT:  Negative for congestion, hearing loss, hoarse voice, nosebleeds, odynophagia, stridor and tinnitus.    Eyes:  Negative for blurred vision, double vision, photophobia, visual disturbance and visual halos.   Cardiovascular:  Positive for syncope. Negative for chest pain, claudication, cyanosis, dyspnea on exertion, irregular heartbeat, leg swelling, near-syncope, orthopnea, palpitations and paroxysmal nocturnal dyspnea.   Respiratory:  Negative for cough, hemoptysis, shortness of breath,  sleep disturbances due to breathing, snoring, sputum production and wheezing.    Endocrine: Negative for cold intolerance, heat intolerance, polydipsia, polyphagia and polyuria.   Skin:  Negative for color change, poor wound healing, suspicious lesions and unusual hair distribution.   Musculoskeletal:  Negative for falls, joint pain, muscle cramps, muscle weakness, myalgias, neck pain and stiffness.   Gastrointestinal:  Negative for bloating, abdominal pain, constipation, diarrhea, dysphagia, heartburn, hematemesis, jaundice, melena, nausea and vomiting.   Neurological:  Negative for disturbances in coordination, excessive daytime sleepiness, dizziness, focal weakness, headaches, light-headedness, loss of balance, numbness, paresthesias, seizures, sensory change, tremors, vertigo and weakness.   Psychiatric/Behavioral:  Negative for altered mental status, depression, hallucinations and memory loss. The patient does not have insomnia and is not nervous/anxious.    Objective:     Vital Signs (Most Recent):  Temp: 98.1 °F (36.7 °C) (05/09/23 0747)  Pulse: (!) 59 (05/09/23 0747)  Resp: 18 (05/09/23 0747)  BP: (!) 178/79 (05/09/23 0747)  SpO2: 95 % (05/09/23 0747) Vital Signs (24h Range):  Temp:  [97.5 °F (36.4 °C)-98.1 °F (36.7 °C)] 98.1 °F (36.7 °C)  Pulse:  [57-69] 59  Resp:  [17-18] 18  SpO2:  [93 %-100 %] 95 %  BP: (120-201)/(56-94) 178/79     Weight: 81 kg (178 lb 9.2 oz)  Body mass index is 37.32 kg/m².    SpO2: 95 %         Intake/Output Summary (Last 24 hours) at 5/9/2023 0941  Last data filed at 5/8/2023 2300  Gross per 24 hour   Intake 600 ml   Output --   Net 600 ml       Lines/Drains/Airways       Drain  Duration                  Closed/Suction Drain 01/23/20 0846 Left Breast Bulb 19 Fr. 1201 days         Closed/Suction Drain 01/23/20 0932 Right Breast Bulb 19 Fr. 1201 days              Peripheral Intravenous Line  Duration                  Peripheral IV - Single Lumen 05/08/23 1524 20 G Right Forearm <1 day                      Physical Exam  Vitals reviewed.   Constitutional:       General: She is not in acute distress.     Appearance: Normal appearance. She is well-developed. She is obese. She is not ill-appearing, toxic-appearing or diaphoretic.   HENT:      Head: Normocephalic.   Neck:      Vascular: No carotid bruit or JVD.   Cardiovascular:      Rate and Rhythm: Normal rate and regular rhythm.      Pulses: Normal pulses.   Pulmonary:      Effort: Pulmonary effort is normal.      Breath sounds: Normal breath sounds.   Abdominal:      General: Bowel sounds are normal.      Palpations: Abdomen is soft.      Tenderness: There is no abdominal tenderness.   Musculoskeletal:      Right lower leg: No edema.      Left lower leg: No edema.   Neurological:      Mental Status: She is alert and oriented to person, place, and time.   Psychiatric:         Mood and Affect: Mood normal.         Speech: Speech normal.         Behavior: Behavior normal.         Thought Content: Thought content normal.        Significant Labs: CMP   Recent Labs   Lab 05/08/23  1524   *   K 4.9      CO2 25   GLU 95   BUN 29*   CREATININE 1.0   CALCIUM 9.3   PROT 6.7   ALBUMIN 3.8   BILITOT 0.3   ALKPHOS 43*   AST 21   ALT 27   ANIONGAP 9   , CBC   Recent Labs   Lab 05/08/23  1524   WBC 8.91   HGB 12.0   HCT 38.5      , and Troponin   Recent Labs   Lab 05/08/23  1524 05/09/23  0135   TROPONINI <0.006 <0.006       Significant Imaging: EKG:  Sinus bradycardia first-degree AV block    Assessment and Plan:     * Syncope  05/09/2023: Episode sounds vagal.  Monitor on telemetry.  Recommend outpatient event monitor.  Echocardiogram pending.    Hyperlipidemia  05/09/2023: Continue statin.    Essential hypertension  05/09/2023: Patient states blood pressure at home has been elevated.  Monitor.        VTE Risk Mitigation (From admission, onward)         Ordered     enoxaparin injection 40 mg  Daily         05/08/23 1918     IP VTE HIGH RISK  PATIENT  Once         05/08/23 1918     Place sequential compression device  Until discontinued         05/08/23 1918                Thank you for your consult. I will follow-up with patient. Please contact us if you have any additional questions.    Farooq Clark MD  Cardiology   [facility] [place] [facility]

## 2023-05-09 NOTE — DISCHARGE SUMMARY
[facility] Medicine  Discharge Summary      Patient Name: Dayami Mina  MRN: [id number]  TONE: [id number]  Patient Class: OP- Observation  Admission Date: 5/8/2023  Hospital Length of Stay: 0 days  Discharge Date and Time:  05/09/2023 5:33 PM  Attending Physician: Jeimy Sheriff MD   Discharging Provider: Juan Rutherford PA-C  Primary Care Provider: Ramana French MD    Primary Care Team: JUAN RUTHERFORD    HPI:   72 y.o. female with retention, hyperlipidemia, GERD, depression presents with a complaint of syncope.  Acute onset this morning, witnessed, was seated at the time and denies trauma, associated with prodrome of lightheadedness.  Reportedly hypotensive with normal CBG on scene.  Denies fever, chills, cough, SOB, chest pain, palpitations, dizziness, n/v/d, or abdominal pain.  Routine labs, BMP, troponin, UA, EKG, chest x-ray, and CT head were all unremarkable for any acute abnormality.  Tox screen positive for opiates, but her home medication prescriptions have not recently changed.  No evidence of infection, denies ETOH or illicit substance use, no recent changes to diet or sleep habits.  Placed in observation.      * No surgery found *      Hospital Course:   Dayami Mina was placed under observation for management of syncope.    Throughout her observation stay, Ms. Mina continued to endorse that she was symptom free. Cardiology was consulted who recommended further outpatient workup with event monitor. Echocardiogram was ordered which noted normal LV/RV size and function with EF 60%. 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 Patient and daughters were informed and educated on the findings. Patient's blood pressure continued to remain elevated and she was administered her home anti-hypertensive medications which were held earlier in the day due to low heart rate. Ms.  Keeley is medically stable and clear for discharge. Strict return precautions have been discussed with, as well as her daughter at bedside and they are in agreement with return precautions.    All findings and plan were explained to the patient and daughter at bedside and over the phone. All questions and concerns were answered. Patient and daughters verbalized understanding. Patient is in stable condition to d/c home and has been informed to follow up with her PCP within the next 7-10 days to discuss her observation stay and to follow-up with Cardiology and Vascular Surgery. Patient has been educated to return to the ED if she experiences any further syncopal episodes, chest pain, shortness of breath, lightheadness, weakness, or discomfort.       Goals of Care Treatment Preferences:  Code Status: Full Code      Consults:   Consults (From admission, onward)        Status Ordering Provider     Inpatient consult to Cardiology  Once        Provider:  Todd Chapa MD    Completed JAROCHO PAEZ JR          No new Assessment & Plan notes have been filed under this hospital service since the last note was generated.  Service: Hospital Medicine    Final Active Diagnoses:    Diagnosis Date Noted POA    PRINCIPAL PROBLEM:  Syncope [R55] 05/08/2023 Yes    Recurrent major depressive disorder, in full remission [F33.42] 11/03/2020 Yes     Chronic    Hyperlipidemia [E78.5] 07/07/2015 Yes     Chronic    Essential hypertension [I10] 04/20/2015 Yes     Chronic      Problems Resolved During this Admission:       Discharged Condition: stable    Disposition: Home or Self Care    Follow Up:   Follow-up Information     Todd Cahpa MD Follow up on 6/26/2023.    Specialty: Cardiology  Why: at 3pm  Contact information:  [address]  [address] [place] [id number]  [phone]                       Patient Instructions:      Ambulatory referral/consult to Cardiology   Standing Status: Future   Referral Priority: Urgent  Referral Type: Consultation   Referral Reason: Specialty Services Required   Referred to Provider: DUSTIN MICHAUD Requested Specialty: Cardiology   Number of Visits Requested: 1     Ambulatory referral/consult to Vascular Surgery   Standing Status: Future   Referral Priority: Urgent Referral Type: Consultation   Referral Reason: Specialty Services Required   Requested Specialty: Vascular Surgery   Number of Visits Requested: 1     Diet Cardiac     Notify your health care provider if you experience any of the following:  severe uncontrolled pain     Notify your health care provider if you experience any of the following:  severe persistent headache     Notify your health care provider if you experience any of the following:  persistent dizziness, light-headedness, or visual disturbances     Notify your health care provider if you experience any of the following:  increased confusion or weakness     Activity as tolerated       Significant Diagnostic Studies: Labs:   BMP:   Recent Labs   Lab 05/08/23  1524   GLU 95   *   K 4.9      CO2 25   BUN 29*   CREATININE 1.0   CALCIUM 9.3     CMP   Recent Labs   Lab 05/08/23  1524   *   K 4.9      CO2 25   GLU 95   BUN 29*   CREATININE 1.0   CALCIUM 9.3   PROT 6.7   ALBUMIN 3.8   BILITOT 0.3   ALKPHOS 43*   AST 21   ALT 27   ANIONGAP 9     CBC   Recent Labs   Lab 05/08/23  1524   WBC 8.91   HGB 12.0   HCT 38.5        Troponin   Recent Labs   Lab 05/08/23  1524 05/09/23  0135   TROPONINI <0.006 <0.006     Cardiac Graphics: Echocardiogram:   2D echo with color flow doppler:   Transthoracic echo (TTE) complete (Cupid Only):   Results for orders placed or performed during the hospital encounter of 05/08/23   Echo   Result Value Ref Range    BSA 1.82 m2    TDI SEPTAL 0.05 m/s    LV LATERAL E/E' RATIO 10.57 m/s    LV SEPTAL E/E' RATIO 14.80 m/s    LA WIDTH 3.30 cm    IVC diameter 0.83 cm    Left Ventricular Outflow Tract Mean Velocity 0.64 cm/s    Left  Ventricular Outflow Tract Mean Gradient 1.82 mmHg    AORTIC VALVE CUSP SEPERATION 1.50 cm    TDI LATERAL 0.07 m/s    PV PEAK VELOCITY 0.74 cm/s    LVIDd 4.27 3.5 - 6.0 cm    IVS 0.79 0.6 - 1.1 cm    Posterior Wall 0.75 0.6 - 1.1 cm    Ao root annulus 2.83 cm    LVIDs 2.82 2.1 - 4.0 cm    FS 34 28 - 44 %    LA volume 46.17 cm3    Sinus 2.71 cm    STJ 1.70 cm    Ascending aorta 2.31 cm    LV mass 99.00 g    LA size 3.59 cm    RVDD 2.38 cm    TAPSE 2.08 cm    RV S' 0.01 cm/s    Left Ventricle Relative Wall Thickness 0.35 cm    AV mean gradient 3 mmHg    AV valve area 2.95 cm2    AV Velocity Ratio 0.72     AV index (prosthetic) 0.77     MV valve area p 1/2 method 2.70 cm2    E/A ratio 0.75     Mean e' 0.06 m/s    E wave deceleration time 280.51 msec    IVRT 125.59 msec    Pulm vein S/D ratio 1.53     LVOT diameter 2.21 cm    LVOT area 3.8 cm2    LVOT peak jurgen 0.83 m/s    LVOT peak VTI 22.30 cm    Ao peak jurgen 1.16 m/s    Ao VTI 29.0 cm    LVOT stroke volume 85.50 cm3    AV peak gradient 5 mmHg    TV peak gradient 1.40 mmHg    E/E' ratio 12.33 m/s    MV Peak E Jurgen 0.74 m/s    TR Max Jurgen 1.40 m/s    MV stenosis pressure 1/2 time 81.35 ms    MV Peak A Jurgen 0.99 m/s    PV Peak S Jurgen 0.49 m/s    PV Peak D Jurgen 0.32 m/s    LV Systolic Volume 29.94 mL    LV Systolic Volume Index 17.3 mL/m2    LV Diastolic Volume 81.62 mL    LV Diastolic Volume Index 47.18 mL/m2    LA Volume Index 26.7 mL/m2    LV Mass Index 57 g/m2    RA Major Axis 4.67 cm    Left Atrium Minor Axis 4.62 cm    Left Atrium Major Axis 4.55 cm    Triscuspid Valve Regurgitation Peak Gradient 8 mmHg    RA Width 2.80 cm    Right Atrial Pressure (from IVC) 3 mmHg    EF 60 %    TV rest pulmonary artery pressure 11 mmHg    Narrative    · The left ventricle is normal in size with normal systolic function.  · The estimated ejection fraction is 60%.  · Normal left ventricular diastolic function.  · Normal right ventricular size with normal right ventricular systolic    function.  · Normal central venous pressure (3 mmHg).  · The estimated PA systolic pressure is 11 mmHg.  · There is no pulmonary hypertension.          Pending Diagnostic Studies:     None         Medications:  Reconciled Home Medications:      Medication List      CONTINUE taking these medications    albuterol 90 mcg/actuation inhaler  Commonly known as: PROVENTIL/VENTOLIN HFA  INHALE 2 PUFFS BY MOUTH EVERY 6 HOURS AS NEEDED FOR WHEEZING. RESCUE     allopurinoL 100 MG tablet  Commonly known as: ZYLOPRIM  TAKE ONE TABLET BY MOUTH ONCE A DAY     aspirin 81 MG EC tablet  Commonly known as: ECOTRIN  Take 81 mg by mouth once daily.     betahistine (bulk) 100 % Powd  WEEK 1: TAKE ONE 12MG CAPSULE BY MOUTH ONCE A DAY. WEEK 2 AND THEREAFTER: TAKE ONE 12 MG CAPSULE BY MOUTH TWICE DAILY.     cyanocobalamin 1000 MCG tablet  Commonly known as: VITAMIN B-12  Take 100 mcg by mouth once daily.     docusate sodium 100 MG capsule  Commonly known as: COLACE  Take 200 mg by mouth Daily.     EScitalopram oxalate 20 MG tablet  Commonly known as: LEXAPRO  TAKE 1 TABLET BY MOUTH EVERY DAY     esomeprazole 20 MG capsule  Commonly known as: NEXIUM  Take 20 mg by mouth.     EXCEDRIN TENSION HEADACHE ORAL  Take 1 capsule by mouth daily as needed.     fexofenadine 180 MG tablet  Commonly known as: ALLEGRA  Take 180 mg by mouth once daily.     fluticasone furoate-vilanteroL 100-25 mcg/dose diskus inhaler  Commonly known as: BREO ELLIPTA  Inhale 1 puff into the lungs once daily.     fluticasone propionate 50 mcg/actuation nasal spray  Commonly known as: FLONASE  USE 1 SPARY IN EACH NOSTRIL ONCE DAILY     gabapentin 100 MG capsule  Commonly known as: NEURONTIN  100 mg. 2 tabs in the am and 2 tabs in the pm     HYDROcodone-acetaminophen 7.5-325 mg per tablet  Commonly known as: NORCO  Take 1 tablet by mouth.     losartan 50 MG tablet  Commonly known as: COZAAR  TAKE ONE TABLET BY MOUTH EVERY DAY     magnesium oxide 400 mg (241.3 mg magnesium)  tablet  Commonly known as: MAG-OX  Take 400 mg by mouth once daily.     metoprolol succinate 50 MG 24 hr tablet  Commonly known as: TOPROL-XL  Take 1 tablet (50 mg total) by mouth once daily.     multivitamin capsule  Take 1 capsule by mouth once daily.     potassium chloride SA 20 MEQ tablet  Commonly known as: K-DUR,KLOR-CON  Take 20 mEq by mouth daily as needed.     pravastatin 40 MG tablet  Commonly known as: PRAVACHOL  TAKE 1 TABLET BY MOUTH EVERY DAY     VEGETABLE LAXATIVE 8.6 mg tablet  Generic drug: senna  Take 2 tablets by mouth once daily.        STOP taking these medications    FIORICET -40 mg Cap  Generic drug: butalbital-acetaminophen-caff     furosemide 20 MG tablet  Commonly known as: LASIX     meclizine 12.5 mg tablet  Commonly known as: ANTIVERT     mupirocin 2 % ointment  Commonly known as: BACTROBAN     oxyCODONE-acetaminophen 7.5-325 mg per tablet  Commonly known as: PERCOCET            Indwelling Lines/Drains at time of discharge:   Lines/Drains/Airways     Drain  Duration                Closed/Suction Drain 01/23/20 0846 Left Breast Bulb 19 Fr. 1202 days         Closed/Suction Drain 01/23/20 0932 Right Breast Bulb 19 Fr. 1202 days                Time spent on the discharge of patient: 60 minutes      Juan Rutherford PA-C  Department of Hospital Medicine  [facility]  05/09/2023

## 2023-05-09 NOTE — HPI
Dayami Mina is a 72 y.o. female who presents after a syncopal episode.  Patient states she was sitting at her kitchen table.  A friend of hers came over.  She states that when this friend usually shows up bad news follows.  She was told that a friend of there is is in the hospital and is very sick.  Shortly after speaking with her she felt diaphoretic.  Dizzy.  Subsequently passed out.  Reportedly hypotensive.  She denies any prior episodes.  EKG on admission showed sinus bradycardia with first-degree AV block.  No events noted on telemetry.  Heart rate currently 73.  Troponin negative.  History of palpitations.  On Toprol.      EKG 5/8/2023:  Sinus bradycardia with first-degree AV block  04/05/2023: Sinus rhythm with first-degree AV block    Follows with Dr. Chapa    Stress test 10/30/19    Normal [place] myocardial perfusion study.    The perfusion scan is free of evidence from myocardial ischemia or injury.    Gated perfusion images showed an ejection fraction of 75 % post stress.    There is normal wall motion post stress.     Echo 10/30/19  Normal left ventricular systolic function. The estimated ejection fraction is 60%  Concentric left ventricular remodeling.  No wall motion abnormalities.  Normal right ventricular systolic function.     Holter 8/1/17  1. Sinus rhythm with heart rates varying between 56 and 105 bpm with an average of 72 bpm.     VENTRICULAR ARRHYTHMIAS  1. There were very frequent PVCs totalling 4114 and averaging 171 per hour.     2. There were no episodes of ventricular tachycardia.    SUPRA VENTRICULAR ARRHYTHMIAS  1. There were very rare PACs recorded totalling 1 and averaging less than 1 per hour.     2. There were no episodes of sustained supraventricular tachycardia.    SINUS NODE FUNCTION  1. There was no evidence of high grade SA manuel block.     AV CONDUCTION  1. There was no evidence of high grade AV block.

## 2023-05-09 NOTE — NURSING
Patient escorted by PCT to family vehicle for discharge home. Patient accompanied by daughter. No apparent distress noted.

## 2023-05-11 ENCOUNTER — OFFICE VISIT (OUTPATIENT)
Dept: CARDIOLOGY | Facility: CLINIC | Age: 73
End: 2023-05-11
Payer: MEDICARE

## 2023-05-11 VITALS
HEIGHT: 58 IN | DIASTOLIC BLOOD PRESSURE: 57 MMHG | SYSTOLIC BLOOD PRESSURE: 129 MMHG | HEART RATE: 63 BPM | WEIGHT: 174.38 LBS | BODY MASS INDEX: 36.61 KG/M2 | RESPIRATION RATE: 15 BRPM | OXYGEN SATURATION: 95 %

## 2023-05-11 DIAGNOSIS — E78.00 PURE HYPERCHOLESTEROLEMIA: Chronic | ICD-10-CM

## 2023-05-11 DIAGNOSIS — I10 ESSENTIAL HYPERTENSION: Chronic | ICD-10-CM

## 2023-05-11 DIAGNOSIS — R00.2 PALPITATIONS: ICD-10-CM

## 2023-05-11 DIAGNOSIS — R55 SYNCOPE, UNSPECIFIED SYNCOPE TYPE: Primary | ICD-10-CM

## 2023-05-11 DIAGNOSIS — I70.0 AORTIC ATHEROSCLEROSIS: ICD-10-CM

## 2023-05-11 PROCEDURE — 99214 PR OFFICE/OUTPT VISIT, EST, LEVL IV, 30-39 MIN: ICD-10-PCS | Mod: S$PBB,,, | Performed by: INTERNAL MEDICINE

## 2023-05-11 PROCEDURE — 99999 PR PBB SHADOW E&M-EST. PATIENT-LVL V: ICD-10-PCS | Mod: PBBFAC,,, | Performed by: INTERNAL MEDICINE

## 2023-05-11 PROCEDURE — 99999 PR PBB SHADOW E&M-EST. PATIENT-LVL V: CPT | Mod: PBBFAC,,, | Performed by: INTERNAL MEDICINE

## 2023-05-11 PROCEDURE — 99215 OFFICE O/P EST HI 40 MIN: CPT | Mod: PBBFAC | Performed by: INTERNAL MEDICINE

## 2023-05-11 PROCEDURE — 99214 OFFICE O/P EST MOD 30 MIN: CPT | Mod: S$PBB,,, | Performed by: INTERNAL MEDICINE

## 2023-05-11 NOTE — PROGRESS NOTES
Subjective:   Patient ID:  Dayami Mina is a 72 y.o. female who presents for follow-up of Hospital Follow Up      HPI    HTN, COPD, CRI, palpitations, syncope     Stress test 10/30/19    Normal [place] myocardial perfusion study.    The perfusion scan is free of evidence from myocardial ischemia or injury.    Gated perfusion images showed an ejection fraction of 75 % post stress.    There is normal wall motion post stress.     Echo 5/9/23  The left ventricle is normal in size with normal systolic function.  The estimated ejection fraction is 60%.  Normal left ventricular diastolic function.  Normal right ventricular size with normal right ventricular systolic function.  Normal central venous pressure (3 mmHg).  The estimated PA systolic pressure is 11 mmHg.  There is no pulmonary hypertension.     Carotid US 5/9/23  1. Sonogram suggest moderate (50-69%) stenosis along the distal segment of the right extracranial ICA.     Holter 8/1/17  1. Sinus rhythm with heart rates varying between 56 and 105 bpm with an average of 72 bpm.     VENTRICULAR ARRHYTHMIAS  1. There were very frequent PVCs totalling 4114 and averaging 171 per hour.     2. There were no episodes of ventricular tachycardia.    SUPRA VENTRICULAR ARRHYTHMIAS  1. There were very rare PACs recorded totalling 1 and averaging less than 1 per hour.     2. There were no episodes of sustained supraventricular tachycardia.    SINUS NODE FUNCTION  1. There was no evidence of high grade SA manuel block.     AV CONDUCTION  1. There was no evidence of high grade AV block.         1/22/18 Denies CP  SOB stable  EKG NSR - ok  Needs clearance for toe surgery     1/18/19 Denies CP  Stable SOB  EKG NSR 1st degree AVB otherwise ok  Upcoming knee replacement     7/3/19 Recently Dx with diverticulitis - scheduled for upcoming colonoscopy  Some fatigue  BP runs low on occasion  EKG NSR low voltage otherwise ok        10/21/19 Reports worsening fatigue  Getting over  recently prolonged sinus infection  EKG NSR - ok     11/13/19 Still with fatigue and sinus issues  Denies CP  Mild stable ARANGO     7/30/20 Back for LE edema. On exam there is 1+ edema at most  Stable ARANGO  Denies CP  EKG NSR - ok  Prn lasix for LE edema  OV 1 month with BNP, BMP        9/3/20 LE edema resolved after taking lasix for 3 days  Denies CP or SOB   Continue Rx for HTN, HLD, LE edema  OV 6 months     3/8/21 Denies CP or SOB. No further LE edema  EKG NSR - ok    Continue Rx for HTN, HLD, LE edema  OV 6 months     10/5/21 Denies CP or SOB  BP controlled    Continue Rx for HTN, HLD, LE edema  OV 6 months      3/28/22 Denies CP or SOB  BP controlled     Continue Rx for HTN, HLD, LE edema  OV 6 months      10/17/22 Denies CP or SOB. LE edema resolved  Chronic back issues  EKG NSR ok   Continue Rx for HTN, HLD, LE edema   OV 6 months with echo     4/5/23 Echo not done  Denies CP, SOB, or LE edema  BP controlled  EKG NSR 1st degree AVB    Continue Rx for HTN, HLD, LE edema   OV 6 months with echo    Admitted 5/9/23  Throughout her observation stay, Ms. Mina continued to endorse that she was symptom free. Cardiology was consulted who recommended further outpatient workup with event monitor. Echocardiogram was ordered which noted normal LV/RV size and function with EF 60%. 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 Patient and daughters were informed and educated on the findings. Patient's blood pressure continued to remain elevated and she was administered her home anti-hypertensive medications which were held earlier in the day due to low heart rate. Ms. Mina is medically stable and clear for discharge. Strict return precautions have been discussed with, as well as her daughter at bedside and they are in agreement with return precautions.    5/11/23 Denies further dizziness or syncope  Some left and right arm  numbness  BP controlled    Review of Systems   Constitutional: Negative for decreased appetite.   HENT:  Negative for ear discharge.    Eyes:  Negative for blurred vision.   Respiratory:  Negative for hemoptysis.    Endocrine: Negative for polyphagia.   Hematologic/Lymphatic: Negative for adenopathy.   Skin:  Negative for color change.   Musculoskeletal:  Negative for joint swelling.   Genitourinary:  Negative for bladder incontinence.   Neurological:  Negative for brief paralysis.   Psychiatric/Behavioral:  Negative for hallucinations.    Allergic/Immunologic: Negative for hives.     Objective:   Physical Exam  Constitutional:       Appearance: She is well-developed.   HENT:      Head: Normocephalic and atraumatic.   Eyes:      Conjunctiva/sclera: Conjunctivae normal.      Pupils: Pupils are equal, round, and reactive to light.   Cardiovascular:      Rate and Rhythm: Normal rate.      Pulses: Intact distal pulses.      Heart sounds: Normal heart sounds.   Pulmonary:      Effort: Pulmonary effort is normal.      Breath sounds: Normal breath sounds.   Abdominal:      General: Bowel sounds are normal.      Palpations: Abdomen is soft.   Musculoskeletal:         General: Normal range of motion.      Cervical back: Normal range of motion and neck supple.   Skin:     General: Skin is warm and dry.   Neurological:      Mental Status: She is alert and oriented to person, place, and time.       Assessment:      1. Syncope, unspecified syncope type    2. Essential hypertension    3. Pure hypercholesterolemia    4. Palpitations    5. Aortic atherosclerosis        Plan:     Event monitor for recent syncope  Refer to neurology at patient request    Continue Rx for HTN, HLD, LE edema

## 2023-06-02 ENCOUNTER — OFFICE VISIT (OUTPATIENT)
Dept: FAMILY MEDICINE | Facility: CLINIC | Age: 73
End: 2023-06-02
Payer: MEDICARE

## 2023-06-02 VITALS
OXYGEN SATURATION: 96 % | WEIGHT: 179.69 LBS | HEART RATE: 81 BPM | HEIGHT: 58 IN | SYSTOLIC BLOOD PRESSURE: 126 MMHG | DIASTOLIC BLOOD PRESSURE: 70 MMHG | BODY MASS INDEX: 37.72 KG/M2 | TEMPERATURE: 98 F

## 2023-06-02 DIAGNOSIS — I65.21 STENOSIS OF RIGHT CAROTID ARTERY: ICD-10-CM

## 2023-06-02 DIAGNOSIS — R55 SYNCOPE, UNSPECIFIED SYNCOPE TYPE: Primary | ICD-10-CM

## 2023-06-02 DIAGNOSIS — R20.2 PARESTHESIA OF ARM: ICD-10-CM

## 2023-06-02 DIAGNOSIS — E16.2 HYPOGLYCEMIA: ICD-10-CM

## 2023-06-02 PROCEDURE — 99214 OFFICE O/P EST MOD 30 MIN: CPT | Mod: S$PBB,,, | Performed by: INTERNAL MEDICINE

## 2023-06-02 PROCEDURE — 99999 PR PBB SHADOW E&M-EST. PATIENT-LVL V: CPT | Mod: PBBFAC,,, | Performed by: INTERNAL MEDICINE

## 2023-06-02 PROCEDURE — 99999 PR PBB SHADOW E&M-EST. PATIENT-LVL V: ICD-10-PCS | Mod: PBBFAC,,, | Performed by: INTERNAL MEDICINE

## 2023-06-02 PROCEDURE — 99214 PR OFFICE/OUTPT VISIT, EST, LEVL IV, 30-39 MIN: ICD-10-PCS | Mod: S$PBB,,, | Performed by: INTERNAL MEDICINE

## 2023-06-02 PROCEDURE — 99215 OFFICE O/P EST HI 40 MIN: CPT | Mod: PBBFAC,PN | Performed by: INTERNAL MEDICINE

## 2023-06-02 RX ORDER — INSULIN PUMP SYRINGE, 3 ML
EACH MISCELLANEOUS
Qty: 1 EACH | Refills: 0 | Status: SHIPPED | OUTPATIENT
Start: 2023-06-02 | End: 2023-06-02 | Stop reason: SDUPTHER

## 2023-06-02 RX ORDER — INSULIN PUMP SYRINGE, 3 ML
EACH MISCELLANEOUS
Qty: 1 EACH | Refills: 0 | Status: SHIPPED | OUTPATIENT
Start: 2023-06-02 | End: 2024-06-01

## 2023-06-02 RX ORDER — LANCETS
EACH MISCELLANEOUS
Qty: 100 EACH | Refills: 0 | Status: SHIPPED | OUTPATIENT
Start: 2023-06-02

## 2023-06-02 NOTE — PROGRESS NOTES
Subjective:       Patient ID: Dayami Mina is a 72 y.o. female.    Chief Complaint: Follow-up    F/u from hospitalization    HPI: 71 y/o w/ HTN COPD chronic low back/neck pain on daily opioid therapy presents with her two daughters for follow up from recent hospitaliation. She had two episodes of becoming non responsive while sitting. She denies any preceeding symptoms of palpitations vertigo or vision changes. Seh was admitted for observation, her initial blood pressure was low and her anti hypertensives were held during admission. Echocardiogram did not reveal any structural heart disease, her carotid ultrasound showed moderate right ICA stenosis. Since discharge she describes several episodes of feeling off balance as if she were about to pass out without palpitations. She has discontinued metoprolol. She has established with a new cardiologist, Nilay Griggs and is currently wearing a 30 day holter monitor. She endorses parathesia to bilatral upper shoulders no change with abduction of shoulder. She has chronic hand pains and has difficulty with fine motor activities no bowel/bladder incontinence. Her daughters feel she does not eat or drink enough water (normally one meal per day) weight is stable    Review of Systems   Constitutional:  Negative for activity change, appetite change, fatigue, fever and unexpected weight change.   HENT:  Negative for ear pain, rhinorrhea and sore throat.    Eyes:  Negative for discharge and visual disturbance.   Respiratory:  Negative for chest tightness, shortness of breath and wheezing.    Cardiovascular:  Negative for chest pain, palpitations and leg swelling.   Gastrointestinal:  Negative for abdominal pain, constipation and diarrhea.   Endocrine: Negative for cold intolerance and heat intolerance.   Genitourinary:  Negative for dysuria and hematuria.   Musculoskeletal:  Positive for arthralgias, back pain and neck pain. Negative for joint swelling and neck stiffness.  "  Skin:  Negative for rash.   Neurological:  Positive for syncope, light-headedness and numbness. Negative for dizziness, weakness and headaches.   Psychiatric/Behavioral:  Negative for suicidal ideas.      Objective:     Vitals:    06/02/23 1308   BP: 126/70   BP Location: Right arm   Patient Position: Sitting   BP Method: Medium (Manual)   Pulse: 81   Temp: 98.1 °F (36.7 °C)   TempSrc: Oral   SpO2: 96%   Weight: 81.5 kg (179 lb 10.8 oz)   Height: 4' 10" (1.473 m)          Physical Exam  Constitutional:       Appearance: She is well-developed.   HENT:      Head: Normocephalic and atraumatic.   Eyes:      Conjunctiva/sclera: Conjunctivae normal.   Cardiovascular:      Rate and Rhythm: Normal rate and regular rhythm.      Heart sounds: No murmur heard.    No friction rub. No gallop.      Comments: Regular rate in 70's  Pulmonary:      Effort: Pulmonary effort is normal.      Breath sounds: Normal breath sounds. No wheezing or rales.   Abdominal:      General: Bowel sounds are normal.      Palpations: Abdomen is soft.      Tenderness: There is no abdominal tenderness. There is no guarding or rebound.   Musculoskeletal:         General: No tenderness. Normal range of motion.      Cervical back: Normal range of motion.   Skin:     General: Skin is warm and dry.   Neurological:      Mental Status: She is alert and oriented to person, place, and time.      Cranial Nerves: No cranial nerve deficit.       Assessment and Plan   1. Syncope, unspecified syncope type  Check blood sugars if symptomatic  - lancets Misc; To check BG one time daily PRN, to use with insurance preferred meter  Dispense: 100 each; Refill: 0  - blood sugar diagnostic Strp; To check BG one time daily PRN, to use with insurance preferred meter  Dispense: 100 strip; Refill: 0  - blood-glucose meter kit; To check BG one time daily PRN, to use with insurance preferred meter  Dispense: 1 each; Refill: 0    2. Stenosis of right carotid artery  On statin and " asa continue    3. Paresthesia of arm  Referral to neurology (daughter requests Dr. Goldberg)  - Ambulatory referral/consult to Neurology; Future    4. Hypoglycemia  EMS at time of episode glucose was 81, no personal history of diabetes not taking any hypoglycemic agents will request ability to check glucose with home meter when symptomatic  - lancets Misc; To check BG one time daily PRN, to use with insurance preferred meter  Dispense: 100 each; Refill: 0  - blood sugar diagnostic Strp; To check BG one time daily PRN, to use with insurance preferred meter  Dispense: 100 strip; Refill: 0  - blood-glucose meter kit; To check BG one time daily PRN, to use with insurance preferred meter  Dispense: 1 each; Refill: 0

## 2023-06-07 ENCOUNTER — PATIENT MESSAGE (OUTPATIENT)
Dept: FAMILY MEDICINE | Facility: CLINIC | Age: 73
End: 2023-06-07
Payer: MEDICARE

## 2023-06-08 DIAGNOSIS — R73.01 IMPAIRED FASTING GLUCOSE: Primary | ICD-10-CM

## 2023-06-09 ENCOUNTER — LAB VISIT (OUTPATIENT)
Dept: LAB | Facility: HOSPITAL | Age: 73
End: 2023-06-09
Attending: INTERNAL MEDICINE
Payer: MEDICARE

## 2023-06-09 DIAGNOSIS — R73.01 IMPAIRED FASTING GLUCOSE: ICD-10-CM

## 2023-06-09 LAB
ESTIMATED AVG GLUCOSE: 123 MG/DL (ref 68–131)
HBA1C MFR BLD: 5.9 % (ref 4–5.6)

## 2023-06-09 PROCEDURE — 36415 COLL VENOUS BLD VENIPUNCTURE: CPT | Mod: PN | Performed by: INTERNAL MEDICINE

## 2023-06-09 PROCEDURE — 83036 HEMOGLOBIN GLYCOSYLATED A1C: CPT | Performed by: INTERNAL MEDICINE

## 2023-07-13 ENCOUNTER — OFFICE VISIT (OUTPATIENT)
Dept: FAMILY MEDICINE | Facility: CLINIC | Age: 73
End: 2023-07-13
Payer: MEDICARE

## 2023-07-13 VITALS
DIASTOLIC BLOOD PRESSURE: 84 MMHG | WEIGHT: 178.13 LBS | OXYGEN SATURATION: 95 % | HEIGHT: 58 IN | HEART RATE: 89 BPM | TEMPERATURE: 99 F | SYSTOLIC BLOOD PRESSURE: 132 MMHG | BODY MASS INDEX: 37.39 KG/M2

## 2023-07-13 DIAGNOSIS — I10 ESSENTIAL HYPERTENSION: Primary | ICD-10-CM

## 2023-07-13 DIAGNOSIS — M51.9 LUMBAR DISC DISEASE: ICD-10-CM

## 2023-07-13 DIAGNOSIS — Z96.9 PRESENCE OF RETAINED HARDWARE: ICD-10-CM

## 2023-07-13 PROCEDURE — 99999 PR PBB SHADOW E&M-EST. PATIENT-LVL V: CPT | Mod: PBBFAC,,, | Performed by: INTERNAL MEDICINE

## 2023-07-13 PROCEDURE — 99213 PR OFFICE/OUTPT VISIT, EST, LEVL III, 20-29 MIN: ICD-10-PCS | Mod: S$PBB,,, | Performed by: INTERNAL MEDICINE

## 2023-07-13 PROCEDURE — 99999 PR PBB SHADOW E&M-EST. PATIENT-LVL V: ICD-10-PCS | Mod: PBBFAC,,, | Performed by: INTERNAL MEDICINE

## 2023-07-13 PROCEDURE — 99215 OFFICE O/P EST HI 40 MIN: CPT | Mod: PBBFAC,PN | Performed by: INTERNAL MEDICINE

## 2023-07-13 PROCEDURE — 99213 OFFICE O/P EST LOW 20 MIN: CPT | Mod: S$PBB,,, | Performed by: INTERNAL MEDICINE

## 2023-07-13 RX ORDER — AMOXICILLIN 500 MG/1
500 TABLET, FILM COATED ORAL DAILY
Qty: 5 TABLET | Refills: 0 | Status: SHIPPED | OUTPATIENT
Start: 2023-07-13 | End: 2023-07-18

## 2023-07-13 NOTE — PROGRESS NOTES
"Subjective:       Patient ID: Dayami Mina is a 73 y.o. female.    Chief Complaint: Follow-up    F/u chronic conditions    HPI: 72 y/o w/ HTN chronic low back pain (followed by pain management at [facility] s/p cervical RFA last year) presents alone for follow up. Feels well no further syncope/pre syncope since stopping metoprolol no LE swelling no palpitations. Had 30 day event monitor completed this week to follow up wieh outside cardiologist next month (dr Pemberton)     Review of Systems   Constitutional:  Negative for activity change, appetite change, fatigue, fever and unexpected weight change.   HENT:  Negative for ear pain, rhinorrhea and sore throat.    Eyes:  Negative for discharge and visual disturbance.   Respiratory:  Negative for chest tightness, shortness of breath and wheezing.    Cardiovascular:  Negative for chest pain, palpitations and leg swelling.   Gastrointestinal:  Negative for abdominal pain, constipation and diarrhea.   Endocrine: Negative for cold intolerance and heat intolerance.   Genitourinary:  Negative for dysuria and hematuria.   Musculoskeletal:  Positive for arthralgias and back pain. Negative for joint swelling and neck stiffness.   Skin:  Negative for rash.   Neurological:  Negative for dizziness, syncope, weakness and headaches.   Psychiatric/Behavioral:  Negative for suicidal ideas.      Objective:     Vitals:    07/13/23 1302   BP: 132/84   BP Location: Right arm   Patient Position: Sitting   BP Method: Large (Manual)   Pulse: 89   Temp: 98.6 °F (37 °C)   TempSrc: Oral   SpO2: 95%   Weight: 80.8 kg (178 lb 2.1 oz)   Height: 4' 10" (1.473 m)          Physical Exam  Constitutional:       Appearance: She is well-developed.   HENT:      Head: Normocephalic and atraumatic.   Eyes:      Conjunctiva/sclera: Conjunctivae normal.   Cardiovascular:      Rate and Rhythm: Normal rate and regular rhythm.      Heart sounds: No murmur heard.    No friction rub. No gallop. "   Pulmonary:      Effort: Pulmonary effort is normal.      Breath sounds: Normal breath sounds. No wheezing or rales.   Abdominal:      Palpations: Abdomen is soft.      Tenderness: There is no abdominal tenderness. There is no guarding or rebound.   Musculoskeletal:         General: No tenderness. Normal range of motion.      Cervical back: Normal range of motion.      Right lower leg: No edema.      Left lower leg: No edema.   Skin:     General: Skin is warm and dry.   Neurological:      General: No focal deficit present.      Mental Status: She is alert and oriented to person, place, and time.      Cranial Nerves: No cranial nerve deficit.       Assessment and Plan   1. Essential hypertension  Bp at goalw with arb continue    2. Lumbar disc disease  Continue follow up with pain management as scheduled    3. Presence of retained hardware  To have dental procedure in two weeks pre treatment with amoxicillin  - amoxicillin (AMOXIL) 500 MG Tab; Take 1 tablet (500 mg total) by mouth once daily. for 5 days  Dispense: 5 tablet; Refill: 0

## 2023-07-18 ENCOUNTER — TELEPHONE (OUTPATIENT)
Dept: EMERGENCY MEDICINE | Facility: HOSPITAL | Age: 73
End: 2023-07-18
Payer: MEDICARE

## 2023-08-08 ENCOUNTER — TELEPHONE (OUTPATIENT)
Dept: EMERGENCY MEDICINE | Facility: HOSPITAL | Age: 73
End: 2023-08-08
Payer: MEDICARE

## 2023-10-07 DIAGNOSIS — J43.2 CENTRILOBULAR EMPHYSEMA: ICD-10-CM

## 2023-10-07 RX ORDER — FLUTICASONE FUROATE AND VILANTEROL 100; 25 UG/1; UG/1
1 POWDER RESPIRATORY (INHALATION) DAILY
Qty: 180 EACH | Refills: 3 | Status: SHIPPED | OUTPATIENT
Start: 2023-10-07

## 2023-10-07 NOTE — TELEPHONE ENCOUNTER
No care due was identified.  [facility] Embedded Care Due Messages. Reference number: [id number].   10/07/2023 9:00:45 AM CDT

## 2023-10-07 NOTE — TELEPHONE ENCOUNTER
Refill Decision Note   Dayami Keeley  is requesting a refill authorization.  Brief Assessment and Rationale for Refill:  Approve     Medication Therapy Plan:       Medication Reconciliation Completed: No    Comments:     No Care Gaps recommended.     Note composed:6:45 PM 10/07/2023

## 2023-10-28 ENCOUNTER — PATIENT MESSAGE (OUTPATIENT)
Dept: FAMILY MEDICINE | Facility: CLINIC | Age: 73
End: 2023-10-28
Payer: MEDICARE

## 2023-11-15 ENCOUNTER — OFFICE VISIT (OUTPATIENT)
Dept: FAMILY MEDICINE | Facility: CLINIC | Age: 73
End: 2023-11-15
Payer: MEDICARE

## 2023-11-15 ENCOUNTER — LAB VISIT (OUTPATIENT)
Dept: LAB | Facility: HOSPITAL | Age: 73
End: 2023-11-15
Attending: INTERNAL MEDICINE
Payer: MEDICARE

## 2023-11-15 VITALS
RESPIRATION RATE: 18 BRPM | TEMPERATURE: 98 F | DIASTOLIC BLOOD PRESSURE: 50 MMHG | HEIGHT: 58 IN | BODY MASS INDEX: 37.06 KG/M2 | WEIGHT: 176.56 LBS | SYSTOLIC BLOOD PRESSURE: 112 MMHG | OXYGEN SATURATION: 96 % | HEART RATE: 85 BPM

## 2023-11-15 DIAGNOSIS — J01.90 ACUTE SINUSITIS, RECURRENCE NOT SPECIFIED, UNSPECIFIED LOCATION: ICD-10-CM

## 2023-11-15 DIAGNOSIS — J43.2 CENTRILOBULAR EMPHYSEMA: ICD-10-CM

## 2023-11-15 DIAGNOSIS — R73.01 IMPAIRED FASTING GLUCOSE: ICD-10-CM

## 2023-11-15 DIAGNOSIS — I10 ESSENTIAL HYPERTENSION: Primary | ICD-10-CM

## 2023-11-15 DIAGNOSIS — F33.42 RECURRENT MAJOR DEPRESSIVE DISORDER, IN FULL REMISSION: ICD-10-CM

## 2023-11-15 DIAGNOSIS — I10 ESSENTIAL HYPERTENSION: ICD-10-CM

## 2023-11-15 LAB
ANION GAP SERPL CALC-SCNC: 10 MMOL/L (ref 8–16)
BASOPHILS # BLD AUTO: 0.04 K/UL (ref 0–0.2)
BASOPHILS NFR BLD: 0.7 % (ref 0–1.9)
BUN SERPL-MCNC: 16 MG/DL (ref 8–23)
CALCIUM SERPL-MCNC: 9.2 MG/DL (ref 8.7–10.5)
CHLORIDE SERPL-SCNC: 102 MMOL/L (ref 95–110)
CO2 SERPL-SCNC: 22 MMOL/L (ref 23–29)
CREAT SERPL-MCNC: 1.1 MG/DL (ref 0.5–1.4)
DIFFERENTIAL METHOD: ABNORMAL
EOSINOPHIL # BLD AUTO: 0.3 K/UL (ref 0–0.5)
EOSINOPHIL NFR BLD: 4.5 % (ref 0–8)
ERYTHROCYTE [DISTWIDTH] IN BLOOD BY AUTOMATED COUNT: 14.3 % (ref 11.5–14.5)
EST. GFR  (NO RACE VARIABLE): 52 ML/MIN/1.73 M^2
GLUCOSE SERPL-MCNC: 196 MG/DL (ref 70–110)
HCT VFR BLD AUTO: 37.7 % (ref 37–48.5)
HGB BLD-MCNC: 11.8 G/DL (ref 12–16)
IMM GRANULOCYTES # BLD AUTO: 0.01 K/UL (ref 0–0.04)
IMM GRANULOCYTES NFR BLD AUTO: 0.2 % (ref 0–0.5)
LYMPHOCYTES # BLD AUTO: 1.6 K/UL (ref 1–4.8)
LYMPHOCYTES NFR BLD: 27.8 % (ref 18–48)
MCH RBC QN AUTO: 27.3 PG (ref 27–31)
MCHC RBC AUTO-ENTMCNC: 31.3 G/DL (ref 32–36)
MCV RBC AUTO: 87 FL (ref 82–98)
MONOCYTES # BLD AUTO: 0.5 K/UL (ref 0.3–1)
MONOCYTES NFR BLD: 9.6 % (ref 4–15)
NEUTROPHILS # BLD AUTO: 3.2 K/UL (ref 1.8–7.7)
NEUTROPHILS NFR BLD: 57.2 % (ref 38–73)
NRBC BLD-RTO: 0 /100 WBC
PLATELET # BLD AUTO: 325 K/UL (ref 150–450)
PMV BLD AUTO: 10.6 FL (ref 9.2–12.9)
POTASSIUM SERPL-SCNC: 4.3 MMOL/L (ref 3.5–5.1)
RBC # BLD AUTO: 4.33 M/UL (ref 4–5.4)
SODIUM SERPL-SCNC: 134 MMOL/L (ref 136–145)
WBC # BLD AUTO: 5.61 K/UL (ref 3.9–12.7)

## 2023-11-15 PROCEDURE — 80048 BASIC METABOLIC PNL TOTAL CA: CPT | Performed by: INTERNAL MEDICINE

## 2023-11-15 PROCEDURE — 36415 COLL VENOUS BLD VENIPUNCTURE: CPT | Mod: PN | Performed by: INTERNAL MEDICINE

## 2023-11-15 PROCEDURE — 85025 COMPLETE CBC W/AUTO DIFF WBC: CPT | Performed by: INTERNAL MEDICINE

## 2023-11-15 PROCEDURE — 99999 PR PBB SHADOW E&M-EST. PATIENT-LVL III: CPT | Mod: PBBFAC,,, | Performed by: INTERNAL MEDICINE

## 2023-11-15 PROCEDURE — 99214 OFFICE O/P EST MOD 30 MIN: CPT | Mod: S$PBB,,, | Performed by: INTERNAL MEDICINE

## 2023-11-15 PROCEDURE — 99213 OFFICE O/P EST LOW 20 MIN: CPT | Mod: PBBFAC,PN | Performed by: INTERNAL MEDICINE

## 2023-11-15 PROCEDURE — 99999 PR PBB SHADOW E&M-EST. PATIENT-LVL III: ICD-10-PCS | Mod: PBBFAC,,, | Performed by: INTERNAL MEDICINE

## 2023-11-15 PROCEDURE — 99214 PR OFFICE/OUTPT VISIT, EST, LEVL IV, 30-39 MIN: ICD-10-PCS | Mod: S$PBB,,, | Performed by: INTERNAL MEDICINE

## 2023-11-15 RX ORDER — DOXYCYCLINE HYCLATE 100 MG
100 TABLET ORAL 2 TIMES DAILY
Qty: 14 TABLET | Refills: 0 | Status: SHIPPED | OUTPATIENT
Start: 2023-11-15 | End: 2023-11-27 | Stop reason: ALTCHOICE

## 2023-11-15 RX ORDER — LOSARTAN POTASSIUM 25 MG/1
25 TABLET ORAL 2 TIMES DAILY
Qty: 180 TABLET | Refills: 1 | Status: SHIPPED | OUTPATIENT
Start: 2023-11-15 | End: 2024-03-20 | Stop reason: SDUPTHER

## 2023-11-15 RX ORDER — FLUTICASONE PROPIONATE 50 MCG
1 SPRAY, SUSPENSION (ML) NASAL 2 TIMES DAILY
Qty: 16 ML | Refills: 2 | Status: SHIPPED | OUTPATIENT
Start: 2023-11-15

## 2023-11-15 RX ORDER — BENZONATATE 200 MG/1
200 CAPSULE ORAL 3 TIMES DAILY PRN
Qty: 60 CAPSULE | Refills: 0 | Status: SHIPPED | OUTPATIENT
Start: 2023-11-15 | End: 2023-11-27 | Stop reason: SDUPTHER

## 2023-11-15 NOTE — PROGRESS NOTES
"Subjective:       Patient ID: Dayami Mina is a 73 y.o. female.    Chief Complaint: Follow-up (4 month f/u) and Medication Refill    Congestion/cough x four days    HPI: 74 y/o w/ HTN COPD cervical disc disease on daily opoid therapy presents alnoe for scheduled follow up. Feels nasal congestion and maxillary pressure for last five days using nasal sprays bid did have some wheezing last night no fevers/chills no LE swelling       Review of Systems   Constitutional:  Negative for activity change, appetite change, fatigue, fever and unexpected weight change.   HENT:  Positive for rhinorrhea, sinus pressure and sore throat. Negative for ear pain.    Eyes:  Negative for discharge and visual disturbance.   Respiratory:  Positive for cough. Negative for chest tightness, shortness of breath and wheezing.    Cardiovascular:  Negative for chest pain, palpitations and leg swelling.   Gastrointestinal:  Negative for abdominal pain, constipation and diarrhea.   Endocrine: Negative for cold intolerance and heat intolerance.   Genitourinary:  Negative for dysuria and hematuria.   Musculoskeletal:  Negative for joint swelling and neck stiffness.   Skin:  Negative for rash.   Neurological:  Negative for dizziness, syncope, weakness and headaches.   Psychiatric/Behavioral:  Negative for suicidal ideas.        Objective:     Vitals:    11/15/23 1353   BP: (!) 112/50   Pulse: 85   Resp: 18   Temp: 97.8 °F (36.6 °C)   TempSrc: Oral   SpO2: 96%   Weight: 80.1 kg (176 lb 9.4 oz)   Height: 4' 10" (1.473 m)          Physical Exam  Constitutional:       Appearance: She is well-developed.   HENT:      Head: Normocephalic and atraumatic.   Eyes:      General: No scleral icterus.     Conjunctiva/sclera: Conjunctivae normal.   Cardiovascular:      Rate and Rhythm: Normal rate and regular rhythm.      Heart sounds: No murmur heard.     No friction rub. No gallop.   Pulmonary:      Effort: Pulmonary effort is normal.      Breath sounds: " Normal breath sounds. No wheezing or rales.   Abdominal:      Palpations: Abdomen is soft.      Tenderness: There is no abdominal tenderness. There is no guarding or rebound.   Musculoskeletal:         General: No tenderness. Normal range of motion.      Cervical back: Normal range of motion.      Right lower leg: No edema.      Left lower leg: No edema.   Skin:     General: Skin is warm and dry.   Neurological:      Mental Status: She is alert and oriented to person, place, and time.      Cranial Nerves: No cranial nerve deficit.         Assessment and Plan   1. Essential hypertension  Bp at goal continue arb  - losartan (COZAAR) 25 MG tablet; Take 1 tablet (25 mg total) by mouth 2 (two) times a day.  Dispense: 180 tablet; Refill: 1  - CBC Auto Differential; Future  - BASIC METABOLIC PANEL; Future    2. Centrilobular emphysema  Continue laba/ics    3. Impaired fasting glucose  A1c WNL     4. Recurrent major depressive disorder, in full remission  Continue ssri    5. Acute sinusitis, recurrence not specified, unspecified location  Doxycyline bid x seven days continue ansal steroid spray prn benzonatate  - fluticasone propionate (FLONASE) 50 mcg/actuation nasal spray; 1 spray (50 mcg total) by Each Nostril route 2 (two) times a day.  Dispense: 16 mL; Refill: 2  - doxycycline (VIBRA-TABS) 100 MG tablet; Take 1 tablet (100 mg total) by mouth 2 (two) times daily.  Dispense: 14 tablet; Refill: 0  - benzonatate (TESSALON) 200 MG capsule; Take 1 capsule (200 mg total) by mouth 3 (three) times daily as needed for Cough.  Dispense: 60 capsule; Refill: 0

## 2023-11-16 ENCOUNTER — TELEPHONE (OUTPATIENT)
Dept: FAMILY MEDICINE | Facility: CLINIC | Age: 73
End: 2023-11-16
Payer: MEDICARE

## 2023-11-16 DIAGNOSIS — J01.90 ACUTE SINUSITIS, RECURRENCE NOT SPECIFIED, UNSPECIFIED LOCATION: Primary | ICD-10-CM

## 2023-11-16 RX ORDER — PROMETHAZINE HYDROCHLORIDE AND DEXTROMETHORPHAN HYDROBROMIDE 6.25; 15 MG/5ML; MG/5ML
5 SYRUP ORAL NIGHTLY PRN
Qty: 118 ML | Refills: 0 | Status: SHIPPED | OUTPATIENT
Start: 2023-11-16 | End: 2023-11-27 | Stop reason: SDUPTHER

## 2023-11-16 NOTE — TELEPHONE ENCOUNTER
----- Message from Samira Golden MA sent at 11/16/2023  1:19 PM CST -----  Regarding: FW: Self/ [phone]  Lov 11/15/23  ----- Message -----  From: Piedad Desir  Sent: 11/16/2023  12:49 PM CST  To: [facility] Staff  Subject: Self/ [phone]                               Type: Patient Call Back    Who called:  Patient    What is the request in detail:  Patient would like a call from staff, she wanting a cough syrup called in for her cough.  Patient stated she coughed all night and she can hear herself wheezing.  Thank you    Would the patient rather a call back or a response via [facility]?  Call back    Best call back number:  [phone]        Thank you

## 2023-11-27 ENCOUNTER — OFFICE VISIT (OUTPATIENT)
Dept: FAMILY MEDICINE | Facility: CLINIC | Age: 73
End: 2023-11-27
Payer: MEDICARE

## 2023-11-27 VITALS
HEART RATE: 74 BPM | DIASTOLIC BLOOD PRESSURE: 70 MMHG | RESPIRATION RATE: 16 BRPM | HEIGHT: 58 IN | SYSTOLIC BLOOD PRESSURE: 126 MMHG | OXYGEN SATURATION: 98 % | BODY MASS INDEX: 37.21 KG/M2 | TEMPERATURE: 98 F | WEIGHT: 177.25 LBS

## 2023-11-27 DIAGNOSIS — J44.1 COPD WITH ACUTE EXACERBATION: Primary | ICD-10-CM

## 2023-11-27 DIAGNOSIS — I10 ESSENTIAL HYPERTENSION: Chronic | ICD-10-CM

## 2023-11-27 DIAGNOSIS — J43.2 CENTRILOBULAR EMPHYSEMA: ICD-10-CM

## 2023-11-27 PROCEDURE — 99999 PR PBB SHADOW E&M-EST. PATIENT-LVL V: CPT | Mod: PBBFAC,,, | Performed by: NURSE PRACTITIONER

## 2023-11-27 PROCEDURE — 99999 PR PBB SHADOW E&M-EST. PATIENT-LVL V: ICD-10-PCS | Mod: PBBFAC,,, | Performed by: NURSE PRACTITIONER

## 2023-11-27 PROCEDURE — 99215 OFFICE O/P EST HI 40 MIN: CPT | Mod: PBBFAC,PN | Performed by: NURSE PRACTITIONER

## 2023-11-27 PROCEDURE — 99214 OFFICE O/P EST MOD 30 MIN: CPT | Mod: S$PBB,,, | Performed by: NURSE PRACTITIONER

## 2023-11-27 PROCEDURE — 99214 PR OFFICE/OUTPT VISIT, EST, LEVL IV, 30-39 MIN: ICD-10-PCS | Mod: S$PBB,,, | Performed by: NURSE PRACTITIONER

## 2023-11-27 RX ORDER — CLINDAMYCIN HYDROCHLORIDE 300 MG/1
CAPSULE ORAL
COMMUNITY
Start: 2023-07-17 | End: 2024-03-20 | Stop reason: ALTCHOICE

## 2023-11-27 RX ORDER — MECLIZINE HCL 12.5 MG 12.5 MG/1
1 TABLET ORAL
COMMUNITY
End: 2024-01-11 | Stop reason: SDUPTHER

## 2023-11-27 RX ORDER — PREDNISONE 20 MG/1
TABLET ORAL
Qty: 12 TABLET | Refills: 0 | Status: SHIPPED | OUTPATIENT
Start: 2023-11-27 | End: 2024-03-20 | Stop reason: ALTCHOICE

## 2023-11-27 RX ORDER — BENZONATATE 200 MG/1
200 CAPSULE ORAL 3 TIMES DAILY PRN
Qty: 60 CAPSULE | Refills: 0 | Status: SHIPPED | OUTPATIENT
Start: 2023-11-27 | End: 2024-03-20 | Stop reason: ALTCHOICE

## 2023-11-27 RX ORDER — ALBUTEROL SULFATE 90 UG/1
AEROSOL, METERED RESPIRATORY (INHALATION)
Qty: 6.7 G | Refills: 1 | Status: SHIPPED | OUTPATIENT
Start: 2023-11-27 | End: 2023-12-22

## 2023-11-27 RX ORDER — PROMETHAZINE HYDROCHLORIDE AND DEXTROMETHORPHAN HYDROBROMIDE 6.25; 15 MG/5ML; MG/5ML
5 SYRUP ORAL NIGHTLY PRN
Qty: 118 ML | Refills: 0 | Status: SHIPPED | OUTPATIENT
Start: 2023-11-27 | End: 2024-03-20 | Stop reason: ALTCHOICE

## 2023-11-27 RX ORDER — BUTALBITAL, ACETAMINOPHEN AND CAFFEINE 50; 325; 40 MG/1; MG/1; MG/1
1 TABLET ORAL
COMMUNITY

## 2023-11-27 NOTE — PROGRESS NOTES
Routine Office Visit    Patient Name: Dayami Mina    : 1950  MRN: [id number]    Chief Complaint:  Cough    Subjective:  Dayami is a 73 y.o. female who presents today for:    Cough - patient who is known to me with the below documented medical history reports today for evaluation.  States around 2 weeks ago was given doxycycline for nasal issues such as congestion and sinus pain.  Reports resolution of her nasal congestion and sinus pain but states she is still having a significant dry cough with wheezing.  Denies any fevers or shortness a breath.  She has not smoked in over 30 years.  She states that Tessalon and promethazine DM do help somewhat with the cough but it is still lingering. No other acute concerns. Reports the cough has been present for over 2 weeks now.    Past Medical History  Past Medical History:   Diagnosis Date    Benign colon polyp 2cm     COPD (chronic obstructive pulmonary disease)     Depression     Hyperlipidemia     Hypertension     PONV (postoperative nausea and vomiting)        Family History  Family History   Problem Relation Age of Onset    Diabetes Mother     Diabetes Father     Stroke Paternal Grandfather     Diabetes Sister        Current Medications  Current Outpatient Medications on File Prior to Visit   Medication Sig Dispense Refill    acetaminophen/caffeine (EXCEDRIN TENSION HEADACHE ORAL) Take 1 capsule by mouth daily as needed.      allopurinoL (ZYLOPRIM) 100 MG tablet TAKE ONE TABLET BY MOUTH ONCE A DAY 90 tablet 0    aspirin (ECOTRIN) 81 MG EC tablet Take 81 mg by mouth once daily.      betahistine HCl (BETAHISTINE, BULK,) 100 % Powd WEEK 1: TAKE ONE 12MG CAPSULE BY MOUTH ONCE A DAY. WEEK 2 AND THEREAFTER: TAKE ONE 12 MG CAPSULE BY MOUTH TWICE DAILY.      blood sugar diagnostic Strp To check BG one time daily PRN, to use with insurance preferred meter 100 strip 0    blood-glucose meter kit To check BG one time daily PRN, to use with insurance preferred meter 1  each 0    butalbital-acetaminophen-caffeine -40 mg (FIORICET, ESGIC) -40 mg per tablet Take 1 tablet by mouth.      clindamycin (CLEOCIN) 300 MG capsule TAKE TWO CAPSULES BY MOUTH TWO hours prior TO dental procedure, THEN TAKE TWO CAPSULES SIX hours post procedure AS DIRECTED      cyanocobalamin (VITAMIN B-12) 1000 MCG tablet Take 100 mcg by mouth once daily.      docusate sodium (COLACE) 100 MG capsule Take 200 mg by mouth Daily.      EScitalopram oxalate (LEXAPRO) 20 MG tablet TAKE ONE TABLET (20mg) BY MOUTH ONCE A DAY 90 tablet 3    esomeprazole (NEXIUM) 20 MG capsule Take 20 mg by mouth.      fexofenadine (ALLEGRA) 180 MG tablet Take 180 mg by mouth once daily.      fluticasone furoate-vilanteroL (BREO ELLIPTA) 100-25 mcg/dose diskus inhaler Inhale 1 puff into the lungs once daily. 180 each 3    fluticasone propionate (FLONASE) 50 mcg/actuation nasal spray 1 spray (50 mcg total) by Each Nostril route 2 (two) times a day. 16 mL 2    gabapentin (NEURONTIN) 100 MG capsule 100 mg. 2 tabs in the am and 2 tabs in the pm      HYDROcodone-acetaminophen (NORCO) 7.5-325 mg per tablet Take 1 tablet by mouth.      lancets Misc To check BG one time daily PRN, to use with insurance preferred meter 100 each 0    losartan (COZAAR) 25 MG tablet Take 1 tablet (25 mg total) by mouth 2 (two) times a day. 180 tablet 1    magnesium oxide (MAG-OX) 400 mg (241.3 mg magnesium) tablet Take 400 mg by mouth once daily.      meclizine (ANTIVERT) 12.5 mg tablet Take 1 tablet by mouth as needed.      multivitamin capsule Take 1 capsule by mouth once daily.      potassium chloride SA (K-DUR,KLOR-CON) 20 MEQ tablet Take 20 mEq by mouth daily as needed.      pravastatin (PRAVACHOL) 40 MG tablet TAKE 1 TABLET BY MOUTH EVERY DAY 90 tablet 3    senna (SENOKOT) 8.6 mg tablet Take 2 tablets by mouth once daily.      [DISCONTINUED] albuterol (PROVENTIL/VENTOLIN HFA) 90 mcg/actuation inhaler INHALE 2 PUFFS BY MOUTH EVERY 6 HOURS AS NEEDED  "FOR WHEEZING. RESCUE 6.7 g 1    [DISCONTINUED] benzonatate (TESSALON) 200 MG capsule Take 1 capsule (200 mg total) by mouth 3 (three) times daily as needed for Cough. 60 capsule 0    [DISCONTINUED] promethazine-dextromethorphan (PROMETHAZINE-DM) 6.25-15 mg/5 mL Syrp Take 5 mLs by mouth nightly as needed (cough). 118 mL 0    [DISCONTINUED] doxycycline (VIBRA-TABS) 100 MG tablet Take 1 tablet (100 mg total) by mouth 2 (two) times daily. (Patient not taking: Reported on 11/27/2023) 14 tablet 0     No current facility-administered medications on file prior to visit.       Allergies   Review of patient's allergies indicates:   Allergen Reactions    Ancef [cefazolin] Nausea And Vomiting    Sulfa (sulfonamide antibiotics) Anaphylaxis    Scopolamine Blisters and Other (See Comments)    Phenylephrin-polyvinyl alcohol Blisters and Other (See Comments)       Review of Systems (Pertinent positives)  Review of Systems   Constitutional:  Negative for fever.   HENT: Negative.  Negative for congestion and sinus pain.    Eyes: Negative.    Respiratory:  Positive for cough and wheezing. Negative for hemoptysis, sputum production, shortness of breath and stridor.    Cardiovascular: Negative.    Gastrointestinal: Negative.    Genitourinary: Negative.    Musculoskeletal: Negative.    Skin: Negative.    Neurological:  Negative for dizziness, tingling, tremors, sensory change and headaches.   Endo/Heme/Allergies: Negative.    Psychiatric/Behavioral: Negative.         /70 (BP Location: Left arm, Patient Position: Sitting, BP Method: Large (Manual))   Pulse 74   Temp 97.9 °F (36.6 °C) (Oral)   Resp 16   Ht 4' 10" (1.473 m)   Wt 80.4 kg (177 lb 4 oz)   SpO2 98%   BMI 37.05 kg/m²     Physical Exam  Vitals reviewed.   Constitutional:       General: She is not in acute distress.     Appearance: Normal appearance. She is not ill-appearing, toxic-appearing or diaphoretic.   HENT:      Head: Normocephalic and atraumatic. "   Cardiovascular:      Rate and Rhythm: Normal rate and regular rhythm.      Pulses: Normal pulses.      Heart sounds: Normal heart sounds.   Pulmonary:      Effort: Pulmonary effort is normal. No respiratory distress.      Breath sounds: Wheezing present. No rhonchi or rales.      Comments: Wheezes noted throughout all lung fields.  No rhonchi.  No rales.  Skin:     General: Skin is warm and dry.      Capillary Refill: Capillary refill takes less than 2 seconds.   Neurological:      General: No focal deficit present.      Mental Status: She is alert and oriented to person, place, and time.   Psychiatric:         Mood and Affect: Mood normal.         Behavior: Behavior normal.          Assessment/Plan:  Dayami Mina is a 73 y.o. female who presents today for :    Dayami was seen today for wheezing and cough.    Diagnoses and all orders for this visit:    COPD with acute exacerbation  -     predniSONE (DELTASONE) 20 MG tablet; Take 2 tablets by mouth once a day for 3 days, then take 1 tab by mouth once a day for 3 days, then take 1/2 tab by mouth once a day for 3 days  -     X-Ray Chest PA And Lateral; Future  -     promethazine-dextromethorphan (PROMETHAZINE-DM) 6.25-15 mg/5 mL Syrp; Take 5 mLs by mouth nightly as needed (cough).  -     benzonatate (TESSALON) 200 MG capsule; Take 1 capsule (200 mg total) by mouth 3 (three) times daily as needed for Cough.  -     albuterol (PROVENTIL/VENTOLIN HFA) 90 mcg/actuation inhaler; INHALE 2 PUFFS BY MOUTH EVERY 6 HOURS AS NEEDED FOR WHEEZING. RESCUE    Essential hypertension    Centrilobular emphysema  -     albuterol (PROVENTIL/VENTOLIN HFA) 90 mcg/actuation inhaler; INHALE 2 PUFFS BY MOUTH EVERY 6 HOURS AS NEEDED FOR WHEEZING. RESCUE    - Tx for exacerbation with prednisone. Check xray. Defer further antibiotics right now  - PRNs refilled for pt  - BP controlled  - f/u as needed        This office note has been dictated.  This dictation has been generated using  M-Modal Fluency Direct dictation; some phonetic errors may occur.

## 2023-11-28 ENCOUNTER — APPOINTMENT (OUTPATIENT)
Dept: RADIOLOGY | Facility: HOSPITAL | Age: 73
End: 2023-11-28
Attending: NURSE PRACTITIONER
Payer: MEDICARE

## 2023-11-28 DIAGNOSIS — J44.1 COPD WITH ACUTE EXACERBATION: ICD-10-CM

## 2023-11-28 PROCEDURE — 71046 X-RAY EXAM CHEST 2 VIEWS: CPT | Mod: TC,FY,PN

## 2023-11-28 PROCEDURE — 71046 X-RAY EXAM CHEST 2 VIEWS: CPT | Mod: 26,,, | Performed by: RADIOLOGY

## 2023-11-28 PROCEDURE — 71046 XR CHEST PA AND LATERAL: ICD-10-PCS | Mod: 26,,, | Performed by: RADIOLOGY

## 2023-11-29 ENCOUNTER — PATIENT MESSAGE (OUTPATIENT)
Dept: FAMILY MEDICINE | Facility: CLINIC | Age: 73
End: 2023-11-29
Payer: MEDICARE

## 2023-11-29 RX ORDER — ALBUTEROL SULFATE 0.83 MG/ML
SOLUTION RESPIRATORY (INHALATION)
Qty: 270 ML | Refills: 0 | Status: SHIPPED | OUTPATIENT
Start: 2023-11-29

## 2023-11-29 RX ORDER — ALBUTEROL SULFATE 2.5 MG/.5ML
2.5 SOLUTION RESPIRATORY (INHALATION) EVERY 6 HOURS PRN
Qty: 4 EACH | Refills: 1 | Status: SHIPPED | OUTPATIENT
Start: 2023-11-29 | End: 2024-11-28

## 2023-12-03 DIAGNOSIS — Z71.89 COMPLEX CARE COORDINATION: ICD-10-CM

## 2023-12-06 ENCOUNTER — PATIENT MESSAGE (OUTPATIENT)
Dept: FAMILY MEDICINE | Facility: CLINIC | Age: 73
End: 2023-12-06
Payer: MEDICARE

## 2023-12-06 DIAGNOSIS — Z20.828 EXPOSURE TO INFLUENZA: Primary | ICD-10-CM

## 2023-12-06 RX ORDER — OSELTAMIVIR PHOSPHATE 75 MG/1
75 CAPSULE ORAL 2 TIMES DAILY
Qty: 10 CAPSULE | Refills: 0 | Status: SHIPPED | OUTPATIENT
Start: 2023-12-06 | End: 2023-12-11

## 2023-12-22 DIAGNOSIS — J43.2 CENTRILOBULAR EMPHYSEMA: ICD-10-CM

## 2023-12-22 DIAGNOSIS — J44.1 COPD WITH ACUTE EXACERBATION: ICD-10-CM

## 2023-12-22 RX ORDER — ALBUTEROL SULFATE 90 UG/1
AEROSOL, METERED RESPIRATORY (INHALATION)
Qty: 6.7 G | Refills: 1 | Status: SHIPPED | OUTPATIENT
Start: 2023-12-22

## 2023-12-25 ENCOUNTER — PATIENT MESSAGE (OUTPATIENT)
Dept: FAMILY MEDICINE | Facility: CLINIC | Age: 73
End: 2023-12-25
Payer: MEDICARE

## 2023-12-25 DIAGNOSIS — H81.13 BENIGN PAROXYSMAL POSITIONAL VERTIGO DUE TO BILATERAL VESTIBULAR DISORDER: ICD-10-CM

## 2023-12-26 RX ORDER — MECLIZINE HCL 12.5 MG 12.5 MG/1
12.5 TABLET ORAL 3 TIMES DAILY PRN
Qty: 60 TABLET | Refills: 0 | Status: SHIPPED | OUTPATIENT
Start: 2023-12-26 | End: 2024-01-11

## 2023-12-26 NOTE — TELEPHONE ENCOUNTER
Care Due:                  Date            Visit Type   Department     Provider  --------------------------------------------------------------------------------                                [facility]                              PRIMARY      MEDICINE/  Last Visit: 11-      CARE ([facility])   INTERNAL MED   Ramana French  Next Visit: None Scheduled  None         None Found                                                            Last  Test          Frequency    Reason                     Performed    Due Date  --------------------------------------------------------------------------------    Lipid Panel.  12 months..  pravastatin..............  02- 02-    Uric Acid...  12 months..  allopurinoL..............  09- 09-    [employer] Embedded Care Due Messages. Reference number: [id number].   12/26/2023 7:43:50 AM CST

## 2023-12-31 ENCOUNTER — PATIENT MESSAGE (OUTPATIENT)
Dept: FAMILY MEDICINE | Facility: CLINIC | Age: 73
End: 2023-12-31
Payer: MEDICARE

## 2024-01-08 DIAGNOSIS — E78.5 HYPERLIPIDEMIA, UNSPECIFIED HYPERLIPIDEMIA TYPE: Chronic | ICD-10-CM

## 2024-01-08 NOTE — TELEPHONE ENCOUNTER
No care due was identified.  [facility] Embedded Care Due Messages. Reference number: [id number].   1/08/2024 11:18:04 AM CST

## 2024-01-09 RX ORDER — PRAVASTATIN SODIUM 40 MG/1
40 TABLET ORAL
Qty: 90 TABLET | Refills: 0 | Status: SHIPPED | OUTPATIENT
Start: 2024-01-09

## 2024-01-09 NOTE — TELEPHONE ENCOUNTER
Dayami Mina  is requesting a refill authorization.  Brief Assessment and Rationale for Refill:  Approve     Medication Therapy Plan:         Comments:     Note composed:5:47 AM 01/09/2024

## 2024-01-11 ENCOUNTER — TELEPHONE (OUTPATIENT)
Dept: FAMILY MEDICINE | Facility: CLINIC | Age: 74
End: 2024-01-11
Payer: MEDICARE

## 2024-01-11 DIAGNOSIS — H81.13 BENIGN PAROXYSMAL POSITIONAL VERTIGO DUE TO BILATERAL VESTIBULAR DISORDER: ICD-10-CM

## 2024-01-11 RX ORDER — MECLIZINE HYDROCHLORIDE 25 MG/1
25 TABLET ORAL 3 TIMES DAILY PRN
Qty: 60 TABLET | Refills: 1 | Status: SHIPPED | OUTPATIENT
Start: 2024-01-11

## 2024-01-11 NOTE — TELEPHONE ENCOUNTER
----- Message from Opal Ching sent at 1/11/2024 12:11 PM CST -----  Type: Patient Call Back    Who called:Self    What is the request in detail:Pt has been experiencing dizziness and would like to know what else can she do since medication isnt working     Can the clinic reply by [facility]?No    Would the patient rather a call back or a response via [facility]? Call    Best call back number:.[phone] ([place])       Additional Information:

## 2024-01-11 NOTE — TELEPHONE ENCOUNTER
Spoke with patient stated having  COVID right before the new year. Since patients dizziness has increased. medication is not helping stated taking x2 of 12.5  meclizine. Please advise

## 2024-01-26 DIAGNOSIS — M1A.0410 CHRONIC GOUT OF RIGHT HAND, UNSPECIFIED CAUSE: ICD-10-CM

## 2024-01-26 RX ORDER — ALLOPURINOL 100 MG/1
TABLET ORAL
Qty: 90 TABLET | Refills: 2 | Status: SHIPPED | OUTPATIENT
Start: 2024-01-26

## 2024-01-26 NOTE — TELEPHONE ENCOUNTER
Refill Routing Note   Medication(s) are not appropriate for processing by [facility] for the following reason(s):        Required labs outdated    [facility] action(s):  Defer               Appointments  past 12m or future 3m with PCP    Date Provider   Last Visit   11/15/2023 Ramana French MD   Next Visit   3/20/2024 Ramana French MD   ED visits in past 90 days: 0        Note composed:10:46 AM 01/26/2024

## 2024-01-26 NOTE — TELEPHONE ENCOUNTER
No care due was identified.  [facility] Embedded Care Due Messages. Reference number: [id number].   1/26/2024 10:27:21 AM CST

## 2024-03-20 ENCOUNTER — LAB VISIT (OUTPATIENT)
Dept: LAB | Facility: HOSPITAL | Age: 74
End: 2024-03-20
Attending: INTERNAL MEDICINE
Payer: MEDICARE

## 2024-03-20 ENCOUNTER — OFFICE VISIT (OUTPATIENT)
Dept: FAMILY MEDICINE | Facility: CLINIC | Age: 74
End: 2024-03-20
Payer: MEDICARE

## 2024-03-20 VITALS
WEIGHT: 175.06 LBS | SYSTOLIC BLOOD PRESSURE: 132 MMHG | HEIGHT: 58 IN | HEART RATE: 90 BPM | OXYGEN SATURATION: 97 % | TEMPERATURE: 98 F | BODY MASS INDEX: 36.75 KG/M2 | DIASTOLIC BLOOD PRESSURE: 70 MMHG

## 2024-03-20 DIAGNOSIS — J43.2 CENTRILOBULAR EMPHYSEMA: ICD-10-CM

## 2024-03-20 DIAGNOSIS — R73.01 IMPAIRED FASTING GLUCOSE: ICD-10-CM

## 2024-03-20 DIAGNOSIS — I10 ESSENTIAL HYPERTENSION: Primary | ICD-10-CM

## 2024-03-20 DIAGNOSIS — F33.42 RECURRENT MAJOR DEPRESSIVE DISORDER, IN FULL REMISSION: ICD-10-CM

## 2024-03-20 DIAGNOSIS — I70.0 AORTIC ATHEROSCLEROSIS: ICD-10-CM

## 2024-03-20 DIAGNOSIS — J30.89 NON-SEASONAL ALLERGIC RHINITIS DUE TO OTHER ALLERGIC TRIGGER: ICD-10-CM

## 2024-03-20 DIAGNOSIS — I65.21 STENOSIS OF RIGHT CAROTID ARTERY: ICD-10-CM

## 2024-03-20 DIAGNOSIS — E66.01 SEVERE OBESITY (BMI 35.0-39.9) WITH COMORBIDITY: ICD-10-CM

## 2024-03-20 DIAGNOSIS — I10 ESSENTIAL HYPERTENSION: ICD-10-CM

## 2024-03-20 DIAGNOSIS — M46.1 SACROILIITIS, NOT ELSEWHERE CLASSIFIED: ICD-10-CM

## 2024-03-20 LAB
ALBUMIN SERPL BCP-MCNC: 3.7 G/DL (ref 3.5–5.2)
ALP SERPL-CCNC: 51 U/L (ref 55–135)
ALT SERPL W/O P-5'-P-CCNC: 40 U/L (ref 10–44)
ANION GAP SERPL CALC-SCNC: 5 MMOL/L (ref 8–16)
AST SERPL-CCNC: 31 U/L (ref 10–40)
BASOPHILS # BLD AUTO: 0.03 K/UL (ref 0–0.2)
BASOPHILS NFR BLD: 0.5 % (ref 0–1.9)
BILIRUB SERPL-MCNC: 0.3 MG/DL (ref 0.1–1)
BUN SERPL-MCNC: 20 MG/DL (ref 8–23)
CALCIUM SERPL-MCNC: 9.2 MG/DL (ref 8.7–10.5)
CHLORIDE SERPL-SCNC: 103 MMOL/L (ref 95–110)
CO2 SERPL-SCNC: 29 MMOL/L (ref 23–29)
CREAT SERPL-MCNC: 1 MG/DL (ref 0.5–1.4)
DIFFERENTIAL METHOD BLD: ABNORMAL
EOSINOPHIL # BLD AUTO: 0.3 K/UL (ref 0–0.5)
EOSINOPHIL NFR BLD: 4.5 % (ref 0–8)
ERYTHROCYTE [DISTWIDTH] IN BLOOD BY AUTOMATED COUNT: 14.4 % (ref 11.5–14.5)
EST. GFR  (NO RACE VARIABLE): 59 ML/MIN/1.73 M^2
GLUCOSE SERPL-MCNC: 93 MG/DL (ref 70–110)
HCT VFR BLD AUTO: 40.3 % (ref 37–48.5)
HGB BLD-MCNC: 12.5 G/DL (ref 12–16)
IMM GRANULOCYTES # BLD AUTO: 0.02 K/UL (ref 0–0.04)
IMM GRANULOCYTES NFR BLD AUTO: 0.3 % (ref 0–0.5)
LYMPHOCYTES # BLD AUTO: 1.9 K/UL (ref 1–4.8)
LYMPHOCYTES NFR BLD: 29 % (ref 18–48)
MCH RBC QN AUTO: 26.7 PG (ref 27–31)
MCHC RBC AUTO-ENTMCNC: 31 G/DL (ref 32–36)
MCV RBC AUTO: 86 FL (ref 82–98)
MONOCYTES # BLD AUTO: 0.6 K/UL (ref 0.3–1)
MONOCYTES NFR BLD: 9.7 % (ref 4–15)
NEUTROPHILS # BLD AUTO: 3.7 K/UL (ref 1.8–7.7)
NEUTROPHILS NFR BLD: 56 % (ref 38–73)
NRBC BLD-RTO: 0 /100 WBC
PLATELET # BLD AUTO: 264 K/UL (ref 150–450)
PMV BLD AUTO: 9.7 FL (ref 9.2–12.9)
POTASSIUM SERPL-SCNC: 5.4 MMOL/L (ref 3.5–5.1)
PROT SERPL-MCNC: 6.6 G/DL (ref 6–8.4)
RBC # BLD AUTO: 4.68 M/UL (ref 4–5.4)
SODIUM SERPL-SCNC: 137 MMOL/L (ref 136–145)
WBC # BLD AUTO: 6.61 K/UL (ref 3.9–12.7)

## 2024-03-20 PROCEDURE — 85025 COMPLETE CBC W/AUTO DIFF WBC: CPT | Performed by: INTERNAL MEDICINE

## 2024-03-20 PROCEDURE — 99215 OFFICE O/P EST HI 40 MIN: CPT | Mod: PBBFAC,PN | Performed by: INTERNAL MEDICINE

## 2024-03-20 PROCEDURE — 99999 PR PBB SHADOW E&M-EST. PATIENT-LVL V: CPT | Mod: PBBFAC,,, | Performed by: INTERNAL MEDICINE

## 2024-03-20 PROCEDURE — 83036 HEMOGLOBIN GLYCOSYLATED A1C: CPT | Performed by: INTERNAL MEDICINE

## 2024-03-20 PROCEDURE — 99214 OFFICE O/P EST MOD 30 MIN: CPT | Mod: S$PBB,,, | Performed by: INTERNAL MEDICINE

## 2024-03-20 PROCEDURE — 36415 COLL VENOUS BLD VENIPUNCTURE: CPT | Mod: PN | Performed by: INTERNAL MEDICINE

## 2024-03-20 PROCEDURE — 80053 COMPREHEN METABOLIC PANEL: CPT | Performed by: INTERNAL MEDICINE

## 2024-03-20 RX ORDER — PRAVASTATIN SODIUM 40 MG/1
40 TABLET ORAL NIGHTLY
Qty: 90 TABLET | Refills: 3 | Status: SHIPPED | OUTPATIENT
Start: 2024-03-20

## 2024-03-20 RX ORDER — CETIRIZINE HYDROCHLORIDE 10 MG/1
10 TABLET ORAL DAILY
Qty: 90 TABLET | Refills: 3 | Status: SHIPPED | OUTPATIENT
Start: 2024-03-20 | End: 2025-03-20

## 2024-03-20 RX ORDER — LOSARTAN POTASSIUM 25 MG/1
25 TABLET ORAL 2 TIMES DAILY
Qty: 180 TABLET | Refills: 1 | Status: SHIPPED | OUTPATIENT
Start: 2024-03-20

## 2024-03-20 NOTE — PROGRESS NOTES
"Subjective:       Patient ID: Dayami Mina is a 73 y.o. female.    Chief Complaint: Follow-up (4m f/u)    F/u chronic conditions    hPI: 74 y/o w/ COPD HTN CAD carotid stenosis chronic low back pain on daily opioid therapy presents alone for scheduled follow up. Dizziness somewhat improved compared to two months ago using meclizine intermittently no falls no dysphagia no LE swelling breathing at baseline    Had SI injection with pain specialist last month with improvement in hip and lower back pain      Review of Systems   Constitutional:  Negative for activity change, appetite change, fatigue, fever and unexpected weight change.   HENT:  Negative for ear pain, rhinorrhea and sore throat.    Eyes:  Negative for discharge and visual disturbance.   Respiratory:  Negative for chest tightness, shortness of breath and wheezing.    Cardiovascular:  Negative for chest pain, palpitations and leg swelling.   Gastrointestinal:  Negative for abdominal pain, constipation and diarrhea.   Endocrine: Negative for cold intolerance and heat intolerance.   Genitourinary:  Negative for dysuria and hematuria.   Musculoskeletal:  Positive for arthralgias and back pain. Negative for joint swelling and neck stiffness.   Skin:  Negative for rash.   Neurological:  Negative for dizziness, syncope, weakness and headaches.   Psychiatric/Behavioral:  Negative for suicidal ideas.        Objective:     Vitals:    03/20/24 1307   BP: 132/70   BP Location: Right arm   Patient Position: Sitting   BP Method: Medium (Manual)   Pulse: 90   Temp: 97.7 °F (36.5 °C)   TempSrc: Oral   SpO2: 97%   Weight: 79.4 kg (175 lb 0.7 oz)   Height: 4' 10" (1.473 m)          Physical Exam  Constitutional:       Appearance: She is well-developed.   HENT:      Head: Normocephalic and atraumatic.   Eyes:      Conjunctiva/sclera: Conjunctivae normal.   Cardiovascular:      Rate and Rhythm: Normal rate and regular rhythm.      Heart sounds: No murmur heard.     " No friction rub. No gallop.   Pulmonary:      Effort: Pulmonary effort is normal.      Breath sounds: Normal breath sounds. No wheezing or rales.   Abdominal:      Palpations: Abdomen is soft.      Tenderness: There is no abdominal tenderness. There is no guarding or rebound.   Musculoskeletal:         General: No tenderness. Normal range of motion.      Cervical back: Normal range of motion.      Right lower leg: No edema.      Left lower leg: No edema.   Skin:     General: Skin is warm and dry.   Neurological:      Mental Status: She is alert and oriented to person, place, and time.      Cranial Nerves: No cranial nerve deficit.         Assessment and Plan   1. Essential hypertension  BP at [facility]  - CBC Auto Differential; Future  - Comprehensive Metabolic Panel; Future  - losartan (COZAAR) 25 MG tablet; Take 1 tablet (25 mg total) by mouth 2 (two) times a day.  Dispense: 180 tablet; Refill: 1    2. Centrilobular emphysema  Continue laba/ics    3. Sacroiliitis, not elsewhere classified  Management per dr Fox    4. Impaired fasting glucose  Screen wtiha 1c  - Hemoglobin A1C; Future    5. Stenosis of right carotid artery  Cartoid ultrasound to monitor continue statin and asa therapy  - CV Ultrasound Bilateral Doppler Carotid; Future    6. Severe obesity (BMI 35.0-39.9) with comorbidity  The patient is asked to make an attempt to improve diet and exercise patterns to aid in medical management of this problem.      7. Recurrent major depressive disorder, in full remission  On ssri     8. Aortic atherosclerosis  Statin as above  - pravastatin (PRAVACHOL) 40 MG tablet; Take 1 tablet (40 mg total) by mouth every evening.  Dispense: 90 tablet; Refill: 3    9. Non-seasonal allergic rhinitis due to other allergic trigger  Switch to cetirizine conitnue nasal steroid spray  - cetirizine (ZYRTEC) 10 MG tablet; Take 1 tablet (10 mg total) by mouth once daily.  Dispense: 90 tablet; Refill: 3

## 2024-03-21 LAB
ESTIMATED AVG GLUCOSE: 128 MG/DL (ref 68–131)
HBA1C MFR BLD: 6.1 % (ref 4–5.6)

## 2024-04-26 ENCOUNTER — LAB VISIT (OUTPATIENT)
Dept: LAB | Facility: HOSPITAL | Age: 74
End: 2024-04-26
Attending: ORTHOPAEDIC SURGERY
Payer: MEDICARE

## 2024-04-26 DIAGNOSIS — M19.032 ARTHRITIS OF LEFT FOREARM: ICD-10-CM

## 2024-04-26 DIAGNOSIS — M19.032 ARTHRITIS OF LEFT FOREARM: Primary | ICD-10-CM

## 2024-04-26 LAB
ALBUMIN SERPL BCP-MCNC: 3.7 G/DL (ref 3.5–5.2)
ALP SERPL-CCNC: 46 U/L (ref 55–135)
ALT SERPL W/O P-5'-P-CCNC: 36 U/L (ref 10–44)
ANION GAP SERPL CALC-SCNC: 9 MMOL/L (ref 8–16)
AST SERPL-CCNC: 28 U/L (ref 10–40)
BASOPHILS # BLD AUTO: 0.04 K/UL (ref 0–0.2)
BASOPHILS NFR BLD: 0.5 % (ref 0–1.9)
BILIRUB SERPL-MCNC: 0.2 MG/DL (ref 0.1–1)
BUN SERPL-MCNC: 19 MG/DL (ref 8–23)
CALCIUM SERPL-MCNC: 9.4 MG/DL (ref 8.7–10.5)
CHLORIDE SERPL-SCNC: 104 MMOL/L (ref 95–110)
CO2 SERPL-SCNC: 26 MMOL/L (ref 23–29)
CREAT SERPL-MCNC: 0.9 MG/DL (ref 0.5–1.4)
CRP SERPL-MCNC: 8.2 MG/L (ref 0–8.2)
DIFFERENTIAL METHOD BLD: ABNORMAL
EOSINOPHIL # BLD AUTO: 0.2 K/UL (ref 0–0.5)
EOSINOPHIL NFR BLD: 2.6 % (ref 0–8)
ERYTHROCYTE [DISTWIDTH] IN BLOOD BY AUTOMATED COUNT: 14.6 % (ref 11.5–14.5)
ERYTHROCYTE [SEDIMENTATION RATE] IN BLOOD BY WESTERGREN METHOD: 20 MM/HR (ref 0–20)
EST. GFR  (NO RACE VARIABLE): >60 ML/MIN/1.73 M^2
GLUCOSE SERPL-MCNC: 95 MG/DL (ref 70–110)
HCT VFR BLD AUTO: 38.2 % (ref 37–48.5)
HGB BLD-MCNC: 11.8 G/DL (ref 12–16)
IMM GRANULOCYTES # BLD AUTO: 0.02 K/UL (ref 0–0.04)
IMM GRANULOCYTES NFR BLD AUTO: 0.3 % (ref 0–0.5)
LYMPHOCYTES # BLD AUTO: 1.9 K/UL (ref 1–4.8)
LYMPHOCYTES NFR BLD: 24.3 % (ref 18–48)
MCH RBC QN AUTO: 27.3 PG (ref 27–31)
MCHC RBC AUTO-ENTMCNC: 30.9 G/DL (ref 32–36)
MCV RBC AUTO: 88 FL (ref 82–98)
MONOCYTES # BLD AUTO: 0.6 K/UL (ref 0.3–1)
MONOCYTES NFR BLD: 7.9 % (ref 4–15)
NEUTROPHILS # BLD AUTO: 4.9 K/UL (ref 1.8–7.7)
NEUTROPHILS NFR BLD: 64.4 % (ref 38–73)
NRBC BLD-RTO: 0 /100 WBC
PLATELET # BLD AUTO: 256 K/UL (ref 150–450)
PMV BLD AUTO: 9.5 FL (ref 9.2–12.9)
POTASSIUM SERPL-SCNC: 4.7 MMOL/L (ref 3.5–5.1)
PROT SERPL-MCNC: 6.9 G/DL (ref 6–8.4)
RBC # BLD AUTO: 4.33 M/UL (ref 4–5.4)
SODIUM SERPL-SCNC: 139 MMOL/L (ref 136–145)
WBC # BLD AUTO: 7.62 K/UL (ref 3.9–12.7)

## 2024-04-26 PROCEDURE — 86038 ANTINUCLEAR ANTIBODIES: CPT | Performed by: ORTHOPAEDIC SURGERY

## 2024-04-26 PROCEDURE — 86431 RHEUMATOID FACTOR QUANT: CPT | Performed by: ORTHOPAEDIC SURGERY

## 2024-04-26 PROCEDURE — 86140 C-REACTIVE PROTEIN: CPT | Performed by: ORTHOPAEDIC SURGERY

## 2024-04-26 PROCEDURE — 85652 RBC SED RATE AUTOMATED: CPT | Performed by: ORTHOPAEDIC SURGERY

## 2024-04-26 PROCEDURE — 36415 COLL VENOUS BLD VENIPUNCTURE: CPT | Performed by: ORTHOPAEDIC SURGERY

## 2024-04-26 PROCEDURE — 80053 COMPREHEN METABOLIC PANEL: CPT | Performed by: ORTHOPAEDIC SURGERY

## 2024-04-26 PROCEDURE — 85025 COMPLETE CBC W/AUTO DIFF WBC: CPT | Performed by: ORTHOPAEDIC SURGERY

## 2024-04-28 LAB — RHEUMATOID FACT SERPL-ACNC: <13 IU/ML (ref 0–15)

## 2024-04-29 LAB — ANA SER QL IF: NORMAL

## 2024-05-03 ENCOUNTER — HOSPITAL ENCOUNTER (OUTPATIENT)
Dept: CARDIOLOGY | Facility: HOSPITAL | Age: 74
Discharge: HOME | End: 2024-05-03
Attending: INTERNAL MEDICINE
Payer: MEDICARE

## 2024-05-03 DIAGNOSIS — I65.21 STENOSIS OF RIGHT CAROTID ARTERY: ICD-10-CM

## 2024-05-03 PROCEDURE — 93880 EXTRACRANIAL BILAT STUDY: CPT

## 2024-05-03 PROCEDURE — 93880 EXTRACRANIAL BILAT STUDY: CPT | Mod: 26,,, | Performed by: INTERNAL MEDICINE

## 2024-05-04 LAB
LEFT CBA DIAS: 13 CM/S
LEFT CBA SYS: 62 CM/S
LEFT CCA DIST DIAS: 15 CM/S
LEFT CCA DIST SYS: 78 CM/S
LEFT CCA MID DIAS: 15 CM/S
LEFT CCA MID SYS: 68 CM/S
LEFT CCA PROX DIAS: 20 CM/S
LEFT CCA PROX SYS: 89 CM/S
LEFT ECA DIAS: 3 CM/S
LEFT ECA SYS: 59 CM/S
LEFT ICA DIST DIAS: 31 CM/S
LEFT ICA DIST SYS: 87 CM/S
LEFT ICA MID DIAS: 32 CM/S
LEFT ICA MID SYS: 85 CM/S
LEFT ICA PROX DIAS: 24 CM/S
LEFT ICA PROX SYS: 65 CM/S
LEFT VERTEBRAL DIAS: 9 CM/S
LEFT VERTEBRAL SYS: 39 CM/S
OHS CV CAROTID RIGHT ICA EDV HIGHEST: 21
OHS CV CAROTID ULTRASOUND LEFT ICA/CCA RATIO: 1.12
OHS CV CAROTID ULTRASOUND RIGHT ICA/CCA RATIO: 1.5
OHS CV PV CAROTID LEFT HIGHEST CCA: 89
OHS CV PV CAROTID LEFT HIGHEST ICA: 87
OHS CV PV CAROTID RIGHT HIGHEST CCA: 75
OHS CV PV CAROTID RIGHT HIGHEST ICA: 90
OHS CV US CAROTID LEFT HIGHEST EDV: 32
RIGHT CBA DIAS: 12 CM/S
RIGHT CBA SYS: 63 CM/S
RIGHT CCA DIST DIAS: 11 CM/S
RIGHT CCA DIST SYS: 60 CM/S
RIGHT CCA MID DIAS: 10 CM/S
RIGHT CCA MID SYS: 75 CM/S
RIGHT CCA PROX DIAS: 6 CM/S
RIGHT CCA PROX SYS: 68 CM/S
RIGHT ECA DIAS: 5 CM/S
RIGHT ECA SYS: 123 CM/S
RIGHT ICA DIST DIAS: 18 CM/S
RIGHT ICA DIST SYS: 64 CM/S
RIGHT ICA MID DIAS: 21 CM/S
RIGHT ICA MID SYS: 90 CM/S
RIGHT ICA PROX DIAS: 12 CM/S
RIGHT ICA PROX SYS: 57 CM/S
RIGHT VERTEBRAL DIAS: 14 CM/S
RIGHT VERTEBRAL SYS: 45 CM/S

## 2024-05-23 ENCOUNTER — PATIENT MESSAGE (OUTPATIENT)
Dept: FAMILY MEDICINE | Facility: CLINIC | Age: 74
End: 2024-05-23
Payer: MEDICARE

## 2024-05-23 DIAGNOSIS — H81.13 BENIGN PAROXYSMAL POSITIONAL VERTIGO DUE TO BILATERAL VESTIBULAR DISORDER: ICD-10-CM

## 2024-05-23 RX ORDER — MECLIZINE HYDROCHLORIDE 25 MG/1
25 TABLET ORAL 3 TIMES DAILY PRN
Qty: 60 TABLET | Refills: 1 | Status: SHIPPED | OUTPATIENT
Start: 2024-05-23 | End: 2024-06-14

## 2024-05-23 NOTE — TELEPHONE ENCOUNTER
Care Due:                  Date            Visit Type   Department     Provider  --------------------------------------------------------------------------------                                [facility]                              PRIMARY      MEDICINE/  Last Visit: 03-      CARE ([facility])   INTERNAL MED   Ramana French                              [facility]                              PRIMARY      MEDICINE/  Next Visit: 07-      CARE ([facility])   INTERNAL MED   Ramana French                                                            Last  Test          Frequency    Reason                     Performed    Due Date  --------------------------------------------------------------------------------    Lipid Panel.  12 months..  pravastatin..............  02- 02-    Uric Acid...  12 months..  allopurinoL..............  09- 09-    [facility] Embedded Care Due Messages. Reference number: [id number].   5/23/2024 4:10:13 PM CDT

## 2024-05-24 ENCOUNTER — OFFICE VISIT (OUTPATIENT)
Dept: FAMILY MEDICINE | Facility: CLINIC | Age: 74
End: 2024-05-24
Payer: MEDICARE

## 2024-05-24 DIAGNOSIS — H81.392 PERIPHERAL VERTIGO INVOLVING LEFT EAR: ICD-10-CM

## 2024-05-24 DIAGNOSIS — J32.9 CHRONIC SINUSITIS, UNSPECIFIED LOCATION: Primary | ICD-10-CM

## 2024-05-24 PROCEDURE — 99213 OFFICE O/P EST LOW 20 MIN: CPT | Mod: 95,,, | Performed by: NURSE PRACTITIONER

## 2024-05-24 RX ORDER — METHYLPREDNISOLONE 4 MG/1
TABLET ORAL
Qty: 1 EACH | Refills: 0 | Status: SHIPPED | OUTPATIENT
Start: 2024-05-24 | End: 2024-06-14

## 2024-05-24 NOTE — PROGRESS NOTES
The patient location is: [place]  The chief complaint leading to consultation is:  Dizziness, congestion    Visit type: audiovisual    Face to Face time with patient: 13 minutes  20 minutes of total time spent on the encounter, which includes face to face time and non-face to face time preparing to see the patient (eg, review of tests), Obtaining and/or reviewing separately obtained history, Documenting clinical information in the electronic or other health record, Independently interpreting results (not separately reported) and communicating results to the patient/family/caregiver, or Care coordination (not separately reported).         Each patient to whom he or she provides medical services by telemedicine is:  (1) informed of the relationship between the physician and patient and the respective role of any other health care provider with respect to management of the patient; and (2) notified that he or she may decline to receive medical services by telemedicine and may withdraw from such care at any time.    Notes:     Patient Name: Dayami Mina    : 1950  MRN: [id number]    Chief Complaint:  Dizziness, congestion    Subjective:  Dayami is a 73 y.o. female who presents today for:    Dizziness, congestion - patient who is known to me with a history of chronic sinusitis reports today for evaluation.  For the last 2 weeks she has been having some congestion and postnasal drip.  Previously was taking Allegra but this has not helped so she switched to Zyrtec D which has been helping.  She notes in the last 2 weeks getting significant vertigo with left-sided head movements.  She has dealt with this problem in the past before seeing ENT.  She notes that meclizine only helps partly with the dizziness.  She describes the vertigo as a room spinning sensation.  She has chronic bilateral hearing loss and does note some intermittent chronic tingling of the left ear.  She does take Nasacort in addition to the  Zyrtec.    Past Medical History  Past Medical History:   Diagnosis Date    Benign colon polyp 2cm     COPD (chronic obstructive pulmonary disease)     Depression     Hyperlipidemia     Hypertension     PONV (postoperative nausea and vomiting)        Family History  Family History   Problem Relation Name Age of Onset    Diabetes Mother      Diabetes Father      Stroke Paternal Grandfather      Diabetes Sister         Current Medications  Current Outpatient Medications on File Prior to Visit   Medication Sig Dispense Refill    acetaminophen/caffeine (EXCEDRIN TENSION HEADACHE ORAL) Take 1 capsule by mouth daily as needed.      albuterol (PROVENTIL) 2.5 mg /3 mL (0.083 %) nebulizer solution USE one vial INHALE THE CONTENT OF 1 VIAL VIA NEBULIZER nebulizer THREE TIMES DAILY AS NEEDED wheezing 270 mL 0    albuterol (PROVENTIL/VENTOLIN HFA) 90 mcg/actuation inhaler INHALE TWO PUFFS BY MOUTH EVERY 6 HOURS AS NEEDED FOR WHEEZING 6.7 g 1    albuterol sulfate 2.5 mg/0.5 mL Nebu Take 2.5 mg by nebulization every 6 (six) hours as needed (shortness of breath or wheezing). Rescue 4 each 1    allopurinoL (ZYLOPRIM) 100 MG tablet TAKE ONE TABLET BY MOUTH ONCE A DAY 90 tablet 2    aspirin (ECOTRIN) 81 MG EC tablet Take 81 mg by mouth once daily.      betahistine HCl (BETAHISTINE, BULK,) 100 % Powd WEEK 1: TAKE ONE 12MG CAPSULE BY MOUTH ONCE A DAY. WEEK 2 AND THEREAFTER: TAKE ONE 12 MG CAPSULE BY MOUTH TWICE DAILY.      blood sugar diagnostic Strp To check BG one time daily PRN, to use with insurance preferred meter 100 strip 0    blood-glucose meter kit To check BG one time daily PRN, to use with insurance preferred meter 1 each 0    butalbital-acetaminophen-caffeine -40 mg (FIORICET, ESGIC) -40 mg per tablet Take 1 tablet by mouth.      cetirizine (ZYRTEC) 10 MG tablet Take 1 tablet (10 mg total) by mouth once daily. 90 tablet 3    cyanocobalamin (VITAMIN B-12) 1000 MCG tablet Take 100 mcg by mouth once daily.       docusate sodium (COLACE) 100 MG capsule Take 200 mg by mouth Daily.      EScitalopram oxalate (LEXAPRO) 20 MG tablet TAKE ONE TABLET (20mg) BY MOUTH ONCE A DAY 90 tablet 3    esomeprazole (NEXIUM) 20 MG capsule Take 20 mg by mouth.      fluticasone furoate-vilanteroL (BREO ELLIPTA) 100-25 mcg/dose diskus inhaler Inhale 1 puff into the lungs once daily. 180 each 3    fluticasone propionate (FLONASE) 50 mcg/actuation nasal spray 1 spray (50 mcg total) by Each Nostril route 2 (two) times a day. 16 mL 2    gabapentin (NEURONTIN) 100 MG capsule 100 mg. 2 tabs in the am and 2 tabs in the pm      HYDROcodone-acetaminophen (NORCO) 7.5-325 mg per tablet Take 1 tablet by mouth.      lancets Misc To check BG one time daily PRN, to use with insurance preferred meter 100 each 0    losartan (COZAAR) 25 MG tablet Take 1 tablet (25 mg total) by mouth 2 (two) times a day. 180 tablet 1    magnesium oxide (MAG-OX) 400 mg (241.3 mg magnesium) tablet Take 400 mg by mouth once daily.      meclizine (ANTIVERT) 25 mg tablet Take 1 tablet (25 mg total) by mouth 3 (three) times daily as needed for Dizziness. 60 tablet 1    multivitamin capsule Take 1 capsule by mouth once daily.      potassium chloride SA (K-DUR,KLOR-CON) 20 MEQ tablet Take 20 mEq by mouth daily as needed.      pravastatin (PRAVACHOL) 40 MG tablet Take 1 tablet (40 mg total) by mouth every evening. 90 tablet 3    senna (SENOKOT) 8.6 mg tablet Take 2 tablets by mouth once daily.       No current facility-administered medications on file prior to visit.       Allergies   Review of patient's allergies indicates:   Allergen Reactions    Ancef [cefazolin] Nausea And Vomiting    Sulfa (sulfonamide antibiotics) Anaphylaxis    Scopolamine Blisters and Other (See Comments)    Phenylephrin-polyvinyl alcohol Blisters and Other (See Comments)       Review of Systems (Pertinent positives)  Review of Systems   Constitutional: Negative.    HENT:  Positive for congestion, ear pain and  tinnitus. Negative for ear discharge and hearing loss.    Respiratory: Negative.     Cardiovascular: Negative.    Neurological:  Positive for dizziness. Negative for tingling, tremors and headaches.       There were no vitals taken for this visit.    Physical Exam  Vitals reviewed.   Constitutional:       General: She is not in acute distress.     Appearance: Normal appearance. She is not ill-appearing, toxic-appearing or diaphoretic.   HENT:      Head: Normocephalic and atraumatic.   Cardiovascular:      Pulses: Normal pulses.   Pulmonary:      Effort: Pulmonary effort is normal. No respiratory distress.      Breath sounds: No wheezing.   Skin:     General: Skin is warm and dry.   Neurological:      Mental Status: She is alert and oriented to person, place, and time.   Psychiatric:         Mood and Affect: Mood normal.         Behavior: Behavior normal.          Assessment/Plan:  Dayami Mina is a 73 y.o. female who presents today for :    Diagnoses and all orders for this visit:    Chronic sinusitis, unspecified location  -     methylPREDNISolone (MEDROL DOSEPACK) 4 mg tablet; use as directed    Peripheral vertigo involving left ear  -     Ambulatory referral/consult to Physical/Occupational Therapy; Future    - trial short course steroid to assist with symptoms  - vestibular exercises discussed with patient, will consult PT  - continue nasal steroid  - recommended patient to limit use of decongestants given patient's history of hypertension  - can help schedule follow up with ENT if symptoms do not improve  - all questions answered        This office note has been dictated.  This dictation has been generated using M-Modal Fluency Direct dictation; some phonetic errors may occur.

## 2024-06-13 DIAGNOSIS — H81.13 BENIGN PAROXYSMAL POSITIONAL VERTIGO DUE TO BILATERAL VESTIBULAR DISORDER: ICD-10-CM

## 2024-06-13 NOTE — TELEPHONE ENCOUNTER
No care due was identified.  [facility] Embedded Care Due Messages. Reference number: [id number].   6/13/2024 12:57:41 PM CDT

## 2024-06-14 RX ORDER — MECLIZINE HYDROCHLORIDE 25 MG/1
25 TABLET ORAL
Qty: 60 TABLET | Refills: 1 | Status: SHIPPED | OUTPATIENT
Start: 2024-06-14

## 2024-06-14 NOTE — TELEPHONE ENCOUNTER
Refill Routing Note   Medication(s) are not appropriate for processing by [facility] for the following reason(s):        Outside of protocol    [facility] action(s):  Route               Appointments  past 12m or future 3m with PCP    Date Provider   Last Visit   3/20/2024 Ramana French MD   Next Visit   7/23/2024 Ramana French MD   ED visits in past 90 days: 0        Note composed:1:32 PM 06/14/2024

## 2024-07-03 DIAGNOSIS — Z71.89 COMPLEX CARE COORDINATION: ICD-10-CM

## 2024-07-05 ENCOUNTER — TELEPHONE (OUTPATIENT)
Dept: FAMILY MEDICINE | Facility: CLINIC | Age: 74
End: 2024-07-05
Payer: MEDICARE

## 2024-07-09 ENCOUNTER — TELEPHONE (OUTPATIENT)
Dept: FAMILY MEDICINE | Facility: CLINIC | Age: 74
End: 2024-07-09
Payer: MEDICARE

## 2024-07-09 NOTE — TELEPHONE ENCOUNTER
----- Message from Beatris Thomas sent at 7/8/2024  3:08 PM CDT -----  Regarding: self  Type:  Sooner Appointment Request     Patient is requesting a sooner appointment.  Patient declined first available appointment listed as well as another facility and provider .  Patient will not accept being placed on the waitlist and is requesting a message be sent to doctor.     Name of Caller:self     When is the first available appointment?sept, pt is scheduled for July and office is asking to reschedule. She wants to be seen sooner     Symptoms:follow up     Would the patient rather a call back or a response via [facility]?call     Best Call Back Number:[phone]       Additional Information:

## 2024-07-10 DIAGNOSIS — F33.42 RECURRENT MAJOR DEPRESSIVE DISORDER, IN FULL REMISSION: ICD-10-CM

## 2024-07-10 RX ORDER — ESCITALOPRAM OXALATE 20 MG/1
TABLET ORAL
Qty: 90 TABLET | Refills: 2 | Status: SHIPPED | OUTPATIENT
Start: 2024-07-10

## 2024-07-10 NOTE — TELEPHONE ENCOUNTER
No care due was identified.  Health [facility] Embedded Care Due Messages. Reference number: [id number].   7/10/2024 9:48:10 AM CDT

## 2024-07-10 NOTE — TELEPHONE ENCOUNTER
Refill Decision Note   Dayami Mina  is requesting a refill authorization.  Brief Assessment and Rationale for Refill:  Approve     Medication Therapy Plan:        Comments:     Note composed:11:00 AM 07/10/2024

## 2024-07-16 ENCOUNTER — OFFICE VISIT (OUTPATIENT)
Dept: FAMILY MEDICINE | Facility: CLINIC | Age: 74
End: 2024-07-16
Payer: MEDICARE

## 2024-07-16 VITALS
SYSTOLIC BLOOD PRESSURE: 134 MMHG | WEIGHT: 177 LBS | HEIGHT: 58 IN | TEMPERATURE: 98 F | OXYGEN SATURATION: 95 % | DIASTOLIC BLOOD PRESSURE: 70 MMHG | HEART RATE: 78 BPM | BODY MASS INDEX: 37.16 KG/M2

## 2024-07-16 DIAGNOSIS — K21.9 GASTROESOPHAGEAL REFLUX DISEASE WITHOUT ESOPHAGITIS: ICD-10-CM

## 2024-07-16 DIAGNOSIS — J43.2 CENTRILOBULAR EMPHYSEMA: ICD-10-CM

## 2024-07-16 DIAGNOSIS — F33.42 RECURRENT MAJOR DEPRESSIVE DISORDER, IN FULL REMISSION: ICD-10-CM

## 2024-07-16 DIAGNOSIS — R73.01 IMPAIRED FASTING GLUCOSE: ICD-10-CM

## 2024-07-16 DIAGNOSIS — I70.0 AORTIC ATHEROSCLEROSIS: ICD-10-CM

## 2024-07-16 DIAGNOSIS — E66.01 SEVERE OBESITY (BMI 35.0-39.9) WITH COMORBIDITY: ICD-10-CM

## 2024-07-16 DIAGNOSIS — I10 ESSENTIAL HYPERTENSION: Primary | ICD-10-CM

## 2024-07-16 PROCEDURE — 99215 OFFICE O/P EST HI 40 MIN: CPT | Mod: PBBFAC,PN | Performed by: INTERNAL MEDICINE

## 2024-07-16 PROCEDURE — 99999 PR PBB SHADOW E&M-EST. PATIENT-LVL V: CPT | Mod: PBBFAC,,, | Performed by: INTERNAL MEDICINE

## 2024-07-16 PROCEDURE — 99214 OFFICE O/P EST MOD 30 MIN: CPT | Mod: S$PBB,,, | Performed by: INTERNAL MEDICINE

## 2024-07-16 RX ORDER — OMEPRAZOLE 20 MG/1
20 CAPSULE, DELAYED RELEASE ORAL DAILY
Qty: 90 CAPSULE | Refills: 3 | Status: SHIPPED | OUTPATIENT
Start: 2024-07-16 | End: 2025-07-16

## 2024-07-16 NOTE — PROGRESS NOTES
"Subjective:       Patient ID: Dayami Mina is a 74 y.o. female.    Chief Complaint: Follow-up (4m f/u)    F/u chronic conditions    HPI: 75 y/o w/ chronic sinusitis COPD HTN chronic low back pain on daily opioid therapy, HTN presents alone for scheduled follow up. Intermittent sensation of room movement assocaited with flare of maxillary sinus pressure treated with meclizine with good effect breathign at baseline not using rescue inhaler recently due to normal breathing (limiting outdoor activities) no LE swelling back about the same she would like to switch to omeprazole from esomeprazole due to cost      Review of Systems   Constitutional:  Negative for activity change, appetite change, fatigue, fever and unexpected weight change.   HENT:  Positive for postnasal drip and rhinorrhea. Negative for ear pain and sore throat.    Eyes:  Negative for discharge and visual disturbance.   Respiratory:  Negative for chest tightness, shortness of breath and wheezing.    Cardiovascular:  Negative for chest pain, palpitations and leg swelling.   Gastrointestinal:  Negative for abdominal pain, constipation and diarrhea.   Endocrine: Negative for cold intolerance and heat intolerance.   Genitourinary:  Negative for dysuria and hematuria.   Musculoskeletal:  Positive for arthralgias and back pain. Negative for joint swelling and neck stiffness.   Skin:  Negative for rash.   Neurological:  Negative for syncope, weakness and headaches.   Psychiatric/Behavioral:  Negative for suicidal ideas.        Objective:     Vitals:    07/16/24 1018   BP: 134/70   BP Location: Right arm   Patient Position: Sitting   BP Method: Medium (Manual)   Pulse: 78   Temp: 97.8 °F (36.6 °C)   TempSrc: Oral   SpO2: 95%   Weight: 80.3 kg (177 lb 0.5 oz)   Height: 4' 10" (1.473 m)          Physical Exam  Constitutional:       General: She is not in acute distress.     Appearance: She is well-developed. She is obese.   HENT:      Head: Normocephalic " and atraumatic.   Eyes:      General: No scleral icterus.     Conjunctiva/sclera: Conjunctivae normal.   Cardiovascular:      Rate and Rhythm: Normal rate and regular rhythm.      Heart sounds: No murmur heard.     No friction rub. No gallop.   Pulmonary:      Effort: Pulmonary effort is normal.      Breath sounds: Normal breath sounds. No wheezing or rales.   Abdominal:      Palpations: Abdomen is soft.      Tenderness: There is no abdominal tenderness. There is no guarding or rebound.   Musculoskeletal:         General: No tenderness. Normal range of motion.      Cervical back: Normal range of motion.      Right lower leg: No edema.      Left lower leg: No edema.   Skin:     General: Skin is warm and dry.   Neurological:      Mental Status: She is alert and oriented to person, place, and time.      Cranial Nerves: No cranial nerve deficit.         Assessment and Plan   1. Essential hypertension  Bp at goal continue current medications labs to monitor for end organ dysfunction  - CBC Without Differential; Future  - Comprehensive Metabolic Panel; Future  - CBC Without Differential; Future  - Comprehensive Metabolic Panel; Future    2. Impaired fasting glucose  Screen for dm with a1c  - Hemoglobin A1C; Future  - Hemoglobin A1C; Future    3. Aortic atherosclerosis  On statin continue repeat flp  - Comprehensive Metabolic Panel; Future  - Lipid Panel; Future  - Comprehensive Metabolic Panel; Future  - Lipid Panel; Future    4. Severe obesity (BMI 35.0-39.9) with comorbidity  The patient is asked to make an attempt to improve diet and exercise patterns to aid in medical management of this problem.    - Comprehensive Metabolic Panel; Future    5. Recurrent major depressive disorder, in full remission  Continue ssri    6. Centrilobular emphysema  Current therapy is effective  - CBC Without Differential; Future  - CBC Without Differential; Future    7. Gastroesophageal reflux disease without esophagitis  Switch to once   daily omeprazole  - omeprazole (PRILOSEC) 20 MG capsule; Take 1 capsule (20 mg total) by mouth once daily.  Dispense: 90 capsule; Refill: 3  - Comprehensive Metabolic Panel; Future

## 2024-07-18 DIAGNOSIS — J43.2 CENTRILOBULAR EMPHYSEMA: ICD-10-CM

## 2024-07-18 RX ORDER — FLUTICASONE FUROATE AND VILANTEROL TRIFENATATE 100; 25 UG/1; UG/1
POWDER RESPIRATORY (INHALATION)
Qty: 180 EACH | Refills: 3 | Status: SHIPPED | OUTPATIENT
Start: 2024-07-18

## 2024-07-18 NOTE — TELEPHONE ENCOUNTER
No care due was identified.  [facility] Embedded Care Due Messages. Reference number: [id number].   7/18/2024 8:50:25 AM CDT

## 2024-07-18 NOTE — TELEPHONE ENCOUNTER
Refill Decision Note   Dayami Mina  is requesting a refill authorization.  Brief Assessment and Rationale for Refill:  Approve     Medication Therapy Plan:         Comments:     Note composed:10:13 AM 07/18/2024

## 2024-07-23 DIAGNOSIS — H81.13 BENIGN PAROXYSMAL POSITIONAL VERTIGO DUE TO BILATERAL VESTIBULAR DISORDER: ICD-10-CM

## 2024-07-23 NOTE — TELEPHONE ENCOUNTER
Refill Routing Note   Medication(s) are not appropriate for processing by [facility] for the following reason(s):        Outside of protocol    [facility] action(s):  Route     Requires labs : Yes             Appointments  past 12m or future 3m with PCP    Date Provider   Last Visit   7/16/2024 Ramana French MD   Next Visit   11/21/2024 Ramana French MD   ED visits in past 90 days: 0        Note composed:1:52 PM 07/23/2024

## 2024-07-23 NOTE — TELEPHONE ENCOUNTER
Care Due:                  Date            Visit Type   Department     Provider  --------------------------------------------------------------------------------                                [facility]                              PRIMARY      MEDICINE/  Last Visit: 07-      CARE ([facility])   INTERNAL MED   Ramana French                              [facility]                              PRIMARY      MEDICINE/  Next Visit: 11-      CARE ([facility])   INTERNAL MED   Ramana French                                                            Last  Test          Frequency    Reason                     Performed    Due Date  --------------------------------------------------------------------------------    Lipid Panel.  12 months..  pravastatin..............  02- 02-    Uric Acid...  12 months..  allopurinoL..............  09- 09-    [facility] Embedded Care Due Messages. Reference number: [id number].   7/23/2024 12:00:30 PM CDT

## 2024-07-24 RX ORDER — MECLIZINE HYDROCHLORIDE 25 MG/1
25 TABLET ORAL
Qty: 60 TABLET | Refills: 1 | Status: SHIPPED | OUTPATIENT
Start: 2024-07-24

## 2024-07-25 ENCOUNTER — LAB VISIT (OUTPATIENT)
Dept: LAB | Facility: HOSPITAL | Age: 74
End: 2024-07-25
Attending: INTERNAL MEDICINE
Payer: MEDICARE

## 2024-07-25 DIAGNOSIS — K21.9 GASTROESOPHAGEAL REFLUX DISEASE WITHOUT ESOPHAGITIS: ICD-10-CM

## 2024-07-25 DIAGNOSIS — I10 ESSENTIAL HYPERTENSION: ICD-10-CM

## 2024-07-25 DIAGNOSIS — J43.2 CENTRILOBULAR EMPHYSEMA: ICD-10-CM

## 2024-07-25 DIAGNOSIS — R73.01 IMPAIRED FASTING GLUCOSE: ICD-10-CM

## 2024-07-25 DIAGNOSIS — I70.0 AORTIC ATHEROSCLEROSIS: ICD-10-CM

## 2024-07-25 LAB
ALBUMIN SERPL BCP-MCNC: 3.6 G/DL (ref 3.5–5.2)
ALP SERPL-CCNC: 39 U/L (ref 55–135)
ALT SERPL W/O P-5'-P-CCNC: 35 U/L (ref 10–44)
ANION GAP SERPL CALC-SCNC: 9 MMOL/L (ref 8–16)
AST SERPL-CCNC: 27 U/L (ref 10–40)
BILIRUB SERPL-MCNC: 0.4 MG/DL (ref 0.1–1)
BUN SERPL-MCNC: 18 MG/DL (ref 8–23)
CALCIUM SERPL-MCNC: 9.5 MG/DL (ref 8.7–10.5)
CHLORIDE SERPL-SCNC: 104 MMOL/L (ref 95–110)
CHOLEST SERPL-MCNC: 197 MG/DL (ref 120–199)
CHOLEST/HDLC SERPL: 4 {RATIO} (ref 2–5)
CO2 SERPL-SCNC: 25 MMOL/L (ref 23–29)
CREAT SERPL-MCNC: 0.9 MG/DL (ref 0.5–1.4)
ERYTHROCYTE [DISTWIDTH] IN BLOOD BY AUTOMATED COUNT: 13.6 % (ref 11.5–14.5)
EST. GFR  (NO RACE VARIABLE): >60 ML/MIN/1.73 M^2
GLUCOSE SERPL-MCNC: 97 MG/DL (ref 70–110)
HCT VFR BLD AUTO: 40.2 % (ref 37–48.5)
HDLC SERPL-MCNC: 49 MG/DL (ref 40–75)
HDLC SERPL: 24.9 % (ref 20–50)
HGB BLD-MCNC: 12.1 G/DL (ref 12–16)
LDLC SERPL CALC-MCNC: 107.6 MG/DL (ref 63–159)
MCH RBC QN AUTO: 26.9 PG (ref 27–31)
MCHC RBC AUTO-ENTMCNC: 30.1 G/DL (ref 32–36)
MCV RBC AUTO: 90 FL (ref 82–98)
NONHDLC SERPL-MCNC: 148 MG/DL
PLATELET # BLD AUTO: 279 K/UL (ref 150–450)
PMV BLD AUTO: 10.4 FL (ref 9.2–12.9)
POTASSIUM SERPL-SCNC: 4.1 MMOL/L (ref 3.5–5.1)
PROT SERPL-MCNC: 6.7 G/DL (ref 6–8.4)
RBC # BLD AUTO: 4.49 M/UL (ref 4–5.4)
SODIUM SERPL-SCNC: 138 MMOL/L (ref 136–145)
TRIGL SERPL-MCNC: 202 MG/DL (ref 30–150)
WBC # BLD AUTO: 5.76 K/UL (ref 3.9–12.7)

## 2024-07-25 PROCEDURE — 80053 COMPREHEN METABOLIC PANEL: CPT | Performed by: INTERNAL MEDICINE

## 2024-07-25 PROCEDURE — 80061 LIPID PANEL: CPT | Performed by: INTERNAL MEDICINE

## 2024-07-25 PROCEDURE — 36415 COLL VENOUS BLD VENIPUNCTURE: CPT | Mod: PN | Performed by: INTERNAL MEDICINE

## 2024-07-25 PROCEDURE — 83036 HEMOGLOBIN GLYCOSYLATED A1C: CPT | Performed by: INTERNAL MEDICINE

## 2024-07-25 PROCEDURE — 85027 COMPLETE CBC AUTOMATED: CPT | Performed by: INTERNAL MEDICINE

## 2024-07-26 LAB
ESTIMATED AVG GLUCOSE: 123 MG/DL (ref 68–131)
HBA1C MFR BLD: 5.9 % (ref 4–5.6)

## 2024-09-25 DIAGNOSIS — Z00.00 ENCOUNTER FOR MEDICARE ANNUAL WELLNESS EXAM: ICD-10-CM

## 2024-11-05 DIAGNOSIS — M1A.0410 CHRONIC GOUT OF RIGHT HAND, UNSPECIFIED CAUSE: ICD-10-CM

## 2024-11-05 RX ORDER — ALLOPURINOL 100 MG/1
TABLET ORAL
Qty: 90 TABLET | Refills: 0 | Status: SHIPPED | OUTPATIENT
Start: 2024-11-05

## 2024-11-05 NOTE — TELEPHONE ENCOUNTER
Care Due:                  Date            Visit Type   Department     Provider  --------------------------------------------------------------------------------                                [facility]                              PRIMARY      MEDICINE/  Last Visit: 07-      CARE ([facility])   INTERNAL MED   Ramana French                              [facility]                              PRIMARY      MEDICINE/  Next Visit: 11-      CARE ([facility])   INTERNAL MED   Ramana French                                                            Last  Test          Frequency    Reason                     Performed    Due Date  --------------------------------------------------------------------------------    Uric Acid...  12 months..  allopurinoL..............  Not Found    Overdue    [employer] Embedded Care Due Messages. Reference number: [id number].   11/05/2024 1:51:14 PM CST

## 2024-11-05 NOTE — TELEPHONE ENCOUNTER
Refill Routing Note   Medication(s) are not appropriate for processing by [facility] for the following reason(s):        Required labs outdated    [facility] action(s):  Defer   Requires labs : Yes             Appointments  past 12m or future 3m with PCP    Date Provider   Last Visit   7/16/2024 Ramana French MD   Next Visit   11/21/2024 Ramana French MD   ED visits in past 90 days: 0        Note composed:1:53 PM 11/05/2024

## 2024-11-06 DIAGNOSIS — I10 ESSENTIAL HYPERTENSION: ICD-10-CM

## 2024-11-06 RX ORDER — LOSARTAN POTASSIUM 25 MG/1
TABLET ORAL
Qty: 180 TABLET | Refills: 2 | Status: SHIPPED | OUTPATIENT
Start: 2024-11-06

## 2024-11-06 NOTE — TELEPHONE ENCOUNTER
Refill Decision Note   Dayami Mina  is requesting a refill authorization.  Brief Assessment and Rationale for Refill:  Approve     Medication Therapy Plan:        Comments:     Note composed:1:25 PM 11/06/2024

## 2024-11-06 NOTE — TELEPHONE ENCOUNTER
No care due was identified.  Health [facility] Embedded Care Due Messages. Reference number: [id number].   11/06/2024 9:06:10 AM CST

## 2024-11-11 ENCOUNTER — PATIENT MESSAGE (OUTPATIENT)
Dept: FAMILY MEDICINE | Facility: CLINIC | Age: 74
End: 2024-11-11
Payer: MEDICARE

## 2024-11-11 DIAGNOSIS — I10 ESSENTIAL HYPERTENSION: ICD-10-CM

## 2024-11-12 RX ORDER — LOSARTAN POTASSIUM 25 MG/1
25 TABLET ORAL DAILY
Start: 2024-11-12

## 2024-11-21 ENCOUNTER — OFFICE VISIT (OUTPATIENT)
Dept: FAMILY MEDICINE | Facility: CLINIC | Age: 74
End: 2024-11-21
Payer: MEDICARE

## 2024-11-21 ENCOUNTER — LAB VISIT (OUTPATIENT)
Dept: LAB | Facility: HOSPITAL | Age: 74
End: 2024-11-21
Attending: INTERNAL MEDICINE
Payer: MEDICARE

## 2024-11-21 VITALS
WEIGHT: 172.38 LBS | TEMPERATURE: 98 F | HEIGHT: 58 IN | BODY MASS INDEX: 36.18 KG/M2 | HEART RATE: 80 BPM | RESPIRATION RATE: 17 BRPM | OXYGEN SATURATION: 96 % | SYSTOLIC BLOOD PRESSURE: 130 MMHG | DIASTOLIC BLOOD PRESSURE: 70 MMHG

## 2024-11-21 DIAGNOSIS — I70.0 AORTIC ATHEROSCLEROSIS: ICD-10-CM

## 2024-11-21 DIAGNOSIS — R73.01 IMPAIRED FASTING GLUCOSE: ICD-10-CM

## 2024-11-21 DIAGNOSIS — M51.9 LUMBAR DISC DISEASE: ICD-10-CM

## 2024-11-21 DIAGNOSIS — I10 ESSENTIAL HYPERTENSION: Primary | ICD-10-CM

## 2024-11-21 DIAGNOSIS — R21 RASH: ICD-10-CM

## 2024-11-21 DIAGNOSIS — I10 ESSENTIAL HYPERTENSION: ICD-10-CM

## 2024-11-21 DIAGNOSIS — J43.2 CENTRILOBULAR EMPHYSEMA: ICD-10-CM

## 2024-11-21 LAB
ALBUMIN SERPL BCP-MCNC: 3.8 G/DL (ref 3.5–5.2)
ALP SERPL-CCNC: 48 U/L (ref 40–150)
ALT SERPL W/O P-5'-P-CCNC: 27 U/L (ref 10–44)
ANION GAP SERPL CALC-SCNC: 11 MMOL/L (ref 8–16)
AST SERPL-CCNC: 29 U/L (ref 10–40)
BILIRUB SERPL-MCNC: 0.3 MG/DL (ref 0.1–1)
BUN SERPL-MCNC: 23 MG/DL (ref 8–23)
CALCIUM SERPL-MCNC: 9.7 MG/DL (ref 8.7–10.5)
CHLORIDE SERPL-SCNC: 105 MMOL/L (ref 95–110)
CO2 SERPL-SCNC: 25 MMOL/L (ref 23–29)
CREAT SERPL-MCNC: 0.9 MG/DL (ref 0.5–1.4)
ERYTHROCYTE [DISTWIDTH] IN BLOOD BY AUTOMATED COUNT: 14.6 % (ref 11.5–14.5)
EST. GFR  (NO RACE VARIABLE): >60 ML/MIN/1.73 M^2
GLUCOSE SERPL-MCNC: 89 MG/DL (ref 70–110)
HCT VFR BLD AUTO: 41 % (ref 37–48.5)
HGB BLD-MCNC: 12.5 G/DL (ref 12–16)
MCH RBC QN AUTO: 27.2 PG (ref 27–31)
MCHC RBC AUTO-ENTMCNC: 30.5 G/DL (ref 32–36)
MCV RBC AUTO: 89 FL (ref 82–98)
PLATELET # BLD AUTO: 326 K/UL (ref 150–450)
PMV BLD AUTO: 10.4 FL (ref 9.2–12.9)
POTASSIUM SERPL-SCNC: 4.3 MMOL/L (ref 3.5–5.1)
PROT SERPL-MCNC: 7.4 G/DL (ref 6–8.4)
RBC # BLD AUTO: 4.6 M/UL (ref 4–5.4)
SODIUM SERPL-SCNC: 141 MMOL/L (ref 136–145)
WBC # BLD AUTO: 6.29 K/UL (ref 3.9–12.7)

## 2024-11-21 PROCEDURE — 85027 COMPLETE CBC AUTOMATED: CPT | Performed by: INTERNAL MEDICINE

## 2024-11-21 PROCEDURE — 99214 OFFICE O/P EST MOD 30 MIN: CPT | Mod: S$PBB,,, | Performed by: INTERNAL MEDICINE

## 2024-11-21 PROCEDURE — G2211 COMPLEX E/M VISIT ADD ON: HCPCS | Mod: S$PBB,,, | Performed by: INTERNAL MEDICINE

## 2024-11-21 PROCEDURE — 99999 PR PBB SHADOW E&M-EST. PATIENT-LVL V: CPT | Mod: PBBFAC,,, | Performed by: INTERNAL MEDICINE

## 2024-11-21 PROCEDURE — 36415 COLL VENOUS BLD VENIPUNCTURE: CPT | Mod: PN | Performed by: INTERNAL MEDICINE

## 2024-11-21 PROCEDURE — 99215 OFFICE O/P EST HI 40 MIN: CPT | Mod: PBBFAC,PN | Performed by: INTERNAL MEDICINE

## 2024-11-21 PROCEDURE — 80053 COMPREHEN METABOLIC PANEL: CPT | Performed by: INTERNAL MEDICINE

## 2024-11-21 PROCEDURE — 83036 HEMOGLOBIN GLYCOSYLATED A1C: CPT | Performed by: INTERNAL MEDICINE

## 2024-11-21 RX ORDER — NYSTATIN 100000 [USP'U]/G
POWDER TOPICAL 3 TIMES DAILY PRN
Qty: 60 G | Refills: 1 | Status: SHIPPED | OUTPATIENT
Start: 2024-11-21

## 2024-11-21 RX ORDER — TRIAMCINOLONE ACETONIDE 1 MG/G
CREAM TOPICAL DAILY
Qty: 15 G | Refills: 0 | Status: SHIPPED | OUTPATIENT
Start: 2024-11-21

## 2024-11-21 NOTE — PROGRESS NOTES
"Subjective:       Patient ID: Dayami Mina is a 74 y.o. female.    Chief Complaint: Follow-up    Discuss blood pressure    HPI: 73 y/o w/ HTN COPD chronic low back pain on daily opioid therapy presents with daughter (Joe) for follow up. Her other daughter (tim) messaged me last week after patient had spinal injection that blood pressure was low. Losartan reduce to 25mg once daily. She had antoher episode of low blood pressure reported yesterday where she felt very tired which self resolved. No change in urinary frequency no loose stool diarreha no change in breathing (using breo daily) no LE swelling no orthopnea or PND. Has not taken any medicaitons today and BP in normal range      Review of Systems   Constitutional:  Negative for activity change, appetite change, fatigue, fever and unexpected weight change.   HENT:  Negative for ear pain, rhinorrhea and sore throat.    Eyes:  Negative for discharge and visual disturbance.   Respiratory:  Negative for chest tightness, shortness of breath and wheezing.    Cardiovascular:  Negative for chest pain, palpitations and leg swelling.   Gastrointestinal:  Negative for abdominal pain, constipation and diarrhea.   Endocrine: Negative for cold intolerance and heat intolerance.   Genitourinary:  Negative for dysuria and hematuria.   Musculoskeletal:  Negative for joint swelling and neck stiffness.   Skin:  Negative for rash.   Neurological:  Negative for dizziness, syncope, weakness and headaches.   Psychiatric/Behavioral:  Negative for suicidal ideas.        Objective:     Vitals:    11/21/24 1045   BP: 130/70   BP Location: Right arm   Patient Position: Sitting   Pulse: 80   Resp: 17   Temp: 98.3 °F (36.8 °C)   TempSrc: Oral   SpO2: 96%   Weight: 78.2 kg (172 lb 6.4 oz)   Height: 4' 10" (1.473 m)          Physical Exam  Constitutional:       General: She is not in acute distress.     Appearance: She is well-developed. She is obese.   HENT:      Head: " Normocephalic and atraumatic.   Eyes:      Conjunctiva/sclera: Conjunctivae normal.   Cardiovascular:      Rate and Rhythm: Normal rate and regular rhythm.      Pulses: Normal pulses.      Heart sounds: No murmur heard.     No friction rub. No gallop.   Pulmonary:      Effort: Pulmonary effort is normal.      Breath sounds: Normal breath sounds. No wheezing or rales.   Abdominal:      General: Bowel sounds are normal.      Palpations: Abdomen is soft.      Tenderness: There is no abdominal tenderness. There is no guarding or rebound.   Musculoskeletal:         General: No tenderness. Normal range of motion.      Cervical back: Normal range of motion.      Right lower leg: No edema.      Left lower leg: No edema.   Skin:     General: Skin is warm and dry.   Neurological:      Mental Status: She is alert and oriented to person, place, and time.      Cranial Nerves: No cranial nerve deficit.         Assessment and Plan   1. Essential hypertension (Primary)  Bp in normal range without medicaitons stop arb labs today to monitor blood count and renal function  - CBC Without Differential; Future  - Comprehensive Metabolic Panel; Future    2. Aortic atherosclerosis  Continue statin therapy    3. Impaired fasting glucose  Screen for dm with a1c  - Hemoglobin A1C; Future    4. Centrilobular emphysema  Well controlled continue laba/ics    5. Lumbar disc disease  Followed by pain manageemetn with improvement after recent NGUYEN continue oral opioid with Dr. Fox prescribing authority    6. Rash  Topiacl nystatin powder when seating, can using steroid cream daily up to five days  - nystatin (MYCOSTATIN) powder; Apply topically 3 (three) times daily as needed (skin rash).  Dispense: 60 g; Refill: 1  - triamcinolone acetonide 0.1% (KENALOG) 0.1 % cream; Apply topically once daily. Not to use for more than five consecutive days  Dispense: 15 g; Refill: 0

## 2024-11-22 LAB
ESTIMATED AVG GLUCOSE: 126 MG/DL (ref 68–131)
HBA1C MFR BLD: 6 % (ref 4–5.6)

## 2024-11-26 ENCOUNTER — TELEPHONE (OUTPATIENT)
Dept: FAMILY MEDICINE | Facility: CLINIC | Age: 74
End: 2024-11-26
Payer: MEDICARE

## 2024-11-26 DIAGNOSIS — R21 RASH: ICD-10-CM

## 2024-11-26 NOTE — TELEPHONE ENCOUNTER
Note from payer: The drug you asked for is not listed in your preferred drug list (formulary). The preferred drug(s), you may not have tried are: nystatin topical ointment.

## 2025-01-10 ENCOUNTER — TELEPHONE (OUTPATIENT)
Dept: FAMILY MEDICINE | Facility: CLINIC | Age: 75
End: 2025-01-10
Payer: MEDICARE

## 2025-01-10 NOTE — TELEPHONE ENCOUNTER
----- Message from Med Assistant Ascencio sent at 1/10/2025  3:57 PM CST -----  Type:  Needs Medical Advice/Symptom-based Call    Who Called:  Pt   Symptoms (please be specific):  sinus infection  How long has patient had these symptoms:    2 days   Pharmacy:    [facility] - [place] - [address]  [address] [place] [address]  Phone: [phone] Fax: [phone]    Would the patient rather a call back or a response via [facility]?  Callback   Best Call Back Number:  Telephone Information:  Mobile          [phone]     Additional Information:   Requesting advise on if to go to urgent care if staff can;t get sooner with pcp

## 2025-01-10 NOTE — TELEPHONE ENCOUNTER
Pt states she's experiencing cold symptoms. Pt is scheduled to see NP Marchese Monday 1/18/25. Pt was advised to go to urgent care if symptoms worsen between now and Monday.

## 2025-01-16 ENCOUNTER — PATIENT MESSAGE (OUTPATIENT)
Dept: FAMILY MEDICINE | Facility: CLINIC | Age: 75
End: 2025-01-16
Payer: MEDICARE

## 2025-01-17 ENCOUNTER — OFFICE VISIT (OUTPATIENT)
Dept: FAMILY MEDICINE | Facility: CLINIC | Age: 75
End: 2025-01-17
Payer: MEDICARE

## 2025-01-17 DIAGNOSIS — I70.0 AORTIC ATHEROSCLEROSIS: ICD-10-CM

## 2025-01-17 DIAGNOSIS — J43.2 CENTRILOBULAR EMPHYSEMA: ICD-10-CM

## 2025-01-17 DIAGNOSIS — J01.90 ACUTE BACTERIAL SINUSITIS: Primary | ICD-10-CM

## 2025-01-17 DIAGNOSIS — F33.9 RECURRENT MAJOR DEPRESSIVE DISORDER, REMISSION STATUS UNSPECIFIED: ICD-10-CM

## 2025-01-17 DIAGNOSIS — M46.1 SACROILIITIS, NOT ELSEWHERE CLASSIFIED: ICD-10-CM

## 2025-01-17 DIAGNOSIS — R19.5 MUCUS IN STOOL: ICD-10-CM

## 2025-01-17 DIAGNOSIS — B96.89 ACUTE BACTERIAL SINUSITIS: Primary | ICD-10-CM

## 2025-01-17 DIAGNOSIS — E66.01 SEVERE OBESITY (BMI 35.0-39.9) WITH COMORBIDITY: ICD-10-CM

## 2025-01-17 PROCEDURE — 98006 SYNCH AUDIO-VIDEO EST MOD 30: CPT | Mod: 95,,, | Performed by: NURSE PRACTITIONER

## 2025-01-17 PROCEDURE — G2211 COMPLEX E/M VISIT ADD ON: HCPCS | Mod: 95,,, | Performed by: NURSE PRACTITIONER

## 2025-01-17 RX ORDER — HYDROXYZINE HYDROCHLORIDE 25 MG/1
25 TABLET, FILM COATED ORAL 3 TIMES DAILY PRN
Qty: 30 TABLET | Refills: 0 | Status: SHIPPED | OUTPATIENT
Start: 2025-01-17 | End: 2025-01-27

## 2025-01-17 RX ORDER — DOXYCYCLINE HYCLATE 100 MG
100 TABLET ORAL 2 TIMES DAILY
Qty: 20 TABLET | Refills: 0 | Status: SHIPPED | OUTPATIENT
Start: 2025-01-17
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
## 2025-01-24 ENCOUNTER — PATIENT MESSAGE (OUTPATIENT)
Dept: FAMILY MEDICINE | Facility: CLINIC | Age: 75
End: 2025-01-24
Payer: MEDICARE

## 2025-01-27 DIAGNOSIS — F33.42 RECURRENT MAJOR DEPRESSIVE DISORDER, IN FULL REMISSION: Primary | Chronic | ICD-10-CM

## 2025-01-27 RX ORDER — HYDROXYZINE HYDROCHLORIDE 10 MG/1
10 TABLET, FILM COATED ORAL 3 TIMES DAILY PRN
Qty: 30 TABLET | Refills: 0 | Status: SHIPPED | OUTPATIENT
Start: 2025-01-27 | End: 2025-01-31 | Stop reason: SDUPTHER

## 2025-01-30 DIAGNOSIS — Z00.00 ENCOUNTER FOR MEDICARE ANNUAL WELLNESS EXAM: ICD-10-CM

## 2025-01-31 ENCOUNTER — TELEPHONE (OUTPATIENT)
Dept: FAMILY MEDICINE | Facility: CLINIC | Age: 75
End: 2025-01-31
Payer: MEDICARE

## 2025-01-31 DIAGNOSIS — M1A.0410 CHRONIC GOUT OF RIGHT HAND, UNSPECIFIED CAUSE: ICD-10-CM

## 2025-01-31 RX ORDER — HYDROXYZINE HYDROCHLORIDE 10 MG/1
10 TABLET, FILM COATED ORAL 3 TIMES DAILY PRN
Qty: 30 TABLET | Refills: 1 | Status: SHIPPED | OUTPATIENT
Start: 2025-01-31

## 2025-01-31 RX ORDER — ALLOPURINOL 100 MG/1
100 TABLET ORAL DAILY
Qty: 90 TABLET | Refills: 1 | Status: SHIPPED | OUTPATIENT
Start: 2025-01-31

## 2025-01-31 NOTE — TELEPHONE ENCOUNTER
----- Message from Summer sent at 1/31/2025 10:47 AM CST -----  Regarding: allopurinoL (ZYLOPRIM) 100 MG tablet  Type:  RX Refill Request     Who Called: [facility]   Refill or New Rx:refill  RX Name and Strength:allopurinoL (ZYLOPRIM) 100 MG tablet  How is the patient currently taking it? (ex. 1XDay):  Is this a 30 day or 90 day RX:90  Preferred Pharmacy with phone number:[facility] - [place] - [address] [facility]   Phone: [phone]  Fax: [phone]        Local or Mail Order:local   Ordering Provider:  Would the patient rather a call back or a response via [facility]? Call back   Best Call Back Number:[phone]  Additional Information:

## 2025-01-31 NOTE — TELEPHONE ENCOUNTER
Refill Routing Note   Medication(s) are not appropriate for processing by [facility] for the following reason(s):        Required labs outdated    [facility] action(s):  Defer   Requires labs : Yes             Appointments  past 12m or future 3m with PCP    Date Provider   Last Visit   11/21/2024 Ramana French MD   Next Visit   3/20/2025 Ramana French MD   ED visits in past 90 days: 0        Note composed:12:21 PM 01/31/2025

## 2025-01-31 NOTE — TELEPHONE ENCOUNTER
Care Due:                  Date            Visit Type   Department     Provider  --------------------------------------------------------------------------------                                [facility]                              PRIMARY      MEDICINE/  Last Visit: 11-      CARE ([facility])   INTERNAL MED   Ramana French                              [facility]                              PRIMARY      MEDICINE/  Next Visit: 03-      CARE ([facility])   INTERNAL MED   Ramana French                                                            Last  Test          Frequency    Reason                     Performed    Due Date  --------------------------------------------------------------------------------    Uric Acid...  12 months..  allopurinoL..............  Not Found    Overdue    [employer] Embedded Care Due Messages. Reference number: [id number].   1/31/2025 11:00:32 AM CST

## 2025-02-05 ENCOUNTER — TELEPHONE (OUTPATIENT)
Dept: FAMILY MEDICINE | Facility: CLINIC | Age: 75
End: 2025-02-05
Payer: MEDICARE

## 2025-02-05 NOTE — TELEPHONE ENCOUNTER
----- Message from Justus sent at 2/5/2025 10:30 AM CST -----  Regarding: Yovani Viera  Type: Patient Call Back     What is the request in detail: Please let Yovani know if compliant request form was completed , Please send fax back asap     Can the clinic reply by [facility]? No     Would the patient rather a call back or a response via [facility]? Call back    Best call back number: [phone]   fax# [phone]    Additional Information:    Thank you.

## 2025-03-03 RX ORDER — HYDROXYZINE HYDROCHLORIDE 10 MG/1
TABLET, FILM COATED ORAL
Qty: 30 TABLET | Refills: 1 | Status: SHIPPED | OUTPATIENT
Start: 2025-03-03

## 2025-03-03 NOTE — TELEPHONE ENCOUNTER
Refill Routing Note   Medication(s) are not appropriate for processing by [facility] for the following reason(s):        Outside of protocol    [facility] action(s):  Route               Appointments  past 12m or future 3m with PCP    Date Provider   Last Visit   11/21/2024 Ramana French MD   Next Visit   3/20/2025 Ramana French MD   ED visits in past 90 days: 0        Note composed:9:42 AM 03/03/2025

## 2025-03-20 ENCOUNTER — OFFICE VISIT (OUTPATIENT)
Dept: FAMILY MEDICINE | Facility: CLINIC | Age: 75
End: 2025-03-20
Payer: MEDICARE

## 2025-03-20 VITALS
OXYGEN SATURATION: 95 % | HEIGHT: 58 IN | BODY MASS INDEX: 35.91 KG/M2 | HEART RATE: 91 BPM | SYSTOLIC BLOOD PRESSURE: 128 MMHG | TEMPERATURE: 98 F | DIASTOLIC BLOOD PRESSURE: 70 MMHG | WEIGHT: 171.06 LBS

## 2025-03-20 DIAGNOSIS — R73.01 IFG (IMPAIRED FASTING GLUCOSE): ICD-10-CM

## 2025-03-20 DIAGNOSIS — I70.0 AORTIC ATHEROSCLEROSIS: ICD-10-CM

## 2025-03-20 DIAGNOSIS — J43.2 CENTRILOBULAR EMPHYSEMA: ICD-10-CM

## 2025-03-20 DIAGNOSIS — F33.42 RECURRENT MAJOR DEPRESSIVE DISORDER, IN FULL REMISSION: Primary | ICD-10-CM

## 2025-03-20 DIAGNOSIS — I10 ESSENTIAL HYPERTENSION: ICD-10-CM

## 2025-03-20 DIAGNOSIS — H60.501 ACUTE OTITIS EXTERNA OF RIGHT EAR, UNSPECIFIED TYPE: ICD-10-CM

## 2025-03-20 DIAGNOSIS — I65.21 STENOSIS OF RIGHT CAROTID ARTERY: ICD-10-CM

## 2025-03-20 PROCEDURE — 99215 OFFICE O/P EST HI 40 MIN: CPT | Mod: PBBFAC,PN | Performed by: INTERNAL MEDICINE

## 2025-03-20 PROCEDURE — 99999 PR PBB SHADOW E&M-EST. PATIENT-LVL V: CPT | Mod: PBBFAC,,, | Performed by: INTERNAL MEDICINE

## 2025-03-20 RX ORDER — NEOMYCIN SULFATE, POLYMYXIN B SULFATE AND HYDROCORTISONE 10; 3.5; 1 MG/ML; MG/ML; [USP'U]/ML
3 SUSPENSION/ DROPS AURICULAR (OTIC) 2 TIMES DAILY
Qty: 10 ML | Refills: 0 | Status: SHIPPED | OUTPATIENT
Start: 2025-03-20

## 2025-03-20 RX ORDER — POTASSIUM CHLORIDE 20 MEQ/1
20 TABLET, EXTENDED RELEASE ORAL DAILY
Qty: 90 TABLET | Refills: 0 | Status: SHIPPED | OUTPATIENT
Start: 2025-03-20

## 2025-03-20 RX ORDER — ATORVASTATIN CALCIUM 20 MG/1
20 TABLET, FILM COATED ORAL DAILY
Qty: 90 TABLET | Refills: 3 | Status: SHIPPED | OUTPATIENT
Start: 2025-03-20 | End: 2026-03-20

## 2025-03-20 NOTE — PROGRESS NOTES
"Subjective:       Patient ID: Dayami Mina is a 74 y.o. female.    Chief Complaint: Follow-up (3m f/u)    F/u chronic conditions    HPI: 75 y/o w/ HTN COPD presents alone for scheduled follow up reports pain to right ear/tragus for two weeks has stopped wearing hearing aid because of pain no drainage no vertigo type symptoms no LE swelling breathing at baseline not using laba/ics regularlly no orthostatic symptoms       Review of Systems   Constitutional:  Negative for activity change, appetite change, fatigue, fever and unexpected weight change.   HENT:  Positive for ear pain. Negative for rhinorrhea and sore throat.    Eyes:  Negative for discharge and visual disturbance.   Respiratory:  Negative for chest tightness, shortness of breath and wheezing.    Cardiovascular:  Negative for chest pain, palpitations and leg swelling.   Gastrointestinal:  Negative for abdominal pain, constipation and diarrhea.   Endocrine: Negative for cold intolerance and heat intolerance.   Genitourinary:  Negative for dysuria and hematuria.   Musculoskeletal:  Negative for joint swelling and neck stiffness.   Skin:  Negative for rash.   Neurological:  Negative for dizziness, syncope, weakness and headaches.   Psychiatric/Behavioral:  Negative for suicidal ideas.        Objective:     Vitals:    03/20/25 0956   BP: 128/70   BP Location: Right arm   Patient Position: Sitting   Pulse: 91   Temp: 98.1 °F (36.7 °C)   TempSrc: Oral   SpO2: 95%   Weight: 77.6 kg (171 lb 1.2 oz)   Height: 4' 10" (1.473 m)          Physical Exam  Constitutional:       Appearance: She is well-developed.   HENT:      Head: Normocephalic and atraumatic.      Ears:      Comments: Right exertinal canal with focal area of erythema without induration or ulceration no pain with traction of pinnea TM is intact  Eyes:      Conjunctiva/sclera: Conjunctivae normal.   Cardiovascular:      Rate and Rhythm: Normal rate and regular rhythm.      Heart sounds: No murmur " heard.     No friction rub. No gallop.   Pulmonary:      Effort: Pulmonary effort is normal.      Breath sounds: Normal breath sounds. No wheezing or rales.   Abdominal:      Palpations: Abdomen is soft.      Tenderness: There is no abdominal tenderness. There is no guarding or rebound.   Musculoskeletal:         General: No tenderness. Normal range of motion.      Cervical back: Normal range of motion.   Skin:     General: Skin is warm and dry.   Neurological:      Mental Status: She is alert and oriented to person, place, and time.      Cranial Nerves: No cranial nerve deficit.         Assessment and Plan   1. Recurrent major depressive disorder, in full remission (Primary)  Improved with ssri continue    2. Aortic atherosclerosis  Switch to medium intensity statin   - Lipid Panel; Future  - atorvastatin (LIPITOR) 20 MG tablet; Take 1 tablet (20 mg total) by mouth once daily.  Dispense: 90 tablet; Refill: 3    3. Centrilobular emphysema  Stable prn albuterol  - CBC Without Differential; Future    4. Essential hypertension  Bp at goal no adjustments today (off arb and beta blocker)  - CBC Without Differential; Future  - Comprehensive Metabolic Panel; Future    5. Stenosis of right carotid artery  Repeat carotid ultrasound last ldl above goal switch to atorvastatin  - US Carotid Bilateral; Future  - Lipid Panel; Future  - atorvastatin (LIPITOR) 20 MG tablet; Take 1 tablet (20 mg total) by mouth once daily.  Dispense: 90 tablet; Refill: 3    6. IFG (impaired fasting glucose)  Screen for dm with a1c  - Hemoglobin A1C; Future    7. Acute otitis externa of right ear, unspecified type  Cortisporin drops x seven days  - neomycin-polymyxin-hydrocortisone (CORTISPORIN) 3.5-10,000-1 mg/mL-unit/mL-% otic suspension; Place 3 drops into the right ear 2 (two) times a day. For seven days  Dispense: 10 mL; Refill: 0

## 2025-03-25 ENCOUNTER — LAB VISIT (OUTPATIENT)
Dept: LAB | Facility: HOSPITAL | Age: 75
End: 2025-03-25
Attending: INTERNAL MEDICINE
Payer: MEDICARE

## 2025-03-25 DIAGNOSIS — I65.21 STENOSIS OF RIGHT CAROTID ARTERY: ICD-10-CM

## 2025-03-25 DIAGNOSIS — I10 ESSENTIAL HYPERTENSION: ICD-10-CM

## 2025-03-25 DIAGNOSIS — I70.0 AORTIC ATHEROSCLEROSIS: ICD-10-CM

## 2025-03-25 DIAGNOSIS — J43.2 CENTRILOBULAR EMPHYSEMA: ICD-10-CM

## 2025-03-25 DIAGNOSIS — R73.01 IFG (IMPAIRED FASTING GLUCOSE): ICD-10-CM

## 2025-03-25 LAB
ALBUMIN SERPL BCP-MCNC: 4.1 G/DL (ref 3.5–5.2)
ALP SERPL-CCNC: 45 UNIT/L (ref 40–150)
ALT SERPL W/O P-5'-P-CCNC: 28 UNIT/L (ref 10–44)
ANION GAP (OHS): 10 MMOL/L (ref 8–16)
AST SERPL-CCNC: 26 UNIT/L (ref 11–45)
BILIRUB SERPL-MCNC: 0.4 MG/DL (ref 0.1–1)
BUN SERPL-MCNC: 26 MG/DL (ref 8–23)
CALCIUM SERPL-MCNC: 9.7 MG/DL (ref 8.7–10.5)
CHLORIDE SERPL-SCNC: 104 MMOL/L (ref 95–110)
CHOLEST SERPL-MCNC: 219 MG/DL (ref 120–199)
CHOLEST/HDLC SERPL: 3.3 {RATIO} (ref 2–5)
CO2 SERPL-SCNC: 25 MMOL/L (ref 23–29)
CREAT SERPL-MCNC: 0.9 MG/DL (ref 0.5–1.4)
ERYTHROCYTE [DISTWIDTH] IN BLOOD BY AUTOMATED COUNT: 14.5 % (ref 11.5–14.5)
GFR SERPLBLD CREATININE-BSD FMLA CKD-EPI: >60 ML/MIN/1.73/M2
GLUCOSE SERPL-MCNC: 88 MG/DL (ref 70–110)
HCT VFR BLD AUTO: 42.3 % (ref 37–48.5)
HDLC SERPL-MCNC: 66 MG/DL (ref 40–75)
HDLC SERPL: 30.1 % (ref 20–50)
HGB BLD-MCNC: 13 GM/DL (ref 12–16)
LDLC SERPL CALC-MCNC: 121.4 MG/DL (ref 63–159)
MCH RBC QN AUTO: 26.6 PG (ref 27–50)
MCHC RBC AUTO-ENTMCNC: 30.7 G/DL (ref 32–36)
MCV RBC AUTO: 87 FL (ref 82–98)
NONHDLC SERPL-MCNC: 153 MG/DL
PLATELET # BLD AUTO: 299 K/UL (ref 150–450)
PMV BLD AUTO: 10.6 FL (ref 9.2–12.9)
POTASSIUM SERPL-SCNC: 4.7 MMOL/L (ref 3.5–5.1)
PROT SERPL-MCNC: 7.8 GM/DL (ref 6–8.4)
RBC # BLD AUTO: 4.88 M/UL (ref 4–5.4)
SODIUM SERPL-SCNC: 139 MMOL/L (ref 136–145)
TRIGL SERPL-MCNC: 158 MG/DL (ref 30–150)
WBC # BLD AUTO: 6.55 K/UL (ref 3.9–12.7)

## 2025-03-25 PROCEDURE — 82465 ASSAY BLD/SERUM CHOLESTEROL: CPT

## 2025-03-25 PROCEDURE — 83036 HEMOGLOBIN GLYCOSYLATED A1C: CPT

## 2025-03-25 PROCEDURE — 36415 COLL VENOUS BLD VENIPUNCTURE: CPT | Mod: PN

## 2025-03-25 PROCEDURE — 85027 COMPLETE CBC AUTOMATED: CPT

## 2025-03-25 PROCEDURE — 82565 ASSAY OF CREATININE: CPT

## 2025-03-26 LAB
EAG (OHS): 128 MG/DL (ref 68–131)
HBA1C MFR BLD: 6.1 % (ref 4–5.6)

## 2025-04-04 ENCOUNTER — HOSPITAL ENCOUNTER (OUTPATIENT)
Dept: RADIOLOGY | Facility: HOSPITAL | Age: 75
Discharge: HOME | End: 2025-04-04
Attending: INTERNAL MEDICINE
Payer: MEDICARE

## 2025-04-04 DIAGNOSIS — I65.21 STENOSIS OF RIGHT CAROTID ARTERY: ICD-10-CM

## 2025-04-04 PROCEDURE — 93880 EXTRACRANIAL BILAT STUDY: CPT | Mod: 26,,, | Performed by: RADIOLOGY

## 2025-04-04 PROCEDURE — 93880 EXTRACRANIAL BILAT STUDY: CPT | Mod: TC

## 2025-04-10 NOTE — PLAN OF CARE
12/03/17 1000   Final Note   Assessment Type Final Discharge Note   Discharge Disposition Home   What phone number can be called within the next 1-3 days to see how you are doing after discharge? ([phone] )   Hospital Follow Up  Appt(s) scheduled? Yes   Discharge plans and expectations educations in teach back method with documentation complete? Yes   Right Care Referral Info   Post Acute Recommendation No Care      on the discharge service for the patient. I have reviewed and made amendments to the documentation where necessary.

## 2025-04-13 ENCOUNTER — PATIENT MESSAGE (OUTPATIENT)
Dept: FAMILY MEDICINE | Facility: CLINIC | Age: 75
End: 2025-04-13
Payer: MEDICARE

## 2025-04-13 DIAGNOSIS — I70.0 AORTIC ATHEROSCLEROSIS: Primary | ICD-10-CM

## 2025-04-14 RX ORDER — ROSUVASTATIN CALCIUM 10 MG/1
10 TABLET, COATED ORAL NIGHTLY
Qty: 90 TABLET | Refills: 3 | Status: SHIPPED | OUTPATIENT
Start: 2025-04-14 | End: 2026-04-14

## 2025-06-19 ENCOUNTER — TELEPHONE (OUTPATIENT)
Dept: FAMILY MEDICINE | Facility: CLINIC | Age: 75
End: 2025-06-19
Payer: MEDICARE

## 2025-06-19 NOTE — TELEPHONE ENCOUNTER
Spoke with aDyami who's concerned per blood pressure elevation during procedure on yesterday and again this morning. Nurse instructed patient to go to the ER for any readings of 170/100. Patient verbalized understanding. Patient denied any s/s per h/a, blurred vision, numbness or tingling on one side of body. Patient verbalized no c/o discomfort or pain at present. Nurse scheduled OV for 06/20/2025.

## 2025-06-19 NOTE — TELEPHONE ENCOUNTER
Copied from CRM #[id number]. Topic: General Inquiry - Patient Advice  >> Jun 19, 2025 10:26 AM Rudolph wrote:  Type: Patient Call Back    Who called: Self     What is the request in detail: pt stated there blood pressure nayeli during their procedure on yesterday. Upon completion of the procedure pt's blood pressure read as 210/199. She was released after succesfully bringing her pressure back down. Pt would like to be advised if they should take their blood pressure medication until their upcoming appt or if a stat appt is needed. Current reading as of 10:29 am 06/19/2025 -148/64.     Can the clinic reply by [facility]?    Would the patient rather a call back or a response via [facility]? Call back     Best call back number:[phone]     Additional Information:

## 2025-06-20 ENCOUNTER — OFFICE VISIT (OUTPATIENT)
Dept: FAMILY MEDICINE | Facility: CLINIC | Age: 75
End: 2025-06-20
Payer: MEDICARE

## 2025-06-20 VITALS
RESPIRATION RATE: 17 BRPM | HEIGHT: 58 IN | SYSTOLIC BLOOD PRESSURE: 126 MMHG | HEART RATE: 84 BPM | DIASTOLIC BLOOD PRESSURE: 64 MMHG | BODY MASS INDEX: 36.23 KG/M2 | OXYGEN SATURATION: 96 % | WEIGHT: 172.63 LBS

## 2025-06-20 DIAGNOSIS — E78.00 PURE HYPERCHOLESTEROLEMIA: Chronic | ICD-10-CM

## 2025-06-20 DIAGNOSIS — F41.9 ANXIETY: ICD-10-CM

## 2025-06-20 DIAGNOSIS — I10 ESSENTIAL HYPERTENSION: Primary | Chronic | ICD-10-CM

## 2025-06-20 DIAGNOSIS — M47.812 CERVICAL SPONDYLOSIS: ICD-10-CM

## 2025-06-20 PROCEDURE — 99999 PR PBB SHADOW E&M-EST. PATIENT-LVL V: CPT | Mod: PBBFAC,,,

## 2025-06-20 PROCEDURE — 99215 OFFICE O/P EST HI 40 MIN: CPT | Mod: PBBFAC,PN

## 2025-06-20 NOTE — PROGRESS NOTES
HPI     Dayami Mina is a 74 y.o. female with multiple medical diagnoses as listed in the medical history and problem list that presents for   Chief Complaint   Patient presents with    Hypertension       Hypertension  Pertinent negatives include no chest pain, headaches, palpitations or shortness of breath.     Pt presents today for elevated blood pressure. Has a hx of HTN, however, has not been on her antihypertensive due to persistent controlled readings. However, she had a procedure done yesterday to her neck/back and her bp was exponentially elevated at 210/199. Was experiencing a headache at the time and was provided tylenol and iv medication (unsure of med name) and her bp had decreased once she got home. She does suffer from chronic headaches due to her cervical spondylosis, and seldomly takes Fioricet for bad flare ups. Has not felt the need to take it recently.  She does feel like her anxiety has been exacerbating due to life stressors unrelated to the event that occurred yesterday. She states feeling stable while on Lexapro 20 mg and takes Hydroxyzine 10 mg nightly. She is not interested in therapy sessions. She does live with family at home, has a good support system.  Denies chest pain, chest tightness, palpitations    Assessment & Plan     1. Essential hypertension  - Blood pressure today controlled in clinic. stable, continue with lifestyle management  - instructed to keep log of bp readings and keep f/u with PCP next month with readings.  - Recommend low-sodium diet and compliance with blood pressure medication.  - Keep blood pressure goal <140/90 and f/u with clinic if persistently elevated      2. Pure hypercholesterolemia  - Patient tolerating statin well with no side effects.   - LDL and triglyerides monitored  - Current dose: Rosuvastatin 10mg daily. Continue with current dosage.  - No mm aches or pain  - The 10-year ASCVD risk score (Félix DK, et al., 2019) is: 17.6%    Values used  to calculate the score:      Age: 74 years      Sex: Female      Is Non- : No      Diabetic: No      Tobacco smoker: No      Systolic Blood Pressure: 126 mmHg      Is BP treated: No      HDL Cholesterol: 66 mg/dL      Total Cholesterol: 219 mg/dL        3. Cervical spondylosis  - Regularly followed by physical medicine team. Pt scheduled for f/u appointment next week to discuss repeating procedure.  - Continue w/Norco and Gabapentin.    4. Anxiety  - Pt stable on Lexapro 20 mg and Hydroxyzine 10 mg nightly. Advised patient that she is permitted to take her Hydroxyzine TID PRN. Offered therapy referral, she declined. Offered additional medication to control anxiety, she declined.  - She would like to continue with current regimen and will follow up if uncontrolled and discuss other options if needed     --------------------------------------------    Health Maintenance         Date Due Completion Date    COVID-19 Vaccine (4 - 2024-25 season) 09/01/2024 11/30/2021    Hemoglobin A1c (Prediabetes) 03/25/2026 3/25/2025    Aspirin/Antiplatelet Therapy 06/20/2026 6/20/2025    DEXA Scan 06/29/2026 6/29/2021    TETANUS VACCINE 10/29/2028 10/29/2018    Lipid Panel 03/25/2030 3/25/2025            Health maintenance reviewed    Follow Up:  Follow up in about 4 weeks (around 7/18/2025).    Exam     Review of Systems:  (as noted above)  Review of Systems   Constitutional:  Negative for fatigue.   HENT:  Negative for trouble swallowing.    Eyes:  Negative for visual disturbance.   Respiratory:  Negative for cough, chest tightness and shortness of breath.    Cardiovascular:  Negative for chest pain, palpitations and leg swelling.   Gastrointestinal:  Negative for diarrhea, nausea and vomiting.   Musculoskeletal:  Negative for arthralgias, gait problem and myalgias.   Skin:  Negative for rash.   Neurological:  Negative for dizziness, weakness, light-headedness and headaches.   Psychiatric/Behavioral:  The  "patient is not nervous/anxious.        Physical Exam  Constitutional:       General: She is not in acute distress.     Appearance: Normal appearance. She is not ill-appearing.   Cardiovascular:      Rate and Rhythm: Normal rate and regular rhythm.      Pulses: Normal pulses.      Heart sounds: Normal heart sounds. No murmur heard.     No friction rub. No gallop.   Pulmonary:      Effort: Pulmonary effort is normal. No respiratory distress.      Breath sounds: Normal breath sounds. No wheezing, rhonchi or rales.   Musculoskeletal:      Cervical back: Normal range of motion.   Skin:     General: Skin is warm and dry.      Capillary Refill: Capillary refill takes less than 2 seconds.   Neurological:      General: No focal deficit present.      Mental Status: She is alert and oriented to person, place, and time.   Psychiatric:         Mood and Affect: Mood normal.         Behavior: Behavior normal.       Vitals:    06/20/25 0811   BP: 126/64   BP Location: Left arm   Patient Position: Sitting   Pulse: 84   Resp: 17   SpO2: 96%   Weight: 78.3 kg (172 lb 9.9 oz)   Height: 4' 10" (1.473 m)      Body mass index is 36.08 kg/m².        History     Past Medical History:   Diagnosis Date    Benign colon polyp 2cm     COPD (chronic obstructive pulmonary disease)     Depression     Hyperlipidemia     Hypertension     PONV (postoperative nausea and vomiting)        Family History   Problem Relation Name Age of Onset    Diabetes Mother      Diabetes Father      Stroke Paternal Grandfather      Diabetes Sister         Allergies and Medications: (updated and reviewed)  Review of patient's allergies indicates:   Allergen Reactions    Ancef [cefazolin] Nausea And Vomiting    Sulfa (sulfonamide antibiotics) Anaphylaxis    Scopolamine Blisters and Other (See Comments)    Phenylephrin-polyvinyl alcohol Blisters and Other (See Comments)     Current Medications[1]    Patient Care Team:  Ramana French MD as PCP - General (Internal " Medicine)  Lucien Lynch MD as Consulting Physician (Nephrology)  Tano Pemberton MD as Consulting Physician (Cardiology)  Getachew Merida II, MD as Consulting Physician (Gastroenterology)  Gamaliel Maki MA (Inactive)  WhiteGamaliel, MA (Inactive)  White, Gamaliel, MA (Inactive)  White, Gamaliel, MA (Inactive)  White, Gamaliel, MA (Inactive)         - The patient is given an After Visit Summary that lists all medications with directions, allergies, education, orders placed during this encounter and follow-up instructions.      - I have reviewed the patient's medical information including past medical and family history sections including the medications and allergies.      - We discussed the patient's current medications.   This note was generated with the assistance of ambient listening technology. Verbal consent was obtained by the patient and accompanying visitor(s) for the recording of patient appointment to facilitate this note. I attest to having reviewed and edited the generated note for accuracy, though some syntax or spelling errors may persist. Please contact the author of this note for any clarification.          Ryan Talamantes NP                      [1]   Current Outpatient Medications   Medication Sig Dispense Refill    acetaminophen/caffeine (EXCEDRIN TENSION HEADACHE ORAL) Take 1 capsule by mouth daily as needed.      albuterol (PROVENTIL) 2.5 mg /3 mL (0.083 %) nebulizer solution USE one vial INHALE THE CONTENT OF 1 VIAL VIA NEBULIZER nebulizer THREE TIMES DAILY AS NEEDED wheezing 270 mL 0    albuterol (PROVENTIL/VENTOLIN HFA) 90 mcg/actuation inhaler INHALE TWO PUFFS BY MOUTH EVERY 6 HOURS AS NEEDED FOR WHEEZING 6.7 g 1    allopurinoL (ZYLOPRIM) 100 MG tablet Take 1 tablet (100 mg total) by mouth once daily. 90 tablet 1    aspirin (ECOTRIN) 81 MG EC tablet Take 81 mg by mouth once daily.      betahistine HCl (BETAHISTINE, BULK,) 100 % Powd WEEK 1: TAKE ONE 12MG CAPSULE BY  MOUTH ONCE A DAY. WEEK 2 AND THEREAFTER: TAKE ONE 12 MG CAPSULE BY MOUTH TWICE DAILY.      blood sugar diagnostic Strp To check BG one time daily PRN, to use with insurance preferred meter 100 strip 0    BREO ELLIPTA 100-25 mcg/dose diskus inhaler INHALE ONE PUFF BY MOUTH ONCE A  each 3    butalbital-acetaminophen-caffeine -40 mg (FIORICET, ESGIC) -40 mg per tablet Take 1 tablet by mouth.      cyanocobalamin (VITAMIN B-12) 1000 MCG tablet Take 100 mcg by mouth once daily.      docusate sodium (COLACE) 100 MG capsule Take 200 mg by mouth Daily.      EScitalopram oxalate (LEXAPRO) 20 MG tablet TAKE ONE TABLET (20mg) BY MOUTH ONCE A DAY 90 tablet 2    gabapentin (NEURONTIN) 100 MG capsule 100 mg. 2 tabs in the am and 2 tabs in the pm      HYDROcodone-acetaminophen (NORCO) 7.5-325 mg per tablet Take 1 tablet by mouth.      hydrOXYzine HCL (ATARAX) 10 MG Tab TAKE ONE TABLET BY MOUTH THREE TIMES DAILY FOR ANXIETY 30 tablet 1    lancets Misc To check BG one time daily PRN, to use with insurance preferred meter 100 each 0    magnesium oxide (MAG-OX) 400 mg (241.3 mg magnesium) tablet Take 400 mg by mouth once daily.      meclizine (ANTIVERT) 25 mg tablet TAKE ONE TABLET BY MOUTH THREE TIMES DAILY AS NEEDED FOR DIZZINESS 60 tablet 1    multivitamin capsule Take 1 capsule by mouth once daily.      neomycin-polymyxin-hydrocortisone (CORTISPORIN) 3.5-10,000-1 mg/mL-unit/mL-% otic suspension Place 3 drops into the right ear 2 (two) times a day. For seven days 10 mL 0    nystatin (MYCOSTATIN) powder Apply topically 3 (three) times daily as needed (skin rash). 60 g 1    omeprazole (PRILOSEC) 20 MG capsule Take 1 capsule (20 mg total) by mouth once daily. 90 capsule 3    potassium chloride SA (K-DUR,KLOR-CON) 20 MEQ tablet Take 1 tablet (20 mEq total) by mouth Daily. 90 tablet 0    rosuvastatin (CRESTOR) 10 MG tablet Take 1 tablet (10 mg total) by mouth every evening. 90 tablet 3    senna (SENOKOT) 8.6 mg tablet  Take 2 tablets by mouth once daily.      triamcinolone acetonide 0.1% (KENALOG) 0.1 % cream Apply topically once daily. Not to use for more than five consecutive days 15 g 0    blood-glucose meter kit To check BG one time daily PRN, to use with insurance preferred meter 1 each 0     No current facility-administered medications for this visit.

## 2025-07-03 DIAGNOSIS — F33.42 RECURRENT MAJOR DEPRESSIVE DISORDER, IN FULL REMISSION: ICD-10-CM

## 2025-07-03 DIAGNOSIS — K21.9 GASTROESOPHAGEAL REFLUX DISEASE WITHOUT ESOPHAGITIS: ICD-10-CM

## 2025-07-03 RX ORDER — OMEPRAZOLE 20 MG/1
20 CAPSULE, DELAYED RELEASE ORAL DAILY
Qty: 90 CAPSULE | Refills: 2 | Status: SHIPPED | OUTPATIENT
Start: 2025-07-03

## 2025-07-03 RX ORDER — ESCITALOPRAM OXALATE 20 MG/1
TABLET ORAL
Qty: 90 TABLET | Refills: 2 | Status: SHIPPED | OUTPATIENT
Start: 2025-07-03

## 2025-07-03 NOTE — TELEPHONE ENCOUNTER
No care due was identified.  [facility] Embedded Care Due Messages. Reference number: [id number].   7/03/2025 8:02:11 AM CDT

## 2025-07-03 NOTE — TELEPHONE ENCOUNTER
Refill Decision Note   Dayami Mina  is requesting a refill authorization.  Brief Assessment and Rationale for Refill:  Approve     Medication Therapy Plan:         Comments:     Note composed:1:38 PM 07/03/2025

## 2025-07-15 DIAGNOSIS — R42 DIZZINESS: Primary | ICD-10-CM

## 2025-07-18 DIAGNOSIS — M1A.0410 CHRONIC GOUT OF RIGHT HAND, UNSPECIFIED CAUSE: ICD-10-CM

## 2025-07-18 DIAGNOSIS — F41.9 ANXIETY: Primary | ICD-10-CM

## 2025-07-18 NOTE — TELEPHONE ENCOUNTER
No care due was identified.  [facility] Embedded Care Due Messages. Reference number: [id number].   7/18/2025 10:32:43 AM CDT

## 2025-07-18 NOTE — TELEPHONE ENCOUNTER
Copied from CRM #[id number]. Topic: Medications - Medication Refill  >> Jul 18, 2025  9:52 AM Justus wrote:  .Type: RX Refill Request    Who Called:  self     Have you contacted your pharmacy yes     Refill or New Rx: refill     RX Name and Strength: hydrOXYzine HCL (ATARAX) 10 MG Tab , allopurinoL (ZYLOPRIM) 100 MG tablet     How is the patient currently taking it? (ex. 1XDay): per dr morris     Is this a 30 day or 90 day RX: per dr morris   hydrOXYzine HCL (ATARAX) 10 MG Tab   Preferred Pharmacy with phone number: .  [facility] - [place] - [address]  [address] [place] [address]  Phone: [phone] Fax: [phone]    [facility]/pharmacy #[id number] - [place] - [address]  [address] [address]  Phone: [phone] Fax: [phone]        Local or Mail Order: local     Ordering Provider: michoacano salcido     Would the patient rather a call back or a response via [facility]?  Call back     Best Call Back Number:.[phone]      Additional Information: since dr hoyt appt pt needs refills

## 2025-07-21 ENCOUNTER — TELEPHONE (OUTPATIENT)
Dept: OTOLARYNGOLOGY | Facility: CLINIC | Age: 75
End: 2025-07-21
Payer: MEDICARE

## 2025-07-21 RX ORDER — HYDROXYZINE HYDROCHLORIDE 10 MG/1
10 TABLET, FILM COATED ORAL 3 TIMES DAILY PRN
Qty: 30 TABLET | Refills: 1 | Status: SHIPPED | OUTPATIENT
Start: 2025-07-21

## 2025-07-21 RX ORDER — ALLOPURINOL 100 MG/1
100 TABLET ORAL DAILY
Qty: 90 TABLET | Refills: 1 | Status: SHIPPED | OUTPATIENT
Start: 2025-07-21

## 2025-07-21 NOTE — TELEPHONE ENCOUNTER
Copied from CRM #[id number]. Topic: General Inquiry - Return Call  >> Jul 21, 2025 12:16 PM David Gardiner wrote:  Type:  Patient Returning Call    Who Called:pt   Who Left Message for Patient:Tonya   Does the patient know what this is regarding?:appt       Best Call Back Number:[phone] (M)  >> Jul 21, 2025  3:31 PM Med Assistant Tonya wrote:  Patient would like to see Dr Sullivan on the [facility] due to her staying over there, can someone reach out to get her scheduled Thanks Tonya  ----- Message -----  From: Zuleyka Shah  Sent: 7/21/2025  12:16 PM CDT  To: Master Rocael Staff

## 2025-07-29 DIAGNOSIS — J43.2 CENTRILOBULAR EMPHYSEMA: ICD-10-CM

## 2025-07-29 RX ORDER — FLUTICASONE FUROATE AND VILANTEROL TRIFENATATE 100; 25 UG/1; UG/1
POWDER RESPIRATORY (INHALATION)
Qty: 180 EACH | Refills: 2 | Status: SHIPPED | OUTPATIENT
Start: 2025-07-29

## 2025-07-29 NOTE — TELEPHONE ENCOUNTER
No care due was identified.  [facility] Embedded Care Due Messages. Reference number: [id number].   7/29/2025 8:49:13 AM CDT

## 2025-08-05 ENCOUNTER — TELEPHONE (OUTPATIENT)
Dept: FAMILY MEDICINE | Facility: CLINIC | Age: 75
End: 2025-08-05
Payer: MEDICARE

## 2025-08-05 DIAGNOSIS — F33.42 RECURRENT MAJOR DEPRESSIVE DISORDER, IN FULL REMISSION: ICD-10-CM

## 2025-08-05 DIAGNOSIS — K21.9 GASTROESOPHAGEAL REFLUX DISEASE WITHOUT ESOPHAGITIS: Primary | ICD-10-CM

## 2025-08-05 DIAGNOSIS — E78.2 MIXED HYPERLIPIDEMIA: ICD-10-CM

## 2025-08-05 DIAGNOSIS — R73.01 IFG (IMPAIRED FASTING GLUCOSE): ICD-10-CM

## 2025-08-05 DIAGNOSIS — M1A.0410 CHRONIC GOUT OF RIGHT HAND, UNSPECIFIED CAUSE: ICD-10-CM

## 2025-08-05 NOTE — TELEPHONE ENCOUNTER
Copied from CRM #[id number]. Topic: Appointments - Amb Referral  >> Aug 5, 2025  3:13 PM Martine wrote:  Type: Lab    Caller is requesting to schedule their Lab appointment prior to annual appointment.    Order is not listed in EPIC.  Please enter order and contact patient to schedule.    Name of Caller:self    Preferred Date and Time of Labs:    Date of EPP Appointment:8-7    Where would they like the lab performed?[facility]    Would the patient rather a call back or a response via [facility]? call    Best Call Back Number:..[phone]    Additional Information: would like orders in for her labs and urine she has cloudy urine

## 2025-08-06 ENCOUNTER — LAB VISIT (OUTPATIENT)
Dept: LAB | Facility: HOSPITAL | Age: 75
End: 2025-08-06
Attending: INTERNAL MEDICINE
Payer: MEDICARE

## 2025-08-06 DIAGNOSIS — E78.2 MIXED HYPERLIPIDEMIA: ICD-10-CM

## 2025-08-06 DIAGNOSIS — R73.01 IFG (IMPAIRED FASTING GLUCOSE): ICD-10-CM

## 2025-08-06 DIAGNOSIS — M1A.0410 CHRONIC GOUT OF RIGHT HAND, UNSPECIFIED CAUSE: ICD-10-CM

## 2025-08-06 DIAGNOSIS — F33.42 RECURRENT MAJOR DEPRESSIVE DISORDER, IN FULL REMISSION: ICD-10-CM

## 2025-08-06 DIAGNOSIS — K21.9 GASTROESOPHAGEAL REFLUX DISEASE WITHOUT ESOPHAGITIS: ICD-10-CM

## 2025-08-06 LAB
ALBUMIN SERPL BCP-MCNC: 3.9 G/DL (ref 3.5–5.2)
ALP SERPL-CCNC: 40 UNIT/L (ref 40–150)
ALT SERPL W/O P-5'-P-CCNC: 20 UNIT/L (ref 10–44)
ANION GAP (OHS): 11 MMOL/L (ref 8–16)
AST SERPL-CCNC: 26 UNIT/L (ref 11–45)
BILIRUB SERPL-MCNC: 0.3 MG/DL (ref 0.1–1)
BUN SERPL-MCNC: 21 MG/DL (ref 8–23)
CALCIUM SERPL-MCNC: 9.7 MG/DL (ref 8.7–10.5)
CHLORIDE SERPL-SCNC: 104 MMOL/L (ref 95–110)
CHOLEST SERPL-MCNC: 175 MG/DL (ref 120–199)
CHOLEST/HDLC SERPL: 3.5 {RATIO} (ref 2–5)
CO2 SERPL-SCNC: 25 MMOL/L (ref 23–29)
CREAT SERPL-MCNC: 1.1 MG/DL (ref 0.5–1.4)
ERYTHROCYTE [DISTWIDTH] IN BLOOD BY AUTOMATED COUNT: 13.8 % (ref 11.5–14.5)
GFR SERPLBLD CREATININE-BSD FMLA CKD-EPI: 52 ML/MIN/1.73/M2
GLUCOSE SERPL-MCNC: 101 MG/DL (ref 70–110)
HCT VFR BLD AUTO: 40.2 % (ref 37–48.5)
HDLC SERPL-MCNC: 50 MG/DL (ref 40–75)
HDLC SERPL: 28.6 % (ref 20–50)
HGB BLD-MCNC: 12.3 GM/DL (ref 12–16)
LDLC SERPL CALC-MCNC: 76.2 MG/DL (ref 63–159)
MCH RBC QN AUTO: 26.7 PG (ref 27–31)
MCHC RBC AUTO-ENTMCNC: 30.6 G/DL (ref 32–36)
MCV RBC AUTO: 87 FL (ref 82–98)
NONHDLC SERPL-MCNC: 125 MG/DL
PLATELET # BLD AUTO: 274 K/UL (ref 150–450)
PMV BLD AUTO: 10.4 FL (ref 9.2–12.9)
POTASSIUM SERPL-SCNC: 4.2 MMOL/L (ref 3.5–5.1)
PROT SERPL-MCNC: 7 GM/DL (ref 6–8.4)
RBC # BLD AUTO: 4.61 M/UL (ref 4–5.4)
SODIUM SERPL-SCNC: 140 MMOL/L (ref 136–145)
TRIGL SERPL-MCNC: 244 MG/DL (ref 30–150)
URATE SERPL-MCNC: 6.8 MG/DL (ref 2.4–5.7)
WBC # BLD AUTO: 5.71 K/UL (ref 3.9–12.7)

## 2025-08-06 PROCEDURE — 36415 COLL VENOUS BLD VENIPUNCTURE: CPT | Mod: PN

## 2025-08-06 PROCEDURE — 84550 ASSAY OF BLOOD/URIC ACID: CPT

## 2025-08-06 PROCEDURE — 80061 LIPID PANEL: CPT

## 2025-08-06 PROCEDURE — 83036 HEMOGLOBIN GLYCOSYLATED A1C: CPT

## 2025-08-06 PROCEDURE — 82040 ASSAY OF SERUM ALBUMIN: CPT

## 2025-08-06 PROCEDURE — 85027 COMPLETE CBC AUTOMATED: CPT

## 2025-08-07 ENCOUNTER — OFFICE VISIT (OUTPATIENT)
Dept: FAMILY MEDICINE | Facility: CLINIC | Age: 75
End: 2025-08-07
Payer: MEDICARE

## 2025-08-07 VITALS
DIASTOLIC BLOOD PRESSURE: 78 MMHG | HEART RATE: 85 BPM | TEMPERATURE: 98 F | HEIGHT: 57 IN | WEIGHT: 173.94 LBS | RESPIRATION RATE: 16 BRPM | OXYGEN SATURATION: 96 % | BODY MASS INDEX: 37.53 KG/M2 | SYSTOLIC BLOOD PRESSURE: 120 MMHG

## 2025-08-07 DIAGNOSIS — M47.812 CERVICAL SPONDYLOSIS: ICD-10-CM

## 2025-08-07 DIAGNOSIS — R30.0 DYSURIA: ICD-10-CM

## 2025-08-07 DIAGNOSIS — J43.2 CENTRILOBULAR EMPHYSEMA: ICD-10-CM

## 2025-08-07 DIAGNOSIS — G31.84 MCI (MILD COGNITIVE IMPAIRMENT): ICD-10-CM

## 2025-08-07 DIAGNOSIS — F41.9 ANXIETY: ICD-10-CM

## 2025-08-07 DIAGNOSIS — I65.21 STENOSIS OF RIGHT CAROTID ARTERY: Primary | ICD-10-CM

## 2025-08-07 DIAGNOSIS — I70.0 AORTIC ATHEROSCLEROSIS: ICD-10-CM

## 2025-08-07 LAB
BILIRUB UR QL STRIP.AUTO: NEGATIVE
CLARITY UR: CLEAR
COLOR UR AUTO: YELLOW
EAG (OHS): 131 MG/DL (ref 68–131)
GLUCOSE UR QL STRIP: NEGATIVE
HBA1C MFR BLD: 6.2 % (ref 4–5.6)
HGB UR QL STRIP: NEGATIVE
KETONES UR QL STRIP: NEGATIVE
LEUKOCYTE ESTERASE UR QL STRIP: NEGATIVE
NITRITE UR QL STRIP: NEGATIVE
PH UR STRIP: 5 [PH]
PROT UR QL STRIP: ABNORMAL
SP GR UR STRIP: 1.03
UROBILINOGEN UR STRIP-ACNC: ABNORMAL EU/DL

## 2025-08-07 PROCEDURE — 99215 OFFICE O/P EST HI 40 MIN: CPT | Mod: PBBFAC,PN | Performed by: INTERNAL MEDICINE

## 2025-08-07 PROCEDURE — 81003 URINALYSIS AUTO W/O SCOPE: CPT | Performed by: INTERNAL MEDICINE

## 2025-08-07 PROCEDURE — 99999 PR PBB SHADOW E&M-EST. PATIENT-LVL V: CPT | Mod: PBBFAC,,, | Performed by: INTERNAL MEDICINE

## 2025-08-07 NOTE — LETTER
August 7, 2025      [facility]  [address]  [address]  [place] [address]  Phone: [phone]       Patient: Dayamikingsley Mina   YOB: 1950  Date of Visit: 08/07/2025    To Whom It May Concern:    Mrs Frieda Scales was at [facility] on 08/07/2025 with her mother Mrs Mina. The patient may return to work/school on 08/08/2025 with no restrictions. If you have any questions or concerns, or if I can be of further assistance, please do not hesitate to contact me.    Sincerely,    Liliane Melissa MA
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
## 2025-08-08 LAB — HOLD SPECIMEN: NORMAL

## 2025-08-28 ENCOUNTER — TELEPHONE (OUTPATIENT)
Dept: FAMILY MEDICINE | Facility: CLINIC | Age: 75
End: 2025-08-28
Payer: MEDICARE

## 2025-08-28 ENCOUNTER — OFFICE VISIT (OUTPATIENT)
Dept: OTOLARYNGOLOGY | Facility: CLINIC | Age: 75
End: 2025-08-28
Payer: MEDICARE

## 2025-08-28 ENCOUNTER — PATIENT MESSAGE (OUTPATIENT)
Dept: OTOLARYNGOLOGY | Facility: CLINIC | Age: 75
End: 2025-08-28

## 2025-08-28 ENCOUNTER — CLINICAL SUPPORT (OUTPATIENT)
Dept: AUDIOLOGY | Facility: CLINIC | Age: 75
End: 2025-08-28
Payer: MEDICARE

## 2025-08-28 VITALS
SYSTOLIC BLOOD PRESSURE: 108 MMHG | HEART RATE: 83 BPM | RESPIRATION RATE: 18 BRPM | BODY MASS INDEX: 36.87 KG/M2 | HEIGHT: 57 IN | DIASTOLIC BLOOD PRESSURE: 66 MMHG | TEMPERATURE: 98 F | WEIGHT: 170.88 LBS

## 2025-08-28 DIAGNOSIS — G43.809 VESTIBULAR MIGRAINE: ICD-10-CM

## 2025-08-28 DIAGNOSIS — H90.3 SENSORINEURAL HEARING LOSS (SNHL) OF BOTH EARS: Primary | ICD-10-CM

## 2025-08-28 DIAGNOSIS — H81.02 ENDOLYMPHATIC HYDROPS OF LEFT EAR: ICD-10-CM

## 2025-08-28 DIAGNOSIS — R42 DIZZINESS: Primary | ICD-10-CM

## 2025-08-28 DIAGNOSIS — H81.10 BPPV (BENIGN PAROXYSMAL POSITIONAL VERTIGO), UNSPECIFIED LATERALITY: ICD-10-CM

## 2025-08-28 DIAGNOSIS — F41.9 ANXIETY: ICD-10-CM

## 2025-08-28 PROCEDURE — 92567 TYMPANOMETRY: CPT | Mod: S$GLB,,,

## 2025-08-28 PROCEDURE — 92557 COMPREHENSIVE HEARING TEST: CPT | Mod: S$GLB,,,

## 2025-08-28 RX ORDER — HYDROXYZINE HYDROCHLORIDE 10 MG/1
10 TABLET, FILM COATED ORAL 3 TIMES DAILY PRN
Qty: 30 TABLET | Refills: 1 | Status: SHIPPED | OUTPATIENT
Start: 2025-08-28

## (undated) DEVICE — GLOVE BIOGEL 7.5

## (undated) DEVICE — SOL 9P NACL IRR PIC IL

## (undated) DEVICE — DRESSING ANTIMICROBIAL 1 INCH

## (undated) DEVICE — APPLICATOR CHLORAPREP ORN 26ML

## (undated) DEVICE — SEE MEDLINE ITEM 152622

## (undated) DEVICE — EVACUATOR WOUND BULB 100CC

## (undated) DEVICE — Device

## (undated) DEVICE — SYR ONLY LUER LOCK 20CC

## (undated) DEVICE — SEE MEDLINE ITEM 157117

## (undated) DEVICE — ELECTRODE REM PLYHSV RETURN 9

## (undated) DEVICE — CONTAINER SPECIMEN STRL 4OZ

## (undated) DEVICE — SEE MEDLINE ITEM 107746

## (undated) DEVICE — DRESSING TRANS 2X2 TEGADERM

## (undated) DEVICE — GLOVE BIOGEL ECLIPSE SZ 6

## (undated) DEVICE — SUT MONOCRYL PLUS UD 3-0 27

## (undated) DEVICE — GAUZE FLUFF XXLG 36X36 2 PLY

## (undated) DEVICE — SHEET THYROID W/ISO-BAC

## (undated) DEVICE — SEE MEDLINE ITEM 146292

## (undated) DEVICE — SYS CLSR DERMABOND PRINEO 22CM

## (undated) DEVICE — SUT STRATAFIX SPRL PS-2 3-0

## (undated) DEVICE — SUT STRATAFIX 4-0 30CM PS-2

## (undated) DEVICE — STAPLER SKIN ROTATING HEAD

## (undated) DEVICE — SHEET DRAPE FAN-FOLDED 3/4

## (undated) DEVICE — CANISTER SUCTION 2 LTR

## (undated) DEVICE — DRAIN SIL RND HUBLSS 19F TRCR

## (undated) DEVICE — UNDERGLOVES BIOGEL PI SIZE 8

## (undated) DEVICE — SEE MEDLINE ITEM 146298

## (undated) DEVICE — BLADE SURG STAINLESS STEEL #15

## (undated) DEVICE — COVER OVERHEAD SURG LT BLUE

## (undated) DEVICE — NDL HYPO REG 25G X 1 1/2

## (undated) DEVICE — GLOVE SURG BIOGEL LATEX SZ 7.5

## (undated) DEVICE — BRA CLASSIC COMFORT 44 BLACK

## (undated) DEVICE — GLOVE BIOGEL PI MICRO SZ 7

## (undated) DEVICE — NDL 18GA X1 1/2 REG BEVEL

## (undated) DEVICE — SYR 10CC LUER LOCK

## (undated) DEVICE — SPONGE LAP 18X18 PREWASHED

## (undated) DEVICE — SUT VICRYL PLUS 3-0 SH 18IN